# Patient Record
Sex: FEMALE | Employment: OTHER | URBAN - METROPOLITAN AREA
[De-identification: names, ages, dates, MRNs, and addresses within clinical notes are randomized per-mention and may not be internally consistent; named-entity substitution may affect disease eponyms.]

---

## 2017-02-02 ENCOUNTER — APPOINTMENT (OUTPATIENT)
Dept: GENERAL RADIOLOGY | Age: 72
End: 2017-02-02
Attending: PHYSICIAN ASSISTANT
Payer: COMMERCIAL

## 2017-02-02 ENCOUNTER — HOSPITAL ENCOUNTER (EMERGENCY)
Age: 72
Discharge: HOME | End: 2017-02-02
Attending: EMERGENCY MEDICINE
Payer: COMMERCIAL

## 2017-02-02 VITALS
BODY MASS INDEX: 35.49 KG/M2 | DIASTOLIC BLOOD PRESSURE: 76 MMHG | HEIGHT: 65 IN | RESPIRATION RATE: 20 BRPM | WEIGHT: 213 LBS | HEART RATE: 75 BPM | SYSTOLIC BLOOD PRESSURE: 142 MMHG | TEMPERATURE: 98.3 F | OXYGEN SATURATION: 99 %

## 2017-02-02 DIAGNOSIS — K21.9 GASTROESOPHAGEAL REFLUX DISEASE WITHOUT ESOPHAGITIS: ICD-10-CM

## 2017-02-02 DIAGNOSIS — R07.9 CHEST PAIN, UNSPECIFIED TYPE: Primary | ICD-10-CM

## 2017-02-02 LAB
ALBUMIN SERPL BCP-MCNC: 2.7 G/DL (ref 3.5–5)
ALBUMIN/GLOB SERPL: 0.6 {RATIO} (ref 1.1–2.2)
ALP SERPL-CCNC: 98 U/L (ref 45–117)
ALT SERPL-CCNC: 27 U/L (ref 12–78)
ANION GAP BLD CALC-SCNC: 9 MMOL/L (ref 5–15)
APPEARANCE UR: CLEAR
AST SERPL W P-5'-P-CCNC: 25 U/L (ref 15–37)
ATRIAL RATE: 80 BPM
BACTERIA URNS QL MICRO: NEGATIVE /HPF
BASOPHILS # BLD AUTO: 0 K/UL (ref 0–0.1)
BASOPHILS # BLD: 0 % (ref 0–1)
BILIRUB SERPL-MCNC: 0.9 MG/DL (ref 0.2–1)
BILIRUB UR QL: NEGATIVE
BUN SERPL-MCNC: 11 MG/DL (ref 6–20)
BUN/CREAT SERPL: 13 (ref 12–20)
CALCIUM SERPL-MCNC: 8.7 MG/DL (ref 8.5–10.1)
CALCULATED P AXIS, ECG09: 3 DEGREES
CALCULATED R AXIS, ECG10: 1 DEGREES
CALCULATED T AXIS, ECG11: 31 DEGREES
CHLORIDE SERPL-SCNC: 101 MMOL/L (ref 97–108)
CO2 SERPL-SCNC: 29 MMOL/L (ref 21–32)
COLOR UR: ABNORMAL
CREAT SERPL-MCNC: 0.82 MG/DL (ref 0.55–1.02)
DIAGNOSIS, 93000: NORMAL
DIFFERENTIAL METHOD BLD: ABNORMAL
EOSINOPHIL # BLD: 0 K/UL (ref 0–0.4)
EOSINOPHIL NFR BLD: 0 % (ref 0–7)
EPITH CASTS URNS QL MICRO: ABNORMAL /LPF
ERYTHROCYTE [DISTWIDTH] IN BLOOD BY AUTOMATED COUNT: 14.6 % (ref 11.5–14.5)
GLOBULIN SER CALC-MCNC: 4.8 G/DL (ref 2–4)
GLUCOSE SERPL-MCNC: 156 MG/DL (ref 65–100)
GLUCOSE UR STRIP.AUTO-MCNC: NEGATIVE MG/DL
HCT VFR BLD AUTO: 34.9 % (ref 35–47)
HGB BLD-MCNC: 11.7 G/DL (ref 11.5–16)
HGB UR QL STRIP: ABNORMAL
HYALINE CASTS URNS QL MICRO: ABNORMAL /LPF (ref 0–5)
KETONES UR QL STRIP.AUTO: NEGATIVE MG/DL
LACTATE SERPL-SCNC: 1.5 MMOL/L (ref 0.4–2)
LEUKOCYTE ESTERASE UR QL STRIP.AUTO: ABNORMAL
LIPASE SERPL-CCNC: 142 U/L (ref 73–393)
LYMPHOCYTES # BLD AUTO: 9 % (ref 12–49)
LYMPHOCYTES # BLD: 1.6 K/UL (ref 0.8–3.5)
MCH RBC QN AUTO: 29.3 PG (ref 26–34)
MCHC RBC AUTO-ENTMCNC: 33.5 G/DL (ref 30–36.5)
MCV RBC AUTO: 87.3 FL (ref 80–99)
MONOCYTES # BLD: 0.9 K/UL (ref 0–1)
MONOCYTES NFR BLD AUTO: 5 % (ref 5–13)
NEUTS BAND NFR BLD MANUAL: 3 % (ref 0–6)
NEUTS SEG # BLD: 15.4 K/UL (ref 1.8–8)
NEUTS SEG NFR BLD AUTO: 83 % (ref 32–75)
NITRITE UR QL STRIP.AUTO: NEGATIVE
P-R INTERVAL, ECG05: 162 MS
PH UR STRIP: 7.5 [PH] (ref 5–8)
PLATELET # BLD AUTO: 274 K/UL (ref 150–400)
PLATELET COMMENTS,PCOM: ABNORMAL
POTASSIUM SERPL-SCNC: 3.7 MMOL/L (ref 3.5–5.1)
PROT SERPL-MCNC: 7.5 G/DL (ref 6.4–8.2)
PROT UR STRIP-MCNC: NEGATIVE MG/DL
Q-T INTERVAL, ECG07: 386 MS
QRS DURATION, ECG06: 88 MS
QTC CALCULATION (BEZET), ECG08: 445 MS
RBC # BLD AUTO: 4 M/UL (ref 3.8–5.2)
RBC #/AREA URNS HPF: ABNORMAL /HPF (ref 0–5)
RBC MORPH BLD: ABNORMAL
SODIUM SERPL-SCNC: 139 MMOL/L (ref 136–145)
SP GR UR REFRACTOMETRY: 1.01 (ref 1–1.03)
TROPONIN I SERPL-MCNC: <0.04 NG/ML
TROPONIN I SERPL-MCNC: <0.04 NG/ML
UA: UC IF INDICATED,UAUC: ABNORMAL
UROBILINOGEN UR QL STRIP.AUTO: 1 EU/DL (ref 0.2–1)
VENTRICULAR RATE, ECG03: 80 BPM
WBC # BLD AUTO: 17.9 K/UL (ref 3.6–11)
WBC URNS QL MICRO: ABNORMAL /HPF (ref 0–4)

## 2017-02-02 PROCEDURE — 81001 URINALYSIS AUTO W/SCOPE: CPT | Performed by: PHYSICIAN ASSISTANT

## 2017-02-02 PROCEDURE — 83690 ASSAY OF LIPASE: CPT | Performed by: EMERGENCY MEDICINE

## 2017-02-02 PROCEDURE — 84484 ASSAY OF TROPONIN QUANT: CPT | Performed by: EMERGENCY MEDICINE

## 2017-02-02 PROCEDURE — 93005 ELECTROCARDIOGRAM TRACING: CPT

## 2017-02-02 PROCEDURE — 74011250636 HC RX REV CODE- 250/636: Performed by: PHYSICIAN ASSISTANT

## 2017-02-02 PROCEDURE — 36415 COLL VENOUS BLD VENIPUNCTURE: CPT | Performed by: EMERGENCY MEDICINE

## 2017-02-02 PROCEDURE — 85025 COMPLETE CBC W/AUTO DIFF WBC: CPT | Performed by: EMERGENCY MEDICINE

## 2017-02-02 PROCEDURE — 99285 EMERGENCY DEPT VISIT HI MDM: CPT

## 2017-02-02 PROCEDURE — 83605 ASSAY OF LACTIC ACID: CPT | Performed by: EMERGENCY MEDICINE

## 2017-02-02 PROCEDURE — 96374 THER/PROPH/DIAG INJ IV PUSH: CPT

## 2017-02-02 PROCEDURE — 80053 COMPREHEN METABOLIC PANEL: CPT | Performed by: EMERGENCY MEDICINE

## 2017-02-02 PROCEDURE — 71020 XR CHEST PA LAT: CPT

## 2017-02-02 PROCEDURE — 74011000250 HC RX REV CODE- 250: Performed by: PHYSICIAN ASSISTANT

## 2017-02-02 PROCEDURE — 96361 HYDRATE IV INFUSION ADD-ON: CPT

## 2017-02-02 PROCEDURE — 74011250637 HC RX REV CODE- 250/637: Performed by: PHYSICIAN ASSISTANT

## 2017-02-02 RX ORDER — FAMOTIDINE 10 MG/ML
20 INJECTION INTRAVENOUS
Status: COMPLETED | OUTPATIENT
Start: 2017-02-02 | End: 2017-02-02

## 2017-02-02 RX ADMIN — FAMOTIDINE 20 MG: 10 INJECTION, SOLUTION INTRAVENOUS at 02:43

## 2017-02-02 RX ADMIN — LIDOCAINE HYDROCHLORIDE 40 ML: 20 SOLUTION ORAL; TOPICAL at 02:40

## 2017-02-02 RX ADMIN — SODIUM CHLORIDE 1000 ML: 900 INJECTION, SOLUTION INTRAVENOUS at 01:38

## 2017-02-02 NOTE — ED PROVIDER NOTES
HPI Comments: 69 y.o. female with past medical history significant for hypertension, GERD, chronic pain, and endocrine disease who presents from home with chief complaint of chest pain. States she began with left sided chest pain that began around 2000. Pain to left side and left arm. No exertional symptoms. States mild SOB with pain but none otherwise. Pt states she also vomited this am after drinking creamer which \"happens\" to her. Denies abd pain. Denies fever, cough, abd pain, flank pain, urinary symptoms. Non smoker. Review of old chart shows Negative stress by Dr Taina Santillan Dec 2015. Patient is a 66 y.o. female presenting with chest pain. The history is provided by the patient. Chest Pain (Angina)    This is a new problem. The current episode started 3 to 5 hours ago. The problem has been gradually improving. The pain is at a severity of 7/10. The pain radiates to the left arm. Pertinent negatives include no abdominal pain, no back pain, no cough, no diaphoresis, no dizziness, no exertional chest pressure, no headaches, no numbness, no palpitations and no weakness.         Past Medical History:   Diagnosis Date    Abdominal pain 6/18/14     note from [facility] directive discussed with patient 07/22/2015    Arthritis      dr Leandro buck          7/8/15 note/lab    Bacteremia due to Klebsiella pneumoniae 2/2/2016     [facility] note from Dr Nick Boyer, Infect Disease    Chronic pain      low back pain/arthritis      [facility] note 11/2/16    Contact dermatitis and other eczema, due to unspecified cause      hyperpigmented macules/seb k    Elevated LFTs      per info [facility] Dr Eda Steen [facility] on report    Endocrine disease      hypothyroid    GERD (gastroesophageal reflux disease)      chelsy burns    HSV infection     Hypertension     Hypothyroid 10/2012    Insomnia     Melasma 3/3/15     notes from [facility]    Pain management counseling, encounter for 05/05/2016     sees Dr Mable Trinh 10/7/16    Rhinitis, allergic nonseasonal     Well woman exam (no gynecological exam) 07/29/2016     zedler's note rec'd       Past Surgical History:   Procedure Laterality Date    Hx cholecystectomy      Hx tubal ligation      Colonoscopy  8/1/11     dr Arti Tamayo 10 year repeat    [facility]         History reviewed. No pertinent family history. Social History     Social History    Marital status:      Spouse name: N/A    Number of children: N/A    Years of education: N/A     Occupational History    Not on file. Social History Main Topics    Smoking status: Never Smoker    Smokeless tobacco: Never Used    Alcohol use No    Drug use: No    Sexual activity: Not Currently     Other Topics Concern    Not on file     Social History Narrative         ALLERGIES: Pcn [penicillins]; Tramadol; Proventil [albuterol sulfate]; Ace inhibitors; Albuterol; Ambien [zolpidem]; Ciprofloxacin; Lisinopril; Oxaprozin; Sulfadiazine; and Trazodone    Review of Systems   Constitutional: Negative. Negative for diaphoresis. HENT: Negative for ear discharge. Eyes: Negative for photophobia, pain, discharge and visual disturbance. Respiratory: Negative for apnea, cough and chest tightness. Cardiovascular: Positive for chest pain. Negative for palpitations and leg swelling. Gastrointestinal: Negative for abdominal distention, abdominal pain and blood in stool. Genitourinary: Negative for difficulty urinating, dysuria, flank pain, frequency and hematuria. Musculoskeletal: Negative for back pain, gait problem, joint swelling, myalgias and neck pain. Skin: Negative for color change and pallor. Neurological: Negative for dizziness, syncope, weakness, numbness and headaches. Psychiatric/Behavioral: Negative for behavioral problems and confusion. The patient is not nervous/anxious.         Vitals: 02/02/17 0027   BP: 132/80   Pulse: 79   Resp: 19   Temp: 98.3 °F (36.8 °C)   SpO2: 99%   Weight: 96.6 kg (213 lb)   Height: 5' 5\" (1.651 m)            Physical Exam   Constitutional: She is oriented to person, place, and time. She appears well-developed and well-nourished. HENT:   Head: Normocephalic and atraumatic. Right Ear: External ear normal.   Left Ear: External ear normal.   Nose: Nose normal.   Mouth/Throat: Oropharynx is clear and moist.   Eyes: Conjunctivae and EOM are normal. Pupils are equal, round, and reactive to light. Right eye exhibits no discharge. Left eye exhibits no discharge. Neck: Normal range of motion. Neck supple. Cardiovascular: Normal rate, regular rhythm, normal heart sounds and intact distal pulses. Pulmonary/Chest: Effort normal and breath sounds normal.   Abdominal: Soft. Bowel sounds are normal. She exhibits no distension. There is no tenderness. There is no rebound and no guarding. Musculoskeletal: Normal range of motion. She exhibits no edema or tenderness. Neurological: She is alert and oriented to person, place, and time. No cranial nerve deficit. Coordination normal.   Skin: Skin is warm and dry. No rash noted. Psychiatric: She has a normal mood and affect. Her behavior is normal. Judgment and thought content normal.   Nursing note and vitals reviewed. MDM  Number of Diagnoses or Management Options  Chest pain, unspecified type:   Gastroesophageal reflux disease without esophagitis:   Diagnosis management comments: 67 yo female with chest pain earlier this evening; no exertional CP, stress neg Dec 2015; EKG and 2 trops negative; will d/c with PCP and cards followup; also with GI at request of pt; return if change or worsening of symptoms.  FOSTER Santos          Amount and/or Complexity of Data Reviewed  Clinical lab tests: ordered and reviewed  Tests in the radiology section of CPT®: ordered and reviewed  Discuss the patient with other providers: yes  Independent visualization of images, tracings, or specimens: yes      ED Course       Procedures     ED EKG interpretation:  Rhythm: normal sinus rhythm; and regular . Rate (approx.): 80; Axis: normal; P wave: normal; QRS interval: normal ; ST/T wave: normal. This EKG was interpreted by Dr. Eulalio Pacheco and documented by Chani Patient. Garrett Jaimes PA-C,ED Provider. Patient has been reassessed. States now with some pain; repeat EKG with no changes;   ED EKG interpretation:  Rhythm: normal sinus rhythm; and regular . Rate (approx.): 74; Axis: normal; P wave: normal; QRS interval: normal ; ST/T wave: normal. This EKG was interpreted by Dr. Eulalio Pacheco and documented by Chani Patient. Garrett Jaimes PA-C,ED Provider. Discussed case with attending Physician Eulalio Pacheco in to see pt. Agrees with care and plan. FOSTER Napoles    Patient has been reassessed. Feeling better. Reviewed labs, medications and radiographics with patient. Ready to discharge home. Patient's results have been reviewed with them. Patient and/or family have verbally conveyed their understanding and agreement of the patient's signs, symptoms, diagnosis, treatment and prognosis and additionally agree to follow up as recommended or return to the Emergency Room should their condition change prior to follow-up. Discharge instructions have also been provided to the patient with some educational information regarding their diagnosis as well a list of reasons why they would want to return to the ER prior to their follow-up appointment should their condition change.   FOSTER Napoles

## 2017-02-02 NOTE — ED TRIAGE NOTES
Patient presents to the emergency department reporting chest pain, shortness of breath, and left arm pain. Patient reports onset of pain \"sometime this evening. \"  Patient reports pain is a burning sensation, and patient reports significant GI issues. Patient reports loose stool and vomiting yesterday.

## 2017-02-02 NOTE — DISCHARGE INSTRUCTIONS
Chest Pain: Care Instructions  Your Care Instructions  There are many things that can cause chest pain. Some are not serious and will get better on their own in a few days. But some kinds of chest pain need more testing and treatment. Your doctor may have recommended a follow-up visit in the next 8 to 12 hours. If you are not getting better, you may need more tests or treatment. Even though your doctor has released you, you still need to watch for any problems. The doctor carefully checked you, but sometimes problems can develop later. If you have new symptoms or if your symptoms do not get better, get medical care right away. If you have worse or different chest pain or pressure that lasts more than 5 minutes or you passed out (lost consciousness), call 911 or seek other emergency help right away. A medical visit is only one step in your treatment. Even if you feel better, you still need to do what your doctor recommends, such as going to all suggested follow-up appointments and taking medicines exactly as directed. This will help you recover and help prevent future problems. How can you care for yourself at home? · Rest until you feel better. · Take your medicine exactly as prescribed. Call your doctor if you think you are having a problem with your medicine. · Do not drive after taking a prescription pain medicine. When should you call for help? Call 911 if:  · You passed out (lost consciousness). · You have severe difficulty breathing. · You have symptoms of a heart attack. These may include:  ¨ Chest pain or pressure, or a strange feeling in your chest.  ¨ Sweating. ¨ Shortness of breath. ¨ Nausea or vomiting. ¨ Pain, pressure, or a strange feeling in your back, neck, jaw, or upper belly or in one or both shoulders or arms. ¨ Lightheadedness or sudden weakness. ¨ A fast or irregular heartbeat.   After you call 911, the  may tell you to chew 1 adult-strength or 2 to 4 low-dose aspirin. Wait for an ambulance. Do not try to drive yourself. Call your doctor today if:  · You have any trouble breathing. · Your chest pain gets worse. · You are dizzy or lightheaded, or you feel like you may faint. · You are not getting better as expected. · You are having new or different chest pain. Where can you learn more? Go to http://petey-rona.info/. Enter A120 in the search box to learn more about \"Chest Pain: Care Instructions. \"  Current as of: May 27, 2016  Content Version: 11.1  © 8461-3169 [employer]. Care instructions adapted under license by [employer] (which disclaims liability or warranty for this information). If you have questions about a medical condition or this instruction, always ask your healthcare professional. [address] any warranty or liability for your use of this information. We hope that we have addressed all of your medical concerns. The examination and treatment you received in the Emergency Department were for an emergent problem and were not intended as complete care. It is important that you follow up with your healthcare provider(s) for ongoing care. If your symptoms worsen or do not improve as expected, and you are unable to reach your usual health care provider(s), you should return to the Emergency Department. Today's healthcare is undergoing tremendous change, and patient satisfaction surveys are one of the many tools to assess the quality of medical care. You may receive a survey from the [employer] organization regarding your experience in the Emergency Department. I hope that your experience has been completely positive, particularly the medical care that I provided. As such, please participate in the survey; anything less than excellent does not meet my expectations or intentions.         [facility] and [facility] participate in nationally recognized quality of care measures. If your blood pressure is greater than 120/80, as reported below, we urge that you seek medical care to address the potential of high blood pressure, commonly known as hypertension. Hypertension can be hereditary or can be caused by certain medical conditions, pain, stress, or \"white coat syndrome. \"       Please make an appointment with your health care provider(s) for follow up of your Emergency Department visit. VITALS:   Patient Vitals for the past 8 hrs:   Temp Pulse Resp BP SpO2   02/02/17 0415 - 78 19 140/76 97 %   02/02/17 0330 - 79 19 147/75 99 %   02/02/17 0315 - 75 19 152/82 98 %   02/02/17 0247 - 74 20 146/76 100 %   02/02/17 0130 - 75 17 131/81 100 %   02/02/17 0027 98.3 °F (36.8 °C) 79 19 132/80 99 %          Thank you for allowing us to provide you with medical care today. We realize that you have many choices for your emergency care needs. Please choose us in the future for any continued health care needs. Beronica Hoffman  [address].   Office: [phone]            Recent Results (from the past 24 hour(s))   EKG, 12 LEAD, INITIAL    Collection Time: 02/02/17 12:30 AM   Result Value Ref Range    Ventricular Rate 80 BPM    Atrial Rate 80 BPM    P-R Interval 162 ms    QRS Duration 88 ms    Q-T Interval 386 ms    QTC Calculation (Bezet) 445 ms    Calculated P Axis 3 degrees    Calculated R Axis 1 degrees    Calculated T Axis 31 degrees    Diagnosis       Normal sinus rhythm  When compared with ECG of 23-DEC-2015 03:42,  premature ventricular complexes are no longer present     CBC WITH AUTOMATED DIFF    Collection Time: 02/02/17 12:41 AM   Result Value Ref Range    WBC 17.9 (H) 3.6 - 11.0 K/uL    RBC 4.00 3.80 - 5.20 M/uL    HGB 11.7 11.5 - 16.0 g/dL    HCT 34.9 (L) 35.0 - 47.0 %    MCV 87.3 80.0 - 99.0 FL    MCH 29.3 26.0 - 34.0 PG    MCHC 33.5 30.0 - 36.5 g/dL    RDW 14.6 (H) 11.5 - 14.5 % PLATELET 070 518 - 260 K/uL    NEUTROPHILS 83 (H) 32 - 75 %    BAND NEUTROPHILS 3 0 - 6 %    LYMPHOCYTES 9 (L) 12 - 49 %    MONOCYTES 5 5 - 13 %    EOSINOPHILS 0 0 - 7 %    BASOPHILS 0 0 - 1 %    ABS. NEUTROPHILS 15.4 (H) 1.8 - 8.0 K/UL    ABS. LYMPHOCYTES 1.6 0.8 - 3.5 K/UL    ABS. MONOCYTES 0.9 0.0 - 1.0 K/UL    ABS. EOSINOPHILS 0.0 0.0 - 0.4 K/UL    ABS. BASOPHILS 0.0 0.0 - 0.1 K/UL    DF MANUAL      PLATELET COMMENTS LARGE PLATELETS      RBC COMMENTS ANISOCYTOSIS  1+       METABOLIC PANEL, COMPREHENSIVE    Collection Time: 02/02/17 12:41 AM   Result Value Ref Range    Sodium 139 136 - 145 mmol/L    Potassium 3.7 3.5 - 5.1 mmol/L    Chloride 101 97 - 108 mmol/L    CO2 29 21 - 32 mmol/L    Anion gap 9 5 - 15 mmol/L    Glucose 156 (H) 65 - 100 mg/dL    BUN 11 6 - 20 MG/DL    Creatinine 0.82 0.55 - 1.02 MG/DL    BUN/Creatinine ratio 13 12 - 20      GFR est AA >60 >60 ml/min/1.73m2    GFR est non-AA >60 >60 ml/min/1.73m2    Calcium 8.7 8.5 - 10.1 MG/DL    Bilirubin, total 0.9 0.2 - 1.0 MG/DL    ALT (SGPT) 27 12 - 78 U/L    AST (SGOT) 25 15 - 37 U/L    Alk.  phosphatase 98 45 - 117 U/L    Protein, total 7.5 6.4 - 8.2 g/dL    Albumin 2.7 (L) 3.5 - 5.0 g/dL    Globulin 4.8 (H) 2.0 - 4.0 g/dL    A-G Ratio 0.6 (L) 1.1 - 2.2     TROPONIN I    Collection Time: 02/02/17 12:41 AM   Result Value Ref Range    Troponin-I, Qt. <0.04 <0.05 ng/mL   LIPASE    Collection Time: 02/02/17 12:41 AM   Result Value Ref Range    Lipase 142 73 - 393 U/L   URINALYSIS W/ REFLEX CULTURE    Collection Time: 02/02/17  1:27 AM   Result Value Ref Range    Color YELLOW/STRAW      Appearance CLEAR CLEAR      Specific gravity 1.008 1.003 - 1.030      pH (UA) 7.5 5.0 - 8.0      Protein NEGATIVE  NEG mg/dL    Glucose NEGATIVE  NEG mg/dL    Ketone NEGATIVE  NEG mg/dL    Bilirubin NEGATIVE  NEG      Blood SMALL (A) NEG      Urobilinogen 1.0 0.2 - 1.0 EU/dL    Nitrites NEGATIVE  NEG      Leukocyte Esterase TRACE (A) NEG      WBC 0-4 0 - 4 /hpf    RBC 0-5 0 - 5 /hpf    Epithelial cells FEW FEW /lpf    Bacteria NEGATIVE  NEG /hpf    UA:UC IF INDICATED CULTURE NOT INDICATED BY UA RESULT CNI      Hyaline cast 0-2 0 - 5 /lpf   LACTIC ACID, PLASMA    Collection Time: 02/02/17  1:35 AM   Result Value Ref Range    Lactic acid 1.5 0.4 - 2.0 MMOL/L   TROPONIN I    Collection Time: 02/02/17  3:33 AM   Result Value Ref Range    Troponin-I, Qt. <0.04 <0.05 ng/mL       Xr Chest Pa Lat    Result Date: 2/2/2017  EXAM:  CR chest PA lateral INDICATION:  Chest pain, shortness of breath, and left arm pain starting tonight. COMPARISON: 12/30/2015. TECHNIQUE: Frontal and lateral chest views. FINDINGS: Cardiac monitoring wires overlie the thorax. The cardiomediastinal contours are stable. There is stable biapical pleural parenchymal scarring. The lungs and pleural spaces are otherwise clear. The bones and upper abdomen are stable. IMPRESSION: There is no acute process.

## 2017-02-02 NOTE — ED NOTES
Pt is A&OX3 airway patent resting on str without signs of distress. Pt given discharge instructions and advised to follow up with her PCP. Pt expressed understanding of such information. IV removed and pressure dressing applied. Pt taken to waiting room by wheelchair.

## 2017-02-13 DIAGNOSIS — I10 ESSENTIAL HYPERTENSION WITH GOAL BLOOD PRESSURE LESS THAN 130/85: ICD-10-CM

## 2017-02-13 DIAGNOSIS — E03.9 ACQUIRED HYPOTHYROIDISM: ICD-10-CM

## 2017-02-13 RX ORDER — HYDROCHLOROTHIAZIDE 25 MG/1
25 TABLET ORAL DAILY
Qty: 90 TAB | Refills: 0 | Status: SHIPPED | OUTPATIENT
Start: 2017-02-13 | End: 2017-07-12 | Stop reason: SDUPTHER

## 2017-02-13 RX ORDER — VERAPAMIL HYDROCHLORIDE 120 MG/1
TABLET, FILM COATED, EXTENDED RELEASE ORAL
Qty: 90 TAB | Refills: 0 | Status: SHIPPED | OUTPATIENT
Start: 2017-02-13 | End: 2017-05-01 | Stop reason: SDUPTHER

## 2017-02-13 RX ORDER — LEVOTHYROXINE SODIUM 50 UG/1
TABLET ORAL
Qty: 90 TAB | Refills: 2 | Status: SHIPPED | OUTPATIENT
Start: 2017-02-13 | End: 2017-12-11 | Stop reason: SDUPTHER

## 2017-02-13 NOTE — TELEPHONE ENCOUNTER
She is due to have her lipids checked but she has had some labs done in September. She was seen for [facility] end of October and it is ok to wait until April for her to come in. Some labs also done elsewhere but are in the system.  TSH was checked end of Sept.

## 2017-03-14 RX ORDER — POTASSIUM CHLORIDE 1500 MG/1
TABLET, EXTENDED RELEASE ORAL
Qty: 90 TAB | Refills: 2 | Status: SHIPPED | OUTPATIENT
Start: 2017-03-14 | End: 2017-10-18 | Stop reason: ALTCHOICE

## 2017-05-01 RX ORDER — VERAPAMIL HYDROCHLORIDE 120 MG/1
TABLET, FILM COATED, EXTENDED RELEASE ORAL
Qty: 90 TAB | Refills: 0 | Status: SHIPPED | OUTPATIENT
Start: 2017-05-01 | End: 2017-05-24

## 2017-05-01 NOTE — TELEPHONE ENCOUNTER
She is due in for recheck and needs to come fasting-no recent lipids done-most of other labs are current as of September

## 2017-05-14 ENCOUNTER — APPOINTMENT (OUTPATIENT)
Dept: CT IMAGING | Age: 72
DRG: 871 | End: 2017-05-14
Attending: EMERGENCY MEDICINE
Payer: MEDICARE

## 2017-05-14 ENCOUNTER — HOSPITAL ENCOUNTER (INPATIENT)
Age: 72
LOS: 9 days | Discharge: NURSING, REHAB OR LONG-TERM CARE | DRG: 871 | End: 2017-05-24
Attending: EMERGENCY MEDICINE | Admitting: INTERNAL MEDICINE
Payer: MEDICARE

## 2017-05-14 DIAGNOSIS — R11.2 NAUSEA AND VOMITING, INTRACTABILITY OF VOMITING NOT SPECIFIED, UNSPECIFIED VOMITING TYPE: ICD-10-CM

## 2017-05-14 DIAGNOSIS — G93.40 ACUTE ENCEPHALOPATHY: ICD-10-CM

## 2017-05-14 DIAGNOSIS — I95.9 HYPOTENSION, UNSPECIFIED HYPOTENSION TYPE: ICD-10-CM

## 2017-05-14 DIAGNOSIS — N17.9 ACUTE RENAL FAILURE, UNSPECIFIED ACUTE RENAL FAILURE TYPE (HCC): Primary | ICD-10-CM

## 2017-05-14 DIAGNOSIS — R19.7 DIARRHEA, UNSPECIFIED TYPE: ICD-10-CM

## 2017-05-14 LAB
BASOPHILS # BLD AUTO: 0 K/UL (ref 0–0.1)
BASOPHILS # BLD: 0 % (ref 0–1)
DIFFERENTIAL METHOD BLD: ABNORMAL
EOSINOPHIL # BLD: 0 K/UL (ref 0–0.4)
EOSINOPHIL NFR BLD: 0 % (ref 0–7)
ERYTHROCYTE [DISTWIDTH] IN BLOOD BY AUTOMATED COUNT: 16.9 % (ref 11.5–14.5)
HCT VFR BLD AUTO: 32.8 % (ref 35–47)
HGB BLD-MCNC: 11.1 G/DL (ref 11.5–16)
LACTATE SERPL-SCNC: 10.3 MMOL/L (ref 0.4–2)
LIPASE SERPL-CCNC: 75 U/L (ref 73–393)
LYMPHOCYTES # BLD AUTO: 7 % (ref 12–49)
LYMPHOCYTES # BLD: 1.2 K/UL (ref 0.8–3.5)
MCH RBC QN AUTO: 30.5 PG (ref 26–34)
MCHC RBC AUTO-ENTMCNC: 33.8 G/DL (ref 30–36.5)
MCV RBC AUTO: 90.1 FL (ref 80–99)
METAMYELOCYTES NFR BLD MANUAL: 5 %
MONOCYTES # BLD: 0.2 K/UL (ref 0–1)
MONOCYTES NFR BLD AUTO: 1 % (ref 5–13)
NEUTS BAND NFR BLD MANUAL: 21 % (ref 0–6)
NEUTS SEG # BLD: 14.6 K/UL (ref 1.8–8)
NEUTS SEG NFR BLD AUTO: 66 % (ref 32–75)
PLATELET # BLD AUTO: 240 K/UL (ref 150–400)
RBC # BLD AUTO: 3.64 M/UL (ref 3.8–5.2)
RBC MORPH BLD: ABNORMAL
RBC MORPH BLD: ABNORMAL
WBC # BLD AUTO: 16.8 K/UL (ref 3.6–11)

## 2017-05-14 PROCEDURE — 83605 ASSAY OF LACTIC ACID: CPT | Performed by: EMERGENCY MEDICINE

## 2017-05-14 PROCEDURE — 83690 ASSAY OF LIPASE: CPT | Performed by: EMERGENCY MEDICINE

## 2017-05-14 PROCEDURE — 93005 ELECTROCARDIOGRAM TRACING: CPT

## 2017-05-14 PROCEDURE — 87186 SC STD MICRODIL/AGAR DIL: CPT | Performed by: EMERGENCY MEDICINE

## 2017-05-14 PROCEDURE — 51702 INSERT TEMP BLADDER CATH: CPT

## 2017-05-14 PROCEDURE — 74011250636 HC RX REV CODE- 250/636: Performed by: EMERGENCY MEDICINE

## 2017-05-14 PROCEDURE — 77030005514 HC CATH URETH FOL14 BARD -A

## 2017-05-14 PROCEDURE — 99285 EMERGENCY DEPT VISIT HI MDM: CPT

## 2017-05-14 PROCEDURE — 80053 COMPREHEN METABOLIC PANEL: CPT | Performed by: EMERGENCY MEDICINE

## 2017-05-14 PROCEDURE — 36415 COLL VENOUS BLD VENIPUNCTURE: CPT | Performed by: EMERGENCY MEDICINE

## 2017-05-14 PROCEDURE — C1751 CATH, INF, PER/CENT/MIDLINE: HCPCS

## 2017-05-14 PROCEDURE — 77030008768 HC TU NG VYGC -A

## 2017-05-14 PROCEDURE — 96361 HYDRATE IV INFUSION ADD-ON: CPT

## 2017-05-14 PROCEDURE — 87040 BLOOD CULTURE FOR BACTERIA: CPT | Performed by: EMERGENCY MEDICINE

## 2017-05-14 PROCEDURE — 96365 THER/PROPH/DIAG IV INF INIT: CPT

## 2017-05-14 PROCEDURE — 75810000137 HC PLCMT CENT VENOUS CATH

## 2017-05-14 PROCEDURE — 87077 CULTURE AEROBIC IDENTIFY: CPT | Performed by: EMERGENCY MEDICINE

## 2017-05-14 PROCEDURE — 85025 COMPLETE CBC W/AUTO DIFF WBC: CPT | Performed by: EMERGENCY MEDICINE

## 2017-05-14 RX ORDER — SODIUM CHLORIDE 0.9 % (FLUSH) 0.9 %
10 SYRINGE (ML) INJECTION
Status: COMPLETED | OUTPATIENT
Start: 2017-05-14 | End: 2017-05-15

## 2017-05-14 RX ADMIN — SODIUM CHLORIDE 1000 ML: 900 INJECTION, SOLUTION INTRAVENOUS at 23:12

## 2017-05-14 RX ADMIN — SODIUM CHLORIDE 1000 ML: 900 INJECTION, SOLUTION INTRAVENOUS at 22:56

## 2017-05-14 RX ADMIN — SODIUM CHLORIDE 1000 ML: 900 INJECTION, SOLUTION INTRAVENOUS at 23:56

## 2017-05-14 NOTE — IP AVS SNAPSHOT
[address] 
[phone] Patient: Joao Gilliam MRN: [id number] HXK:[id number] You are allergic to the following Allergen Reactions Pcn (Penicillins) Swelling Has tolerated Cefepime Tramadol Nausea and Vomiting SEVERE If taken w/o food Proventil (Albuterol Sulfate) Hives Ace Inhibitors Angioedema Albuterol Swelling  
 swelling Ambien (Zolpidem) Other (comments) Nightmares and memory disturbance Ciprofloxacin Other (comments) Sore throat and trouble swallowing Lisinopril Swelling Oxaprozin Nausea and Vomiting  
 severe stomach upset Sulfadiazine Swelling  
 swelling Trazodone Other (comments) Vivid dreams and felt disoriented Recent Documentation Height Weight Breastfeeding? BMI OB Status Smoking Status 1.626 m 104.6 kg No 39.58 kg/m2 Postmenopausal Never Smoker Emergency Contacts Name Discharge Info Relation Home Work Mobile Sandeep Mtz DISCHARGE CAREGIVER [3] Child [2] [phone]    
 Sandeep LEWIS  Child [2] [phone] About your hospitalization You were admitted on:  May 15, 2017 You last received care in the:  [place] [id number] You were discharged on:  May 24, 2017 Unit phone number:  [phone] Why you were hospitalized Your primary diagnosis was:  Septic Shock (Hcc) Your diagnoses also included:  E Coli Bacteremia, Septic Encephalopathy, Demand Ischemia (Hcc), Partial Small Bowel Obstruction (Hcc), Hypokalemia, High Anion Gap Metabolic Acidosis, Rohan (Acute Kidney Injury) ([facility]), Abnormal Lfts, Acute Gastroenteritis, Hypothyroidism, Ra (Rheumatoid Arthritis) ([facility]), Oral Candidiasis Providers Seen During Your Hospitalizations Provider Role Specialty Primary office phone Linda Wilkes MD Attending Provider Emergency Medicine [phone] Sherie Barahona MD Attending Provider Internal Medicine [phone] Rosa Brito MD Attending Provider Internal Medicine [phone] Clifford Denver, DO Attending Provider Internal Medicine [phone] Filbert Denver, MD Attending Provider Internal Medicine [phone] Your Primary Care Physician (PCP) Primary Care Physician Office Phone Office Fax Mary Felipe [phone] Follow-up Information Follow up With Details Comments Contact Info [facility] [address] [facility] [address] 
[phone] Hansel Sharma MD In 1 week hospital follow up [address] [facility] [employer] [place] [address] 
[phone] Kisha Calderón MD  Infectious Disease; as instructed [address] [facility] [address] [facility] [address] 
[phone] Aashish Patel MD  Renal; call as needed [address] [facility] [address] [facility]  [address] 
[phone] Your Appointments Wednesday June 21, 2017  1:20 PM EDT HOSPITAL DISCHARGE with Eleonora Marroquin MD  
[facility] ([facility]) [address]  [address] [place] [address]  
[phone] Current Discharge Medication List  
  
START taking these medications Dose & Instructions Dispensing Information Comments Morning Noon Evening Bedtime  
 aluminum-magnesium hydroxide 200-200 mg/5 mL susp 30 mL, diphenhydrAMINE 12.5 mg/5 mL elix 75 mg, lidocaine 2 % soln 30 mL oral suspension (compounded) Your last dose was: Your next dose is:    
   
   
 Dose:  5 mL Take 5 mL by mouth Before breakfast, lunch, and dinner. Quantity:  50 mL Refills:  0  
     
   
   
   
  
 atorvastatin 20 mg tablet Commonly known as:  LIPITOR Your last dose was: Your next dose is:    
   
   
 Dose:  20 mg Take 1 Tab by mouth nightly. Quantity:  30 Tab Refills:  0  
     
   
   
   
  
 carvedilol 3.125 mg tablet Commonly known as:  [place] Your last dose was: Your next dose is:    
   
   
 Dose:  3.125 mg Take 1 Tab by mouth two (2) times a day. Quantity:  60 Tab Refills:  0  
     
   
   
   
  
 cefTRIAXone 2 gram 2 g, ADDaptor 1 Device IVPB Your last dose was: Your next dose is:    
   
   
 Dose:  2 g  
2 g by IntraVENous route every twenty-four (24) hours. Quantity:  1 Dose Refills:  0  
     
   
   
   
  
 furosemide 40 mg tablet Commonly known as:  LASIX Your last dose was: Your next dose is:    
   
   
 Dose:  40 mg Take 1 Tab by mouth daily. Quantity:  30 Tab Refills:  0  
     
   
   
   
  
 ipratropium 0.02 % nebulizer solution Commonly known as:  ATROVENT Your last dose was: Your next dose is:    
   
   
 Dose:  0.5 mg  
2.5 mL by Nebulization route every six (6) hours as needed. Quantity:  100 mL Refills:  0  
     
   
   
   
  
 pantoprazole 40 mg tablet Commonly known as:  PROTONIX Your last dose was: Your next dose is:    
   
   
 Dose:  40 mg Take 1 Tab by mouth daily. Indications: gastroenteritis Quantity:  30 Tab Refills:  0 CONTINUE these medications which have CHANGED Dose & Instructions Dispensing Information Comments Morning Noon Evening Bedtime  
 baclofen 10 mg tablet Commonly known as:  LIORESAL What changed:   
- how much to take - when to take this 
- additional instructions Your last dose was: Your next dose is:    
   
   
 Dose:  5 mg Take 0.5 Tabs by mouth three (3) times daily. Take one half tablet every 8 hr.prn for muscle spasm Quantity:  30 Tab Refills:  0  
     
   
   
   
  
 * nystatin topical cream  
 Commonly known as:  MYCOSTATIN What changed:  Another medication with the same name was added. Make sure you understand how and when to take each. Your last dose was: Your next dose is:    
   
   
 Apply  to affected area two (2) times a day. Quantity:  30 g Refills:  0  
     
   
   
   
  
 * nystatin 100,000 unit/mL suspension Commonly known as:  MYCOSTATIN What changed: You were already taking a medication with the same name, and this prescription was added. Make sure you understand how and when to take each. Your last dose was: Your next dose is:    
   
   
 Dose:  254255 Units Take 5 mL by mouth four (4) times daily for 13 days. swish and spit  Indications: ORAL CANDIDIASIS Quantity:  250 mL Refills:  0  
     
   
   
   
  
 * Notice: This list has 2 medication(s) that are the same as other medications prescribed for you. Read the directions carefully, and ask your doctor or other care provider to review them with you. CONTINUE these medications which have NOT CHANGED Dose & Instructions Dispensing Information Comments Morning Noon Evening Bedtime  
 aspirin 81 mg tablet Your last dose was: Your next dose is:    
   
   
 Dose:  81 mg Take 81 mg by mouth daily. Refills:  0  
     
   
   
   
  
 azaTHIOprine 50 mg tablet Commonly known as:  The Pepsi Your last dose was: Your next dose is:    
   
   
 Dose:  150 mg Take 150 mg by mouth daily. Refills:  0  
     
   
   
   
  
 CALCIUM 600 + D 600-125 mg-unit Tab Generic drug:  calcium-cholecalciferol (d3) Your last dose was: Your next dose is: Take  by mouth. Refills:  0  
     
   
   
   
  
 gabapentin 100 mg capsule Commonly known as:  NEURONTIN Your last dose was: Your next dose is:    
   
   
 Dose:  100 mg Take 1 Cap by mouth two (2) times a day. Quantity:  60 Cap Refills:  0 guaiFENesin 100 mg/5 mL liquid Commonly known as:  ROBITUSSIN Your last dose was: Your next dose is:    
   
   
 Dose:  100 mg Take 100 mg by mouth three (3) times daily as needed for Cough. Refills:  0  
     
   
   
   
  
 hydroCHLOROthiazide 25 mg tablet Commonly known as:  HYDRODIURIL Your last dose was: Your next dose is:    
   
   
 Dose:  25 mg Take 1 Tab by mouth daily. 1 tablet oe time daily for edema ,HTN Quantity:  90 Tab Refills:  0 KLOR-CON M20 20 mEq tablet Generic drug:  potassium chloride Your last dose was: Your next dose is: TAKE 1 TABLET DAILY Quantity:  90 Tab Refills:  2  
     
   
   
   
  
 levothyroxine 50 mcg tablet Commonly known as:  SYNTHROID Your last dose was: Your next dose is: TAKE 1 TABLET DAILY Quantity:  90 Tab Refills:  2  
     
   
   
   
  
 montelukast 10 mg tablet Commonly known as:  SINGULAIR Your last dose was: Your next dose is:    
   
   
 Dose:  10 mg Take 1 Tab by mouth daily. Quantity:  90 Tab Refills:  3  
     
   
   
   
  
 multivitamin tablet Commonly known as:  ONE A DAY Your last dose was: Your next dose is:    
   
   
 Dose:  1 Tab Take 1 Tab by mouth daily. Refills:  0  
     
   
   
   
  
 VITAMIN B COMPLEX PO Your last dose was: Your next dose is:    
   
   
 Dose:  1 Tab Take 1 Tab by mouth daily. 1 tab, PO, daily, 0 Refills Refills:  0  
     
   
   
   
  
 VITAMIN D3 1,000 unit tablet Generic drug:  cholecalciferol Your last dose was: Your next dose is:    
   
   
 Dose:  1000 Units Take 1,000 Units by mouth two (2) times a day. Refills:  0  
     
   
   
   
  
 vitamin E 400 unit capsule Commonly known as:  Mary Silva 83 Your last dose was: Your next dose is:    
   
   
 Dose:  400 Units Take 400 Units by mouth two (2) times a day. Refills:  0 STOP taking these medications   
 acyclovir 400 mg tablet Commonly known as:  ZOVIRAX  
   
  
 oxyCODONE IR 5 mg immediate release tablet Commonly known as:  ROXICODONE  
   
  
 predniSONE 5 mg tablet Commonly known as:  DELTASONE  
   
  
 verapamil  mg tablet Commonly known as:  CALAN-SR Where to Get Your Medications Information on where to get these meds will be given to you by the nurse or doctor. ! Ask your nurse or doctor about these medications  
  aluminum-magnesium hydroxide 200-200 mg/5 mL susp 30 mL, diphenhydrAMINE 12.5 mg/5 mL elix 75 mg, lidocaine 2 % soln 30 mL oral suspension (compounded)  
 atorvastatin 20 mg tablet  
 carvedilol 3.125 mg tablet  
 cefTRIAXone 2 gram 2 g, ADDaptor 1 Device IVPB  
 furosemide 40 mg tablet  
 ipratropium 0.02 % nebulizer solution  
 nystatin 100,000 unit/mL suspension  
 pantoprazole 40 mg tablet Discharge Instructions DISCHARGE SUMMARY from Nurse The following personal items are in your possession at time of discharge: 
 
Dental Appliances: None Visual Aid: None Home Medications: None Jewelry: None Clothing: At bedside Other Valuables: None Personal Items Sent to Safe: none PATIENT INSTRUCTIONS: 
 
 
F-face looks uneven A-arms unable to move or move unevenly S-speech slurred or non-existent T-time-call 911 as soon as signs and symptoms begin-DO NOT go Back to bed or wait to see if you get better-TIME IS BRAIN. Warning Signs of HEART ATTACK Call 911 if you have these symptoms: 
? Chest discomfort. Most heart attacks involve discomfort in the center of the chest that lasts more than a few minutes, or that goes away and comes back. It can feel like uncomfortable pressure, squeezing, fullness, or pain. ? Discomfort in other areas of the upper body. Symptoms can include pain or discomfort in one or both arms, the back, neck, jaw, or stomach. ? Shortness of breath with or without chest discomfort. ? Other signs may include breaking out in a cold sweat, nausea, or lightheadedness. Don't wait more than five minutes to call [address]! Fast action can save your life. Calling 911 is almost always the fastest way to get lifesaving treatment. Emergency Medical Services staff can begin treatment when they arrive  up to an hour sooner than if someone gets to the hospital by car. The discharge information has been reviewed with the patient. The patient verbalized understanding. Discharge medications reviewed with the patient and appropriate educational materials and side effects teaching were provided. Undo Software Activation Thank you for requesting access to [employer] Software. Please follow the instructions below to securely access and download your online medical record. Undo Software allows you to send messages to your doctor, view your test results, renew your prescriptions, schedule appointments, and more. How Do I Sign Up? 1. In your internet browser, go to [email or web address] 
2. Click on the First Time User? Click Here link in the Sign In box. You will be redirect to the New Member Sign Up page. 3. Enter your Undo Software Access Code exactly as it appears below. You will not need to use this code after youve completed the sign-up process. If you do not sign up before the expiration date, you must request a new code. Undo Software Access Code: [email or web address] Expires: 2017  3:51 PM (This is the date your Undo Software access code will ) 4. Enter the last four digits of your Social Security Number (xxxx) and Date of Birth (mm/dd/yyyy) as indicated and click Submit. You will be taken to the next sign-up page. 5. Create a Undo Software ID.  This will be your Undo Software login ID and cannot be changed, so think of one that is secure and easy to remember. 6. Create a Locu password. You can change your password at any time. 7. Enter your Password Reset Question and Answer. This can be used at a later time if you forget your password. 8. Enter your e-mail address. You will receive e-mail notification when new information is available in [address] 9. Click Sign Up. You can now view and download portions of your medical record. 10. Click the Download Summary menu link to download a portable copy of your medical information. Additional Information If you have questions, please visit the Frequently Asked Questions section of the Locu website at [email or web address] Remember, Locu is NOT to be used for urgent needs. For medical emergencies, dial 911. ADDITIONAL CARE RECOMMENDATIONS:  
1. Take medications as prescribed. 2. Keep appointment(s) as recommended/scheduled. 3. Encourage free water intake to improve hypernatremia. DIET: Cardiac Diet Oral Nutritional Supplements: Ensure Enlive with breakfast and dinner ACTIVITY: PT/OT Eval and Treat WOUND CARE: routine left IJ central line care EQUIPMENT needed: as per PT/OT Gastroenteritis: Care Instructions Your Care Instructions Gastroenteritis is an illness that may cause nausea, vomiting, and diarrhea. It is sometimes called \"stomach flu. \" It can be caused by bacteria or a virus. You will probably begin to feel better in 1 to 2 days. In the meantime, get plenty of rest and make sure you do not become dehydrated. Dehydration occurs when your body loses too much fluid. Follow-up care is a key part of your treatment and safety. Be sure to make and go to all appointments, and call your doctor if you are having problems. Its also a good idea to know your test results and keep a list of the medicines you take. How can you care for yourself at home? · If your doctor prescribed antibiotics, take them as directed. Do not stop taking them just because you feel better. You need to take the full course of antibiotics. · Drink plenty of fluids to prevent dehydration, enough so that your urine is light yellow or clear like water. Choose water and other caffeine-free clear liquids until you feel better. If you have kidney, heart, or liver disease and have to limit fluids, talk with your doctor before you increase your fluid intake. · Drink fluids slowly, in frequent, small amounts, because drinking too much too fast can cause vomiting. · Begin eating mild foods, such as dry toast, yogurt, applesauce, bananas, and rice. Avoid spicy, hot, or high-fat foods, and do not drink alcohol or caffeine for a day or two. Do not drink milk or eat ice cream until you are feeling better. How to prevent gastroenteritis · Keep hot foods hot and cold foods cold. · Do not eat meats, dressings, salads, or other foods that have been kept at room temperature for more than 2 hours. · Use a thermometer to check your refrigerator. It should be between 34°F and 40°F. 
· Defrost meats in the refrigerator or microwave, not on the kitchen counter. · Keep your hands and your kitchen clean. Wash your hands, cutting boards, and countertops with hot soapy water frequently. · Cook meat until it is well done. · Do not eat raw eggs or uncooked sauces made with raw eggs. · Do not take chances. If food looks or tastes spoiled, throw it out. When should you call for help? Call 911 anytime you think you may need emergency care. For example, call if: 
· You vomit blood or what looks like coffee grounds. · You passed out (lost consciousness). · You pass maroon or very bloody stools. Call your doctor now or seek immediate medical care if: 
· You have severe belly pain. · You have signs of needing more fluids. You have sunken eyes, a dry mouth, and pass only a little dark urine. · You feel like you are going to faint. · You have increased belly pain that does not go away in 1 to 2 days. · You have new or increased nausea, or you are vomiting. · You have a new or higher fever. · Your stools are black and tarlike or have streaks of blood. Watch closely for changes in your health, and be sure to contact your doctor if: 
· You are dizzy or lightheaded. · You urinate less than usual, or your urine is dark yellow or brown. · You do not feel better with each day that goes by. Where can you learn more? Go to http://petey-rona.info/. Enter N142 in the search box to learn more about \"Gastroenteritis: Care Instructions. \" Current as of: May 24, 2016 Content Version: 11.2 © 3649-9826 [employer]ud[employer]. Care instructions adapted under license by [employer] (which disclaims liability or warranty for this information). If you have questions about a medical condition or this instruction, always ask your healthcare professional. [place] [address] any warranty or liability for your use of this information. Sepsis: Care Instructions Your Care Instructions Sepsis is an infection that has spread throughout your body. It is a life-threatening condition and often causes extremely low blood pressure. This can lead to problems with many different organs. The cause of sepsis is not always clear, but it can happen as part of a long-term or sudden illness. Sometimes even a mild illness can lead to sepsis. Follow-up care is a key part of your treatment and safety. Be sure to make and go to all appointments, and call your doctor if you are having problems. Its also a good idea to know your test results and keep a list of the medicines you take. How can you care for yourself at home? · If your doctor prescribed antibiotics, take them as directed. Do not stop taking them just because you feel better.  You need to take the full course of antibiotics. · Drink plenty of fluids, enough so that your urine is light yellow or clear like water. Choose water or caffeine-free clear liquids until you feel better. If you have kidney, heart, or liver disease and have to limit fluids, talk with your doctor before you increase your fluid intake. You can try rehydration drinks, such as Gatorade or Powerade. · Do not drink alcohol. · Eat a healthy diet. Include fruits, vegetables, and whole grains in your diet every day. · Walking is an easy way to get exercise. Gradually increase the amount you walk every day. Make sure your doctor knows that you are starting an exercise program. 
· Do not smoke or use other tobacco products. If you need help quitting, talk to your doctor about stop-smoking programs and medicines. These can increase your chances of quitting for good. When should you call for help? Call 911 anytime you think you may need emergency care. For example, call if: 
· You passed out (lost consciousness). Call your doctor now or seek immediate medical care if: 
· You have a fever or chills. · You have cool, pale, or clammy skin. · You are dizzy or lightheaded, or you feel like you may faint. · You have any new symptoms, such as a cough, pain in one part of your body, or urinary problems. Watch closely for changes in your health, and be sure to contact your doctor if: 
· You do not get better as expected. Where can you learn more? Go to http://petey-rona.info/. Enter X505 in the search box to learn more about \"Sepsis: Care Instructions. \" Current as of: May 27, 2016 Content Version: 11.2 © 5063-8629 Healthwise, Incorporated. Care instructions adapted under license by [employer] (which disclaims liability or warranty for this information).  If you have questions about a medical condition or this instruction, always ask your healthcare professional. [employer] disclaims any warranty or liability for your use of this information. Discharge Instructions Attachments/References PANTOPRAZOLE (BY MOUTH) (ENGLISH) CEFTRIAXONE (BY INJECTION) (ENGLISH) Discharge Orders None [facility] Announcement We are excited to announce that we are making your provider's discharge notes available to you in Gift Card Combo. You will see these notes when they are completed and signed by the physician that discharged you from your recent hospital stay. If you have any questions or concerns about any information you see in Gift Card Combo, please call the Health Information Department where you were seen or reach out to your Primary Care Provider for more information about your plan of care. Introducing [facility]! Tiffany Hinson introduces Gift Card Combo patient portal. Now you can access parts of your medical record, email your doctor's office, and request medication refills online. 1. In your internet browser, go to [email or web address] 2. Click on the First Time User? Click Here link in the Sign In box. You will see the New Member Sign Up page. 3. Enter your Gift Card Combo Access Code exactly as it appears below. You will not need to use this code after youve completed the sign-up process. If you do not sign up before the expiration date, you must request a new code. · Gift Card Combo Access Code: [email or web address] Expires: 8/22/2017  3:51 PM 
 
4. Enter the last four digits of your Social Security Number (xxxx) and Date of Birth (mm/dd/yyyy) as indicated and click Submit. You will be taken to the next sign-up page. 5. Create a Applitoolst ID. This will be your Gift Card Combo login ID and cannot be changed, so think of one that is secure and easy to remember. 6. Create a Gift Card Combo password. You can change your password at any time. 7. Enter your Password Reset Question and Answer. This can be used at a later time if you forget your password. 8. Enter your e-mail address. You will receive e-mail notification when new information is available in [address] 9. Click Sign Up. You can now view and download portions of your medical record. 10. Click the Download Summary menu link to download a portable copy of your medical information. If you have questions, please visit the Frequently Asked Questions section of the RouterShare website. Remember, RouterShare is NOT to be used for urgent needs. For medical emergencies, dial 911. Now available from your iPhone and Android! General Information Please provide this summary of care documentation to your next provider. Patient Signature:  ____________________________________________________________ Date:  ____________________________________________________________  
  
[facility] Provider Signature:  ____________________________________________________________ Date:  ____________________________________________________________ More Information Pantoprazole (By mouth) Pantoprazole (pan-TOE-pra-zole) Treats gastroesophageal reflux disease (GERD), a damaged esophagus, and high levels of stomach acid. This medicine is a proton pump inhibitor (PPI). Brand Name(s): Protonix There may be other brand names for this medicine. When This Medicine Should Not Be Used: This medicine is not right for everyone. Do not use it if you had an allergic reaction to pantoprazole or similar medicines. How to Use This Medicine:  
Packet, Tablet, Delayed Release Tablet, Long Acting Tablet · Your doctor will tell you how much medicine to use. Do not use more than directed. Take the medicine at least 30 minutes before a meal. 
· Delayed-release tablet: Swallow the tablet whole. Do not crush, break, or chew it. · Delayed-release packet: ¨ To prepare with applesauce: § Mix the packet contents with 1 teaspoon of applesauce.  Do not mix with water, or other liquids or food. Do not divide the packet contents to make smaller doses. § Swallow the mixture within 10 minutes after you mix it. Do not chew or crush the granules. § Sip some water after you take the mixture to make sure you swallow all of the medicine. ¨ To prepare with apple juice: § Mix the packet contents with 1 teaspoon of apple juice in a small cup. Do not divide the packet contents to make smaller doses. § Stir for 5 seconds and drink the mixture immediately. Do not chew or crush the granules. § To make sure you get all of the medicine, add more apple juice to the cup. Drink it immediately. ¨ To prepare for a feeding tube: § Pour the packet contents in a 2-ounce (60 milliliter [mL]) catheter-tip syringe. § Add 10 mL of apple juice to the syringe. Add the mixture to the tube. Gently tap or shake the barrel of the syringe to help empty it. § Add 10 mL of apple juice to the syringe and put it in the tube. Do this at least 2 times. There should be no granules left in the syringe. · This medicine should come with a Medication Guide. Ask your pharmacist for a copy if you do not have one. · Missed dose: Take a dose as soon as you remember. If it is almost time for your next dose, wait until then and take a regular dose. Do not take extra medicine to make up for a missed dose. · Store the medicine in a closed container at room temperature, away from heat, moisture, and direct light. Drugs and Foods to Avoid: Ask your doctor or pharmacist before using any other medicine, including over-the-counter medicines, vitamins, and herbal products. · Some foods and medicines can affect how pantoprazole works. Tell your doctor if you are using any of the following: ¨ Ampicillin, atazanavir, digoxin, erlotinib, ketoconazole, methotrexate, mycophenolate mofetil, nelfinavir ¨ Blood thinner (including warfarin) ¨ Diuretic (water pill) ¨ Iron supplements Warnings While Using This Medicine: · Tell your doctor if you are pregnant or breastfeeding, or if you have liver disease, lupus, or osteoporosis. · This medicine may cause the following problems: ¨ Kidney problems ¨ Low vitamin B12 or magnesium levels ¨ Increased risk of broken bones in the hip, wrist, or spine · This medicine can cause diarrhea. Call your doctor if the diarrhea becomes severe, does not stop, or is bloody. Do not take any medicine to stop diarrhea until you have talked to your doctor. Diarrhea can occur 2 months or more after you stop taking this medicine. · Tell any doctor or dentist who treats you that you are using this medicine. This medicine may affect certain medical test results. · Your doctor will check your progress and the effects of this medicine at regular visits. Keep all appointments. · Keep all medicine out of the reach of children. Never share your medicine with anyone. Possible Side Effects While Using This Medicine:  
Call your doctor right away if you notice any of these side effects: · Allergic reaction: Itching or hives, swelling in your face or hands, swelling or tingling in your mouth or throat, chest tightness, trouble breathing · Blistering, peeling, red skin rash · Fever, joint pain, swelling in your body, unusual weight gain, change in how much or how often you urinate · Joint pain, rash on your cheeks or arms that gets worse in the sun · Seizures, dizziness, uneven heartbeat, muscle cramps or twitching · Severe diarrhea, stomach cramps, fever · Stomach pain, nausea, vomiting, weight loss If you notice other side effects that you think are caused by this medicine, tell your doctor. Call your doctor for medical advice about side effects. You may report side effects to FDA at 3-406-FDA-2452 © 2017 [facility] Information is for End User's use only and may not be sold, redistributed or otherwise used for commercial purposes. The above information is an  only. It is not intended as medical advice for individual conditions or treatments. Talk to your doctor, nurse or pharmacist before following any medical regimen to see if it is safe and effective for you. Ceftriaxone (By injection) Ceftriaxone (yem-cdts-DK-one) Treats infections. This medicine is a cephalosporin antibiotic. Brand Name(s): [employer] cefTRIAXone, [employer] [facility] cefTRIAXone, cefTRIAXone Novaplus There may be other brand names for this medicine. When This Medicine Should Not Be Used: This medicine is not right for everyone. Do not use it if you had an allergic reaction to any other cephalosporin antibiotic. How to Use This Medicine:  
Injectable · Your doctor will prescribe your exact dose and tell you how often it should be given. This medicine is given as a shot into a muscle or through a needle placed into a vein. · A nurse or other health provider will give you this medicine. · Missed dose: You must use this medicine on a fixed schedule. Call your doctor or pharmacist if you miss a dose. Drugs and Foods to Avoid: Ask your doctor or pharmacist before using any other medicine, including over-the-counter medicines, vitamins, and herbal products. Warnings While Using This Medicine: · Tell your doctor if you are pregnant or breastfeeding, or if you have kidney disease, liver disease, anemia, gallbladder disease, pancreas problems, or a history of stomach or bowel disease, such as colitis. Tell your doctor if you are allergic to penicillin. · This medicine can cause diarrhea. Call your doctor if the diarrhea becomes severe, does not stop, or is bloody. Do not take any medicine to stop diarrhea until you have talked to your doctor. Diarrhea can occur 2 months or more after you stop taking this medicine. · Your doctor will do lab tests at regular visits to check on the effects of this medicine. Keep all appointments. · Take all of the medicine in your prescription to clear up your infection, even if you feel better after the first few doses. · Call your doctor if your symptoms do not improve or if they get worse. Possible Side Effects While Using This Medicine:  
Call your doctor right away if you notice any of these side effects: · Allergic reaction: Itching or hives, swelling in your face or hands, swelling or tingling in your mouth or throat, chest tightness, trouble breathing · Dark urine or pale stools, nausea, vomiting, loss of appetite, stomach pain, yellow skin or eyes · Severe diarrhea, diarrhea that contains blood, or vomiting · Shortness of breath, tiredness, uneven heartbeat · Unusual bleeding, bruising, or weakness If you notice these less serious side effects, talk with your doctor: · Change or loss of taste · Dizziness or headache · Mild rash or itching skin · Pain, redness, or swelling where the shot is given · Vaginal itching or discharge If you notice other side effects that you think are caused by this medicine, tell your doctor. Call your doctor for medical advice about side effects. You may report side effects to FDA at 2-406-FDA-0289 © 2017 [facility] Information is for End User's use only and may not be sold, redistributed or otherwise used for commercial purposes. The above information is an  only. It is not intended as medical advice for individual conditions or treatments. Talk to your doctor, nurse or pharmacist before following any medical regimen to see if it is safe and effective for you.

## 2017-05-14 NOTE — IP AVS SNAPSHOT
Current Discharge Medication List  
  
START taking these medications Dose & Instructions Dispensing Information Comments Morning Noon Evening Bedtime  
 aluminum-magnesium hydroxide 200-200 mg/5 mL susp 30 mL, diphenhydrAMINE 12.5 mg/5 mL elix 75 mg, lidocaine 2 % soln 30 mL oral suspension (compounded) Your last dose was: Your next dose is:    
   
   
 Dose:  5 mL Take 5 mL by mouth Before breakfast, lunch, and dinner. Quantity:  50 mL Refills:  0  
     
   
   
   
  
 atorvastatin 20 mg tablet Commonly known as:  LIPITOR Your last dose was: Your next dose is:    
   
   
 Dose:  20 mg Take 1 Tab by mouth nightly. Quantity:  30 Tab Refills:  0  
     
   
   
   
  
 carvedilol 3.125 mg tablet Commonly known as:  Carteret Combe Your last dose was: Your next dose is:    
   
   
 Dose:  3.125 mg Take 1 Tab by mouth two (2) times a day. Quantity:  60 Tab Refills:  0  
     
   
   
   
  
 cefTRIAXone 2 gram 2 g, ADDaptor 1 Device IVPB Your last dose was: Your next dose is:    
   
   
 Dose:  2 g  
2 g by IntraVENous route every twenty-four (24) hours. Quantity:  1 Dose Refills:  0  
     
   
   
   
  
 furosemide 40 mg tablet Commonly known as:  LASIX Your last dose was: Your next dose is:    
   
   
 Dose:  40 mg Take 1 Tab by mouth daily. Quantity:  30 Tab Refills:  0  
     
   
   
   
  
 ipratropium 0.02 % nebulizer solution Commonly known as:  ATROVENT Your last dose was: Your next dose is:    
   
   
 Dose:  0.5 mg  
2.5 mL by Nebulization route every six (6) hours as needed. Quantity:  100 mL Refills:  0  
     
   
   
   
  
 pantoprazole 40 mg tablet Commonly known as:  PROTONIX Your last dose was: Your next dose is:    
   
   
 Dose:  40 mg Take 1 Tab by mouth daily. Indications: gastroenteritis Quantity:  30 Tab Refills:  0 CONTINUE these medications which have CHANGED Dose & Instructions Dispensing Information Comments Morning Noon Evening Bedtime  
 baclofen 10 mg tablet Commonly known as:  LIORESAL What changed:   
- how much to take - when to take this 
- additional instructions Your last dose was: Your next dose is:    
   
   
 Dose:  5 mg Take 0.5 Tabs by mouth three (3) times daily. Take one half tablet every 8 hr.prn for muscle spasm Quantity:  30 Tab Refills:  0  
     
   
   
   
  
 * nystatin topical cream  
Commonly known as:  MYCOSTATIN What changed:  Another medication with the same name was added. Make sure you understand how and when to take each. Your last dose was: Your next dose is:    
   
   
 Apply  to affected area two (2) times a day. Quantity:  30 g Refills:  0  
     
   
   
   
  
 * nystatin 100,000 unit/mL suspension Commonly known as:  MYCOSTATIN What changed: You were already taking a medication with the same name, and this prescription was added. Make sure you understand how and when to take each. Your last dose was: Your next dose is:    
   
   
 Dose:  061464 Units Take 5 mL by mouth four (4) times daily for 13 days. swish and spit  Indications: ORAL CANDIDIASIS Quantity:  250 mL Refills:  0  
     
   
   
   
  
 * Notice: This list has 2 medication(s) that are the same as other medications prescribed for you. Read the directions carefully, and ask your doctor or other care provider to review them with you. CONTINUE these medications which have NOT CHANGED Dose & Instructions Dispensing Information Comments Morning Noon Evening Bedtime  
 aspirin 81 mg tablet Your last dose was: Your next dose is:    
   
   
 Dose:  81 mg Take 81 mg by mouth daily. Refills:  0  
     
   
   
   
  
 azaTHIOprine 50 mg tablet Commonly known as:  [employer]  
   
 Your last dose was: Your next dose is:    
   
   
 Dose:  150 mg Take 150 mg by mouth daily. Refills:  0  
     
   
   
   
  
 CALCIUM 600 + D 600-125 mg-unit Tab Generic drug:  calcium-cholecalciferol (d3) Your last dose was: Your next dose is: Take  by mouth. Refills:  0  
     
   
   
   
  
 gabapentin 100 mg capsule Commonly known as:  NEURONTIN Your last dose was: Your next dose is:    
   
   
 Dose:  100 mg Take 1 Cap by mouth two (2) times a day. Quantity:  60 Cap Refills:  0  
     
   
   
   
  
 guaiFENesin 100 mg/5 mL liquid Commonly known as:  ROBITUSSIN Your last dose was: Your next dose is:    
   
   
 Dose:  100 mg Take 100 mg by mouth three (3) times daily as needed for Cough. Refills:  0  
     
   
   
   
  
 hydroCHLOROthiazide 25 mg tablet Commonly known as:  HYDRODIURIL Your last dose was: Your next dose is:    
   
   
 Dose:  25 mg Take 1 Tab by mouth daily. 1 tablet oe time daily for edema ,HTN Quantity:  90 Tab Refills:  0 KLOR-CON M20 20 mEq tablet Generic drug:  potassium chloride Your last dose was: Your next dose is: TAKE 1 TABLET DAILY Quantity:  90 Tab Refills:  2  
     
   
   
   
  
 levothyroxine 50 mcg tablet Commonly known as:  SYNTHROID Your last dose was: Your next dose is: TAKE 1 TABLET DAILY Quantity:  90 Tab Refills:  2  
     
   
   
   
  
 montelukast 10 mg tablet Commonly known as:  SINGULAIR Your last dose was: Your next dose is:    
   
   
 Dose:  10 mg Take 1 Tab by mouth daily. Quantity:  90 Tab Refills:  3  
     
   
   
   
  
 multivitamin tablet Commonly known as:  ONE A DAY Your last dose was: Your next dose is:    
   
   
 Dose:  1 Tab Take 1 Tab by mouth daily. Refills:  0 VITAMIN B COMPLEX PO Your last dose was: Your next dose is:    
   
   
 Dose:  1 Tab Take 1 Tab by mouth daily. 1 tab, PO, daily, 0 Refills Refills:  0  
     
   
   
   
  
 VITAMIN D3 1,000 unit tablet Generic drug:  cholecalciferol Your last dose was: Your next dose is:    
   
   
 Dose:  1000 Units Take 1,000 Units by mouth two (2) times a day. Refills:  0  
     
   
   
   
  
 vitamin E 400 unit capsule Commonly known as:  [address] Your last dose was: Your next dose is:    
   
   
 Dose:  400 Units Take 400 Units by mouth two (2) times a day. Refills:  0 STOP taking these medications   
 acyclovir 400 mg tablet Commonly known as:  ZOVIRAX  
   
  
 oxyCODONE IR 5 mg immediate release tablet Commonly known as:  ROXICODONE  
   
  
 predniSONE 5 mg tablet Commonly known as:  DELTASONE  
   
  
 verapamil  mg tablet Commonly known as:  CALAN-SR Where to Get Your Medications Information on where to get these meds will be given to you by the nurse or doctor. ! Ask your nurse or doctor about these medications  
  aluminum-magnesium hydroxide 200-200 mg/5 mL susp 30 mL, diphenhydrAMINE 12.5 mg/5 mL elix 75 mg, lidocaine 2 % soln 30 mL oral suspension (compounded)  
 atorvastatin 20 mg tablet  
 carvedilol 3.125 mg tablet  
 cefTRIAXone 2 gram 2 g, ADDaptor 1 Device IVPB  
 furosemide 40 mg tablet  
 ipratropium 0.02 % nebulizer solution  
 nystatin 100,000 unit/mL suspension  
 pantoprazole 40 mg tablet

## 2017-05-15 ENCOUNTER — APPOINTMENT (OUTPATIENT)
Dept: GENERAL RADIOLOGY | Age: 72
DRG: 871 | End: 2017-05-15
Attending: NURSE PRACTITIONER
Payer: MEDICARE

## 2017-05-15 ENCOUNTER — APPOINTMENT (OUTPATIENT)
Dept: GENERAL RADIOLOGY | Age: 72
DRG: 871 | End: 2017-05-15
Attending: SURGERY
Payer: MEDICARE

## 2017-05-15 ENCOUNTER — APPOINTMENT (OUTPATIENT)
Dept: GENERAL RADIOLOGY | Age: 72
DRG: 871 | End: 2017-05-15
Attending: EMERGENCY MEDICINE
Payer: MEDICARE

## 2017-05-15 ENCOUNTER — APPOINTMENT (OUTPATIENT)
Dept: GENERAL RADIOLOGY | Age: 72
DRG: 871 | End: 2017-05-15
Attending: INTERNAL MEDICINE
Payer: MEDICARE

## 2017-05-15 ENCOUNTER — APPOINTMENT (OUTPATIENT)
Dept: CT IMAGING | Age: 72
DRG: 871 | End: 2017-05-15
Attending: EMERGENCY MEDICINE
Payer: MEDICARE

## 2017-05-15 ENCOUNTER — APPOINTMENT (OUTPATIENT)
Dept: CT IMAGING | Age: 72
DRG: 871 | End: 2017-05-15
Attending: INTERNAL MEDICINE
Payer: MEDICARE

## 2017-05-15 PROBLEM — A41.9 SEPTIC SHOCK (HCC): Status: ACTIVE | Noted: 2017-05-15

## 2017-05-15 PROBLEM — R65.21 SEPTIC SHOCK (HCC): Status: ACTIVE | Noted: 2017-05-15

## 2017-05-15 LAB
ALBUMIN SERPL BCP-MCNC: 2.1 G/DL (ref 3.5–5)
ALBUMIN SERPL BCP-MCNC: 2.4 G/DL (ref 3.5–5)
ALBUMIN/GLOB SERPL: 0.6 {RATIO} (ref 1.1–2.2)
ALBUMIN/GLOB SERPL: 0.7 {RATIO} (ref 1.1–2.2)
ALP SERPL-CCNC: 202 U/L (ref 45–117)
ALP SERPL-CCNC: 217 U/L (ref 45–117)
ALT SERPL-CCNC: 50 U/L (ref 12–78)
ALT SERPL-CCNC: 53 U/L (ref 12–78)
ANION GAP BLD CALC-SCNC: 15 MMOL/L (ref 5–15)
ANION GAP BLD CALC-SCNC: 20 MMOL/L (ref 5–15)
APPEARANCE UR: ABNORMAL
ARTERIAL PATENCY WRIST A: ABNORMAL
AST SERPL W P-5'-P-CCNC: 104 U/L (ref 15–37)
AST SERPL W P-5'-P-CCNC: 105 U/L (ref 15–37)
ATRIAL RATE: 116 BPM
BACTERIA URNS QL MICRO: ABNORMAL /HPF
BASE DEFICIT BLDV-SCNC: 12 MMOL/L
BASOPHILS # BLD AUTO: 0 K/UL (ref 0–0.1)
BASOPHILS # BLD: 0 % (ref 0–1)
BDY SITE: ABNORMAL
BILIRUB SERPL-MCNC: 4 MG/DL (ref 0.2–1)
BILIRUB SERPL-MCNC: 4.6 MG/DL (ref 0.2–1)
BILIRUB UR QL CFM: POSITIVE
BUN SERPL-MCNC: 17 MG/DL (ref 6–20)
BUN SERPL-MCNC: 21 MG/DL (ref 6–20)
BUN/CREAT SERPL: 7 (ref 12–20)
BUN/CREAT SERPL: 8 (ref 12–20)
CALCIUM SERPL-MCNC: 8.1 MG/DL (ref 8.5–10.1)
CALCIUM SERPL-MCNC: 9.1 MG/DL (ref 8.5–10.1)
CALCULATED P AXIS, ECG09: 52 DEGREES
CALCULATED R AXIS, ECG10: 36 DEGREES
CALCULATED T AXIS, ECG11: 55 DEGREES
CHLORIDE SERPL-SCNC: 101 MMOL/L (ref 97–108)
CHLORIDE SERPL-SCNC: 107 MMOL/L (ref 97–108)
CK MB CFR SERPL CALC: 1.3 % (ref 0–2.5)
CK MB SERPL-MCNC: 3.4 NG/ML (ref 5–25)
CK SERPL-CCNC: 267 U/L (ref 26–192)
CO2 SERPL-SCNC: 17 MMOL/L (ref 21–32)
CO2 SERPL-SCNC: 18 MMOL/L (ref 21–32)
COLOR UR: ABNORMAL
CREAT SERPL-MCNC: 2.43 MG/DL (ref 0.55–1.02)
CREAT SERPL-MCNC: 2.63 MG/DL (ref 0.55–1.02)
DIAGNOSIS, 93000: NORMAL
DIFFERENTIAL METHOD BLD: ABNORMAL
EOSINOPHIL # BLD: 0.4 K/UL (ref 0–0.4)
EOSINOPHIL NFR BLD: 2 % (ref 0–7)
EPITH CASTS URNS QL MICRO: ABNORMAL /LPF
ERYTHROCYTE [DISTWIDTH] IN BLOOD BY AUTOMATED COUNT: 17.4 % (ref 11.5–14.5)
GAS FLOW.O2 O2 DELIVERY SYS: ABNORMAL L/MIN
GLOBULIN SER CALC-MCNC: 3.4 G/DL (ref 2–4)
GLOBULIN SER CALC-MCNC: 3.6 G/DL (ref 2–4)
GLUCOSE SERPL-MCNC: 77 MG/DL (ref 65–100)
GLUCOSE SERPL-MCNC: 92 MG/DL (ref 65–100)
GLUCOSE UR STRIP.AUTO-MCNC: NEGATIVE MG/DL
GRAN CASTS URNS QL MICRO: ABNORMAL /LPF
HCO3 BLDV-SCNC: 14.3 MMOL/L (ref 23–28)
HCT VFR BLD AUTO: 30.6 % (ref 35–47)
HGB BLD-MCNC: 10.4 G/DL (ref 11.5–16)
HGB UR QL STRIP: ABNORMAL
KETONES UR QL STRIP.AUTO: NEGATIVE MG/DL
LACTATE SERPL-SCNC: 6.8 MMOL/L (ref 0.4–2)
LACTATE SERPL-SCNC: 7.4 MMOL/L (ref 0.4–2)
LACTATE SERPL-SCNC: 8.1 MMOL/L (ref 0.4–2)
LACTATE SERPL-SCNC: 9.5 MMOL/L (ref 0.4–2)
LEUKOCYTE ESTERASE UR QL STRIP.AUTO: ABNORMAL
LYMPHOCYTES # BLD AUTO: 3 % (ref 12–49)
LYMPHOCYTES # BLD: 0.6 K/UL (ref 0.8–3.5)
MAGNESIUM SERPL-MCNC: 1.1 MG/DL (ref 1.6–2.4)
MCH RBC QN AUTO: 30.5 PG (ref 26–34)
MCHC RBC AUTO-ENTMCNC: 34 G/DL (ref 30–36.5)
MCV RBC AUTO: 89.7 FL (ref 80–99)
METAMYELOCYTES NFR BLD MANUAL: 1 %
MONOCYTES # BLD: 0.2 K/UL (ref 0–1)
MONOCYTES NFR BLD AUTO: 1 % (ref 5–13)
MYELOCYTES NFR BLD MANUAL: 6 %
NEUTS BAND NFR BLD MANUAL: 18 % (ref 0–6)
NEUTS SEG # BLD: 16.4 K/UL (ref 1.8–8)
NEUTS SEG NFR BLD AUTO: 69 % (ref 32–75)
NITRITE UR QL STRIP.AUTO: NEGATIVE
P-R INTERVAL, ECG05: 142 MS
PCO2 BLDV: 28.7 MMHG (ref 41–51)
PH BLDV: 7.3 [PH] (ref 7.32–7.42)
PH UR STRIP: 6 [PH] (ref 5–8)
PHOSPHATE SERPL-MCNC: 3.4 MG/DL (ref 2.6–4.7)
PLATELET # BLD AUTO: 182 K/UL (ref 150–400)
PO2 BLDV: 30 MMHG (ref 25–40)
POTASSIUM SERPL-SCNC: 3.1 MMOL/L (ref 3.5–5.1)
POTASSIUM SERPL-SCNC: 3.7 MMOL/L (ref 3.5–5.1)
PROT SERPL-MCNC: 5.5 G/DL (ref 6.4–8.2)
PROT SERPL-MCNC: 6 G/DL (ref 6.4–8.2)
PROT UR STRIP-MCNC: 100 MG/DL
Q-T INTERVAL, ECG07: 336 MS
QRS DURATION, ECG06: 82 MS
QTC CALCULATION (BEZET), ECG08: 467 MS
RBC # BLD AUTO: 3.41 M/UL (ref 3.8–5.2)
RBC #/AREA URNS HPF: ABNORMAL /HPF (ref 0–5)
RBC MORPH BLD: ABNORMAL
RBC MORPH BLD: ABNORMAL
SAO2 % BLDV: 52 % (ref 65–88)
SODIUM SERPL-SCNC: 139 MMOL/L (ref 136–145)
SODIUM SERPL-SCNC: 139 MMOL/L (ref 136–145)
SP GR UR REFRACTOMETRY: 1.01 (ref 1–1.03)
SPECIMEN TYPE: ABNORMAL
TOTAL RESP. RATE, ITRR: 21
TROPONIN I SERPL-MCNC: 0.19 NG/ML
TSH SERPL DL<=0.05 MIU/L-ACNC: 0.78 UIU/ML (ref 0.36–3.74)
UA: UC IF INDICATED,UAUC: ABNORMAL
UROBILINOGEN UR QL STRIP.AUTO: 0.2 EU/DL (ref 0.2–1)
VENTRICULAR RATE, ECG03: 116 BPM
WAXY CASTS URNS QL MICRO: ABNORMAL /LPF
WBC # BLD AUTO: 18.8 K/UL (ref 3.6–11)
WBC MORPH BLD: ABNORMAL
WBC URNS QL MICRO: ABNORMAL /HPF (ref 0–4)

## 2017-05-15 PROCEDURE — 77010033678 HC OXYGEN DAILY

## 2017-05-15 PROCEDURE — 74011250636 HC RX REV CODE- 250/636: Performed by: INTERNAL MEDICINE

## 2017-05-15 PROCEDURE — C9113 INJ PANTOPRAZOLE SODIUM, VIA: HCPCS | Performed by: INTERNAL MEDICINE

## 2017-05-15 PROCEDURE — 71010 XR CHEST PORT: CPT

## 2017-05-15 PROCEDURE — 74011000258 HC RX REV CODE- 258: Performed by: INTERNAL MEDICINE

## 2017-05-15 PROCEDURE — P9045 ALBUMIN (HUMAN), 5%, 250 ML: HCPCS | Performed by: INTERNAL MEDICINE

## 2017-05-15 PROCEDURE — 85025 COMPLETE CBC W/AUTO DIFF WBC: CPT | Performed by: INTERNAL MEDICINE

## 2017-05-15 PROCEDURE — 83605 ASSAY OF LACTIC ACID: CPT | Performed by: INTERNAL MEDICINE

## 2017-05-15 PROCEDURE — 65610000006 HC RM INTENSIVE CARE

## 2017-05-15 PROCEDURE — 74000 XR ABD (KUB): CPT

## 2017-05-15 PROCEDURE — 81001 URINALYSIS AUTO W/SCOPE: CPT | Performed by: INTERNAL MEDICINE

## 2017-05-15 PROCEDURE — 36415 COLL VENOUS BLD VENIPUNCTURE: CPT | Performed by: INTERNAL MEDICINE

## 2017-05-15 PROCEDURE — 83735 ASSAY OF MAGNESIUM: CPT | Performed by: INTERNAL MEDICINE

## 2017-05-15 PROCEDURE — 74011250637 HC RX REV CODE- 250/637

## 2017-05-15 PROCEDURE — 71250 CT THORAX DX C-: CPT

## 2017-05-15 PROCEDURE — 74011250636 HC RX REV CODE- 250/636: Performed by: SURGERY

## 2017-05-15 PROCEDURE — 87077 CULTURE AEROBIC IDENTIFY: CPT | Performed by: SURGERY

## 2017-05-15 PROCEDURE — 82803 BLOOD GASES ANY COMBINATION: CPT

## 2017-05-15 PROCEDURE — 74011000250 HC RX REV CODE- 250: Performed by: INTERNAL MEDICINE

## 2017-05-15 PROCEDURE — 87045 FECES CULTURE AEROBIC BACT: CPT | Performed by: SURGERY

## 2017-05-15 PROCEDURE — 74011000250 HC RX REV CODE- 250: Performed by: EMERGENCY MEDICINE

## 2017-05-15 PROCEDURE — 84100 ASSAY OF PHOSPHORUS: CPT | Performed by: INTERNAL MEDICINE

## 2017-05-15 PROCEDURE — 74176 CT ABD & PELVIS W/O CONTRAST: CPT

## 2017-05-15 PROCEDURE — 84484 ASSAY OF TROPONIN QUANT: CPT | Performed by: INTERNAL MEDICINE

## 2017-05-15 PROCEDURE — 77030019938 HC TBNG IV PCA ICUM -A

## 2017-05-15 PROCEDURE — 74011000258 HC RX REV CODE- 258: Performed by: EMERGENCY MEDICINE

## 2017-05-15 PROCEDURE — 80053 COMPREHEN METABOLIC PANEL: CPT | Performed by: INTERNAL MEDICINE

## 2017-05-15 PROCEDURE — 84443 ASSAY THYROID STIM HORMONE: CPT | Performed by: INTERNAL MEDICINE

## 2017-05-15 PROCEDURE — 82550 ASSAY OF CK (CPK): CPT | Performed by: INTERNAL MEDICINE

## 2017-05-15 PROCEDURE — 87086 URINE CULTURE/COLONY COUNT: CPT | Performed by: INTERNAL MEDICINE

## 2017-05-15 PROCEDURE — 02HV33Z INSERTION OF INFUSION DEVICE INTO SUPERIOR VENA CAVA, PERCUTANEOUS APPROACH: ICD-10-PCS | Performed by: EMERGENCY MEDICINE

## 2017-05-15 PROCEDURE — 93306 TTE W/DOPPLER COMPLETE: CPT

## 2017-05-15 RX ORDER — HEPARIN SODIUM 5000 [USP'U]/ML
5000 INJECTION, SOLUTION INTRAVENOUS; SUBCUTANEOUS EVERY 8 HOURS
Status: DISCONTINUED | OUTPATIENT
Start: 2017-05-15 | End: 2017-05-17

## 2017-05-15 RX ORDER — DOBUTAMINE HYDROCHLORIDE 200 MG/100ML
2.5 INJECTION INTRAVENOUS
Status: DISCONTINUED | OUTPATIENT
Start: 2017-05-15 | End: 2017-05-15

## 2017-05-15 RX ORDER — MAGNESIUM SULFATE HEPTAHYDRATE 40 MG/ML
4 INJECTION, SOLUTION INTRAVENOUS ONCE
Status: COMPLETED | OUTPATIENT
Start: 2017-05-15 | End: 2017-05-15

## 2017-05-15 RX ORDER — ONDANSETRON 2 MG/ML
4 INJECTION INTRAMUSCULAR; INTRAVENOUS
Status: DISCONTINUED | OUTPATIENT
Start: 2017-05-15 | End: 2017-05-24 | Stop reason: HOSPADM

## 2017-05-15 RX ORDER — METRONIDAZOLE 500 MG/100ML
500 INJECTION, SOLUTION INTRAVENOUS EVERY 8 HOURS
Status: DISCONTINUED | OUTPATIENT
Start: 2017-05-15 | End: 2017-05-16

## 2017-05-15 RX ORDER — VANCOMYCIN HYDROCHLORIDE
1250
Status: DISCONTINUED | OUTPATIENT
Start: 2017-05-16 | End: 2017-05-16

## 2017-05-15 RX ORDER — SODIUM BICARBONATE 1 MEQ/ML
100 SYRINGE (ML) INTRAVENOUS ONCE
Status: COMPLETED | OUTPATIENT
Start: 2017-05-15 | End: 2017-05-15

## 2017-05-15 RX ORDER — SODIUM CHLORIDE 9 MG/ML
125 INJECTION, SOLUTION INTRAVENOUS CONTINUOUS
Status: DISCONTINUED | OUTPATIENT
Start: 2017-05-15 | End: 2017-05-15

## 2017-05-15 RX ORDER — ACETAMINOPHEN 650 MG/1
650 SUPPOSITORY RECTAL
Status: DISCONTINUED | OUTPATIENT
Start: 2017-05-15 | End: 2017-05-15

## 2017-05-15 RX ORDER — DOBUTAMINE HYDROCHLORIDE 200 MG/100ML
2.5 INJECTION INTRAVENOUS CONTINUOUS
Status: DISCONTINUED | OUTPATIENT
Start: 2017-05-15 | End: 2017-05-16

## 2017-05-15 RX ORDER — PANTOPRAZOLE SODIUM 40 MG/10ML
40 INJECTION, POWDER, LYOPHILIZED, FOR SOLUTION INTRAVENOUS DAILY
Status: DISCONTINUED | OUTPATIENT
Start: 2017-05-15 | End: 2017-05-15 | Stop reason: SDUPTHER

## 2017-05-15 RX ORDER — ALBUMIN HUMAN 50 G/1000ML
25 SOLUTION INTRAVENOUS EVERY 6 HOURS
Status: COMPLETED | OUTPATIENT
Start: 2017-05-15 | End: 2017-05-16

## 2017-05-15 RX ORDER — ACETAMINOPHEN 650 MG/1
SUPPOSITORY RECTAL
Status: COMPLETED
Start: 2017-05-15 | End: 2017-05-15

## 2017-05-15 RX ORDER — SODIUM CHLORIDE AND POTASSIUM CHLORIDE .9; .15 G/100ML; G/100ML
SOLUTION INTRAVENOUS CONTINUOUS
Status: DISCONTINUED | OUTPATIENT
Start: 2017-05-15 | End: 2017-05-15

## 2017-05-15 RX ORDER — FAMOTIDINE 10 MG/ML
INJECTION INTRAVENOUS
Status: DISPENSED
Start: 2017-05-15 | End: 2017-05-15

## 2017-05-15 RX ORDER — HYDROCORTISONE SODIUM SUCCINATE 100 MG/2ML
50 INJECTION, POWDER, FOR SOLUTION INTRAMUSCULAR; INTRAVENOUS EVERY 8 HOURS
Status: DISCONTINUED | OUTPATIENT
Start: 2017-05-15 | End: 2017-05-17

## 2017-05-15 RX ORDER — POTASSIUM CHLORIDE 29.8 MG/ML
20 INJECTION INTRAVENOUS ONCE
Status: DISCONTINUED | OUTPATIENT
Start: 2017-05-15 | End: 2017-05-15

## 2017-05-15 RX ORDER — HYDROMORPHONE HYDROCHLORIDE 1 MG/ML
1 INJECTION, SOLUTION INTRAMUSCULAR; INTRAVENOUS; SUBCUTANEOUS
Status: DISCONTINUED | OUTPATIENT
Start: 2017-05-15 | End: 2017-05-24 | Stop reason: HOSPADM

## 2017-05-15 RX ORDER — MAGNESIUM SULFATE HEPTAHYDRATE 40 MG/ML
2 INJECTION, SOLUTION INTRAVENOUS ONCE
Status: COMPLETED | OUTPATIENT
Start: 2017-05-15 | End: 2017-05-15

## 2017-05-15 RX ORDER — VANCOMYCIN 2 GRAM/500 ML IN 0.9 % SODIUM CHLORIDE INTRAVENOUS
2000 ONCE
Status: COMPLETED | OUTPATIENT
Start: 2017-05-15 | End: 2017-05-15

## 2017-05-15 RX ADMIN — Medication 10 ML: at 07:12

## 2017-05-15 RX ADMIN — SODIUM CHLORIDE 1000 ML: 900 INJECTION, SOLUTION INTRAVENOUS at 11:14

## 2017-05-15 RX ADMIN — NOREPINEPHRINE BITARTRATE 20 MCG/MIN: 1 INJECTION, SOLUTION, CONCENTRATE INTRAVENOUS at 17:39

## 2017-05-15 RX ADMIN — SODIUM CHLORIDE 40 MG: 9 INJECTION INTRAMUSCULAR; INTRAVENOUS; SUBCUTANEOUS at 08:48

## 2017-05-15 RX ADMIN — ALBUMIN (HUMAN) 25 G: 12.5 INJECTION, SOLUTION INTRAVENOUS at 18:37

## 2017-05-15 RX ADMIN — HEPARIN SODIUM 5000 UNITS: 5000 INJECTION, SOLUTION INTRAVENOUS; SUBCUTANEOUS at 07:11

## 2017-05-15 RX ADMIN — HEPARIN SODIUM 5000 UNITS: 5000 INJECTION, SOLUTION INTRAVENOUS; SUBCUTANEOUS at 16:11

## 2017-05-15 RX ADMIN — ONDANSETRON 4 MG: 2 INJECTION INTRAMUSCULAR; INTRAVENOUS at 20:08

## 2017-05-15 RX ADMIN — VANCOMYCIN HYDROCHLORIDE 2000 MG: 10 INJECTION, POWDER, LYOPHILIZED, FOR SOLUTION INTRAVENOUS at 05:31

## 2017-05-15 RX ADMIN — NOREPINEPHRINE BITARTRATE 5 MCG/MIN: 1 INJECTION, SOLUTION, CONCENTRATE INTRAVENOUS at 01:45

## 2017-05-15 RX ADMIN — SODIUM BICARBONATE 100 MEQ: 84 INJECTION, SOLUTION INTRAVENOUS at 11:33

## 2017-05-15 RX ADMIN — DEXTROSE MONOHYDRATE: 5 INJECTION, SOLUTION INTRAVENOUS at 19:04

## 2017-05-15 RX ADMIN — CEFEPIME 2 G: 2 INJECTION, POWDER, FOR SOLUTION INTRAMUSCULAR; INTRAVENOUS at 07:10

## 2017-05-15 RX ADMIN — HYDROMORPHONE HYDROCHLORIDE 1 MG: 1 INJECTION, SOLUTION INTRAMUSCULAR; INTRAVENOUS; SUBCUTANEOUS at 05:27

## 2017-05-15 RX ADMIN — HEPARIN SODIUM 5000 UNITS: 5000 INJECTION, SOLUTION INTRAVENOUS; SUBCUTANEOUS at 22:42

## 2017-05-15 RX ADMIN — SODIUM CHLORIDE 125 ML/HR: 900 INJECTION, SOLUTION INTRAVENOUS at 03:26

## 2017-05-15 RX ADMIN — HYDROCORTISONE SODIUM SUCCINATE 50 MG: 100 INJECTION, POWDER, FOR SOLUTION INTRAMUSCULAR; INTRAVENOUS at 22:42

## 2017-05-15 RX ADMIN — MAGNESIUM SULFATE HEPTAHYDRATE 2 G: 40 INJECTION, SOLUTION INTRAVENOUS at 09:33

## 2017-05-15 RX ADMIN — HYDROCORTISONE SODIUM SUCCINATE 50 MG: 100 INJECTION, POWDER, FOR SOLUTION INTRAMUSCULAR; INTRAVENOUS at 14:01

## 2017-05-15 RX ADMIN — SODIUM CHLORIDE 3000 ML: 900 INJECTION, SOLUTION INTRAVENOUS at 13:04

## 2017-05-15 RX ADMIN — MAGNESIUM SULFATE HEPTAHYDRATE 4 G: 40 INJECTION, SOLUTION INTRAVENOUS at 18:37

## 2017-05-15 RX ADMIN — ACETAMINOPHEN 650 MG: 650 SUPPOSITORY RECTAL at 05:51

## 2017-05-15 RX ADMIN — HYDROMORPHONE HYDROCHLORIDE 1 MG: 1 INJECTION, SOLUTION INTRAMUSCULAR; INTRAVENOUS; SUBCUTANEOUS at 22:38

## 2017-05-15 RX ADMIN — POTASSIUM CHLORIDE AND SODIUM CHLORIDE: 900; 150 INJECTION, SOLUTION INTRAVENOUS at 07:08

## 2017-05-15 RX ADMIN — HYDROCORTISONE SODIUM SUCCINATE 50 MG: 100 INJECTION, POWDER, FOR SOLUTION INTRAMUSCULAR; INTRAVENOUS at 08:48

## 2017-05-15 RX ADMIN — DEXTROSE MONOHYDRATE: 5 INJECTION, SOLUTION INTRAVENOUS at 12:15

## 2017-05-15 RX ADMIN — ALBUMIN (HUMAN) 25 G: 12.5 INJECTION, SOLUTION INTRAVENOUS at 12:19

## 2017-05-15 RX ADMIN — METRONIDAZOLE 500 MG: 500 INJECTION, SOLUTION INTRAVENOUS at 20:04

## 2017-05-15 RX ADMIN — SODIUM CHLORIDE 1000 ML: 900 INJECTION, SOLUTION INTRAVENOUS at 08:42

## 2017-05-15 RX ADMIN — METRONIDAZOLE 500 MG: 500 INJECTION, SOLUTION INTRAVENOUS at 12:13

## 2017-05-15 RX ADMIN — DOBUTAMINE IN DEXTROSE 2.5 MCG/KG/MIN: 200 INJECTION, SOLUTION INTRAVENOUS at 14:02

## 2017-05-15 NOTE — ED TRIAGE NOTES
Patient arrives via EMS from home with n/v/d starting yesterday with 10/10 abd pain. Son found patient on couch at her home, covered in stool and vomit, had been there all day. Sinus tach and hypertensive en route, BG90 en route per EMS.

## 2017-05-15 NOTE — PROGRESS NOTES
Day #1 of cefepime  Indication:  sepsis  Abx regimen:  cefepime, vancomycin  ID Following ?: NO  Frequency of BMP?: once  Recent Labs      17   2258   WBC  16.8*   CREA  2.43*   BUN  17     Est CrCl: ~23 ml/min  Temp (24hrs), Av.7 °F (37.1 °C), Min:97.7 °F (36.5 °C), Max:101.1 °F (38.4 °C)    Cultures:    blood - pending   stool - pending    Plan: Change to cefepime 2g IV Q24h per renal dose adjustment policy.

## 2017-05-15 NOTE — CONSULTS
[address]    [address] [place] [address]        GASTROENTEROLOGY CONSULTATION NOTE  [facility]  [phone]      NAME:  Korin Cartagena   :   1945   MRN:   [id number]       Referring Physician: Dr Daphne Canales Date: 5/15/2017 10:32 AM    Chief Complaint: abdominal pain     History of Present Illness:  Patient is a 67 y.o. female who is seen in consultation at the request of Dr. Donna Alcantar for gastroenteritis. Ms. Mana Velazquez is in the ICU on pressor support. She was found by her son with vomit and stool on her. She was hypotensive in the ER. She has abdominal pain on palpation. NGT in place with light green output. No signs of acute GI bleeding. Surgery is following.        PMH:  Past Medical History:   Diagnosis Date    Abdominal pain 14    note from [facility] jim discussed with patient 2015    Arthritis     dr Kameron buck        17 f/u note    Bacteremia due to Klebsiella pneumoniae 2016    OV note from Dr Daphne Canales, Infect Disease    Chronic pain     low back pain/arthritis      [facility]    Contact dermatitis and other eczema, due to unspecified cause     hyperpigmented macules/seb k    Elevated LFTs     per [facility] Mar[facility]cos [facility] on report    Endocrine disease     hypothyroid    GERD (gastroesophageal reflux disease)     chelsy burns    HSV infection     Hypertension     Hypothyroid 10/2012    Insomnia     Melasma 3/3/15    [facility]    Nausea & vomiting 2017    report [facility]    Pain management counseling, encounter for 2016    sees Dr Gia Zuleta 17 f/u    Rhinitis, allergic nonseasonal     Urge incontinence of urine 2017 [facility]    [facility] initial eval note Lencho Garcia 17 urodymanics study//17 f/u note    Well woman exam (no gynecological exam) 2016 jonathan's note rec'd       PSH:  Past Surgical History:   Procedure Laterality Date    COLONOSCOPY  8/1/11    dr Brenden Acevedo 10 year repeat    HX CHOLECYSTECTOMY      HX ENDOSCOPY  2014    [address]    HX OTHER SURGICAL  04/17/2017    complex [facility]rodynamics [facility]    HX TUBAL LIGATION         Allergies: Allergies   Allergen Reactions    Pcn [Penicillins] Swelling    Tramadol Nausea and Vomiting     SEVERE If taken w/o food    Proventil [Albuterol Sulfate] Hives    Ace Inhibitors Angioedema    Albuterol Swelling     swelling    Ambien [Zolpidem] Other (comments)     Nightmares and memory disturbance    Ciprofloxacin Other (comments)     Sore throat and trouble swallowing    Lisinopril Swelling    Oxaprozin Nausea and Vomiting     severe stomach upset    Sulfadiazine Swelling     swelling    Trazodone Other (comments)     Vivid dreams and felt disoriented       Home Medications:  Prior to Admission Medications   Prescriptions Last Dose Informant Patient Reported? Taking? KLOR-CON M20 20 mEq tablet   No No   Sig: TAKE 1 TABLET DAILY   VITAMIN B COMPLEX PO   Yes No   Sig: Take 1 Tab by mouth daily. 1 tab, PO, daily, 0 Refills   acyclovir (ZOVIRAX) 400 mg tablet   Yes No   Sig: Take 400 mg by mouth two (2) times a day. aspirin 81 mg tablet   Yes No   Sig: Take 81 mg by mouth daily. azaTHIOprine (IMURAN) 50 mg tablet   Yes No   Sig: Take 150 mg by mouth daily. baclofen (LIORESAL) 10 mg tablet   No No   Sig: Take 0.5 Tabs by mouth three (3) times daily. Take one half tablet every 8 hr.prn for muscle spasm   Patient taking differently: Take 10 mg by mouth two (2) times a day. As needed-muscle spasms. calcium-cholecalciferol, d3, (CALCIUM 600 + D) 600-125 mg-unit tab   Yes No   Sig: Take  by mouth.    cholecalciferol (VITAMIN D3) 1,000 unit tablet   Yes No   Sig: Take 1,000 Units by mouth two (2) times a day.   gabapentin (NEURONTIN) 100 mg capsule   No No   Sig: Take 1 Cap by mouth two (2) times a day. guaiFENesin (ROBITUSSIN) 100 mg/5 mL liquid   Yes No   Sig: Take 100 mg by mouth three (3) times daily as needed for Cough. hydroCHLOROthiazide (HYDRODIURIL) 25 mg tablet   No No   Sig: Take 1 Tab by mouth daily. 1 tablet oe time daily for edema ,HTN   levothyroxine (SYNTHROID) 50 mcg tablet   No No   Sig: TAKE 1 TABLET DAILY   montelukast (SINGULAIR) 10 mg tablet   No No   Sig: Take 1 Tab by mouth daily. multivitamin (ONE A DAY) tablet   Yes No   Sig: Take 1 Tab by mouth daily. nystatin (MYCOSTATIN) topical cream   No No   Sig: Apply  to affected area two (2) times a day. oxyCODONE IR (ROXICODONE) 5 mg immediate release tablet   Yes No   Sig: Before breakfast, lunch, and dinner. predniSONE (DELTASONE) 5 mg tablet   Yes No   Sig: Take 10 mg by mouth daily. 10 mg = 2 tab each dose, PO, daily, this is a dosage change for next refill, # 180 tab, 3 Refills, Pharmacy: [facility]   verapamil ER (CALAN-SR) 120 mg tablet   No No   Sig: TAKE 1 TABLET NIGHTLY   vitamin E (AQUA GEMS) 400 unit capsule   Yes No   Sig: Take 400 Units by mouth two (2) times a day.       Facility-Administered Medications: None       Hospital Medications:  Current Facility-Administered Medications   Medication Dose Route Frequency    NOREPINephrine (LEVOPHED) 8,000 mcg in dextrose 5% 250 mL infusion  2-16 mcg/min IntraVENous TITRATE    0.9% sodium chloride infusion  125 mL/hr IntraVENous CONTINUOUS    0.9% sodium chloride with KCl 20 mEq/L infusion   IntraVENous CONTINUOUS    HYDROmorphone (PF) (DILAUDID) injection 1 mg  1 mg IntraVENous Q4H PRN    ondansetron (ZOFRAN) injection 4 mg  4 mg IntraVENous Q4H PRN    heparin (porcine) injection 5,000 Units  5,000 Units SubCUTAneous Q8H    cefepime (MAXIPIME) 2 g in 0.9% sodium chloride (MBP/ADV) 100 mL  2 g IntraVENous Q24H    pantoprazole (PROTONIX) 40 mg in sodium chloride 0.9 % 10 mL injection  40 mg IntraVENous DAILY    Vancomycin - Pharmacy to Dose Other Rx Dosing/Monitoring    [START ON 5/16/2017] vancomycin (VANCOCIN) 1250 mg in  ml infusion  1,250 mg IntraVENous Q36H    sodium chloride 0.9 % bolus infusion 3,000 mL  3,000 mL IntraVENous ONCE    hydrocortisone Sod Succ (PF) (SOLU-CORTEF) injection 50 mg  50 mg IntraVENous Q8H    magnesium sulfate 2 g/50 ml IVPB (premix or compounded)  2 g IntraVENous ONCE    albumin human 5% (BUMINATE) solution 25 g  25 g IntraVENous Q6H    sodium bicarbonate 8.4 % (1 mEq/mL) injection 100 mEq  100 mEq IntraVENous ONCE    sodium chloride 0.9 % bolus infusion 100 mL  100 mL IntraVENous RAD ONCE    iopamidol (ISOVUE-370) 76 % injection 100 mL  100 mL IntraVENous RAD ONCE       Social History:  Social History   Substance Use Topics    Smoking status: Never Smoker    Smokeless tobacco: Never Used    Alcohol use No       Family History:  History reviewed. No pertinent family history.     Review of Systems:  Constitutional:+ chills  Eyes:   negative visual changes  ENT:   negative sore throat, tongue or lip swelling  Respiratory:  dyspnea  Cards:  negative for chest pain, palpitations, lower extremity edema  GI:   See HPI  :  negative for frequency, dysuria  Integument:  negative for rash and pruritus  Heme:  negative for easy bruising and gum/nose bleeding  Musculoskel: negative for myalgias,  back pain and muscle weakness  Neuro: negative for headaches, dizziness, vertigo  Psych:  negative for feelings of anxiety, depression     Objective:   Patient Vitals for the past 8 hrs:   BP Temp Pulse Resp SpO2 Weight   05/15/17 0933 (!) 76/52 - (!) 114 - - -   05/15/17 0730 (!) 77/52 - (!) 115 28 90 % -   05/15/17 0715 (!) 80/55 - (!) 118 (!) 31 91 % -   05/15/17 0700 (!) 88/50 - (!) 118 (!) 31 90 % -   05/15/17 0645 92/54 - (!) 121 22 91 % -   05/15/17 0637 - - - - - 92.2 kg (203 lb 4.2 oz)   05/15/17 0631 92/54 100.2 °F (37.9 °C) (!) 121 22 91 % -   05/15/17 0550 - (!) 101.1 °F (38.4 °C) - - - -   05/15/17 0530 92/51 98.5 °F (36.9 °C) (!) 125 (!) 33 94 % -   05/15/17 0500 96/48 - (!) 116 (!) 36 96 % -   05/15/17 0430 100/51 - (!) 113 (!) 40 96 % -   05/15/17 0400 103/52 - (!) 108 (!) 36 94 % -   05/15/17 0345 103/59 97.7 °F (36.5 °C) (!) 109 (!) 35 98 % -   05/15/17 0330 94/48 - (!) 114 (!) 37 96 % -   05/15/17 0300 91/44 - (!) 116 (!) 36 94 % -     05/15 0701 - 05/15 1900  In: 100 [I.V.:100]  Out: -   05/13 1901 - 05/15 0700  In: 93.4 [I.V.:93.4]  Out: 101 [Urine:1]    EXAM: CONST- ill appearing 67year old lying in bed    NEURO-alert    HEENT-mild scleral icterus   NECK - supple, thyroid smooth non-tender, no lymphadenopathy   LUNGS-increased resp effort   CARD-S1 S2, tachy   ABD-soft, tenderness, no rebound, bowel sounds (+)  To upper abd, mildly distended   EXT-no edema, cool        Data Review     Recent Labs      05/15/17   0717  05/14/17 2258   WBC  18.8*  16.8*   HGB  10.4*  11.1*   HCT  30.6*  32.8*   PLT  182  240     Recent Labs      05/15/17   0717  05/14/17   2258   NA  139  139   K  3.7  3.1*   CL  107  101   CO2  17*  18*   BUN  21*  17   CREA  2.63*  2.43*   GLU  92  77   PHOS  3.4   --    CA  8.1*  9.1     Recent Labs      05/15/17   0717  05/14/17   2258   SGOT  105*  104*   AP  217*  202*   TP  5.5*  6.0*   ALB  2.1*  2.4*   GLOB  3.4  3.6   LPSE   --   75     No results for input(s): INR, PTP, APTT in the last 72 hours. No lab exists for component: INREXT       Assessment:   · Septic Shock - ICU support on going. · Partial small bowel obstruction - seen on CT scan, concern for ischemia.  Surgery is following     Patient Active Problem List   Diagnosis Code    RA (rheumatoid arthritis) ([facility]) M06.9    Essential hypertension I10    Postmenopausal Z78.0    Hypothyroidism E03.9    Allergic rhinitis J30.9    Tinea manus B35.2    Chronic constipation K59.09    Vitamin D deficiency E55.9    Abnormal gait R26.9    Prediabetes R73.03    Insomnia G47.00    Gastroesophageal reflux disease without esophagitis K21.9    Chronic midline low back pain without sciatica M54.5, G89.29    ACP (advance care planning) Z71.89    Septic shock (HCC) A41.9, R65.21     Plan:   · Support care  · Discussed with Dr Sarahi Wilkinson and Dr Margarette Gloria    Thank you for the consult     Signed By: Marlena Jackson NP     5/15/2017  10:32 AM     I have examined the patient. I have reviewed the chart and agree with the documentation recorded by the NP, including the assessment, treatment plan, and disposition.         Ele Gallegos MD

## 2017-05-15 NOTE — PROGRESS NOTES
Pt awake and answering questions appropriately. Seems a little tachypneic. BP low- systolic 71- and levophed at 25mcg; getting fluid resuscitated as well  Abd w/ RUQ tenderness, o/w soft and NT, few hypactive BS. Lactic acid level trending downward  Cont to monitor.

## 2017-05-15 NOTE — CONSULTS
Patient name: Desiree Barraza MRN: [id number]      [facility]  Initial Consult Note  DOA:5/14/2017  DOS: 5/15/2017  Requested by: Dr. Tom Moser  Reason: Evaluation and management of JEANINE  Source: pt/chart review    Assessment:  JEANINE- likely septic ATN in the setting of SBO/hypotension  Sepsis  SBO  Acidosis-due to JEANINE/Lactic acidosis  HTN>>now hypotensive  Low Mg    Plan/Recommendations:  IVF bolus has been ordered- getting 3 L saline  ct isotonic HCO3 drip at 150 ml/hr as maintenance after the bolus  Pressors to keep MAP >65 mm Hg  Avoid nephrotoxins; hold home dose HCTZ  Replace Mg  IV ABx  Follow cultures  Check UA with micro  Urine Na, Cr- when sample can be obtained    Prog -guarded. High risk for progression    HPI: Desiree Barraza is a 67 y.o. female with PMH significant for HTn, DJD, RA, Hypothyroidism>>presents with N/V and abd pain. Found to be in septic shock and CT A/P shows SBP. Started on IVF/Pressors and IV ABx. Nephrology consulted for Mx of JEANINE. Baseline Cr is normal. No melena or BRBPR. Followed by surgery and PCCM team. No recent NSAIDS use. Not on ACE-I or ARB. No IV contrast exposure. She is slight restless, anxious and unable to provide much history.     PMH:    Past Medical History:   Diagnosis Date    Abdominal pain 6/18/14    note from Rick Oneal directive discussed with patient 07/22/2015    Arthritis     dr Khadar buck        2/6/17 f/u note    Bacteremia due to Klebsiella pneumoniae 2/2/2016    OV note from Dr Agus Caicedo [facility]    Chronic pain     low back pain/arthritis      [facility] note 11/2/16    Contact dermatitis and other eczema, due to unspecified cause     hyperpigmented macules/seb k    Elevated LFTs     per [facility] on report    Endocrine disease     hypothyroid    GERD (gastroesophageal reflux disease)     chelsy burns    HSV infection     Hypertension     Hypothyroid 10/2012    Insomnia     Melasma 3/3/15    [facility]    Nausea & vomiting 05/04/2017    report [facility]    Pain management counseling, encounter for 05/05/2016    sees Dr Mariana Verma 5/2/17 f/u    Rhinitis, allergic nonseasonal     Urge incontinence of urine 03/21/2017 initial [facility] consult    [facility] initial eval note Gabriel Merino 4/17/17 urodymanics study//5/9/17 f/u note    Well woman exam (no gynecological exam) 07/29/2016    zedler's note rec'd       PSH:  Past Surgical History:   Procedure Laterality Date    COLONOSCOPY  8/1/11    dr Shy Cole 10 year repeat    HX CHOLECYSTECTOMY      HX ENDOSCOPY  2014    [address]    HX OTHER SURGICAL  04/17/2017    complex [facility]rodynamics [facility] report [facility]    HX TUBAL LIGATION         Allergies   Allergen Reactions    Pcn [Penicillins] Swelling    Tramadol Nausea and Vomiting     SEVERE If taken w/o food    Proventil [Albuterol Sulfate] Hives    Ace Inhibitors Angioedema    Albuterol Swelling     swelling    Ambien [Zolpidem] Other (comments)     Nightmares and memory disturbance    Ciprofloxacin Other (comments)     Sore throat and trouble swallowing    Lisinopril Swelling    Oxaprozin Nausea and Vomiting     severe stomach upset    Sulfadiazine Swelling     swelling    Trazodone Other (comments)     Vivid dreams and felt disoriented       Prescriptions Prior to Admission   Medication Sig    verapamil ER (CALAN-SR) 120 mg tablet TAKE 1 TABLET NIGHTLY    KLOR-CON M20 20 mEq tablet TAKE 1 TABLET DAILY    hydroCHLOROthiazide (HYDRODIURIL) 25 mg tablet Take 1 Tab by mouth daily. 1 tablet oe time daily for edema ,HTN    levothyroxine (SYNTHROID) 50 mcg tablet TAKE 1 TABLET DAILY    nystatin (MYCOSTATIN) topical cream Apply  to affected area two (2) times a day.  oxyCODONE IR (ROXICODONE) 5 mg immediate release tablet Before breakfast, lunch, and dinner.  montelukast (SINGULAIR) 10 mg tablet Take 1 Tab by mouth daily.  cholecalciferol (VITAMIN D3) 1,000 unit tablet Take 1,000 Units by mouth two (2) times a day.  calcium-cholecalciferol, d3, (CALCIUM 600 + D) 600-125 mg-unit tab Take  by mouth.  guaiFENesin (ROBITUSSIN) 100 mg/5 mL liquid Take 100 mg by mouth three (3) times daily as needed for Cough.  multivitamin (ONE A DAY) tablet Take 1 Tab by mouth daily.  baclofen (LIORESAL) 10 mg tablet Take 0.5 Tabs by mouth three (3) times daily. Take one half tablet every 8 hr.prn for muscle spasm (Patient taking differently: Take 10 mg by mouth two (2) times a day. As needed-muscle spasms.)    gabapentin (NEURONTIN) 100 mg capsule Take 1 Cap by mouth two (2) times a day.  predniSONE (DELTASONE) 5 mg tablet Take 10 mg by mouth daily. 10 mg = 2 tab each dose, PO, daily, this is a dosage change for next refill, # 180 tab, 3 Refills, Pharm[facility]: [facility]    azaTHIOprine (IMURAN) 50 mg tablet Take 150 mg by mouth daily.  VITAMIN B COMPLEX PO Take 1 Tab by mouth daily. 1 tab, PO, daily, 0 Refills    aspirin 81 mg tablet Take 81 mg by mouth daily.  vitamin E (AQUA GEMS) 400 unit capsule Take 400 Units by mouth two (2) times a day.  acyclovir (ZOVIRAX) 400 mg tablet Take 400 mg by mouth two (2) times a day. Social history:  Social History     Social History    Marital status:      Spouse name: N/A    Number of children: N/A    Years of education: N/A     Occupational History    Not on file.      Social History Main Topics    Smoking status: Never Smoker    Smokeless tobacco: Never Used    Alcohol use No    Drug use: No    Sexual activity: Not Currently     Other Topics Concern    Not on file     Social History Narrative       Family history:  No family history of CKD or ESRD    Review of Systems: as noted in HPI;  Rest deferred due to pts clinical status    Physical Exam:  Visit Vitals    BP (!) 76/52    Pulse (!) 114    Temp 100.2 °F (37.9 °C)    Resp 28    Ht 5' 4\" (1.626 m)    Wt 92.2 kg (203 lb 4.2 oz)    SpO2 90%    BMI 34.89 kg/m2       Elderly, WB/WN in NAD  No icterus, mm-dry  Clear ant/lat  RRR, tachy  Soft, slight distended, hypoactive BS+  No edema  Cool peripheries  Peripheral pulses intact  No rash  Alert awake  Wilkerson+    Labs/Data:   Lab Results   Component Value Date/Time    WBC 18.8 05/15/2017 07:17 AM    Hemoglobin (POC) 11.9 12/03/2010 01:40 AM    HGB 10.4 05/15/2017 07:17 AM    Hematocrit (POC) 35 12/03/2010 01:40 AM    HCT 30.6 05/15/2017 07:17 AM    PLATELET 144 20/16/6526 07:17 AM    MCV 89.7 05/15/2017 07:17 AM       Lab Results   Component Value Date/Time    Sodium 139 05/15/2017 07:17 AM    Potassium 3.7 05/15/2017 07:17 AM    Chloride 107 05/15/2017 07:17 AM    CO2 17 05/15/2017 07:17 AM    Anion gap 15 05/15/2017 07:17 AM    Glucose 92 05/15/2017 07:17 AM    BUN 21 05/15/2017 07:17 AM    Creatinine 2.63 05/15/2017 07:17 AM    BUN/Creatinine ratio 8 05/15/2017 07:17 AM    GFR est AA 22 05/15/2017 07:17 AM    GFR est non-AA 18 05/15/2017 07:17 AM    Calcium 8.1 05/15/2017 07:17 AM         Intake/Output Summary (Last 24 hours) at 05/15/17 1113  Last data filed at 05/15/17 0710   Gross per 24 hour   Intake           193.37 ml   Output              101 ml   Net            92.37 ml       Wt Readings from Last 3 Encounters:   05/15/17 92.2 kg (203 lb 4.2 oz)   02/02/17 96.6 kg (213 lb)   11/30/16 96.8 kg (213 lb 8 oz)       Renal US: NA  UA: NA    Patient seen and examined. Chart reviewed.  Labs, data and other pertinent notes reviewed from admit.     Discussed with pt/RN    Signed by:  Dean Ponce MD  Nephrology and HTN

## 2017-05-15 NOTE — CDMP QUERY
Please clarify if this patient is being treated/managed for:    =>Hypomagnesemia in the setting of level 1.1 requiring magnesium replacement and lab monitoring  =>Other Explanation of clinical findings  =>Unable to Determine (no explanation of clinical findings)    The medical record reflects the following:     Clinical Indicators/Risk Factors:  Mag level 1.1 on admission. Treatment: IV Mag replacement, monitor VS and labs    Please clarify and document your clinical opinion in the progress notes and discharge summary including the definitive and/or presumptive diagnosis, (suspected or probable), related to the above clinical findings. Please include clinical findings supporting your diagnosis.     Thank you,    Gely Sommer RN, BSN, [facility], [address] [place]  [phone]

## 2017-05-15 NOTE — PROCEDURES
Name: Hollie Kehr: Toya De Luna 54   : 1945 Admit Date: 2017   Phone: [phone]  Room: [address] [place] [address]   PCP: Han Granger MD  MRN: [id number]   Date: 5/15/2017  Code: Full Code        Procedure:  Central Line Using ultrasound guidance, Left internal jugular    Airway: III (soft palate, base of uvula visible)    Indication:  Inadequate venous access    Anesthesia:  0.1 % Lidocaine    Time Out: Emergency procedure    Summary:        Full sterile barrier precautions were used. A 7 Step Sterility Protocol followed. (cap, mask sterile gown, sterile gloves, large sterile sheet, hand hygiene, 2% chlorhexidine for cutaneous antisepsis). Vessel cannulated cannulated  After x 1 attempts and line placed with no difficulty. Blood return noted. Catheter secured & Biopatch applied. Sterile Tegaderm placed.   CXR pending    Signed: Hyun Downing MD

## 2017-05-15 NOTE — ED NOTES
Repositioned patient in bed incontinent of stool x2 not loose enough to send for C-Diff sample, repeat Lactic drawn, rectal tylenol given for rectal temp of 101. 1.

## 2017-05-15 NOTE — ED NOTES
IV fluids infusing with pressure bags. Pt on monitor x 3. Dr. Codie Mays made aware of BP 73/48 after 2 liters NS. Order received for central line; supplies placed at bedside.

## 2017-05-15 NOTE — H&P
[address]   [address] [place] [address]   HISTORY AND PHYSICAL       Name:  Uli Frye   MR#:  [id number]   :  1945   Account #:  [de-identified]        Date of Adm:  2017       PRIMARY CARE PHYSICIAN: Nancy Manning. MD Betty    SOURCE OF INFORMATION: The patient. CHIEF COMPLAINT: Abdominal pain. HISTORY OF PRESENT ILLNESS: This is a 39-year-old woman with   a past medical history significant for hypothyroidism, rheumatoid   arthritis, chronic pain syndrome, gastroesophageal reflux disease,   hypertension, herpes simplex virus infection, who was in her usual   state of health until about 2 days ago when the patient developed   abdominal pain. The abdominal pain is located at the epigastric region,   constant with radiation to the back. The pain is sharp, 8/10 in severity,   worse with eating, no known relieving factors. The pain is associated   with nausea, vomiting, and diarrhea. The patient was brought to the   emergency room because of worsening symptoms. When the patient   arrived at the emergency room, she was found to be hypotensive with   leukocytosis and elevated lactic acid level. CT scan of the abdomen   and pelvis was obtained. The CT scan shows partial small-bowel   obstruction. The patient was started on IV fluid. She was also started   on a vasopressor because of the hypotension. The patient was   subsequently referred to the hospitalist service for evaluation for   admission. She was last admitted here in 2017 for chest   pain. The patient also complained of rigor, fever and chills. PAST MEDICAL HISTORY: Rheumatoid arthritis, chronic pain   syndrome, hypothyroidism, herpes simplex virus infection,   hypertension. ALLERGIES: THE PATIENT IS ALLERGIC TO.   1. PENICILLIN. 2. TRAMADOL. 3. ACE INHIBITOR. 4. CIPRO. 5. LISINOPRIL. MEDICATIONS:   1. Acyclovir 400 mg twice daily. 2. Aspirin 81 mg daily. 3. Imuran 150 mg daily. 4. Baclofen 5 mg 3 times daily. 5. Neurontin 100 mg twice daily. 6. Hydrochlorothiazide 25 mg daily. 7. Potassium chloride 20 mEq daily. 8. Synthroid 50 mcg daily. 9. Singular 10 mg daily. 10. Oxycodone IR 5 mg 4 times daily. 11. Prednisone dosage as directed. 12. Calan  mg daily. FAMILY HISTORY: This was reviewed no pertinent findings. No family   history of rheumatoid arthritis. PAST SURGICAL HISTORY: This is significant for cholecystectomy,   tubal ligation. SOCIAL HISTORY: No history of alcohol or tobacco abuse. REVIEW OF SYSTEMS   HEAD, EYES, EARS, NOSE AND THROAT: This is positive for   dizziness and lightheadedness. No headache. No photophobia. No   blurring of vision. RESPIRATORY: No cough, no shortness of breath, no hemoptysis. CARDIOVASCULAR: No chest pain, no orthopnea, no palpitation. GASTROINTESTINAL: This is positive for abdominal pain, nausea,   vomiting and diarrhea, no constipation. GENITOURINARY: No dysuria, no urgency, and no frequency. All other systems are reviewed and they are negative. PHYSICAL EXAMINATION   GENERAL APPEARANCE: The patient appeared ill, in   critical condition. VITAL SIGNS: On arrival at the emergency room, temperature 97.9,   pulse 112, respiratory rate 26, blood pressure 122/98. This went down   to 71/47, oxygen saturation 93% on room air. HEAD: Normocephalic, atraumatic. EYES: Normal eye movement. No redness, no drainage, no discharge. EARS: Normal external ears with no obvious drainage. NOSE: No deformities. No drainage. MOUTH AND THROAT: No visible oral lesion. Dry oral mucosa. NECK: Supple. No JVD. No thyromegaly. CHEST: Clear breath sounds. No wheezing, no crackles. HEART: Normal S1 and S2, regular. No clinically appreciable murmur. ABDOMEN: Mild epigastric tenderness, no rebound tenderness, no   guarding. Normal bowel sounds. CENTRAL NERVOUS SYSTEM: Alert, oriented x3.  No gross focal   neurological deficits. EXTREMITIES: No edema. Pulses 2+ bilaterally. MUSCULOSKELETAL: No obvious joint deformity or swelling. SKIN: No active skin lesions seen in the exposed parts of the body. PSYCHIATRIC: Normal mood with flat affect. LYMPHATIC: No cervical lymphadenopathy. DIAGNOSTIC DATA: CHEST X-RAY: No acute pathology. CT scan of   the abdomen and pelvis shows a partial small-bowel obstruction. EKG   shows sinus rhythm and nonspecific ST and T-wave abnormalities. LABORATORY DATA: Sodium 139, potassium 3.1, chloride 101, CO2   of 18, glucose 77, BUN 17, creatinine 2.43. Calcium 9.1, bilirubin total   4.6, ALT 53, , alkaline phosphatase 202, total protein 6.0,   albumin level 2.4, globulin at 3.6, lipase level 175. Lactic acid level of   10.3. Hematology: WBC 16.8, hemoglobin 11.1, hematocrit 32.8,   platelets 965. ASSESSMENT:   1. Septic shock. 2. Partial small bowel obstruction. 3. Hypokalemia. 4. Metabolic acidosis. 5. Acute renal failure. 6. Hyperbilirubinemia. 7. Hypothyroidism. 8. Acute gastroenteritis. 9. Rheumatoid arthritis. PLAN:   1. Septic shock. We will admit the patient into the intensive care unit. The diagnosis of septic shock is supported by hypotension, tachycardia,   and elevated lactic acid level. Will start the patient on vancomycin and   Levaquin. No clear source of infection. We will obtain a CT scan of the   chest. Will await urine and blood culture. We will continue with the   vasopressor started in the emergency room. Will repeat lactic acid   level per protocol. 2. Partial small bowel obstruction. Will carry out conservative therapy. General surgery consult will be requested to assist in evaluation and   treatment. 3. Hypokalemia. We will replace potassium. Will repeat potassium   level. Will also check magnesium level. 4. Metabolic acidosis. Will carry out supportive therapy, which will   include fluid therapy.  Nephrology consult will be requested to assist in   evaluation and treatment. 5. Acute renal failure. The etiology of acute renal failure is not clear. The patient has normal renal function in February this year. Will carry   out hydration with normal saline. Nephrology consult will be requested. 6. Hyperbilirubinemia, will monitor. Gastroenterology consult will be   requested to assist in evaluation and treatment. 7. Hypothyroidism. Will continue with Synthroid. Will check TSH level. 8. Acute gastroenteritis. Will start the patient on Protonix. We will await   further recommendation from the gastroenterologist.   9. Rheumatoid arthritis. Will hold Imuran and prednisone until the   patient is medically stable. OTHER ISSUES: CODE STATUS: THE PATIENT IS A FULL CODE. We will place the patient on Heparin for DVT prophylaxis.         Rodolfo Bustamante MD      RE / CD   D:  05/15/2017   04:34   T:  05/15/2017   05:35   Job #:  [id number]

## 2017-05-15 NOTE — CONSULTS
Note dictated  SBO vs ileus- abd overall feels pretty benign. CT does not show signs of ischemia   Place   NGT  Aggressive fluid resuscitation   Stool for culture and c diff.    Will follow

## 2017-05-15 NOTE — PROGRESS NOTES
Hospitalist Progress Note  Office: [phone]      Date of Service:  5/15/2017  NAME:  Randy Real  :  1945  MRN:  [id number]      Admission Summary:   68 yo woman with obesity, rheumatoid arthritis, chronic pain syndrome, gastroesophageal reflux disease,   hypertension, h/o herpes simplex virus infection was BIBEMS from home on 17 with nausea, vomiting, diarrhea and abdominal pain.      Interval history / Subjective:   C/o pain in the upper and right upper abdomen; had pulled out femoral CVC and had IJ CVC placed at bedside     Assessment & Plan:     Septic shock (POA)  - leucocytosis, fever, hypotension, lactic acidosis, tachypnea, tachycardia on admission  - lactic acidosis; trending down with IVF  - leucocytosis trending up  - concern for intra-abdominal pathology  - aggressive IVF resuscitation  - was on norepinephrine gtt when femoral CVC was in; pulled out and now PCCM placing IJ CVC; resume pressor when CVC available  - empiric cefepime, vancomycin  - BCx  pending  - stool Cx 5/15 pending  - stool caliber not amenable to C diff testing  - minimal UOP since admission to check UA   - place ponce temp probe  - in ICU with PCCM following  - check cortisol as on chronic prednisone due to RA and start IV hydrocortisone    Partial small bowel obstruction (POA)  - RUQ and epigastic TTP  - per General Surgery consult yesterday, not an acute abdomen and no surgical intervention indicated at this time  - pain control    Hypokalemia (POA) - replete prn    AG metabolic acidosis (POA)  - likely due to lactic acidosis and exacerbated by GI losses and JEANINE  - start bicarb gtt    Acute kidney injury (POA)  - likely due to prerenal azotemia vs ATN from hypotension  - Renal following  - Cr worsening  - start bicarb gtt  - CT abd/pelvis wo contrast 5/15 unremarkable kidneys    Abnormal LFTs, hyperbilirubinemia (POA)  - due to dehydration vs other  - GI following  - avoid hepatotoxins    Acute gastroenteritis (POA)   - antiemetics prn  - pain control  - stool Cx 5/15 pending  - C diff 5/15 pending  - NPO for now on IVF  - PPI    Hypothyroidism - TSH WNL; levothyroxine    Rheumatoid arthritis  - check cortisol as patient on prednisone  - hold prednisone and start IV hydrocortisone  - hold Imuran    Hypomagnesemia - replete prn    Code status: Full  DVT prophylaxis: heparin SC    Care Plan discussed with: Patient/Family, Nurse and Consultant PCCM, GI. Spoke to son at bedside. Disposition: TBD. Came from home. Patient is critically ill. Hospital Problems  Date Reviewed: 5/15/2017          Codes Class Noted POA    * (Principal)Septic shock ([facility] [address] ICD-10-CM: A41.9, R65.21  ICD-9-CM: 038.9, 785.52, 995.92  5/15/2017 Yes            Review of Systems:   Pertinent items are noted in HPI. Vital Signs:    Last 24hrs VS reviewed since prior progress note.  Most recent are:  Visit Vitals    BP (!) 77/52    Pulse (!) 115    Temp 100.2 °F (37.9 °C)    Resp 28    Ht 5' 4\" (1.626 m)    Wt 92.2 kg (203 lb 4.2 oz)    SpO2 90%    BMI 34.89 kg/m2         Intake/Output Summary (Last 24 hours) at 05/15/17 0745  Last data filed at 05/15/17 0710   Gross per 24 hour   Intake           193.37 ml   Output              101 ml   Net            92.37 ml      Physical Examination:     Constitutional:  awake, restless, mild acute respiratory distress, moderate pain discomfort, obese, diaphoretic    ENT:  oral mucosa slightly dry, oropharynx benign  Neck thick, IJ CVC   Resp:  tachypnic, using accessory muscles, shallow breaths   CV:  regular rhythm, tachycardic, distant heart sounds, nonpalpable pulses    GI:  hypoactive BS, soft, non distended, epigastric and RUQ TTP, obese     Musculoskeletal:  moves all extremities    Neurologic:  AAOx3, responds appropriately to questions and commands     Skin:  cool, diaphoretic  Eyes:  PERRL    Data Review:    Review and/or order of clinical lab test  Review and/or order of tests in the radiology section of CPT  Review and/or order of tests in the medicine section of CPT    Labs:     Recent Labs      05/15/17   0717  05/14/17 2258   WBC  18.8*  16.8*   HGB  10.4*  11.1*   HCT  30.6*  32.8*   PLT  182  240     Recent Labs      05/14/17 2258   NA  139   K  3.1*   CL  101   CO2  18*   BUN  17   CREA  2.43*   GLU  77   CA  9.1     Recent Labs      05/14/17 2258   SGOT  104*   ALT  53   AP  202*   TBILI  4.6*   TP  6.0*   ALB  2.4*   GLOB  3.6   LPSE  75     No results for input(s): INR, PTP, APTT in the last 72 hours. No lab exists for component: INREXT   No results for input(s): FE, TIBC, PSAT, FERR in the last 72 hours. No results found for: FOL, RBCF   No results for input(s): PH, PCO2, PO2 in the last 72 hours. No results for input(s): CPK, CKNDX, TROIQ in the last 72 hours.     No lab exists for component: CPKMB  Lab Results   Component Value Date/Time    Cholesterol, total 99 12/23/2015 01:45 AM    HDL Cholesterol 22 12/23/2015 01:45 AM    LDL, calculated 54.4 12/23/2015 01:45 AM    Triglyceride 113 12/23/2015 01:45 AM    CHOL/HDL Ratio 4.5 12/23/2015 01:45 AM     Lab Results   Component Value Date/Time    Glucose (POC) 125 12/30/2015 11:28 AM    Glucose (POC) 101 12/23/2015 12:08 AM    Glucose (POC) 205 09/02/2015 11:44 AM    Glucose (POC) 112 09/02/2015 07:04 AM    Glucose (POC) 120 09/01/2015 09:41 PM     Lab Results   Component Value Date/Time    Color YELLOW/STRAW 02/02/2017 01:27 AM    Appearance CLEAR 02/02/2017 01:27 AM    Specific gravity 1.008 02/02/2017 01:27 AM    Specific gravity 1.005 12/23/2015 01:45 AM    pH (UA) 7.5 02/02/2017 01:27 AM    Protein NEGATIVE  02/02/2017 01:27 AM    Glucose NEGATIVE  02/02/2017 01:27 AM    Ketone NEGATIVE  02/02/2017 01:27 AM    Bilirubin NEGATIVE  02/02/2017 01:27 AM    Urobilinogen 1.0 02/02/2017 01:27 AM    Nitrites NEGATIVE  02/02/2017 01:27 AM    Leukocyte Esterase TRACE 02/02/2017 01:27 AM    Epithelial cells FEW 02/02/2017 01:27 AM    Bacteria NEGATIVE  02/02/2017 01:27 AM    WBC 0-4 02/02/2017 01:27 AM    RBC 0-5 02/02/2017 01:27 AM     Medications Reviewed:     Current Facility-Administered Medications   Medication Dose Route Frequency    NOREPINephrine (LEVOPHED) 8,000 mcg in dextrose 5% 250 mL infusion  2-16 mcg/min IntraVENous TITRATE    0.9% sodium chloride infusion  125 mL/hr IntraVENous CONTINUOUS    0.9% sodium chloride with KCl 20 mEq/L infusion   IntraVENous CONTINUOUS    HYDROmorphone (PF) (DILAUDID) injection 1 mg  1 mg IntraVENous Q4H PRN    ondansetron (ZOFRAN) injection 4 mg  4 mg IntraVENous Q4H PRN    heparin (porcine) injection 5,000 Units  5,000 Units SubCUTAneous Q8H    cefepime (MAXIPIME) 2 g in 0.9% sodium chloride (MBP/ADV) 100 mL  2 g IntraVENous Q24H    pantoprazole (PROTONIX) 40 mg in sodium chloride 0.9 % 10 mL injection  40 mg IntraVENous DAILY    Vancomycin - Pharmacy to Dose   Other Rx Dosing/Monitoring    [START ON 5/16/2017] vancomycin (VANCOCIN) 1250 mg in  ml infusion  1,250 mg IntraVENous Q36H    sodium chloride 0.9 % bolus infusion 100 mL  100 mL IntraVENous RAD ONCE    iopamidol (ISOVUE-370) 76 % injection 100 mL  100 mL IntraVENous RAD ONCE     ______________________________________________________________________  EXPECTED LENGTH OF STAY: - - -  ACTUAL LENGTH OF STAY:          0                 Tj Ledezma MD

## 2017-05-15 NOTE — PROGRESS NOTES
Pharmacist Note - Vancomycin Dosing    Consult provided for this 67 y.o. female for indication of sepsis. Antibiotic regimen(s): vancomycin, cefepime    Recent Labs      17   2258   WBC  16.8*   CREA  2.43*   BUN  17     Frequency of BMP: once  Height: 162.2 cm  Weight: 92.2 kg  Est CrCl: ~23 ml/min  Temp (24hrs), Av.7 °F (37.1 °C), Min:97.7 °F (36.5 °C), Max:101.1 °F (38.4 °C)    Cultures:   blood - pending  5/15 stool - pending    Goal trough = 15 - 20 mcg/mL    Therapy will be initiated with a loading dose of 2000 mg IV x 1 to be followed by a maintenance dose of 1250 mg IV every 36 hours. Pharmacy to follow patient daily and order levels / make dose adjustments as appropriate.

## 2017-05-15 NOTE — ROUTINE PROCESS
1930: Bedside and Verbal shift change report given to Cathryn Pineda RN (oncoming nurse) by Darcy Ponce RN (offgoing nurse). Report included the following information SBAR, Kardex, Procedure Summary, Intake/Output, MAR, Accordion, Recent Results, Med Rec Status, Cardiac Rhythm ST and Alarm Parameters . 0730: Bedside and Verbal shift change report given to Ileana Wakefield RN (oncoming nurse) by Cathryn Pineda RN (offgoing nurse). Report included the following information SBAR, Kardex, ED Summary, Procedure Summary, Intake/Output, MAR, Accordion, Recent Results, Med Rec Status, Cardiac Rhythm NSR 80s-ST low 100s and Alarm Parameters .

## 2017-05-15 NOTE — CONSULTS
[address]   [address] [place] [address]       Name:  Nely Mariscal   MR#:  [id number]   :  1945   Account #:  [de-identified]    Date of Consultation:  05/15/2017   Date of Adm:  2017       REFERRING PHYSICIAN: Dr. Audrey Galarza in the Emergency Department. HISTORY OF PRESENT ILLNESS: The patient is a 76-year-old   female with a significant past medical history, who presented to the   hospital with a 3-day history of nausea, vomiting and diarrhea. The   patient states she was in her usual state of health until about 3 days   ago when this began. She states that she could not get off the couch   and could not move and continually had vomiting and diarrhea. Eventually, her son came to check on her and she was found to be   covered in stool, and he called EMS. She has never had anything   similar to this in the past. She does not recall anything that may have   started it. She denies any recent antibiotics. She has not noted any   blood in her stool. PAST MEDICAL HISTORY: Significant for abdominal pain, arthritis,   bacteremia due to Klebsiella, chronic back pain, dermatitis, elevated   LFTs, endocrine disease, GERD, HSV, hypertension, hypothyroidism,   insomnia, asthma, nausea and vomiting, allergic rhinitis. PAST SURGICAL HISTORY: Colonoscopy, cholecystectomy,   endoscopy, and tubal ligation. FAMILY HISTORY: Unremarkable. SOCIAL HISTORY: She is . She does not smoke. ALLERGIES: SHE IS ALLERGIC TO   1. PENICILLIN. 2. TRAMADOL. 3. PROVENTIL. 4. ACE INHIBITORS. 5. ALBUTEROL. 6. AMBIEN. 7. CIPRO. 8. LISINOPRIL. 9. OXAPROZIN. 10. SULFADIAZINE. 11. TRAZODONE. REVIEW OF SYSTEMS   CONSTITUTIONAL: Negative for chills and fever. HEENT: Negative. RESPIRATORY: Negative. CARDIOVASCULAR: Negative. GASTROINTESTINAL: Positive for upper abdominal pain, diarrhea,   nausea and vomiting. Negative for blood in her stool. GENITOURINARY: Negative. MUSCULOSKELETAL: Negative. SKIN: Negative. NEUROLOGIC: Positive for dizziness and lightheadedness. PHYSICAL EXAMINATION   VITAL SIGNS: Temperature is 97.8. Her pulse has been between 104   and 113. Blood pressure as low as 71/47. Respirations 33. GENERAL: She is alert, in moderate distress. HEENT: Normocephalic, atraumatic. Her eyes are anicteric. NECK: Supple. LUNGS: clear bilaterally. HEART: Tachycardic. ABDOMEN: Soft. She is tender in the epigastric region and right and   left quadrants; however, the rest of her abdomen is quite benign and   nontender. EXTREMITIES: No clubbing, cyanosis or edema. LABORATORY: Studies reveal a white count of 16.8, H and H of 11.1   and 32.8, with a platelet count of 038. She does have bandemia. Lactic   acid is 10. Sodium is 139, potassium 3.1, BUN is 17, creatinine is 2.43. Previously 3 months ago, her creatinine was completely normal at 0.8. ALT 53, , alkaline phosphatase 202. CT scan was reviewed   directly with the radiologist and it showed a proximal small-bowel   obstruction, probably adhesive in nature. I asked him extensively if   there is any evidence of ischemia or inflammation, and there was   none. ASSESSMENT: This is a 74-year-old female with nausea, vomiting   and diarrhea. While she does have some dilated loops of bowel on her   CT scan, this may just be a partial obstruction or ileus, given the   diarrhea that she has been having for the past 3 days. Most of her   abdomen is benign and she is not peritonitic. At this point, I believe the   patient will require aggressive hydration and serial abdominal   examinations. An NG tube will be placed to low intermittent suction. Stool cultures will be sent. We will continue to follow along. Should she   worsen or if this becomes more obviously an abdominal process, then   she may require operative intervention.  However, at this point, she   should be resuscitated, as she is quite dehydrated.         Huy Lema MD      SS / FS   D:  05/15/2017   03:23   T:  05/15/2017   04:17   Job #:  [id number]

## 2017-05-15 NOTE — ED PROVIDER NOTES
HPI Comments: 67 y.o. female with extensive past medical history, please see list, significant for HTN, GERD, Cholecystectomy who presents from home via EMS for evaluation of multiple symptoms. Pt c/o epigastric pain, nausea, vomiting and diarrhea x 2 days. Pt states she has vomited 3x today and had diarrhea 3x today. Pt states she had not answered the phone today when her son called numerous times due to not feeling well therefore son came to check on her. She states her son found her on the couch covered in stool therefore called EMS. While in ED, pt also c/o lightheadedness and dizziness. Pt denies recent anitbiotics. She further denies fever, chills, chest pain, SOB, syncope, blood in stool or melena. There are no other acute medical concerns at this time. Old chart review: Pt evaluated in ED 2/2/17 for chest pain and GERD. EKG normal. Serial Troponins negative. Chest XR unremarkable. Pt discharged home to f/u with [facility], Cardiology and GI. Social hx: Lives by herself. PCP: Alec Correa MD    Note written by Alice Bolanos, as dictated by Ruiz Faria MD 11:25 PM    The history is provided by the patient. No  was used.         Past Medical History:   Diagnosis Date    Abdominal pain 6/18/14    note from [facility] directive discussed with patient 07/22/2015    Arthritis     dr Adelina buck        2/6/17 f/u note    Bacteremia due to Klebsiella pneumoniae 2/2/2016    [facility] note from Dr Reinaldo Golden, Infect Disease    Chronic pain     low back pain/arthritis      [facility] Cori Cassis note 11/2/16    Contact dermatitis and other eczema, due to unspecified cause     hyperpigmented macules/seb k    Elevated LFTs     per info from Dr Elroy Peralta [facility] on report    Endocrine disease     hypothyroid    GERD (gastroesophageal reflux disease)     chelsy burns    HSV infection     Hypertension     Hypothyroid 10/2012    Insomnia     Melasma 3/3/15    notes from [facility]    Nausea & vomiting 05/04/2017    report [facility]    Pain management counseling, encounter for 05/05/2016    sees Dr Shalom Willson 5/2/17 f/u    Rhinitis, allergic nonseasonal     Urge incontinence of urine 03/21/2017 initial [facility]    [facility] initial eval note Dee Mary 4/17/17 urodymanics study//5/9/17 f/u note    Well woman exam (no gynecological exam) 07/29/2016    zedler's note rec'd       Past Surgical History:   Procedure Laterality Date    COLONOSCOPY  8/1/11    dr Tiffany Wood 10 year repeat    HX CHOLECYSTECTOMY      HX ENDOSCOPY  2014    [address]    HX OTHER SURGICAL  04/17/2017    complex urodynamics study report from [facility]    HX TUBAL LIGATION           History reviewed. No pertinent family history. Social History     Social History    Marital status:      Spouse name: N/A    Number of children: N/A    Years of education: N/A     Occupational History    Not on file. Social History Main Topics    Smoking status: Never Smoker    Smokeless tobacco: Never Used    Alcohol use No    Drug use: No    Sexual activity: Not Currently     Other Topics Concern    Not on file     Social History Narrative         ALLERGIES: Pcn [penicillins]; Tramadol; Proventil [albuterol sulfate]; Ace inhibitors; Albuterol; Ambien [zolpidem]; Ciprofloxacin; Lisinopril; Oxaprozin; Sulfadiazine; and Trazodone    Review of Systems   Constitutional: Negative for chills and fever. HENT: Negative for rhinorrhea and sore throat. Respiratory: Negative for cough and shortness of breath. Cardiovascular: Negative for chest pain. Gastrointestinal: Positive for abdominal pain (epigastric), diarrhea, nausea and vomiting. Negative for blood in stool. Genitourinary: Negative for dysuria and urgency. Musculoskeletal: Negative for arthralgias and back pain. Skin: Negative for rash.    Neurological: Positive for dizziness and light-headedness. Negative for weakness. Vitals:    05/14/17 2255   BP: (!) 122/98   Pulse: (!) 112   Resp: 26   Temp: 97.9 °F (36.6 °C)   SpO2: 93%   Weight: 87.4 kg (192 lb 11.2 oz)   Height: 5' 4\" (1.626 m)            Physical Exam   Const:  No acute distress, well developed, well nourished  Head:  Atraumatic, normocephalic  Eyes:  PERRL, conjunctiva normal, no scleral icterus  Neck:  Supple, trachea midline  Cardiovascular: Tachycardic, normal rhythm, no murmurs, no gallops, no rubs  Resp:  No resp distress, no increased work of breathing, no wheezes, no rhonchi, no rales,  Abd:  Soft, mild diffuse abdominal tenderness most significant to epigastric region, non-distended, no rebound, no guarding, no CVA tenderness  :  Deferred  MSK:  No pedal edema, normal ROM  Neuro:  Alert and oriented x3, no cranial nerve defect  Skin:  Warm, dry, intact  Psych: Agitated, behavior is normal, judgement and thought content is normal  Note written by Alice Coronel, as dictated by Elroy Banks MD 11:27 PM    MDM  Number of Diagnoses or Management Options  Acute encephalopathy:   Acute renal failure, unspecified acute renal failure type ([address]):   Diarrhea, unspecified type:   Hypotension, unspecified hypotension type:   Nausea and vomiting, intractability of vomiting not specified, unspecified vomiting type:      Amount and/or Complexity of Data Reviewed  Clinical lab tests: ordered and reviewed  Tests in the radiology section of CPT®: ordered and reviewed  Review and summarize past medical records: yes    Critical Care  Total time providing critical care: 30-74 minutes (45 min.)    Patient Progress  Patient progress: stable    ED Course     Pt. Presents to the ER with AMS, diarrhea, and vomiting. Pt. Found to have an extremely elevated lactic acid. Pt. Has been hypotensive, despite IVF. I have placed a central line and started pressors. CT shows partial SBO. Pt.  To be seen by general surgery and to be evaluated for admission by the hospitalist.    CONSULT NOTE:  1:54 AM Eddie Meyers MD spoke with Dr. Logan Noriega, Consult for Surgery. Discussed available diagnostic tests and clinical findings for partial small bowel obstruction. Dr. Logan Noriega recommends admitting to medicine and he will see and evaluate. CONSULT NOTE:  2:09 AM Eddie Meyers MD spoke with Dr. Nikki Gracia, Consult for Hospitalist.  Discussed available diagnostic tests and clinical findings. Dr. Nikki Gracia will see and admit. Central Line  Date/Time: 5/15/2017 6:05 AM  Performed by: Yanira Nielsen  Authorized by: Yanira Nielsen     Consent:     Consent obtained:  Verbal    Consent given by:  Patient    Risks discussed:  Arterial puncture, incorrect placement, nerve damage, bleeding, infection and pneumothorax    Alternatives discussed:  No treatment  Pre-procedure details:     Hand hygiene: Hand hygiene performed prior to insertion      Sterile barrier technique: All elements of maximal sterile technique followed      Skin preparation:  2% chlorhexidine    Skin preparation agent: Skin preparation agent completely dried prior to procedure    Anesthesia (see MAR for exact dosages): Anesthesia method:  Local infiltration    Local anesthetic:  Lidocaine 1% w/o epi  Procedure details:     Location:  R femoral and R internal jugular    Patient position:  Flat    Procedural supplies:  Triple lumen    Landmarks identified: yes      Ultrasound guidance: yes      Successful placement: yes    Post-procedure details:     Post-procedure:  Dressing applied and line sutured    Assessment:  Blood return through all ports and free fluid flow    Patient tolerance of procedure: Tolerated well, no immediate complications  Comments:      I attempted to place the central line in the right IJ 3 times. Although I was able to get blood return on the syringe, I was not able to successfully able to pass the guide wire.   Therefore, I abandoned attempting an IJ, and I placed a right femoral line in one attempt.

## 2017-05-15 NOTE — PROGRESS NOTES
Shift summary: Arrived in ICU from ED at 0630. Drowsy, generalized weakness, oriented x4. Levophed increased to 15 mcg/min for goal MAP >65.  1 ml U/O total since ponce insertion. RR 25-30. ST 110s-120s.

## 2017-05-15 NOTE — ED NOTES
Assumed care, verbal and beside report received from [facility], [facility] . Pt resting comfortably in bed, monitor x 3,  alert and oriented x 4 with no complaints at this time.

## 2017-05-15 NOTE — CDMP QUERY
Patient is noted to have a BMI of 34.89 kg/m. Please clarify if this patient is:     =>Morbidly obese  =>Obese   =>Overweight  =>Other explanation of clinical findings  =>Unable to determine (no explanation for clinical findings)    Presentation: Ht: 5' 4\" (1.626 m) &   Wt: 92.2 kg (203 lb 4.2 oz) = BMI: 34.89 kg/m     Please clarify and document your clinical opinion in the progress notes and discharge summary, including the definitive and or presumptive diagnosis, (suspected or probable), related to the above clinical findings. Please include clinical findings supporting your diagnosis.      Thank you,    Alfred Lawrence, RN, BSN, [facility], [address] [place]  [phone]

## 2017-05-15 NOTE — CDMP QUERY
Please clarify if this patient is being treated/managed for:    =>Severe sepsis (POA) in the setting of lactic acidosis, refractory hypotension and acute kidney injury with noted partial bowel obstruction requiring Vasopressor, abx., renal consult   =>Other Explanation of clinical findings  =>Unable to Determine (no explanation of clinical findings)    The medical record reflects the following:     Clinical Indicators/Risk Factors: Pt presented in septic shock with doc JEANINE likely septic ATN (per renal), noted small bowel obstruction on CT, Lactic acid level 10.3. Treatment: Renal consult, abx-Vanc, 3L NS fl bolus w/Levophed gtt for refractory hypotension. Monitor VS and labs    Please clarify and document your clinical opinion in the progress notes and discharge summary including the definitive and/or presumptive diagnosis, (suspected or probable), related to the above clinical findings. Please include clinical findings supporting your diagnosis.     Thank you,    Radha Medina, RN, BSN, [facility], [address] [place]  [phone]

## 2017-05-15 NOTE — CONSULTS
Hospital: [facility]   Name: Sylvester Marin   : 1945   MRN: [id number]   Code: Full Code           CHIEF COMPLAINT:     History and ROS:  Unable to obtain due to patient factors : Disease State   musculoskeletal issues. Elzbieta Gutierrez 1213 Day: 2       Intensive Care Unit 13    Patient is a 69-year-old -American female admitted with delirium with an NG tube in to low wall suction. There is a query of sepsis patient has shock on norepinephrine. She is cool and getting fluids. We will check a venous blood gas to see if she needs an inotrope and add dobutamine. Solu-Cortef has been added as well as broad-spectrum antibiotics. Surgery is seeing the patient and incarcerated hernia does not appear to be playing a role and her belly is benign. GI and renal to see the patient. Electrolytes to be replenished. She needs to remain in the Intensive Care Unit until her physiology has been clarified. IMPRESSION      · Delirium - Monitoring   · Shock requiring Vasopressors  NorEpi - septic ( 1/3 GNR  On blood culture )  vs Cardiogenic ( hands cold Low SvO2 ) - Cefepime / Vamco - Flagyl  · Abdominal Complaints - ? pSBO Umbilical Hernia- surgery Following   · ARF - Oliguric - Renal Consulted  - Volume expanding  · Lactic acidosis  · Hypomagnesemia required parenteral replenishment   · Hyperbilirubinemia   · Hypothyroidism - On LT4 50   · Acute gastroenteritis. · Rheumatoid arthritis - On Imuran PTA      Medical Issues:  has a past medical history of Abdominal pain (14); Advance directive discussed with patient (2015); Arthritis; Bacteremia due to Klebsiella pneumoniae (2016); Chronic pain; Contact dermatitis and other eczema, due to unspecified cause; Elevated LFTs; Endocrine disease; GERD (gastroesophageal reflux disease); HSV infection; Hypertension; Hypothyroid (10/2012); Insomnia; Melasma (3/3/15); Nausea & vomiting (2017);  Pain management counseling, encounter for (2016); Rhinitis, allergic nonseasonal; Urge incontinence of urine (2017 initial [facility] consult); and Well woman exam (no gynecological exam) (2016).      Patient Active Problem List    Diagnosis Date Noted    Septic shock ([facility] 05/15/2017    ACP (advance care planning) 2016    Gastroesophageal reflux disease without esophagitis 10/24/2016    Chronic midline low back pain without sciatica 10/24/2016    Prediabetes 2015    Insomnia 2015    Abnormal gait 2015    Vitamin D deficiency 2015    Allergic rhinitis 10/07/2014    Tinea manus 10/07/2014    Chronic constipation 10/07/2014    Hypothyroidism 2012    Postmenopausal 10/17/2012    RA (rheumatoid arthritis) ([facility] 2010    Essential hypertension 2010        VITAL SIGNS:        Visit Vitals    BP (!) 76/52    Pulse (!) 114    Temp 100.2 °F (37.9 °C)    Resp 28    Ht 5' 4\" (1.626 m)    Wt 92.2 kg (203 lb 4.2 oz)    SpO2 90%    BMI 34.89 kg/m2        Temp (24hrs), Av.7 °F (37.1 °C), Min:97.7 °F (36.5 °C), Max:101.1 °F (38.4 °C)           INTAKE / OUPUT       Intake/Output Summary (Last 24 hours) at 05/15/17 0935  Last data filed at 05/15/17 0710   Gross per 24 hour   Intake           193.37 ml   Output              101 ml   Net            92.37 ml              EXAM:     OTHER:       GEN: toxic Nondistressed     EYE: Anicteric Noninjected    ENT: Moist No Thrush    CARD: Regular No murmurs    RESP: Clear Equal BS    GI: NABS Nontender    : Clear Urine Normal Genitalia    SKEL: WD WN No Clubbing    SKIN: Perfused No drug rash    NEURO: delirious Nonfocal    PSYCH: Nonagitated Poor insight    LYMPH: No TOVA No edema       DATA:      Recent Labs      05/15/17   0717  17   2258   WBC  18.8*  16.8*   HGB  10.4*  11.1*   PLT  182  240     Recent Labs      05/15/17   0717  05/15/17   0538  17   2258   NA  139   --   139   K  3.7   --   3.1*   CL  107   --   101 CO2  17*   --   18*   GLU  92   --   77   BUN  21*   --   17   CREA  2.63*   --   2.43*   CA  8.1*   --   9.1   MG  1.1*   --    --    PHOS  3.4   --    --    LAC   --   9.5*  10.3*   ALB  2.1*   --   2.4*   SGOT  105*   --   104*   ALT  50   --   53   LPSE   --    --   75          Ventilator / BiPAP / O2     O2 Device: Room air (05/15/17 0631)    Mode    Rate    TV     Pressure    FiO2    PEEP    PIP    MV      No results for input(s): PHI, PCO2I, PO2I in the last 72 hours. IMAGING:     [] Personally Visualized [] Discussed with Radiologist  [] Report reviewed       Results from Hospital Encounter encounter on 05/14/17   XR ABD (KUB)   Narrative EXAM:  XR ABD (KUB). INDICATION: NG tube placement. COMPARISON: 12/1/2010. FINDINGS:   A portable supine radiograph of the lower chest and upper abdomen was obtained  at 0342 hours and shows a nasogastric tube with tip and sidehole over the  stomach. The patient is on a cardiac monitor. No soft tissue masses or  pathologic calcifications are identified. The bones and soft tissues are within  normal limits. Impression IMPRESSION: Nasogastric tube tip and sidehole over the stomach. Results from [place] encounter on 05/14/17   CT CHEST WO CONT   Narrative INDICATION: sepsis     Noncontrast CT of the chest is performed with 5 mm collimation. Coronal and  sagittal reformatted images were also performed. CT dose reduction was achieved through use of a standardized protocol tailored  for this examination and automatic exposure control for dose modulation. Adaptive statistical iterative reconstruction (ASIR) was utilized. Direct comparison is made to CT of the abdomen and pelvis, including lower chest  dated May 15, 2017 and prior CT chest dated December 2015. Chest:     Lungs: There is mild bibasilar dependent compressive atelectasis. Lymph nodes: There is no axillary, mediastinal or hilar lymphadenopathy. Heart:  The heart is of normal size and there is no pericardial effusion. Atherosclerotic calcifications are noted within the coronary arteries. Pleura: There are very small bilateral pleural effusions. Bones: Visualized osseous structures are intact. Upper abdomen: The gallbladder is surgically absent. Nasogastric tube extends to  the stomach. Visualized liver is hypodense consistent with hepatic steatosis. The visualized abdominal structures are otherwise normal.         Impression IMPRESSION: Very small bilateral pleural effusions. Mild bibasilar dependent  compressive atelectasis. Results for orders placed or performed during the hospital encounter of 05/14/17   EKG, 12 LEAD, INITIAL   Result Value Ref Range    Ventricular Rate 116 BPM    Atrial Rate 116 BPM    P-R Interval 142 ms    QRS Duration 82 ms    Q-T Interval 336 ms    QTC Calculation (Bezet) 467 ms    Calculated P Axis 52 degrees    Calculated R Axis 36 degrees    Calculated T Axis 55 degrees    Diagnosis       ** Poor data quality, interpretation may be adversely affected  Sinus tachycardia  When compared with ECG of 14-MAY-2017 22:49,  MANUAL COMPARISON REQUIRED, DATA IS UNCONFIRMED  Confirmed by Lopez Marcial M.D., Radha Tess ([id number]) on 5/15/2017 9:10:23 AM           Tomy Reddy MD [facility]     History:     PMH:  has a past medical history of Abdominal pain (6/18/14); Advance directive discussed with patient (07/22/2015); Arthritis; Bacteremia due to Klebsiella pneumoniae (2/2/2016); Chronic pain; Contact dermatitis and other eczema, due to unspecified cause; Elevated LFTs; Endocrine disease; GERD (gastroesophageal reflux disease); HSV infection; Hypertension; Hypothyroid (10/2012); Insomnia; Melasma (3/3/15); Nausea & vomiting (05/04/2017); Pain management counseling, encounter for (05/05/2016);  Rhinitis, allergic nonseasonal; Urge incontinence of urine (03/21/2017 initial [facility] consult); and Well woman exam (no gynecological exam) (07/29/2016). PSH:   has a past surgical history that includes cholecystectomy; tubal ligation; colonoscopy (8/1/11); endoscopy (2014); and other surgical (04/17/2017). FHX: family history is not on file. SHX:  reports that she has never smoked. She has never used smokeless tobacco. She reports that she does not drink alcohol or use illicit drugs.     ALL:   Allergies   Allergen Reactions    Pcn [Penicillins] Swelling    Tramadol Nausea and Vomiting     SEVERE If taken w/o food    Proventil [Albuterol Sulfate] Hives    Ace Inhibitors Angioedema    Albuterol Swelling     swelling    Ambien [Zolpidem] Other (comments)     Nightmares and memory disturbance    Ciprofloxacin Other (comments)     Sore throat and trouble swallowing    Lisinopril Swelling    Oxaprozin Nausea and Vomiting     severe stomach upset    Sulfadiazine Swelling     swelling    Trazodone Other (comments)     Vivid dreams and felt disoriented        MEDS:   [x] Reviewed - As Below   [] Not reviewed    Current Facility-Administered Medications   Medication    NOREPINephrine (LEVOPHED) 8,000 mcg in dextrose 5% 250 mL infusion    0.9% sodium chloride infusion    0.9% sodium chloride with KCl 20 mEq/L infusion    HYDROmorphone (PF) (DILAUDID) injection 1 mg    ondansetron (ZOFRAN) injection 4 mg    heparin (porcine) injection 5,000 Units    cefepime (MAXIPIME) 2 g in 0.9% sodium chloride (MBP/ADV) 100 mL    pantoprazole (PROTONIX) 40 mg in sodium chloride 0.9 % 10 mL injection    Vancomycin - Pharmacy to Dose    [START ON 5/16/2017] vancomycin (VANCOCIN) 1250 mg in  ml infusion    sodium chloride 0.9 % bolus infusion 3,000 mL    hydrocortisone Sod Succ (PF) (SOLU-CORTEF) injection 50 mg    magnesium sulfate 2 g/50 ml IVPB (premix or compounded)    albumin human 5% (BUMINATE) solution 25 g    sodium chloride 0.9 % bolus infusion 100 mL    iopamidol (ISOVUE-370) 76 % injection 100 mL         Akbar Hernandez MD [facility]

## 2017-05-15 NOTE — ROUTINE PROCESS
TRANSFER - OUT REPORT:    Verbal report given to Edna BRANDT(name) on [facility]  being transferred to ICU(unit) for routine progression of care       Report consisted of patients Situation, Background, Assessment and   Recommendations(SBAR). Information from the following report(s) ED Summary was reviewed with the receiving nurse. Lines:   Triple Lumen 05/15/17 Right Femoral (Active)   Site Assessment Clean, dry, & intact 5/15/2017  2:51 AM   Infiltration Assessment 0 5/15/2017  2:51 AM       Peripheral IV 05/14/17 Left Antecubital (Active)   Site Assessment Clean, dry, & intact 5/14/2017 10:43 PM   Phlebitis Assessment 0 5/14/2017 10:43 PM   Infiltration Assessment 0 5/14/2017 10:43 PM   Dressing Status Clean, dry, & intact 5/14/2017 10:43 PM   Dressing Type Transparent 5/14/2017 10:43 PM       Peripheral IV 05/14/17 Right Antecubital (Active)   Site Assessment Clean, dry, & intact 5/14/2017 11:12 PM   Phlebitis Assessment 0 5/14/2017 11:12 PM   Infiltration Assessment 0 5/14/2017 11:12 PM   Dressing Status Clean, dry, & intact 5/14/2017 11:12 PM   Dressing Type Transparent 5/14/2017 11:12 PM        Opportunity for questions and clarification was provided.       Patient transported with:   Registered Nurse

## 2017-05-15 NOTE — PROGRESS NOTES
Ms. Vangei Reynolds tells me that she feels a little better today. Tm 101.1 HR: 115 BP: 77/52 on Levophed Resp Rate: 28 90% sat on room air. Intake/Output Summary (Last 24 hours) at 05/15/17 0853  Last data filed at 05/15/17 0710   Gross per 24 hour   Intake           193.37 ml   Output              101 ml   Net            92.37 ml   Exam: Cor: RRR. Lungs: Bilat BS, CTA. Abd: Soft, Non tender. No rebound or guarding. Non distended. Incarcerated ventral hernia. Labs:   Recent Results (from the past 12 hour(s))   EKG, 12 LEAD, INITIAL    Collection Time: 05/14/17 10:47 PM   Result Value Ref Range    Ventricular Rate 0 BPM    Atrial Rate 0 BPM    QRS Duration 0 ms    Q-T Interval 0 ms    QTC Calculation (Bezet) 0 ms    Calculated R Axis 0 degrees    Calculated T Axis 0 degrees    Diagnosis       ** No QRS complexes found, no ECG analysis possible **  When compared with ECG of 02-FEB-2017 00:30,  Current undetermined rhythm precludes rhythm comparison, needs review     CBC WITH AUTOMATED DIFF    Collection Time: 05/14/17 10:58 PM   Result Value Ref Range    WBC 16.8 (H) 3.6 - 11.0 K/uL    RBC 3.64 (L) 3.80 - 5.20 M/uL    HGB 11.1 (L) 11.5 - 16.0 g/dL    HCT 32.8 (L) 35.0 - 47.0 %    MCV 90.1 80.0 - 99.0 FL    MCH 30.5 26.0 - 34.0 PG    MCHC 33.8 30.0 - 36.5 g/dL    RDW 16.9 (H) 11.5 - 14.5 %    PLATELET 286 220 - 856 K/uL    NEUTROPHILS 66 32 - 75 %    BAND NEUTROPHILS 21 (H) 0 - 6 %    LYMPHOCYTES 7 (L) 12 - 49 %    MONOCYTES 1 (L) 5 - 13 %    EOSINOPHILS 0 0 - 7 %    BASOPHILS 0 0 - 1 %    METAMYELOCYTES 5 (H) 0 %    ABS. NEUTROPHILS 14.6 (H) 1.8 - 8.0 K/UL    ABS. LYMPHOCYTES 1.2 0.8 - 3.5 K/UL    ABS. MONOCYTES 0.2 0.0 - 1.0 K/UL    ABS. EOSINOPHILS 0.0 0.0 - 0.4 K/UL    ABS.  BASOPHILS 0.0 0.0 - 0.1 K/UL    DF MANUAL      RBC COMMENTS ANISOCYTOSIS  1+        RBC COMMENTS POLYCHROMASIA  1+       METABOLIC PANEL, COMPREHENSIVE    Collection Time: 05/14/17 10:58 PM   Result Value Ref Range    Sodium 139 136 - 145 mmol/L    Potassium 3.1 (L) 3.5 - 5.1 mmol/L    Chloride 101 97 - 108 mmol/L    CO2 18 (L) 21 - 32 mmol/L    Anion gap 20 (H) 5 - 15 mmol/L    Glucose 77 65 - 100 mg/dL    BUN 17 6 - 20 MG/DL    Creatinine 2.43 (H) 0.55 - 1.02 MG/DL    BUN/Creatinine ratio 7 (L) 12 - 20      GFR est AA 24 (L) >60 ml/min/1.73m2    GFR est non-AA 20 (L) >60 ml/min/1.73m2    Calcium 9.1 8.5 - 10.1 MG/DL    Bilirubin, total 4.6 (H) 0.2 - 1.0 MG/DL    ALT (SGPT) 53 12 - 78 U/L    AST (SGOT) 104 (H) 15 - 37 U/L    Alk. phosphatase 202 (H) 45 - 117 U/L    Protein, total 6.0 (L) 6.4 - 8.2 g/dL    Albumin 2.4 (L) 3.5 - 5.0 g/dL    Globulin 3.6 2.0 - 4.0 g/dL    A-G Ratio 0.7 (L) 1.1 - 2.2     LACTIC ACID, PLASMA    Collection Time: 05/14/17 10:58 PM   Result Value Ref Range    Lactic acid 10.3 (HH) 0.4 - 2.0 MMOL/L   LIPASE    Collection Time: 05/14/17 10:58 PM   Result Value Ref Range    Lipase 75 73 - 393 U/L   LACTIC ACID, PLASMA    Collection Time: 05/15/17  5:38 AM   Result Value Ref Range    Lactic acid 9.5 (HH) 0.4 - 2.0 MMOL/L   METABOLIC PANEL, COMPREHENSIVE    Collection Time: 05/15/17  7:17 AM   Result Value Ref Range    Sodium 139 136 - 145 mmol/L    Potassium 3.7 3.5 - 5.1 mmol/L    Chloride 107 97 - 108 mmol/L    CO2 17 (L) 21 - 32 mmol/L    Anion gap 15 5 - 15 mmol/L    Glucose 92 65 - 100 mg/dL    BUN 21 (H) 6 - 20 MG/DL    Creatinine 2.63 (H) 0.55 - 1.02 MG/DL    BUN/Creatinine ratio 8 (L) 12 - 20      GFR est AA 22 (L) >60 ml/min/1.73m2    GFR est non-AA 18 (L) >60 ml/min/1.73m2    Calcium 8.1 (L) 8.5 - 10.1 MG/DL    Bilirubin, total 4.0 (H) 0.2 - 1.0 MG/DL    ALT (SGPT) 50 12 - 78 U/L    AST (SGOT) 105 (H) 15 - 37 U/L    Alk.  phosphatase 217 (H) 45 - 117 U/L    Protein, total 5.5 (L) 6.4 - 8.2 g/dL    Albumin 2.1 (L) 3.5 - 5.0 g/dL    Globulin 3.4 2.0 - 4.0 g/dL    A-G Ratio 0.6 (L) 1.1 - 2.2     CBC WITH AUTOMATED DIFF    Collection Time: 05/15/17  7:17 AM   Result Value Ref Range    WBC 18.8 (H) 3.6 - 11.0 K/uL    RBC 3.41 (L) 3.80 - 5.20 M/uL    HGB 10.4 (L) 11.5 - 16.0 g/dL    HCT 30.6 (L) 35.0 - 47.0 %    MCV 89.7 80.0 - 99.0 FL    MCH 30.5 26.0 - 34.0 PG    MCHC 34.0 30.0 - 36.5 g/dL    RDW 17.4 (H) 11.5 - 14.5 %    PLATELET 156 849 - 638 K/uL    NEUTROPHILS 69 32 - 75 %    BAND NEUTROPHILS 18 (H) 0 - 6 %    LYMPHOCYTES 3 (L) 12 - 49 %    MONOCYTES 1 (L) 5 - 13 %    EOSINOPHILS 2 0 - 7 %    BASOPHILS 0 0 - 1 %    METAMYELOCYTES 1 (H) 0 %    MYELOCYTES 6 (H) 0 %    ABS. NEUTROPHILS 16.4 (H) 1.8 - 8.0 K/UL    ABS. LYMPHOCYTES 0.6 (L) 0.8 - 3.5 K/UL    ABS. MONOCYTES 0.2 0.0 - 1.0 K/UL    ABS. EOSINOPHILS 0.4 0.0 - 0.4 K/UL    ABS. BASOPHILS 0.0 0.0 - 0.1 K/UL    DF MANUAL      RBC COMMENTS ANISOCYTOSIS  1+        RBC COMMENTS MAURA CELLS  PRESENT        WBC COMMENTS VACUOLATED POLYS     TROPONIN I    Collection Time: 05/15/17  7:17 AM   Result Value Ref Range    Troponin-I, Qt. 0.19 (H) <0.05 ng/mL   PHOSPHORUS    Collection Time: 05/15/17  7:17 AM   Result Value Ref Range    Phosphorus 3.4 2.6 - 4.7 MG/DL   TSH 3RD GENERATION    Collection Time: 05/15/17  7:17 AM   Result Value Ref Range    TSH 0.78 0.36 - 3.74 uIU/mL   MAGNESIUM    Collection Time: 05/15/17  7:17 AM   Result Value Ref Range    Magnesium 1.1 (L) 1.6 - 2.4 mg/dL   CK W/ CKMB & INDEX    Collection Time: 05/15/17  7:17 AM   Result Value Ref Range     (H) 26 - 192 U/L    CK - MB 3.4 <3.6 NG/ML    CK-MB Index 1.3 0 - 2.5     Reviewed CT Scan. Continue NG decompression. NPO for now. Wean pressors as tolerated. Nephrology to see. Dr. Gurdeep Dixon following. Plans per Dr. Kiah Cummings. Following.

## 2017-05-15 NOTE — PROGRESS NOTES
Ms. Jimena Ritter feels better today. Tm 101.1 Tc 97.6 HR: 103 on Levophed and Dobutamine BP: 125/80 Resp Rate: 23 per minute 99% sat on 2 liters/minute. Intake/Output Summary (Last 24 hours) at 05/15/17 1800  Last data filed at 05/15/17 1600   Gross per 24 hour   Intake          3334.28 ml   Output              346 ml   Net          2988.28 ml   Abd: Soft, Tender. No associated rebound or guarding. Non Distended. Recent Results (from the past 24 hour(s))   EKG, 12 LEAD, INITIAL    Collection Time: 05/14/17 10:47 PM   Result Value Ref Range    Ventricular Rate 116 BPM    Atrial Rate 116 BPM    P-R Interval 142 ms    QRS Duration 82 ms    Q-T Interval 336 ms    QTC Calculation (Bezet) 467 ms    Calculated P Axis 52 degrees    Calculated R Axis 36 degrees    Calculated T Axis 55 degrees    Diagnosis       ** Poor data quality, interpretation may be adversely affected  Sinus tachycardia  When compared with ECG of 14-MAY-2017 22:49,  MANUAL COMPARISON REQUIRED, DATA IS UNCONFIRMED  Confirmed by Oliver Montoya M.D., [facility] ([id number]) on 5/15/2017 9:10:23 AM     CBC WITH AUTOMATED DIFF    Collection Time: 05/14/17 10:58 PM   Result Value Ref Range    WBC 16.8 (H) 3.6 - 11.0 K/uL    RBC 3.64 (L) 3.80 - 5.20 M/uL    HGB 11.1 (L) 11.5 - 16.0 g/dL    HCT 32.8 (L) 35.0 - 47.0 %    MCV 90.1 80.0 - 99.0 FL    MCH 30.5 26.0 - 34.0 PG    MCHC 33.8 30.0 - 36.5 g/dL    RDW 16.9 (H) 11.5 - 14.5 %    PLATELET 193 323 - 077 K/uL    NEUTROPHILS 66 32 - 75 %    BAND NEUTROPHILS 21 (H) 0 - 6 %    LYMPHOCYTES 7 (L) 12 - 49 %    MONOCYTES 1 (L) 5 - 13 %    EOSINOPHILS 0 0 - 7 %    BASOPHILS 0 0 - 1 %    METAMYELOCYTES 5 (H) 0 %    ABS. NEUTROPHILS 14.6 (H) 1.8 - 8.0 K/UL    ABS. LYMPHOCYTES 1.2 0.8 - 3.5 K/UL    ABS. MONOCYTES 0.2 0.0 - 1.0 K/UL    ABS. EOSINOPHILS 0.0 0.0 - 0.4 K/UL    ABS.  BASOPHILS 0.0 0.0 - 0.1 K/UL    DF MANUAL      RBC COMMENTS ANISOCYTOSIS  1+        RBC COMMENTS POLYCHROMASIA  1+ METABOLIC PANEL, COMPREHENSIVE    Collection Time: 05/14/17 10:58 PM   Result Value Ref Range    Sodium 139 136 - 145 mmol/L    Potassium 3.1 (L) 3.5 - 5.1 mmol/L    Chloride 101 97 - 108 mmol/L    CO2 18 (L) 21 - 32 mmol/L    Anion gap 20 (H) 5 - 15 mmol/L    Glucose 77 65 - 100 mg/dL    BUN 17 6 - 20 MG/DL    Creatinine 2.43 (H) 0.55 - 1.02 MG/DL    BUN/Creatinine ratio 7 (L) 12 - 20      GFR est AA 24 (L) >60 ml/min/1.73m2    GFR est non-AA 20 (L) >60 ml/min/1.73m2    Calcium 9.1 8.5 - 10.1 MG/DL    Bilirubin, total 4.6 (H) 0.2 - 1.0 MG/DL    ALT (SGPT) 53 12 - 78 U/L    AST (SGOT) 104 (H) 15 - 37 U/L    Alk.  phosphatase 202 (H) 45 - 117 U/L    Protein, total 6.0 (L) 6.4 - 8.2 g/dL    Albumin 2.4 (L) 3.5 - 5.0 g/dL    Globulin 3.6 2.0 - 4.0 g/dL    A-G Ratio 0.7 (L) 1.1 - 2.2     LACTIC ACID, PLASMA    Collection Time: 05/14/17 10:58 PM   Result Value Ref Range    Lactic acid 10.3 (HH) 0.4 - 2.0 MMOL/L   LIPASE    Collection Time: 05/14/17 10:58 PM   Result Value Ref Range    Lipase 75 73 - 393 U/L   CULTURE, BLOOD, PAIRED    Collection Time: 05/14/17 11:35 PM   Result Value Ref Range    Special Requests: NO SPECIAL REQUESTS      Culture result: (A)       GRAM NEGATIVE RODS  GROWING IN 3 OF 3 BOTTLES DRAWN  (SITES=L AC)     CULTURE, STOOL    Collection Time: 05/15/17  4:23 AM   Result Value Ref Range    Special Requests: NO SPECIAL REQUESTS      Campylobacter antigen NEGATIVE      Culture result: PENDING    LACTIC ACID, PLASMA    Collection Time: 05/15/17  5:38 AM   Result Value Ref Range    Lactic acid 9.5 (HH) 0.4 - 2.0 MMOL/L   METABOLIC PANEL, COMPREHENSIVE    Collection Time: 05/15/17  7:17 AM   Result Value Ref Range    Sodium 139 136 - 145 mmol/L    Potassium 3.7 3.5 - 5.1 mmol/L    Chloride 107 97 - 108 mmol/L    CO2 17 (L) 21 - 32 mmol/L    Anion gap 15 5 - 15 mmol/L    Glucose 92 65 - 100 mg/dL    BUN 21 (H) 6 - 20 MG/DL    Creatinine 2.63 (H) 0.55 - 1.02 MG/DL    BUN/Creatinine ratio 8 (L) 12 - 20 GFR est AA 22 (L) >60 ml/min/1.73m2    GFR est non-AA 18 (L) >60 ml/min/1.73m2    Calcium 8.1 (L) 8.5 - 10.1 MG/DL    Bilirubin, total 4.0 (H) 0.2 - 1.0 MG/DL    ALT (SGPT) 50 12 - 78 U/L    AST (SGOT) 105 (H) 15 - 37 U/L    Alk. phosphatase 217 (H) 45 - 117 U/L    Protein, total 5.5 (L) 6.4 - 8.2 g/dL    Albumin 2.1 (L) 3.5 - 5.0 g/dL    Globulin 3.4 2.0 - 4.0 g/dL    A-G Ratio 0.6 (L) 1.1 - 2.2     CBC WITH AUTOMATED DIFF    Collection Time: 05/15/17  7:17 AM   Result Value Ref Range    WBC 18.8 (H) 3.6 - 11.0 K/uL    RBC 3.41 (L) 3.80 - 5.20 M/uL    HGB 10.4 (L) 11.5 - 16.0 g/dL    HCT 30.6 (L) 35.0 - 47.0 %    MCV 89.7 80.0 - 99.0 FL    MCH 30.5 26.0 - 34.0 PG    MCHC 34.0 30.0 - 36.5 g/dL    RDW 17.4 (H) 11.5 - 14.5 %    PLATELET 005 315 - 156 K/uL    NEUTROPHILS 69 32 - 75 %    BAND NEUTROPHILS 18 (H) 0 - 6 %    LYMPHOCYTES 3 (L) 12 - 49 %    MONOCYTES 1 (L) 5 - 13 %    EOSINOPHILS 2 0 - 7 %    BASOPHILS 0 0 - 1 %    METAMYELOCYTES 1 (H) 0 %    MYELOCYTES 6 (H) 0 %    ABS. NEUTROPHILS 16.4 (H) 1.8 - 8.0 K/UL    ABS. LYMPHOCYTES 0.6 (L) 0.8 - 3.5 K/UL    ABS. MONOCYTES 0.2 0.0 - 1.0 K/UL    ABS. EOSINOPHILS 0.4 0.0 - 0.4 K/UL    ABS.  BASOPHILS 0.0 0.0 - 0.1 K/UL    DF MANUAL      RBC COMMENTS ANISOCYTOSIS  1+        RBC COMMENTS MAURA CELLS  PRESENT        WBC COMMENTS VACUOLATED POLYS     TROPONIN I    Collection Time: 05/15/17  7:17 AM   Result Value Ref Range    Troponin-I, Qt. 0.19 (H) <0.05 ng/mL   PHOSPHORUS    Collection Time: 05/15/17  7:17 AM   Result Value Ref Range    Phosphorus 3.4 2.6 - 4.7 MG/DL   TSH 3RD GENERATION    Collection Time: 05/15/17  7:17 AM   Result Value Ref Range    TSH 0.78 0.36 - 3.74 uIU/mL   MAGNESIUM    Collection Time: 05/15/17  7:17 AM   Result Value Ref Range    Magnesium 1.1 (L) 1.6 - 2.4 mg/dL   CK W/ CKMB & INDEX    Collection Time: 05/15/17  7:17 AM   Result Value Ref Range     (H) 26 - 192 U/L    CK - MB 3.4 <3.6 NG/ML    CK-MB Index 1.3 0 - 2.5     LACTIC ACID, PLASMA    Collection Time: 05/15/17  9:28 AM   Result Value Ref Range    Lactic acid 7.4 (HH) 0.4 - 2.0 MMOL/L   POC VENOUS BLOOD GAS    Collection Time: 05/15/17  9:30 AM   Result Value Ref Range    Device: ROOM AIR      pH, venous (POC) 7.305 (L) 7.32 - 7.42      pCO2, venous (POC) 28.7 (L) 41 - 51 MMHG    pO2, venous (POC) 30 25 - 40 mmHg    HCO3, venous (POC) 14.3 (L) 23.0 - 28.0 MMOL/L    sO2, venous (POC) 52 (L) 65 - 88 %    Base deficit, venous (POC) 12 mmol/L    Allens test (POC) N/A      Total resp. rate 21      Site CENTRAL LINE      Specimen type (POC) VENOUS BLOOD     URINALYSIS W/ REFLEX CULTURE    Collection Time: 05/15/17  1:44 PM   Result Value Ref Range    Color DARK YELLOW      Appearance TURBID (A) CLEAR      Specific gravity 1.012 1.003 - 1.030      pH (UA) 6.0 5.0 - 8.0      Protein 100 (A) NEG mg/dL    Glucose NEGATIVE  NEG mg/dL    Ketone NEGATIVE  NEG mg/dL    Blood MODERATE (A) NEG      Urobilinogen 0.2 0.2 - 1.0 EU/dL    Nitrites NEGATIVE  NEG      Leukocyte Esterase MODERATE (A) NEG      WBC 0-4 0 - 4 /hpf    RBC 0-5 0 - 5 /hpf    Epithelial cells FEW FEW /lpf    Bacteria 2+ (A) NEG /hpf    UA:UC IF INDICATED URINE CULTURE ORDERED (A) CNI      Granular cast 0-2 (A) NEG /lpf    Waxy Cast 2-5 (A) NEG /lpf   LACTIC ACID, PLASMA    Collection Time: 05/15/17  1:44 PM   Result Value Ref Range    Lactic acid 6.8 (HH) 0.4 - 2.0 MMOL/L   BILIRUBIN, CONFIRM    Collection Time: 05/15/17  1:44 PM   Result Value Ref Range    Bilirubin UA, confirm POSITIVE (A) NEG     Continue to wean pressors as tolerated. GI input - noted. Nephrology following. Dr. Jayna Guidry input - noted. Plans per Dr. Ayse Camarillo. Following.

## 2017-05-15 NOTE — PROGRESS NOTES
0800 assumed care of pt - systolic BP in mid 98'Z with map less then 65 - levophed increased  0830 Justen Smalls in and updated-   0930 pt resting eyes closed-receiving her 3 liter bolus of NS  1005 pt pulled out her central line(femoral line)- Dr Shearon Closs notified  21 223  new line placed per Dr Shearon Closs  [phone]. Aly NP in for GI- stat chest xray done  1100 Dr Patel Hassan in and new orders placed  Fer NP for surgeon - echo done  1400 dobutamine is infusing now at 2.5 mcg  1600 urine output has improved and pt talking more clearly  1800 weaning the levophed - BP better and urine output improved - Dr Vladimir Posadas in to see pt  1900 shift summary: levophed now at 10 mcg- dobutamine at 2.5 mcg- bicarb drip continues- Sm.  Amount of NGT drainage- urine output now over 50 cc an hr-

## 2017-05-16 ENCOUNTER — APPOINTMENT (OUTPATIENT)
Dept: GENERAL RADIOLOGY | Age: 72
DRG: 871 | End: 2017-05-16
Attending: INTERNAL MEDICINE
Payer: MEDICARE

## 2017-05-16 LAB
ALBUMIN SERPL BCP-MCNC: 2.8 G/DL (ref 3.5–5)
ALBUMIN/GLOB SERPL: 1 {RATIO} (ref 1.1–2.2)
ALP SERPL-CCNC: 99 U/L (ref 45–117)
ALT SERPL-CCNC: 43 U/L (ref 12–78)
ANION GAP BLD CALC-SCNC: 12 MMOL/L (ref 5–15)
AST SERPL W P-5'-P-CCNC: 79 U/L (ref 15–37)
BASOPHILS # BLD AUTO: 0 K/UL (ref 0–0.1)
BASOPHILS # BLD: 0 % (ref 0–1)
BILIRUB SERPL-MCNC: 2.4 MG/DL (ref 0.2–1)
BUN SERPL-MCNC: 31 MG/DL (ref 6–20)
BUN/CREAT SERPL: 13 (ref 12–20)
CALCIUM SERPL-MCNC: 7.2 MG/DL (ref 8.5–10.1)
CHLORIDE SERPL-SCNC: 104 MMOL/L (ref 97–108)
CK MB CFR SERPL CALC: 6.8 % (ref 0–2.5)
CK MB SERPL-MCNC: 20.4 NG/ML (ref 5–25)
CK SERPL-CCNC: 299 U/L (ref 26–192)
CO2 SERPL-SCNC: 26 MMOL/L (ref 21–32)
CREAT SERPL-MCNC: 2.43 MG/DL (ref 0.55–1.02)
DIFFERENTIAL METHOD BLD: ABNORMAL
EOSINOPHIL # BLD: 0 K/UL (ref 0–0.4)
EOSINOPHIL NFR BLD: 0 % (ref 0–7)
ERYTHROCYTE [DISTWIDTH] IN BLOOD BY AUTOMATED COUNT: 17.7 % (ref 11.5–14.5)
GLOBULIN SER CALC-MCNC: 2.9 G/DL (ref 2–4)
GLUCOSE SERPL-MCNC: 139 MG/DL (ref 65–100)
HCT VFR BLD AUTO: 24.6 % (ref 35–47)
HGB BLD-MCNC: 8.6 G/DL (ref 11.5–16)
LACTATE SERPL-SCNC: 4.2 MMOL/L (ref 0.4–2)
LYMPHOCYTES # BLD AUTO: 3 % (ref 12–49)
LYMPHOCYTES # BLD: 0.7 K/UL (ref 0.8–3.5)
MAGNESIUM SERPL-MCNC: 2.6 MG/DL (ref 1.6–2.4)
MCH RBC QN AUTO: 30.4 PG (ref 26–34)
MCHC RBC AUTO-ENTMCNC: 35 G/DL (ref 30–36.5)
MCV RBC AUTO: 86.9 FL (ref 80–99)
METAMYELOCYTES NFR BLD MANUAL: 3 %
MONOCYTES # BLD: 0.9 K/UL (ref 0–1)
MONOCYTES NFR BLD AUTO: 4 % (ref 5–13)
NEUTS BAND NFR BLD MANUAL: 5 % (ref 0–6)
NEUTS SEG # BLD: 20.3 K/UL (ref 1.8–8)
NEUTS SEG NFR BLD AUTO: 85 % (ref 32–75)
PHOSPHATE SERPL-MCNC: 1.8 MG/DL (ref 2.6–4.7)
PLATELET # BLD AUTO: 112 K/UL (ref 150–400)
POTASSIUM SERPL-SCNC: 3.1 MMOL/L (ref 3.5–5.1)
PROT SERPL-MCNC: 5.7 G/DL (ref 6.4–8.2)
RBC # BLD AUTO: 2.83 M/UL (ref 3.8–5.2)
RBC MORPH BLD: ABNORMAL
SODIUM SERPL-SCNC: 142 MMOL/L (ref 136–145)
TROPONIN I SERPL-MCNC: 6.88 NG/ML
WBC # BLD AUTO: 22.6 K/UL (ref 3.6–11)
WBC MORPH BLD: ABNORMAL

## 2017-05-16 PROCEDURE — 36415 COLL VENOUS BLD VENIPUNCTURE: CPT | Performed by: INTERNAL MEDICINE

## 2017-05-16 PROCEDURE — 82550 ASSAY OF CK (CPK): CPT | Performed by: INTERNAL MEDICINE

## 2017-05-16 PROCEDURE — 77010033678 HC OXYGEN DAILY

## 2017-05-16 PROCEDURE — 65610000006 HC RM INTENSIVE CARE

## 2017-05-16 PROCEDURE — 93005 ELECTROCARDIOGRAM TRACING: CPT

## 2017-05-16 PROCEDURE — 83605 ASSAY OF LACTIC ACID: CPT | Performed by: INTERNAL MEDICINE

## 2017-05-16 PROCEDURE — 83735 ASSAY OF MAGNESIUM: CPT | Performed by: INTERNAL MEDICINE

## 2017-05-16 PROCEDURE — 74011000250 HC RX REV CODE- 250: Performed by: INTERNAL MEDICINE

## 2017-05-16 PROCEDURE — 84484 ASSAY OF TROPONIN QUANT: CPT | Performed by: INTERNAL MEDICINE

## 2017-05-16 PROCEDURE — 84100 ASSAY OF PHOSPHORUS: CPT | Performed by: INTERNAL MEDICINE

## 2017-05-16 PROCEDURE — 85025 COMPLETE CBC W/AUTO DIFF WBC: CPT | Performed by: INTERNAL MEDICINE

## 2017-05-16 PROCEDURE — 74011000258 HC RX REV CODE- 258: Performed by: INTERNAL MEDICINE

## 2017-05-16 PROCEDURE — 74011000250 HC RX REV CODE- 250: Performed by: STUDENT IN AN ORGANIZED HEALTH CARE EDUCATION/TRAINING PROGRAM

## 2017-05-16 PROCEDURE — 74011250636 HC RX REV CODE- 250/636: Performed by: INTERNAL MEDICINE

## 2017-05-16 PROCEDURE — 71010 XR CHEST PORT: CPT

## 2017-05-16 PROCEDURE — C9113 INJ PANTOPRAZOLE SODIUM, VIA: HCPCS | Performed by: INTERNAL MEDICINE

## 2017-05-16 PROCEDURE — 80053 COMPREHEN METABOLIC PANEL: CPT | Performed by: INTERNAL MEDICINE

## 2017-05-16 PROCEDURE — 87040 BLOOD CULTURE FOR BACTERIA: CPT | Performed by: STUDENT IN AN ORGANIZED HEALTH CARE EDUCATION/TRAINING PROGRAM

## 2017-05-16 PROCEDURE — 36591 DRAW BLOOD OFF VENOUS DEVICE: CPT

## 2017-05-16 PROCEDURE — P9045 ALBUMIN (HUMAN), 5%, 250 ML: HCPCS | Performed by: INTERNAL MEDICINE

## 2017-05-16 RX ORDER — SODIUM CHLORIDE 0.9 % (FLUSH) 0.9 %
SYRINGE (ML) INJECTION
Status: COMPLETED
Start: 2017-05-16 | End: 2017-05-16

## 2017-05-16 RX ORDER — GUAIFENESIN 100 MG/5ML
81 LIQUID (ML) ORAL DAILY
Status: DISCONTINUED | OUTPATIENT
Start: 2017-05-17 | End: 2017-05-24 | Stop reason: HOSPADM

## 2017-05-16 RX ORDER — IPRATROPIUM BROMIDE 0.5 MG/2.5ML
0.5 SOLUTION RESPIRATORY (INHALATION)
Status: DISCONTINUED | OUTPATIENT
Start: 2017-05-16 | End: 2017-05-24 | Stop reason: HOSPADM

## 2017-05-16 RX ORDER — POTASSIUM CHLORIDE 29.8 MG/ML
20 INJECTION INTRAVENOUS ONCE
Status: COMPLETED | OUTPATIENT
Start: 2017-05-16 | End: 2017-05-16

## 2017-05-16 RX ADMIN — DEXTROSE MONOHYDRATE: 5 INJECTION, SOLUTION INTRAVENOUS at 03:00

## 2017-05-16 RX ADMIN — HEPARIN SODIUM 5000 UNITS: 5000 INJECTION, SOLUTION INTRAVENOUS; SUBCUTANEOUS at 06:45

## 2017-05-16 RX ADMIN — HYDROMORPHONE HYDROCHLORIDE 1 MG: 1 INJECTION, SOLUTION INTRAMUSCULAR; INTRAVENOUS; SUBCUTANEOUS at 21:32

## 2017-05-16 RX ADMIN — DEXTROSE MONOHYDRATE: 5 INJECTION, SOLUTION INTRAVENOUS at 19:01

## 2017-05-16 RX ADMIN — HYDROCORTISONE SODIUM SUCCINATE 50 MG: 100 INJECTION, POWDER, FOR SOLUTION INTRAMUSCULAR; INTRAVENOUS at 14:13

## 2017-05-16 RX ADMIN — Medication 10 ML: at 14:16

## 2017-05-16 RX ADMIN — SODIUM CHLORIDE 40 MG: 9 INJECTION INTRAMUSCULAR; INTRAVENOUS; SUBCUTANEOUS at 08:27

## 2017-05-16 RX ADMIN — METRONIDAZOLE 500 MG: 500 INJECTION, SOLUTION INTRAVENOUS at 04:26

## 2017-05-16 RX ADMIN — ALBUMIN (HUMAN) 25 G: 12.5 INJECTION, SOLUTION INTRAVENOUS at 06:45

## 2017-05-16 RX ADMIN — HEPARIN SODIUM 5000 UNITS: 5000 INJECTION, SOLUTION INTRAVENOUS; SUBCUTANEOUS at 21:32

## 2017-05-16 RX ADMIN — HYDROMORPHONE HYDROCHLORIDE 1 MG: 1 INJECTION, SOLUTION INTRAMUSCULAR; INTRAVENOUS; SUBCUTANEOUS at 04:51

## 2017-05-16 RX ADMIN — HYDROCORTISONE SODIUM SUCCINATE 50 MG: 100 INJECTION, POWDER, FOR SOLUTION INTRAMUSCULAR; INTRAVENOUS at 21:31

## 2017-05-16 RX ADMIN — HYDROCORTISONE SODIUM SUCCINATE 50 MG: 100 INJECTION, POWDER, FOR SOLUTION INTRAMUSCULAR; INTRAVENOUS at 06:43

## 2017-05-16 RX ADMIN — Medication: at 09:00

## 2017-05-16 RX ADMIN — POTASSIUM CHLORIDE 20 MEQ: 400 INJECTION, SOLUTION INTRAVENOUS at 11:03

## 2017-05-16 RX ADMIN — ONDANSETRON 4 MG: 2 INJECTION INTRAMUSCULAR; INTRAVENOUS at 04:51

## 2017-05-16 RX ADMIN — HYDROMORPHONE HYDROCHLORIDE 1 MG: 1 INJECTION, SOLUTION INTRAMUSCULAR; INTRAVENOUS; SUBCUTANEOUS at 14:50

## 2017-05-16 RX ADMIN — HEPARIN SODIUM 5000 UNITS: 5000 INJECTION, SOLUTION INTRAVENOUS; SUBCUTANEOUS at 14:13

## 2017-05-16 RX ADMIN — CEFEPIME 2 G: 2 INJECTION, POWDER, FOR SOLUTION INTRAMUSCULAR; INTRAVENOUS at 06:43

## 2017-05-16 RX ADMIN — ALBUMIN (HUMAN) 25 G: 12.5 INJECTION, SOLUTION INTRAVENOUS at 00:06

## 2017-05-16 RX ADMIN — IPRATROPIUM BROMIDE 0.5 MG: 0.5 SOLUTION RESPIRATORY (INHALATION) at 14:59

## 2017-05-16 NOTE — PROGRESS NOTES
Patient with a pmh for gastroesophageal reflux disease, hypertension, herpes simplex virus infection, who was brought to the ED with N/V/D and abdominal pain rated 8/10. Patient admitted to ICU with sepsis and a partial small bowel obstruction. Care manager spoke by phone to patient's son Richard Corona [facility]) [phone] to explain role and discuss transitions of care. 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 Son confirmed her PCP to be Dr Emerald Forbes and she sees him about every 2 months. And uses the mail order prescription benefit through her insurance as well as [facility] as her pharmacies. Care management will follow for potential discharge planning needs. Joao James RN,CRM  Care Management Interventions  PCP Verified by CM:  Yes (Dr Emerald Forbes)  [address] [place]: No  Discharge Durable Medical Equipment: No  Physical Therapy Consult: No  Occupational Therapy Consult: No  Speech Therapy Consult: No  Current Support Network: Own Home, Lives Alone Chandrakant Corona [phone] ([facility]

## 2017-05-16 NOTE — PROGRESS NOTES
Hospital: [facility]   Name: Esteban Watson   : 1945   MRN: [id number]   Code: Full Code             Elzbieta Gutierrez 1213 Day: 3     2017-patient still delirious. Will need restraints. She does follow commands and wants to eat. 50 cc out NG tube, may be able to remove NG tube and advance diet. She complains of only mild abdominal pain and no other complaints. Her creatinine is stable. Echocardiogram is reassuring. Urine output has picked up and creatinine has stabilized. We'll discontinue dobutamine today. Bump in troponin. We'll ask cardiology to comment but likely a supply demand issue. Continue current care in the ICU. Gram-negative rods in 3 of 3 blood cultures. Awaiting identification. Narrowing antibiotics    5/15/17      Intensive Care Unit 13    Patient is a 25-year-old -American female admitted with delirium with an NG tube in to low wall suction. There is a query of sepsis patient has shock on norepinephrine. She is cool and getting fluids. We will check a venous blood gas to see if she needs an inotrope and add dobutamine. Solu-Cortef has been added as well as broad-spectrum antibiotics. Surgery is seeing the patient and incarcerated hernia does not appear to be playing a role and her belly is benign. GI and renal to see the patient. Electrolytes to be replenished. She needs to remain in the Intensive Care Unit until her physiology has been clarified. IMPRESSION      · Delirium - Monitoring   · Shock requiring Vasopressors  NorEpi - septic ( 1/3 GNR  On blood culture )  vs Cardiogenic ( hands cold Low SvO2 ) - Cefepime / Vamco - Flagyl  · Abdominal Complaints - ? pSBO Umbilical Hernia- surgery Following   · ARF - Oliguric - Renal Consulted  - Volume expanding  · Lactic acidosis  · Hypomagnesemia required parenteral replenishment   · Hyperbilirubinemia   · Hypothyroidism - On LT4 50   · Acute gastroenteritis.    · Rheumatoid arthritis - On Imuran PTA      Medical Issues:  has a past medical history of Abdominal pain (14); Advance directive discussed with patient (2015); Arthritis; Bacteremia due to Klebsiella pneumoniae (2016); Chronic pain; Contact dermatitis and other eczema, due to unspecified cause; Elevated LFTs; Endocrine disease; GERD (gastroesophageal reflux disease); HSV infection; Hypertension; Hypothyroid (10/2012); Insomnia; Melasma (3/3/15); Nausea & vomiting (2017); Pain management counseling, encounter for (2016); Rhinitis, allergic nonseasonal; Urge incontinence of urine (2017 initial [facility] consult); and Well woman exam (no gynecological exam) (2016).      Patient Active Problem List    Diagnosis Date Noted    Septic shock ([facility] 05/15/2017    ACP (advance care planning) 2016    Gastroesophageal reflux disease without esophagitis 10/24/2016    Chronic midline low back pain without sciatica 10/24/2016    Prediabetes 2015    Insomnia 2015    Abnormal gait 2015    Vitamin D deficiency 2015    Allergic rhinitis 10/07/2014    Tinea manus 10/07/2014    Chronic constipation 10/07/2014    Hypothyroidism 2012    Postmenopausal 10/17/2012    RA (rheumatoid arthritis) ([facility] 2010    Essential hypertension 2010        VITAL SIGNS:        Visit Vitals    BP 98/70    Pulse 88    Temp 96.5 °F (35.8 °C)    Resp 14    Ht 5' 4\" (1.626 m)    Wt 96.2 kg (212 lb)    SpO2 100%    BMI 36.39 kg/m2        Temp (24hrs), Av.7 °F (36.5 °C), Min:96.5 °F (35.8 °C), Max:98.5 °F (36.9 °C)           INTAKE / OUPUT       Intake/Output Summary (Last 24 hours) at 17 3286  Last data filed at 17 0900   Gross per 24 hour   Intake          7051.45 ml   Output             1160 ml   Net          5891.45 ml              EXAM:     OTHER:       GEN: toxic Nondistressed     EYE: Anicteric Noninjected    ENT: Moist No Thrush    CARD: Regular No murmurs    RESP: Clear Equal BS    GI: NABS Nontender    : Clear Urine Normal Genitalia    SKEL: WD WN No Clubbing    SKIN: Perfused No drug rash    NEURO: delirious Nonfocal    PSYCH: Nonagitated Poor insight    LYMPH: No TOVA No edema       DATA:      Recent Labs      05/16/17   0428  05/15/17   0717  05/14/17   2258   WBC  22.6*  18.8*  16.8*   HGB  8.6*  10.4*  11.1*   PLT  112*  182  240     Recent Labs      05/16/17   0433  05/16/17   0428  05/15/17   1736  05/15/17   1344   05/15/17   0717 05/14/17   2258   NA   --   142   --    --    --   139   --   139   K   --   3.1*   --    --    --   3.7   --   3.1*   CL   --   104   --    --    --   107   --   101   CO2   --   26   --    --    --   17*   --   18*   GLU   --   139*   --    --    --   92   --   77   BUN   --   31*   --    --    --   21*   --   17   CREA   --   2.43*   --    --    --   2.63*   --   2.43*   CA   --   7.2*   --    --    --   8.1*   --   9.1   MG   --   2.6*   --    --    --   1.1*   --    --    PHOS   --   1.8*   --    --    --   3.4   --    --    LAC  4.2*   --   8.1*  6.8*   < >   --    < >  10.3*   ALB   --   2.8*   --    --    --   2.1*   --   2.4*   SGOT   --   79*   --    --    --   105*   --   104*   ALT   --   43   --    --    --   50   --   53   LPSE   --    --    --    --    --    --    --   75    < > = values in this interval not displayed. Ventilator / BiPAP / O2     O2 Device: Nasal cannula (05/16/17 0800)    Mode    Rate    TV     Pressure    FiO2    PEEP    PIP    MV      No results for input(s): PHI, PCO2I, PO2I in the last 72 hours. IMAGING:     [] Personally Visualized [] Discussed with Radiologist  [] Report reviewed       Results from Hospital Encounter encounter on 05/14/17   XR ABD (KUB)   Narrative EXAM:  XR ABD (KUB)    INDICATION:  Abdominal pain    COMPARISON: 0340 hours. FINDINGS: A supine radiograph of the abdomen portably at 1045 hours shows a  normal bowel gas pattern.  A few clips are present in the right upper quadrant. The NG tube extends into the stomach. No soft tissue masses or pathologic  calcifications are identified. Degenerative disc disease is present at the L4-5  level. . A temperature probe is noted in the rectum. Impression IMPRESSION: Paucity of bowel gas. Nonspecific appearance. .                    Results from [place] encounter on 05/14/17   CT CHEST WO CONT   Narrative INDICATION: sepsis     Noncontrast CT of the chest is performed with 5 mm collimation. Coronal and  sagittal reformatted images were also performed. CT dose reduction was achieved through use of a standardized protocol tailored  for this examination and automatic exposure control for dose modulation. Adaptive statistical iterative reconstruction (ASIR) was utilized. Direct comparison is made to CT of the abdomen and pelvis, including lower chest  dated May 15, 2017 and prior CT chest dated December 2015. Chest:     Lungs: There is mild bibasilar dependent compressive atelectasis. Lymph nodes: There is no axillary, mediastinal or hilar lymphadenopathy. Heart: The heart is of normal size and there is no pericardial effusion. Atherosclerotic calcifications are noted within the coronary arteries. Pleura: There are very small bilateral pleural effusions. Bones: Visualized osseous structures are intact. Upper abdomen: The gallbladder is surgically absent. Nasogastric tube extends to  the stomach. Visualized liver is hypodense consistent with hepatic steatosis. The visualized abdominal structures are otherwise normal.         Impression IMPRESSION: Very small bilateral pleural effusions. Mild bibasilar dependent  compressive atelectasis.           Results for orders placed or performed during the hospital encounter of 05/14/17   EKG, 12 LEAD, INITIAL   Result Value Ref Range    Ventricular Rate 116 BPM    Atrial Rate 116 BPM    P-R Interval 142 ms    QRS Duration 82 ms    Q-T Interval 336 ms QTC Calculation (Bezet) 467 ms    Calculated P Axis 52 degrees    Calculated R Axis 36 degrees    Calculated T Axis 55 degrees    Diagnosis       ** Poor data quality, interpretation may be adversely affected  Sinus tachycardia  When compared with ECG of 14-MAY-2017 22:49,  MANUAL COMPARISON REQUIRED, DATA IS UNCONFIRMED  Confirmed by Winnie Dumont M.D., Yulissa Dan ([id number]) on 5/15/2017 9:10:23 AM           Marcia Zuñiga MD [place] [facility]     History:     PMH:  has a past medical history of Abdominal pain (6/18/14); Advance directive discussed with patient (07/22/2015); Arthritis; Bacteremia due to Klebsiella pneumoniae (2/2/2016); Chronic pain; Contact dermatitis and other eczema, due to unspecified cause; Elevated LFTs; Endocrine disease; GERD (gastroesophageal reflux disease); HSV infection; Hypertension; Hypothyroid (10/2012); Insomnia; Melasma (3/3/15); Nausea & vomiting (05/04/2017); Pain management counseling, encounter for (05/05/2016); Rhinitis, allergic nonseasonal; Urge incontinence of urine (03/21/2017 initial [facility] consult); and Well woman exam (no gynecological exam) (07/29/2016). PSH:   has a past surgical history that includes cholecystectomy; tubal ligation; colonoscopy (8/1/11); endoscopy (2014); and other surgical (04/17/2017). FHX: family history is not on file. SHX:  reports that she has never smoked. She has never used smokeless tobacco. She reports that she does not drink alcohol or use illicit drugs.     ALL:   Allergies   Allergen Reactions    Pcn [Penicillins] Swelling    Tramadol Nausea and Vomiting     SEVERE If taken w/o food    Proventil [Albuterol Sulfate] Hives    Ace Inhibitors Angioedema    Albuterol Swelling     swelling    Ambien [Zolpidem] Other (comments)     Nightmares and memory disturbance    Ciprofloxacin Other (comments)     Sore throat and trouble swallowing    Lisinopril Swelling    Oxaprozin Nausea and Vomiting     severe stomach upset    Sulfadiazine Swelling     swelling    Trazodone Other (comments)     Vivid dreams and felt disoriented        MEDS:   [x] Reviewed - As Below   [] Not reviewed    Current Facility-Administered Medications   Medication    sodium chloride (NS) 0.9 % flush    NOREPINephrine (LEVOPHED) 8,000 mcg in dextrose 5% 250 mL infusion    HYDROmorphone (PF) (DILAUDID) injection 1 mg    ondansetron (ZOFRAN) injection 4 mg    heparin (porcine) injection 5,000 Units    cefepime (MAXIPIME) 2 g in 0.9% sodium chloride (MBP/ADV) 100 mL    pantoprazole (PROTONIX) 40 mg in sodium chloride 0.9 % 10 mL injection    Vancomycin - Pharmacy to Dose    vancomycin (VANCOCIN) 1250 mg in  ml infusion    hydrocortisone Sod Succ (PF) (SOLU-CORTEF) injection 50 mg    metroNIDAZOLE (FLAGYL) IVPB premix 500 mg    sodium bicarbonate (8.4%) 150 mEq in dextrose 5% 1,000 mL infusion    DOBUTamine (DOBUTREX) 500 mg/250 mL (2,000 mcg/mL) infusion         Sandra Dawson MD [facility]

## 2017-05-16 NOTE — PROGRESS NOTES
Hospitalist Progress Note  Office: [phone]      Date of Service:  2017  NAME:  Phyllis Frances  :  1945  MRN:  [id number]      Admission Summary:   68 yo woman with obesity, rheumatoid arthritis, chronic pain syndrome, gastroesophageal reflux disease,   hypertension, h/o herpes simplex virus infection was BIBEMS from home on 17 with nausea, vomiting, diarrhea and abdominal pain. Interval history / Subjective:    She is a fairly vague historian, but denies any pain or other acute concerns. Assessment & Plan:         1. Septic shock (POA) with gram negative bacteremia   - leukocytosis, fever, hypotension, lactic acidosis, tachypnea, tachycardia on admission, leukocytosis is worsening   - aggressive IVF resuscitation   - was on norepinephrine gtt, currently weaned off   - on empiric cefepime, received vancomycin   - BCx  pending   - stool Cx 5/15 pending   - stool caliber not amenable to C diff testing currently    2. Troponin elevation / NSTEMI   - suspect demand ischemia in setting of septic shock   - she will require stress test prior to discharge   - start aspirin, need to evaluate for bleeding, but would benefit from heparin drip x 48 hours   - check occult blood stool   - seen by cardiology     3. Partial small bowel obstruction (POA)   - RUQ and epigastic TTP   - per General Surgery consult yesterday, not an acute abdomen and no surgical intervention indicated at this time   - pain control, conservative management with bowel rest    4. Hypokalemia (POA)   - replete prn    5. Anion gap metabolic acidosis (POA)   - likely due to lactic acidosis and exacerbated by GI losses and JEANINE   - on sodium bicarbonate    6.   Acute kidney injury (POA)   - likely due to prerenal azotemia vs septic / hypotensive ATN   - on sodium bicarbonate   - CT abd/pelvis without contrast 5/15 - unremarkable kidneys   - nephrology following    7. Abnormal LFTs, hyperbilirubinemia (POA)   - suspect either dehydration or intra-abdominal sepsis   - GI following   - avoid hepatotoxins    8. Acute gastroenteritis (POA)    - antiemetics prn   - pain control   - stool Cx 5/15 pending   - C diff 5/15 pending, has not had liquid stool yet   - PPI    9. Hypothyroidism   - TSH WNL; continue levothyroxine    10. Rheumatoid arthritis   - start stress dose steroids due to chronic steroid use   - hold home Imuran    11. Hypomagnesemia   - replete prn    12. Anemia   - suspect some element of hemodilution, but cannot rule out occult bleed at this juncture   - check occult blood    Code status: FULL  DVT prophylaxis: heparin    Care Plan discussed with: Patient/Family and Nurse  Disposition: WEI Came from home. She remains critically ill. Hospital Problems  Date Reviewed: 5/15/2017          Codes Class Noted POA    * (Principal)Septic shock ([facility] ICD-10-CM: A41.9, R65.21  ICD-9-CM: 038.9, 785.52, 995.92  5/15/2017 Yes            Review of Systems:   Pertinent items are noted in HPI. Vital Signs:    Last 24hrs VS reviewed since prior progress note.  Most recent are:  Visit Vitals    /82    Pulse 73    Temp 97.6 °F (36.4 °C)    Resp 13    Ht 5' 4\" (1.626 m)    Wt 96.2 kg (212 lb)    SpO2 99%    BMI 36.39 kg/m2         Intake/Output Summary (Last 24 hours) at 05/16/17 1810  Last data filed at 05/16/17 1600   Gross per 24 hour   Intake          4679.44 ml   Output             1060 ml   Net          3619.44 ml      Physical Examination:     Constitutional:  awake, calm, obese   ENT:  oral mucosa slightly dry, oropharynx benign  Neck thick, IJ CVC   Resp:  tachypnic, using accessory muscles, shallow breaths   CV:  regular rhythm, tachycardic, distant heart sounds, nonpalpable pulses    GI:  hypoactive BS, soft, non distended, epigastric and RUQ TTP, obese     Musculoskeletal:  moves all extremities    Neurologic:  AAOx3, responds appropriately to questions and commands     Skin:  cool, diaphoretic  Eyes:  PERRL    Data Review:    Review and/or order of clinical lab test  Review and/or order of tests in the radiology section of CPT  Review and/or order of tests in the medicine section of CPT    Labs:     Recent Labs      05/16/17   0428  05/15/17   0717   WBC  22.6*  18.8*   HGB  8.6*  10.4*   HCT  24.6*  30.6*   PLT  112*  182     Recent Labs      05/16/17   0428  05/15/17   0717  05/14/17   2258   NA  142  139  139   K  3.1*  3.7  3.1*   CL  104  107  101   CO2  26  17*  18*   BUN  31*  21*  17   CREA  2.43*  2.63*  2.43*   GLU  139*  92  77   CA  7.2*  8.1*  9.1   MG  2.6*  1.1*   --    PHOS  1.8*  3.4   --      Recent Labs      05/16/17   0428  05/15/17   0717  05/14/17   2258   SGOT  79*  105*  104*   ALT  43  50  53   AP  99  217*  202*   TBILI  2.4*  4.0*  4.6*   TP  5.7*  5.5*  6.0*   ALB  2.8*  2.1*  2.4*   GLOB  2.9  3.4  3.6   LPSE   --    --   75     No results for input(s): INR, PTP, APTT in the last 72 hours. No lab exists for component: INREXT, INREXT   No results for input(s): FE, TIBC, PSAT, FERR in the last 72 hours. No results found for: FOL, RBCF   No results for input(s): PH, PCO2, PO2 in the last 72 hours.   Recent Labs      05/16/17   0428  05/15/17   0717   CPK  299*  267*   CKNDX  6.8*  1.3   TROIQ  6.88*  0.19*     Lab Results   Component Value Date/Time    Cholesterol, total 99 12/23/2015 01:45 AM    HDL Cholesterol 22 12/23/2015 01:45 AM    LDL, calculated 54.4 12/23/2015 01:45 AM    Triglyceride 113 12/23/2015 01:45 AM    CHOL/HDL Ratio 4.5 12/23/2015 01:45 AM     Lab Results   Component Value Date/Time    Glucose (POC) 125 12/30/2015 11:28 AM    Glucose (POC) 101 12/23/2015 12:08 AM    Glucose (POC) 205 09/02/2015 11:44 AM    Glucose (POC) 112 09/02/2015 07:04 AM    Glucose (POC) 120 09/01/2015 09:41 PM     Lab Results   Component Value Date/Time    Color DARK YELLOW 05/15/2017 01:44 PM    Appearance TURBID 05/15/2017 01:44 PM    Specific gravity 1.012 05/15/2017 01:44 PM    Specific gravity 1.005 12/23/2015 01:45 AM    pH (UA) 6.0 05/15/2017 01:44 PM    Protein 100 05/15/2017 01:44 PM    Glucose NEGATIVE  05/15/2017 01:44 PM    Ketone NEGATIVE  05/15/2017 01:44 PM    Bilirubin NEGATIVE  02/02/2017 01:27 AM    Urobilinogen 0.2 05/15/2017 01:44 PM    Nitrites NEGATIVE  05/15/2017 01:44 PM    Leukocyte Esterase MODERATE 05/15/2017 01:44 PM    Epithelial cells FEW 05/15/2017 01:44 PM    Bacteria 2+ 05/15/2017 01:44 PM    WBC 0-4 05/15/2017 01:44 PM    RBC 0-5 05/15/2017 01:44 PM     Medications Reviewed:     Current Facility-Administered Medications   Medication Dose Route Frequency    ipratropium (ATROVENT) 0.02 % nebulizer solution 0.5 mg  0.5 mg Nebulization Q6H PRN    [START ON 5/17/2017] aspirin chewable tablet 81 mg  81 mg Oral DAILY    HYDROmorphone (PF) (DILAUDID) injection 1 mg  1 mg IntraVENous Q4H PRN    ondansetron (ZOFRAN) injection 4 mg  4 mg IntraVENous Q4H PRN    heparin (porcine) injection 5,000 Units  5,000 Units SubCUTAneous Q8H    cefepime (MAXIPIME) 2 g in 0.9% sodium chloride (MBP/ADV) 100 mL  2 g IntraVENous Q24H    pantoprazole (PROTONIX) 40 mg in sodium chloride 0.9 % 10 mL injection  40 mg IntraVENous DAILY    hydrocortisone Sod Succ (PF) (SOLU-CORTEF) injection 50 mg  50 mg IntraVENous Q8H    sodium bicarbonate (8.4%) 150 mEq in dextrose 5% 1,000 mL infusion   IntraVENous CONTINUOUS     ______________________________________________________________________  EXPECTED LENGTH OF STAY: 5d 2h  ACTUAL LENGTH OF STAY:          [facility] MD

## 2017-05-16 NOTE — PROGRESS NOTES
0730: Bedside and Verbal shift change report given to Bennie Langley RN (oncoming nurse) by Brian Rodriguez RN (offgoing nurse). Report included the following information SBAR, Kardex, Intake/Output, MAR, Accordion, Recent Results and Cardiac Rhythm NSR.     0935: Dobutamine stopped per Dr. Marisa Clark. Cards consult called - Dr. Elizabeth Mederos on call. 1010: Per Dr. Mayda Sow with general surgery, NGT to stay today. Cardiology NP also at bedside. 1115: Infectious Disease consult called. 1140: Repeat blood culture drawn and sent per Dr. Denny Jeffers. 1330: Spoke with patient's son over the phone to update on status. 1500: PRN atrovent neb given for expiratory wheezing. 1530: Dr. Kassandra Knott at bedside to evaluate patient. 1600: No changes to previous assessment. 1930: Bedside and Verbal shift change report given to Ivory De Leon RN (oncoming nurse) by Rosalinda Coello RN (offgoing nurse). Report included the following information SBAR, Intake/Output, MAR, Accordion and Recent Results.

## 2017-05-16 NOTE — PROGRESS NOTES
1940 Bedside and Verbal shift change report given to Allegra Berumen RN  (oncoming nurse) by  Olivia Harmon RN (offgoing nurse). Report included the following information SBAR, Kardex, ED Summary, Intake/Output, MAR, Accordion, Recent Results, Med Rec Status, Cardiac Rhythm normal sinus and Alarm Parameters . 2000 Assessment done at this time, patient is alert to self only, she knows that she is in the hospital, she is disoriented to time and situation. She repeats same answers to different questions, and follows very simple commands. No distress noted at this time  2130 Patient medicated for pain, patient unable to give a number but states her belly hurts, patient medicated at this itme  2200 Patient resting, denies any pain no distress noted  0000 Reassessment done at this time, no distress noted and no changes note  0110 Ultrasound here at this time  0400 reassessment done at this time no change noted, patient resting  0600 Am care done and linen changed  0730 Bedside and Verbal shift change report given to Tiara Suero  (oncoming nurse) by Allegra Berumen RN (offgoing nurse). Report included the following information SBAR, Kardex, Med Rec Status and Cardiac Rhythm normal sinus.

## 2017-05-16 NOTE — PROGRESS NOTES
Problem: Non-Violent Restraints  Goal: *Patient Specific Goal (EDIT GOAL, INSERT TEXT)  Outcome: Not Progressing Towards Goal  Variance: Patient Condition  Comments: Pt specific goal: Pt will not pull at lines and will maintain a safe environment.

## 2017-05-16 NOTE — PROGRESS NOTES
Subjective  No abdominal pain. No flatus   ngt bilious    Temp:  [96.5 °F (35.8 °C)-101.1 °F (38.4 °C)]   Pulse (Heart Rate):  []   BP: ()/(41-98)   Resp Rate:  [10-40]   O2 Sat (%):  [89 %-100 %]   Weight:  [192 lb 11.2 oz (87.4 kg)-212 lb (96.2 kg)]   05/16 0701 - 05/16 1900  In: 159.5 [I.V.:159.5]  Out: 75 [Urine:75]  05/14 1901 - 05/16 0700  In: 7110.3 [I.V.:7110.3]  Out: 1191 [Urine:991]      Objective  Gen- More alert in NAD  Lungs- CTA  H- RRR  Abd- soft nontender nondistended  No  Real BS yet.      Recent Results (from the past 24 hour(s))   URINALYSIS W/ REFLEX CULTURE    Collection Time: 05/15/17  1:44 PM   Result Value Ref Range    Color DARK YELLOW      Appearance TURBID (A) CLEAR      Specific gravity 1.012 1.003 - 1.030      pH (UA) 6.0 5.0 - 8.0      Protein 100 (A) NEG mg/dL    Glucose NEGATIVE  NEG mg/dL    Ketone NEGATIVE  NEG mg/dL    Blood MODERATE (A) NEG      Urobilinogen 0.2 0.2 - 1.0 EU/dL    Nitrites NEGATIVE  NEG      Leukocyte Esterase MODERATE (A) NEG      WBC 0-4 0 - 4 /hpf    RBC 0-5 0 - 5 /hpf    Epithelial cells FEW FEW /lpf    Bacteria 2+ (A) NEG /hpf    UA:UC IF INDICATED URINE CULTURE ORDERED (A) CNI      Granular cast 0-2 (A) NEG /lpf    Waxy Cast 2-5 (A) NEG /lpf   LACTIC ACID, PLASMA    Collection Time: 05/15/17  1:44 PM   Result Value Ref Range    Lactic acid 6.8 (HH) 0.4 - 2.0 MMOL/L   BILIRUBIN, CONFIRM    Collection Time: 05/15/17  1:44 PM   Result Value Ref Range    Bilirubin UA, confirm POSITIVE (A) NEG     LACTIC ACID, PLASMA    Collection Time: 05/15/17  5:36 PM   Result Value Ref Range    Lactic acid 8.1 (HH) 0.4 - 2.0 MMOL/L   TROPONIN I    Collection Time: 05/16/17  4:28 AM   Result Value Ref Range    Troponin-I, Qt. 6.88 (H) <0.05 ng/mL   CBC WITH AUTOMATED DIFF    Collection Time: 05/16/17  4:28 AM   Result Value Ref Range    WBC 22.6 (H) 3.6 - 11.0 K/uL    RBC 2.83 (L) 3.80 - 5.20 M/uL    HGB 8.6 (L) 11.5 - 16.0 g/dL    HCT 24.6 (L) 35.0 - 47.0 %    MCV 86.9 80.0 - 99.0 FL    MCH 30.4 26.0 - 34.0 PG    MCHC 35.0 30.0 - 36.5 g/dL    RDW 17.7 (H) 11.5 - 14.5 %    PLATELET 065 (L) 878 - 400 K/uL    NEUTROPHILS 85 (H) 32 - 75 %    BAND NEUTROPHILS 5 0 - 6 %    LYMPHOCYTES 3 (L) 12 - 49 %    MONOCYTES 4 (L) 5 - 13 %    EOSINOPHILS 0 0 - 7 %    BASOPHILS 0 0 - 1 %    METAMYELOCYTES 3 (H) 0 %    ABS. NEUTROPHILS 20.3 (H) 1.8 - 8.0 K/UL    ABS. LYMPHOCYTES 0.7 (L) 0.8 - 3.5 K/UL    ABS. MONOCYTES 0.9 0.0 - 1.0 K/UL    ABS. EOSINOPHILS 0.0 0.0 - 0.4 K/UL    ABS. BASOPHILS 0.0 0.0 - 0.1 K/UL    DF MANUAL      RBC COMMENTS ANISOCYTOSIS  1+        RBC COMMENTS POLYCHROMASIA  1+        RBC COMMENTS OVALOCYTES  PRESENT        WBC COMMENTS VACUOLATED POLYS     METABOLIC PANEL, COMPREHENSIVE    Collection Time: 05/16/17  4:28 AM   Result Value Ref Range    Sodium 142 136 - 145 mmol/L    Potassium 3.1 (L) 3.5 - 5.1 mmol/L    Chloride 104 97 - 108 mmol/L    CO2 26 21 - 32 mmol/L    Anion gap 12 5 - 15 mmol/L    Glucose 139 (H) 65 - 100 mg/dL    BUN 31 (H) 6 - 20 MG/DL    Creatinine 2.43 (H) 0.55 - 1.02 MG/DL    BUN/Creatinine ratio 13 12 - 20      GFR est AA 24 (L) >60 ml/min/1.73m2    GFR est non-AA 20 (L) >60 ml/min/1.73m2    Calcium 7.2 (L) 8.5 - 10.1 MG/DL    Bilirubin, total 2.4 (H) 0.2 - 1.0 MG/DL    ALT (SGPT) 43 12 - 78 U/L    AST (SGOT) 79 (H) 15 - 37 U/L    Alk.  phosphatase 99 45 - 117 U/L    Protein, total 5.7 (L) 6.4 - 8.2 g/dL    Albumin 2.8 (L) 3.5 - 5.0 g/dL    Globulin 2.9 2.0 - 4.0 g/dL    A-G Ratio 1.0 (L) 1.1 - 2.2     MAGNESIUM    Collection Time: 05/16/17  4:28 AM   Result Value Ref Range    Magnesium 2.6 (H) 1.6 - 2.4 mg/dL   PHOSPHORUS    Collection Time: 05/16/17  4:28 AM   Result Value Ref Range    Phosphorus 1.8 (L) 2.6 - 4.7 MG/DL   LACTIC ACID, PLASMA    Collection Time: 05/16/17  4:33 AM   Result Value Ref Range    Lactic acid 4.2 (HH) 0.4 - 2.0 MMOL/L         Principal Problem:    Septic shock (HCC) (5/15/2017)          Assessment & Plan  Sepsis- Abdomen seems benign. Lactic acid seems to be improving. Would Continue NGt for now until bowel function returns  Cont abx and supportive care  Currently being evaluated for troponin increase.

## 2017-05-16 NOTE — PROGRESS NOTES
Spiritual Care Assessment/Progress Notes    Rachana Shetty [id number]  [id number]    1945  67 y.o.  female    Patient Telephone Number: There is no home phone number on file. Sikhism Affiliation: Buddhism   Language: English   Extended Emergency Contact Information  Primary Emergency Contact: [place] Phone: [phone]  Relation: Child  Secondary Emergency Contact: [address] [place] Phone: [phone]  Relation: Child   Patient Active Problem List    Diagnosis Date Noted    Septic shock ([facility].) 05/15/2017    ACP (advance care planning) 11/30/2016    Gastroesophageal reflux disease without esophagitis 10/24/2016    Chronic midline low back pain without sciatica 10/24/2016    Prediabetes 07/22/2015    Insomnia 07/22/2015    Abnormal gait 05/14/2015    Vitamin D deficiency 03/16/2015    Allergic rhinitis 10/07/2014    Tinea manus 10/07/2014    Chronic constipation 10/07/2014    Hypothyroidism 11/30/2012    Postmenopausal 10/17/2012    RA (rheumatoid arthritis) ([facility] 02/09/2010    Essential hypertension 02/09/2010        Date: 5/16/2017       Level of Sikhism/Spiritual Activity:  []         Involved in brenton tradition/spiritual practice    []         Not involved in brenton tradition/spiritual practice  []         Spiritually oriented    []         Claims no spiritual orientation    []         seeking spiritual identity  []         Feels alienated from Rastafari practice/tradition  []         Feels angry about Rastafari practice/tradition  [x]         Spirituality/Rastafari tradition may be a resource for coping at this time.   [x]         Not able to assess due to medical condition    Services Provided Today:  []         crisis intervention    []         reading Scriptures  []         spiritual assessment    []         prayer  []         empathic listening/emotional support  []         rites and rituals (cite in comments)  []         life review     [] Gnosticist support  []         theological development   []         advocacy  []         ethical dialog     []         blessing  []         bereavement support    []         support to family  []         anticipatory grief support   []         help with AMD  []         spiritual guidance    []         meditation      Spiritual Care Needs  []         Emotional Support  []         Spiritual/Jainism Care  []         Loss/Adjustment  []         Advocacy/Referral                /Ethics  []         No needs expressed at               this time  []         Other: (note in               comments)  [address]  []         Follow up visits with               pt/family  []         Provide materials  []         Schedule sacraments  []         Contact Community               Clergy  []         Follow up as needed  []         Other: (note in               comments)     Attempted Initial Spiritual Assessment in ICU 7123. Unable to assess patients needs--appeared to be sleeping. No family present at this time. Chaplains will follow as able and/or as needed. [facility]'s Staff  (Deangelo Barillas Patient Care Specialist)   Paging Service [phone])

## 2017-05-16 NOTE — ROUTINE PROCESS
9997: Primary Nurse Francisco Javier Martin, RN and Ananth Gamez, RN performed a dual skin assessment on this patient No impairment noted  Jamin score is 14

## 2017-05-16 NOTE — PROGRESS NOTES
[address] [facility] 12, 120 [facility]    GI PROGRESS NOTE    NAME: Dale Bertrand   :  1945   MRN:  [id number]       Subjective:   More awake and alert  Review of Systems    Constitutional: negative fever, negative chills, negative weight loss  Eyes:   negative visual changes  ENT:   negative sore throat, tongue or lip swelling  Respiratory:  negative cough, negative dyspnea  Cards:  negative for chest pain, palpitations, lower extremity edema  GI:   See HPI  :  negative for frequency, dysuria  Integument:  negative for rash and pruritus  Heme:  negative for easy bruising and gum/nose bleeding  Musculoskel: negative for myalgias,  back pain and muscle weakness  Neuro: negative for headaches, dizziness, vertigo  Psych:  negative for feelings of anxiety, depression         Objective:     VITALS:   Last 24hrs VS reviewed since prior progress note. Most recent are:  Visit Vitals    BP 98/70    Pulse 88    Temp 96.5 °F (35.8 °C)    Resp 14    Ht 5' 4\" (1.626 m)    Wt 96.2 kg (212 lb)    SpO2 100%    BMI 36.39 kg/m2       Intake/Output Summary (Last 24 hours) at 17 0914  Last data filed at 17 0900   Gross per 24 hour   Intake          7051.45 ml   Output             1160 ml   Net          5891.45 ml     PHYSICAL EXAM:  General: WD, WN. Alert, cooperative, no acute distress    HEENT: NC, Atraumatic. PERRLA, EOMI. Anicteric sclerae. Lungs:  CTA Bilaterally. No Wheezing/Rhonchi/Rales. Heart:  Regular  rhythm,  No murmur (), No Rubs, No Gallops  Abdomen: Soft, Non distended, Non tender.  +Bowel sounds, no HSM, abdominal hernia with mild pain  Extremities: No c/c/e  Neurologic:  CN 2-12 gi, Alert and oriented X 3. No acute neurological distress   Psych:   Good insight. Not anxious nor agitated.     Lab Data Reviewed:   Recent Labs      17   0428  05/15/17   0717   WBC  22.6*  18.8*   HGB  8.6*  10.4*   HCT  24.6*  30.6*   PLT  112*  182     Recent Labs      05/16/17   0428  05/15/17   0717   NA  142  139   K  3.1*  3.7   CL  104  107   CO2  26  17*   BUN  31*  21*   CREA  2.43*  2.63*   GLU  139*  92   PHOS  1.8*  3.4   CA  7.2*  8.1*     Recent Labs      05/16/17   0428  05/15/17   0717  05/14/17   2258   SGOT  79*  105*  104*   AP  99  217*  202*   TP  5.7*  5.5*  6.0*   ALB  2.8*  2.1*  2.4*   GLOB  2.9  3.4  3.6   LPSE   --    --   75       ________________________________________________________________________       Assessment:   · Sepsis of unclear etiology, she has some mild abdominal pain and abdominal hernia     Patient Active Problem List   Diagnosis Code    RA (rheumatoid arthritis) ([facility]) M06.9    Essential hypertension I10    Postmenopausal Z78.0    Hypothyroidism E03.9    Allergic rhinitis J30.9    Tinea manus B35.2    Chronic constipation K59.09    Vitamin D deficiency E55.9    Abnormal gait R26.9    Prediabetes R73.03    Insomnia G47.00    Gastroesophageal reflux disease without esophagitis K21.9    Chronic midline low back pain without sciatica M54.5, G89.29    ACP (advance care planning) Z71.89    Septic shock ([facility]) A41.9, R65.21     Plan:   · Continue on supportive care  · Follow surgical recommendations     Signed By: Micheal Ramirez MD     5/16/2017  9:14 AM

## 2017-05-16 NOTE — CONSULTS
Cardiology Consult Note      Patient Name: Saurav Owens  : 1945 MRN: [id number]  Date: 2017  Time: 10:18 AM    Admit Diagnosis: Septic shock [facility])    Primary Cardiologist:    Consulting Cardiologist: Anthony Sotelo MD    Reason for Consult: elevated troponin    Requesting MD: Dr. Olivia Cleary    HPI:  Saurav Owens is a 67 y.o. female admitted on 2017  for Septic shock ([facility] She has PMH significant for HTN, negative lexiscan stress in 2015, TIA/thalmic hemorrhage, hypothyroid, GERD, HSV, rheumatoid arthritis on Imuran. She presented 17 with N&V, abdominal pain and found to have gram negative bacteremia, shock, ? SBO vs ileus, ARF. She has been on levophed and dobutamine- both now off. Cardiology was consulted for elevated troponin of 6.88. Subjective: Interview somewhat limited- pt somewhat confused but Pt denies chest pain and SOB. States she has no pain. Does state she feels a little \"dizzy. \"  Denies any chest pain or SOB prior to admission. No N&V currently. Assessment and Plan     1. NSTEMI:  By chemical criteria. Troponin 6.88 from 0.19. No chest pain or SOB. No ST/T wave changes on 12 lead EKG .  6/15 TTE:  EF 55-60%, NWMA   -Medical therapy as able and supportive care. -Recommend ASA 81 mg daily and heparin drip if no bleeding confirmed/suspected. -No beta blocker, ARB due to hypotension/low normal BP and JEANINE. No ACE-I also due to hx of angioedema with ACE-I per chart.   -No nitroglycerin due to hypotension, no chest pain.   -No statin due to elevated LFT   -Will trend troponins   -Repeat 12 lead EKG pending.   -Will need ischemic workup in future, possible nuclear stress test prior to discharge vs outpatient stress test.  Pt not a candidate for cardiac cath at this time due to JEANINE, anemia, ?bleed.     2. JEANINE: creatinine 2.43 (from 2.63 yesterday) GFR 24   -Nephrology following- likely ATN 3. Hypotension:  Hx of HTN. BP currently 91/69 currently   - receiving  ml/hr   -Currently off levophed.    -Holding home verapamil/HCTZ    4. Gm negative bactermia/Sepsis: 3/3 BC 5/14 gram negative rods- ID of bacteria pending. Lactic Acid trending down (4.2 today). WBC trending up:  22.6 today from    -Management per primary team/intensivist.    5. Anemia:  Hgb 8.6 from 10.4   -Workup/management per primary team.    6. Hypokalemia: K=3.3- repleted by Nephrology    7. Elevated LFT (AST 79 trending down from 105 yesterday)   -Hold statins. Pt with sepsis, JEANINE, anemia now with NSTEMI by chemical criteria. No chest pain. Pt not a candidate for invasive cardiac testing at this time - recommend supportive care and medical management to include ASA and heparin if no active bleeding confirmed or suspected. Saw and evaluated pt and agree with above assessment and plan. Pt with elevated cardiac markers, NSTEMI by definition. Consistent with pt having some degree of underlying CAD. Current elevations are more from demand as pt is without chest pain, no ischemic ECG changes and preserved LV function with no wall motion abnormality by echo. Not a candidate for invasive work up at this time with sepsis/renal dysfunction/anemia, etc.  Optimize supportive care as noted above. Hopefully ASA, heparin gtt if no bleeding with anemia. Consider ischemic work up later on with stress test when more stable. Cardiac testing  1/19/85 TTE: LV  Systolic function NL. EF 55 % to 60 %. No RWMA.  Mild TR    Review of Symptoms:  Comprehensive review of symptoms limited due to pt factors- confusion, encephalopathy    Previous treatment/evaluation includes echocardiogram and nuclear stress test .  Cardiac risk factors: obesity, sedentary life style, hypertension, post-menopausal.    Past Medical History:   Diagnosis Date    Abdominal pain 6/18/14    note from [facility] Vera directive discussed with patient 07/22/2015    Arthritis     dr Susana buck        2/6/17 f/u note    Bacteremia due to Klebsiella pneumoniae 2/2/2016    OV note from Dr Alicia Blancas, Infect Disease    Chronic pain     low back pain/arthritis      [facility] [place] note 11/2/16    Contact dermatitis and other eczema, due to unspecified cause     hyperpigmented macules/seb k    Elevated LFTs     per info from Dr Freddy Whitley at [facility] on report    Endocrine disease     hypothyroid    GERD (gastroesophageal reflux disease)     chesly stokesrell    HSV infection     Hypertension     Hypothyroid 10/2012    Insomnia     Melasma 3/3/15    [facility]    Nausea & vomiting 05/04/2017    report [facility]    Pain management counseling, encounter for 05/05/2016    sees Dr Jam Jenkins 5/2/17 f/u    Rhinitis, allergic nonseasonal     Urge incontinence of urine 03/21/2017 initial [facility] consult    [facility] initial eval note Grisel Grover 4/17/17 urodymanics study//5/9/17 f/u note    Well woman exam (no gynecological exam) 07/29/2016    zedler's note rec'd     Past Surgical History:   Procedure Laterality Date    COLONOSCOPY  8/1/11    dr Jhony Jalloh 10 year repeat    HX CHOLECYSTECTOMY      HX ENDOSCOPY  2014    [address]    HX OTHER SURGICAL  04/17/2017    complex urodynamics study report from [facility]    HX TUBAL LIGATION       Current Facility-Administered Medications   Medication Dose Route Frequency    potassium chloride 20 mEq in 50 ml IVPB  20 mEq IntraVENous ONCE    HYDROmorphone (PF) (DILAUDID) injection 1 mg  1 mg IntraVENous Q4H PRN    ondansetron (ZOFRAN) injection 4 mg  4 mg IntraVENous Q4H PRN    heparin (porcine) injection 5,000 Units  5,000 Units SubCUTAneous Q8H    cefepime (MAXIPIME) 2 g in 0.9% sodium chloride (MBP/ADV) 100 mL  2 g IntraVENous Q24H    pantoprazole (PROTONIX) 40 mg in sodium chloride 0.9 % 10 mL injection  40 mg IntraVENous DAILY    hydrocortisone Sod Succ (PF) (SOLU-CORTEF) injection 50 mg  50 mg IntraVENous Q8H    sodium bicarbonate (8.4%) 150 mEq in dextrose 5% 1,000 mL infusion   IntraVENous CONTINUOUS       Allergies   Allergen Reactions    Pcn [Penicillins] Swelling    Tramadol Nausea and Vomiting     SEVERE If taken w/o food    Proventil [Albuterol Sulfate] Hives    Ace Inhibitors Angioedema    Albuterol Swelling     swelling    Ambien [Zolpidem] Other (comments)     Nightmares and memory disturbance    Ciprofloxacin Other (comments)     Sore throat and trouble swallowing    Lisinopril Swelling    Oxaprozin Nausea and Vomiting     severe stomach upset    Sulfadiazine Swelling     swelling    Trazodone Other (comments)     Vivid dreams and felt disoriented      History reviewed. No pertinent family history. Social History     Social History    Marital status:      Spouse name: N/A    Number of children: N/A    Years of education: N/A     Social History Main Topics    Smoking status: Never Smoker    Smokeless tobacco: Never Used    Alcohol use No    Drug use: No    Sexual activity: Not Currently     Other Topics Concern    None     Social History Narrative       Objective:    Physical Exam    Vitals:   Vitals:    05/16/17 0700 05/16/17 0800 05/16/17 0900 05/16/17 1000   BP: 115/84 115/83 98/70 91/69   Pulse: 82 82 88 72   Resp: 20 10 14 11   Temp:  96.5 °F (35.8 °C)     SpO2: 99% 99% 100% 100%   Weight:       Height:           General:     cooperative, no distress, appears stated age. Head:  NG tube to suction- greenish drainage   Neck:   Supple, no carotid bruit and no JVD. Back:     Not examined. Lungs:     Few faint crackles left base, diminished bases bilaterally. No use of accessory muscles noted, no tachypnea. Heart[de-identified]    Regular rate and rhythm, S1, S2 normal, no murmur, click, rub or gallop. Abdomen:     Soft, non-tender   Extremities:   Extremities normal, atraumatic, no cyanosis or edema.    Vascular:   Pulses - 2+   Skin:   Skin color normal.    Neurologic:   Oriented to person, disoriented to place. Telemetry: normal sinus rhythm    ECG: NSR, no ST/T wave changes. Data Review:     Radiology:  5/16 CXR: Mildly decreased diffuse interstitial opacities. Stable small left pleural  effusion.        Recent Labs      05/16/17   0428  05/15/17   0717   CPK  299*  267*   TROIQ  6.88*  0.19*     Recent Labs      05/16/17   0428  05/15/17   0717   NA  142  139   K  3.1*  3.7   CL  104  107   CO2  26  17*   BUN  31*  21*   CREA  2.43*  2.63*   GLU  139*  92   PHOS  1.8*  3.4   CA  7.2*  8.1*     Recent Labs      05/16/17   0428  05/15/17   0717   WBC  22.6*  18.8*   HGB  8.6*  10.4*   HCT  24.6*  30.6*   PLT  112*  182     Recent Labs      05/16/17   0428  05/15/17   0717   SGOT  79*  105*   AP  99  217*     No results for input(s): TGL, CHOL, LDLC in the last 72 hours. No lab exists for component: HDLC,  HBA1C  Recent Labs      05/15/17   0717   TSH  0.78       Thank you very much for this referral. I appreciate the opportunity to participate in this patient's care. I will follow along with above stated plan. Love Sánchez NP         [facility] [address] [place] [address] Rd[address] [facility] 13, [address] [facility] 83[address]     Austyn Harris     [phone]    Vivian Bryson MD

## 2017-05-16 NOTE — CONSULTS
[address] [facility] [address]   [address] [facility]       Name:  Pili Felix   MR#:  [id number]   :  1945   Account #:  [de-identified]    Date of Consultation:  2017   Date of Adm:  2017       REQUESTING PHYSICIAN: Dr. Alberts Petties: Gram-negative lisa bacteremia. HISTORY OF PRESENT ILLNESS: The patient is a 69-year-old   woman whom I have seen in the past for 2 episodes of Klebsiella   bacteremia as well as a history of L4-L5 diskitis. She had received   more than 12 weeks of intravenous therapy with normalization of her   inflammation markers. An aspirate of the disk did not grow out any   bacteria. I have not seen her in more than a year. She currently is   confused and cannot give me a history. She cannot even answer my   questions appropriately. She does say that she does have some back   pain. According to the admitting note, she came in because she had   abdominal pain, vomiting and diarrhea. The pain was located in the   epigastric area and it radiated to the back. She went to the emergency   room where she was found to be hypotensive. She was started on   vancomycin and Levaquin. Levaquin was changed to cefepime. On   May 15, 2017, a central venous line was placed. Her blood cultures   have grown out 3/3 gram-negative rods and we are now being asked   to see her in consult. It looks like she has just sustained a non-ST   segment elevation myocardial infarction. ALLERGIES   1. PENICILLIN, BUT SHE CAN TOLERATE CEPHALOSPORINS. 2. TRAMADOL. 3. PROVENTIL. 4. ACE INHIBITORS. 5. ALBUTEROL. 6. AMBIEN. 7. CIPROFLOXACIN. 8. LISINOPRIL. 9. OXAPROZIN. 10. SULFADIAZINE. 11. TRAZODONE. REVIEW OF SYSTEMS: Really unobtainable, but she did mention that   she had some back pain. CURRENT MEDICATIONS   1. Cefepime. 2. Heparin. 3. Protonix. PAST MEDICAL HISTORY   1. Hypertension. 2. GERD. 3. Rhinitis.    4. Diskitis at L4-L5.   5. Eczema. 6. Insomnia. 7. Rheumatoid arthritis. 8. Hypothyroidism. 9. Asthma. 10. History of elevation of liver function tests. 11. History of Klebsiella bacteremia. PAST SURGICAL HISTORY   1. Cholecystectomy. 2. Tubal ligation. 3. Colonoscopy. FAMILY HISTORY: Remarkable for heart disease in her father,   mother, brother and sister. SOCIAL HISTORY: Unobtainable at this time, but from the time that I   saw her, she did not smoke, drink alcohol or engage in recreational   drug use. PHYSICAL EXAMINATION   GENERAL: She is not in respiratory distress. VITAL SIGNS: Temperature 97.1, pulse 74, blood pressure 104/86,   saturating at 100%, respirations 12. HEENT: She has pink conjunctivae. Anicteric sclerae. NECK: No JVD. No cervical lymphadenopathy. External ears are   normal.   LUNGS: Clear to auscultation. No rales, wheezes or rhonchi. HEART: No murmurs, rubs or clicks. ABDOMEN: Slightly tender on the right upper quadrant and epigastric   areas. GENITOURINARY: No flank tenderness. MUSCULOSKELETAL: Knees are not warm and are not tender. PSYCHIATRIC: She does not answer questions appropriately. INTEGUMENT: I did not notice any rash. NEUROLOGIC: Her muscle strength is equal.    LABORATORY DATA: White blood cell count is 22.6, hemoglobin 8.6,   platelets 160. Creatinine 2.43, AST 29, ALT 43, alkaline phosphatase   is 99, total bilirubin 2.4. Urinalysis shows 0-4 white blood cells. Troponin 6.88. Cultures growing gram-negative rods in 3/3 bottles. December 24, 2016, disk aspirate was negative. December 22, 2016,   blood cultures growing 4/4 bottles of Klebsiella. August 22, 2015, blood   cultures growing 4/4 bottles of Klebsiella. CT of the abdomen shows   proximal bowel obstruction and lumbar spondylosis. Stool culture is   negative. A chest x-ray shows increased interstitial edema. IMPRESSION   1. Gram-negative lisa bacteremia.    2. History of recurrent Klebsiella bacteremia. 3. History of L4-L5 diskitis for which she has received greater than 12   weeks of IV therapy with normalization of her inflammation markers. 4. Renal failure. 5. Rheumatoid arthritis. 6. History of dilated bile ducts. 7. non-ST-segment elevation myocardial infarction. PLAN: It would be concerning if the bacteria in her blood stream will   again be Klebsiella. She had a history of dilated bile ducts 2 years ago. I will get an ultrasound of the liver. I agree with cefepime. We should   follow up her blood cultures. Thank you for the consult.         MD Rocky Strong / [facility] [facility]   D:  05/16/2017   16:29   T:  05/16/2017   17:11   Job #:  [id number]

## 2017-05-16 NOTE — PROGRESS NOTES
Attended interdisciplinary rounds in ICU. : Rev. Rosanna Matamoros.  Panfilo [place]aya; [facility]; to contact [phone] [address]roso call: [phone]

## 2017-05-16 NOTE — PROGRESS NOTES
Name: Rayray Cleary   MRN: [id number]  : 1945      Assessment:  JEANINE- likely septic ATN in the setting of SBO/hypotension and ? NSTEMI  Sepsis  SBO  Acidosis-due to JEANINE/Lactic acidosis  HTN>>now hypotensive  Low Mg  Low K    Pts hemodynamics have improved. She is off pressors this am. UOP has picked up and Cr trending down. Lactic acidosis also improving. Bump in Trop I either NSTEMI vs supply demand mismatch. Echo with nl LVSF and no RWMA.    Plan/Recommendations:  Ct HCO3 drip- reduce rate to 125 ml/hr (she is up by 20 lbs in 2 days)  PRN Pressors to keep MAP >65 mm Hg  Avoid nephrotoxins; hold home dose HCTZ  Replace lytes PRN- IV KCL 20 meq x 1  IV ABx  Follow cultures    Subjective:  Restless. Needing restraints. Bilious NG drainage  WBC rising  UOP picking up    ROS:   UTO    Exam:  Visit Vitals    BP 91/69    Pulse 72    Temp 96.5 °F (35.8 °C)    Resp 11    Ht 5' 4\" (1.626 m)    Wt 96.2 kg (212 lb)    SpO2 100%    BMI 36.39 kg/m2       NAD  Mild icterus+  NGT+  Dec BS, no distress  RRR, no murmur  Soft, no BS, ND, NT  Tr edema  Wilkerson+  Alert awake.  Slight confused/restless    Current Facility-Administered Medications   Medication Dose Route Frequency Last Dose    sodium chloride (NS) 0.9 % flush          potassium chloride 20 mEq in 50 ml IVPB  20 mEq IntraVENous ONCE      HYDROmorphone (PF) (DILAUDID) injection 1 mg  1 mg IntraVENous Q4H PRN 1 mg at 17 0451    ondansetron (ZOFRAN) injection 4 mg  4 mg IntraVENous Q4H PRN 4 mg at 17 0451    heparin (porcine) injection 5,000 Units  5,000 Units SubCUTAneous Q8H 5,000 Units at 17 0645    cefepime (MAXIPIME) 2 g in 0.9% sodium chloride (MBP/ADV) 100 mL  2 g IntraVENous Q24H 2 g at 17 0643    pantoprazole (PROTONIX) 40 mg in sodium chloride 0.9 % 10 mL injection  40 mg IntraVENous DAILY 40 mg at 05/16/17 0827    hydrocortisone Sod Succ (PF) (SOLU-CORTEF) injection 50 mg  50 mg IntraVENous Q8H 50 mg at 05/16/17 0643    sodium bicarbonate (8.4%) 150 mEq in dextrose 5% 1,000 mL infusion   IntraVENous CONTINUOUS         Labs/Data:    Lab Results   Component Value Date/Time    WBC 22.6 05/16/2017 04:28 AM    Hemoglobin (POC) 11.9 12/03/2010 01:40 AM    HGB 8.6 05/16/2017 04:28 AM    Hematocrit (POC) 35 12/03/2010 01:40 AM    HCT 24.6 05/16/2017 04:28 AM    PLATELET 715 38/57/5710 04:28 AM    MCV 86.9 05/16/2017 04:28 AM       Lab Results   Component Value Date/Time    Sodium 142 05/16/2017 04:28 AM    Potassium 3.1 05/16/2017 04:28 AM    Chloride 104 05/16/2017 04:28 AM    CO2 26 05/16/2017 04:28 AM    Anion gap 12 05/16/2017 04:28 AM    Glucose 139 05/16/2017 04:28 AM    BUN 31 05/16/2017 04:28 AM    Creatinine 2.43 05/16/2017 04:28 AM    BUN/Creatinine ratio 13 05/16/2017 04:28 AM    GFR est AA 24 05/16/2017 04:28 AM    GFR est non-AA 20 05/16/2017 04:28 AM    Calcium 7.2 05/16/2017 04:28 AM       Wt Readings from Last 3 Encounters:   05/16/17 96.2 kg (212 lb)   02/02/17 96.6 kg (213 lb)   11/30/16 96.8 kg (213 lb 8 oz)         Intake/Output Summary (Last 24 hours) at 05/16/17 1044  Last data filed at 05/16/17 0900   Gross per 24 hour   Intake          7051.45 ml   Output             1155 ml   Net          5896.45 ml       Patient seen and examined. Chart reviewed. Labs, data and other pertinent notes reviewed in last 24 hrs.     PMH/SH/FH reviewed and unchanged compared to H&P    Discussed with pt, RN and Dr. Caitlyn Piña MD

## 2017-05-16 NOTE — PROGRESS NOTES
Shift summary: Uneventful shift. Levophed weaned to off. 25 ml out from NGT. PRN Zofran and Dilaudid administered per order. U/O adequate. Dr. Raj Crisostomo aware of critical troponin results. No new orders at this time.

## 2017-05-16 NOTE — PROGRESS NOTES
Patient seen and examined     IMPRESSION    1. GNR bacteremia    2. History of recurrent klebsiella bacteremia     3. History of l4-l5 discitis. Received with > 12 weeks of IV therapy    4. Renal failure     5. Rheumatoid arthritis     6. History of dilated bile ducts     7. NSTEMI     PLAN      It will be concerning if the bacteria in her bloodstream will again be klebsiella. She had a history of dilated bile ducts two years ago. I will get a US of the liver. I agree with cefepime. We should ff up her blood cultures.

## 2017-05-17 ENCOUNTER — APPOINTMENT (OUTPATIENT)
Dept: GENERAL RADIOLOGY | Age: 72
DRG: 871 | End: 2017-05-17
Payer: MEDICARE

## 2017-05-17 ENCOUNTER — APPOINTMENT (OUTPATIENT)
Dept: ULTRASOUND IMAGING | Age: 72
DRG: 871 | End: 2017-05-17
Attending: INTERNAL MEDICINE
Payer: MEDICARE

## 2017-05-17 ENCOUNTER — APPOINTMENT (OUTPATIENT)
Dept: GENERAL RADIOLOGY | Age: 72
DRG: 871 | End: 2017-05-17
Attending: INTERNAL MEDICINE
Payer: MEDICARE

## 2017-05-17 LAB
ALBUMIN SERPL BCP-MCNC: 2.6 G/DL (ref 3.5–5)
ALBUMIN/GLOB SERPL: 0.9 {RATIO} (ref 1.1–2.2)
ALP SERPL-CCNC: 98 U/L (ref 45–117)
ALT SERPL-CCNC: 45 U/L (ref 12–78)
ANION GAP BLD CALC-SCNC: 8 MMOL/L (ref 5–15)
APTT PPP: 31.3 SEC (ref 22.1–32.5)
APTT PPP: 42.5 SEC (ref 22.1–32.5)
AST SERPL W P-5'-P-CCNC: 68 U/L (ref 15–37)
BACTERIA SPEC CULT: ABNORMAL
BACTERIA SPEC CULT: NORMAL
BACTERIA SPEC CULT: NORMAL
BASOPHILS # BLD AUTO: 0 K/UL (ref 0–0.1)
BASOPHILS # BLD AUTO: 0 K/UL (ref 0–0.1)
BASOPHILS # BLD: 0 % (ref 0–1)
BASOPHILS # BLD: 0 % (ref 0–1)
BILIRUB SERPL-MCNC: 1.6 MG/DL (ref 0.2–1)
BUN SERPL-MCNC: 31 MG/DL (ref 6–20)
BUN/CREAT SERPL: 16 (ref 12–20)
C JEJUNI+C COLI AG STL QL: NEGATIVE
CALCIUM SERPL-MCNC: 7.4 MG/DL (ref 8.5–10.1)
CC UR VC: NORMAL
CHLORIDE SERPL-SCNC: 100 MMOL/L (ref 97–108)
CO2 SERPL-SCNC: 35 MMOL/L (ref 21–32)
CREAT SERPL-MCNC: 1.93 MG/DL (ref 0.55–1.02)
DIFFERENTIAL METHOD BLD: ABNORMAL
DIFFERENTIAL METHOD BLD: ABNORMAL
E COLI SXT1+2 STL IA: NEGATIVE
EOSINOPHIL # BLD: 0 K/UL (ref 0–0.4)
EOSINOPHIL # BLD: 0 K/UL (ref 0–0.4)
EOSINOPHIL NFR BLD: 0 % (ref 0–7)
EOSINOPHIL NFR BLD: 0 % (ref 0–7)
ERYTHROCYTE [DISTWIDTH] IN BLOOD BY AUTOMATED COUNT: 17.5 % (ref 11.5–14.5)
ERYTHROCYTE [DISTWIDTH] IN BLOOD BY AUTOMATED COUNT: 17.5 % (ref 11.5–14.5)
GLOBULIN SER CALC-MCNC: 2.8 G/DL (ref 2–4)
GLUCOSE SERPL-MCNC: 128 MG/DL (ref 65–100)
HCT VFR BLD AUTO: 26.3 % (ref 35–47)
HCT VFR BLD AUTO: 27.8 % (ref 35–47)
HGB BLD-MCNC: 9.1 G/DL (ref 11.5–16)
HGB BLD-MCNC: 9.5 G/DL (ref 11.5–16)
LYMPHOCYTES # BLD AUTO: 2 % (ref 12–49)
LYMPHOCYTES # BLD AUTO: 4 % (ref 12–49)
LYMPHOCYTES # BLD: 0.5 K/UL (ref 0.8–3.5)
LYMPHOCYTES # BLD: 1 K/UL (ref 0.8–3.5)
MAGNESIUM SERPL-MCNC: 2.5 MG/DL (ref 1.6–2.4)
MCH RBC QN AUTO: 30.4 PG (ref 26–34)
MCH RBC QN AUTO: 30.6 PG (ref 26–34)
MCHC RBC AUTO-ENTMCNC: 34.2 G/DL (ref 30–36.5)
MCHC RBC AUTO-ENTMCNC: 34.6 G/DL (ref 30–36.5)
MCV RBC AUTO: 88.6 FL (ref 80–99)
MCV RBC AUTO: 89.1 FL (ref 80–99)
METAMYELOCYTES NFR BLD MANUAL: 3 %
MONOCYTES # BLD: 0.3 K/UL (ref 0–1)
MONOCYTES # BLD: 1.3 K/UL (ref 0–1)
MONOCYTES NFR BLD AUTO: 1 % (ref 5–13)
MONOCYTES NFR BLD AUTO: 5 % (ref 5–13)
NEUTS BAND NFR BLD MANUAL: 5 % (ref 0–6)
NEUTS BAND NFR BLD MANUAL: 6 % (ref 0–6)
NEUTS SEG # BLD: 23 K/UL (ref 1.8–8)
NEUTS SEG # BLD: 23.2 K/UL (ref 1.8–8)
NEUTS SEG NFR BLD AUTO: 86 % (ref 32–75)
NEUTS SEG NFR BLD AUTO: 88 % (ref 32–75)
PHOSPHATE SERPL-MCNC: 1.7 MG/DL (ref 2.6–4.7)
PLATELET # BLD AUTO: 91 K/UL (ref 150–400)
PLATELET # BLD AUTO: 93 K/UL (ref 150–400)
POTASSIUM SERPL-SCNC: 3 MMOL/L (ref 3.5–5.1)
PROT SERPL-MCNC: 5.4 G/DL (ref 6.4–8.2)
RBC # BLD AUTO: 2.97 M/UL (ref 3.8–5.2)
RBC # BLD AUTO: 3.12 M/UL (ref 3.8–5.2)
RBC MORPH BLD: ABNORMAL
SERVICE CMNT-IMP: ABNORMAL
SERVICE CMNT-IMP: NORMAL
SERVICE CMNT-IMP: NORMAL
SODIUM SERPL-SCNC: 143 MMOL/L (ref 136–145)
THERAPEUTIC RANGE,PTTT: ABNORMAL SECS (ref 58–77)
THERAPEUTIC RANGE,PTTT: NORMAL SECS (ref 58–77)
TROPONIN I SERPL-MCNC: 3.29 NG/ML
WBC # BLD AUTO: 25 K/UL (ref 3.6–11)
WBC # BLD AUTO: 25 K/UL (ref 3.6–11)
WBC MORPH BLD: ABNORMAL
WBC MORPH BLD: ABNORMAL

## 2017-05-17 PROCEDURE — 74011000250 HC RX REV CODE- 250: Performed by: INTERNAL MEDICINE

## 2017-05-17 PROCEDURE — 65660000000 HC RM CCU STEPDOWN

## 2017-05-17 PROCEDURE — 36591 DRAW BLOOD OFF VENOUS DEVICE: CPT

## 2017-05-17 PROCEDURE — 83735 ASSAY OF MAGNESIUM: CPT | Performed by: INTERNAL MEDICINE

## 2017-05-17 PROCEDURE — 74011250636 HC RX REV CODE- 250/636: Performed by: INTERNAL MEDICINE

## 2017-05-17 PROCEDURE — C9113 INJ PANTOPRAZOLE SODIUM, VIA: HCPCS | Performed by: INTERNAL MEDICINE

## 2017-05-17 PROCEDURE — 84100 ASSAY OF PHOSPHORUS: CPT | Performed by: INTERNAL MEDICINE

## 2017-05-17 PROCEDURE — 71010 XR CHEST PORT: CPT

## 2017-05-17 PROCEDURE — 76705 ECHO EXAM OF ABDOMEN: CPT

## 2017-05-17 PROCEDURE — 74011250637 HC RX REV CODE- 250/637: Performed by: STUDENT IN AN ORGANIZED HEALTH CARE EDUCATION/TRAINING PROGRAM

## 2017-05-17 PROCEDURE — 77010033678 HC OXYGEN DAILY

## 2017-05-17 PROCEDURE — 36415 COLL VENOUS BLD VENIPUNCTURE: CPT | Performed by: INTERNAL MEDICINE

## 2017-05-17 PROCEDURE — 74011250636 HC RX REV CODE- 250/636: Performed by: STUDENT IN AN ORGANIZED HEALTH CARE EDUCATION/TRAINING PROGRAM

## 2017-05-17 PROCEDURE — 74011000258 HC RX REV CODE- 258: Performed by: INTERNAL MEDICINE

## 2017-05-17 PROCEDURE — 85730 THROMBOPLASTIN TIME PARTIAL: CPT | Performed by: STUDENT IN AN ORGANIZED HEALTH CARE EDUCATION/TRAINING PROGRAM

## 2017-05-17 PROCEDURE — 84484 ASSAY OF TROPONIN QUANT: CPT | Performed by: INTERNAL MEDICINE

## 2017-05-17 PROCEDURE — 74000 XR ABD (KUB): CPT

## 2017-05-17 PROCEDURE — 85025 COMPLETE CBC W/AUTO DIFF WBC: CPT | Performed by: INTERNAL MEDICINE

## 2017-05-17 PROCEDURE — 80053 COMPREHEN METABOLIC PANEL: CPT | Performed by: INTERNAL MEDICINE

## 2017-05-17 RX ORDER — POTASSIUM CHLORIDE 29.8 MG/ML
20 INJECTION INTRAVENOUS
Status: COMPLETED | OUTPATIENT
Start: 2017-05-17 | End: 2017-05-17

## 2017-05-17 RX ORDER — HEPARIN SODIUM 5000 [USP'U]/ML
60 INJECTION, SOLUTION INTRAVENOUS; SUBCUTANEOUS ONCE
Status: DISCONTINUED | OUTPATIENT
Start: 2017-05-17 | End: 2017-05-17

## 2017-05-17 RX ORDER — SODIUM CHLORIDE 9 MG/ML
100 INJECTION, SOLUTION INTRAVENOUS CONTINUOUS
Status: DISCONTINUED | OUTPATIENT
Start: 2017-05-17 | End: 2017-05-18

## 2017-05-17 RX ORDER — HEPARIN SODIUM 5000 [USP'U]/ML
2000 INJECTION, SOLUTION INTRAVENOUS; SUBCUTANEOUS ONCE
Status: COMPLETED | OUTPATIENT
Start: 2017-05-17 | End: 2017-05-17

## 2017-05-17 RX ORDER — HEPARIN SODIUM 10000 [USP'U]/100ML
9-25 INJECTION, SOLUTION INTRAVENOUS
Status: DISCONTINUED | OUTPATIENT
Start: 2017-05-17 | End: 2017-05-19

## 2017-05-17 RX ORDER — HYDROCORTISONE SODIUM SUCCINATE 100 MG/2ML
25 INJECTION, POWDER, FOR SOLUTION INTRAMUSCULAR; INTRAVENOUS EVERY 8 HOURS
Status: DISCONTINUED | OUTPATIENT
Start: 2017-05-17 | End: 2017-05-18

## 2017-05-17 RX ADMIN — HEPARIN SODIUM 2000 UNITS: 5000 INJECTION, SOLUTION INTRAVENOUS; SUBCUTANEOUS at 22:12

## 2017-05-17 RX ADMIN — CEFEPIME 2 G: 2 INJECTION, POWDER, FOR SOLUTION INTRAMUSCULAR; INTRAVENOUS at 07:23

## 2017-05-17 RX ADMIN — DEXTROSE MONOHYDRATE: 5 INJECTION, SOLUTION INTRAVENOUS at 01:00

## 2017-05-17 RX ADMIN — POTASSIUM CHLORIDE 20 MEQ: 400 INJECTION, SOLUTION INTRAVENOUS at 09:57

## 2017-05-17 RX ADMIN — ASPIRIN 81 MG 81 MG: 81 TABLET ORAL at 08:02

## 2017-05-17 RX ADMIN — SODIUM CHLORIDE: 900 INJECTION, SOLUTION INTRAVENOUS at 11:14

## 2017-05-17 RX ADMIN — HEPARIN SODIUM 5000 UNITS: 5000 INJECTION, SOLUTION INTRAVENOUS; SUBCUTANEOUS at 07:02

## 2017-05-17 RX ADMIN — SODIUM CHLORIDE 40 MG: 9 INJECTION INTRAMUSCULAR; INTRAVENOUS; SUBCUTANEOUS at 08:02

## 2017-05-17 RX ADMIN — HYDROCORTISONE SODIUM SUCCINATE 25 MG: 100 INJECTION, POWDER, FOR SOLUTION INTRAMUSCULAR; INTRAVENOUS at 14:03

## 2017-05-17 RX ADMIN — HYDROCORTISONE SODIUM SUCCINATE 50 MG: 100 INJECTION, POWDER, FOR SOLUTION INTRAMUSCULAR; INTRAVENOUS at 07:02

## 2017-05-17 RX ADMIN — HYDROCORTISONE SODIUM SUCCINATE 25 MG: 100 INJECTION, POWDER, FOR SOLUTION INTRAMUSCULAR; INTRAVENOUS at 22:03

## 2017-05-17 RX ADMIN — ONDANSETRON 4 MG: 2 INJECTION INTRAMUSCULAR; INTRAVENOUS at 22:07

## 2017-05-17 RX ADMIN — HEPARIN SODIUM AND DEXTROSE 9 UNITS/KG/HR: 10000; 5 INJECTION INTRAVENOUS at 11:38

## 2017-05-17 RX ADMIN — SODIUM CHLORIDE 100 ML/HR: 900 INJECTION, SOLUTION INTRAVENOUS at 08:54

## 2017-05-17 NOTE — PROGRESS NOTES
Name: Rey Linder   MRN: [id number]  : 1945      Assessment:  JEANINE- likely septic ATN in the setting of SBO/hypotension and ? NSTEMI  Sepsis- growing Gram negatives  SBO-minimal NG drainage  Acidosis-due to JEANINE/Lactic acidosis  Low Mg  Low K  Alkalosis- from HCO3 drip    Pts hemodynamics have improved. She is off pressors. UOP has picked up and Cr trending down. Bump in Trop I either NSTEMI vs supply demand mismatch. Echo with nl LVSF and no RWMA.    Plan/Recommendations:  D/C HCO3 drip  Start IV NS at 100 ml/hr  Monitor IOs  PRN Pressors to keep MAP >65 mm Hg  Avoid nephrotoxins; hold home dose HCTZ  Replace lytes PRN- KCL/KPhos being replaced  IV ABx    Subjective:  Still restless, but less. No acute c/o    ROS:   Denies n/v/cp or sob    Exam:  Visit Vitals    /75 (BP 1 Location: Left arm, BP Patient Position: At rest)    Pulse 66    Temp 98 °F (36.7 °C)    Resp 16    Ht 5' 4\" (1.626 m)    Wt 96.2 kg (212 lb)    SpO2 100%    BMI 36.39 kg/m2       NAD  Mild icterus+  NGT+  Dec BS, no distress  RRR, no murmur  Soft, faint BS+, ND, NT  Tr edema  Wilkerson+  Alert awake.  Slight confused/restless    Current Facility-Administered Medications   Medication Dose Route Frequency Last Dose    0.9% sodium chloride infusion  100 mL/hr IntraVENous CONTINUOUS 100 mL/hr at 17 0854    potassium phosphate 15 mmol in 0.9% sodium chloride 250 mL infusion   IntraVENous ONCE      potassium chloride 20 mEq in 50 ml IVPB  20 mEq IntraVENous Q1H      hydrocortisone Sod Succ (PF) (SOLU-CORTEF) injection 25 mg  25 mg IntraVENous Q8H      ipratropium (ATROVENT) 0.02 % nebulizer solution 0.5 mg  0.5 mg Nebulization Q6H PRN 0.5 mg at 17 1459    aspirin chewable tablet 81 mg  81 mg Oral DAILY 81 mg at 17 0802    HYDROmorphone (PF) (DILAUDID) injection 1 mg  1 mg IntraVENous Q4H PRN 1 mg at 05/16/17 2132    ondansetron (ZOFRAN) injection 4 mg  4 mg IntraVENous Q4H PRN 4 mg at 05/16/17 0451    heparin (porcine) injection 5,000 Units  5,000 Units SubCUTAneous Q8H 5,000 Units at 05/17/17 0702    cefepime (MAXIPIME) 2 g in 0.9% sodium chloride (MBP/ADV) 100 mL  2 g IntraVENous Q24H 2 g at 05/17/17 0723    pantoprazole (PROTONIX) 40 mg in sodium chloride 0.9 % 10 mL injection  40 mg IntraVENous DAILY 40 mg at 05/17/17 0802       Labs/Data:    Lab Results   Component Value Date/Time    WBC 25.0 05/17/2017 04:45 AM    Hemoglobin (POC) 11.9 12/03/2010 01:40 AM    HGB 9.1 05/17/2017 04:45 AM    Hematocrit (POC) 35 12/03/2010 01:40 AM    HCT 26.3 05/17/2017 04:45 AM    PLATELET 93 57/28/5893 04:45 AM    MCV 88.6 05/17/2017 04:45 AM       Lab Results   Component Value Date/Time    Sodium 143 05/17/2017 04:45 AM    Potassium 3.0 05/17/2017 04:45 AM    Chloride 100 05/17/2017 04:45 AM    CO2 35 05/17/2017 04:45 AM    Anion gap 8 05/17/2017 04:45 AM    Glucose 128 05/17/2017 04:45 AM    BUN 31 05/17/2017 04:45 AM    Creatinine 1.93 05/17/2017 04:45 AM    BUN/Creatinine ratio 16 05/17/2017 04:45 AM    GFR est AA 31 05/17/2017 04:45 AM    GFR est non-AA 25 05/17/2017 04:45 AM    Calcium 7.4 05/17/2017 04:45 AM       Wt Readings from Last 3 Encounters:   05/16/17 96.2 kg (212 lb)   02/02/17 96.6 kg (213 lb)   11/30/16 96.8 kg (213 lb 8 oz)         Intake/Output Summary (Last 24 hours) at 05/17/17 0927  Last data filed at 05/17/17 0900   Gross per 24 hour   Intake          3045.83 ml   Output             1150 ml   Net          1895.83 ml       Patient seen and examined. Chart reviewed. Labs, data and other pertinent notes reviewed in last 24 hrs.     PMH/SH/FH reviewed and unchanged compared to H&P    Discussed with pt and Dr. Jen Palumbo MD

## 2017-05-17 NOTE — PROGRESS NOTES
Problem: Falls - Risk of  Goal: *Absence of falls  Outcome: Progressing Towards Goal  Fall precautions are in place at this time        Problem: Pressure Injury - Risk of  Goal: *Prevention of pressure ulcer  Outcome: Progressing Towards Goal  Patient turned and repositioned as needed and every 2 hours    Problem: Sepsis: Day 3  Goal: *Oxygen saturation within defined limits  Outcome: Progressing Towards Goal  Patient needing supplemental oxygen therapy at this time  Goal: *Vital sign stability  Outcome: Progressing Towards Goal  Patient off of pressors at this time  Goal: *Tolerating diet  Outcome: Not Progressing Towards Goal  Patient NPO at this time and has NGT due to bowel obstruction   Goal: *Demonstrates progressive activity  Outcome: Not Progressing Towards Goal  Patient needing PT eval and treat menty    Problem: Infection - Risk of, Urinary Catheter-Associated Urinary Tract Infection  Goal: *Absence of infection signs and symptoms  Outcome: Progressing Towards Goal  Patient shows no s/s of infection due urinary catheter     Problem: Non-Violent Restraints  Goal: *No harm/injury to patient while restraints in use  Outcome: Progressing Towards Goal  Patient continues to need restraints due to pulling and attempting to remove NGT

## 2017-05-17 NOTE — PROGRESS NOTES
Hospitalist Progress Note  Office: [phone]      Date of Service:  2017  NAME:  Sylvester Marin  :  1945  MRN:  [id number]      Admission Summary:   68 yo woman with obesity, rheumatoid arthritis, chronic pain syndrome, gastroesophageal reflux disease,   hypertension, h/o herpes simplex virus infection was BIBEMS from home on 17 with nausea, vomiting, diarrhea and abdominal pain. Interval history / Subjective:    Awake, reports being hungry, otherwise denies any pain or other acute concerns. Assessment & Plan:         1. Septic shock (POA) with E. coli bacteremia, toxic encephalopathy   - leukocytosis, fever, hypotension, lactic acidosis, tachypnea, tachycardia on admission   - leukocytosis not significantly changed, though had been given stress dose steroids, given E. Coli bacteremia, concern raised for intra-abdominal source for infection   - abd US  - heterogenous liver, increase small right pleural effusion, normal biliary tree s/p cholecystectomy, no hydronephrosis   - aggressive IVF resuscitation   - was on norepinephrine gtt, now weaned off   - on empiric cefepime, received vancomycin   - blood cultures  - E. Coli 3/3 bottles   - repeat blood cultures  - no growth thus far   - stool Cx 5/15 - negative   - stool caliber not amenable to C diff testing currently    2. Troponin elevation / NSTEMI   - suspect demand ischemia in setting of septic shock   - she will require stress test prior to discharge   - start aspirin   - start heparin drip x 48 hours   - seen by cardiology     3. Partial small bowel obstruction (POA)   - RUQ and epigastic TTP   - KUB  -  Nasogastric tube in place with tip projected in the expected location of the stomach. Continued relative paucity of abdominal gas. - pain control, conservative management with bowel rest   - GI / general surgery following    4.   Hypokalemia (POA), hypophosphatemia   - replete prn    5. Anion gap metabolic acidosis (POA)   - likely due to lactic acidosis and exacerbated by GI losses and JEANINE, generally downtrending   - on sodium bicarbonate, now discontinued, started on normal saline    6. Acute kidney injury (POA)   - likely due to prerenal azotemia vs septic / hypotensive ATN   - on sodium bicarbonate, now discontinued   - CT abd/pelvis without contrast 5/15 - unremarkable kidneys   - nephrology following    7. Abnormal LFTs, hyperbilirubinemia (POA)   - suspect either dehydration or intra-abdominal sepsis   - GI following   - avoid hepatotoxins    8. Acute gastroenteritis (POA)    - antiemetics prn   - pain control   - stool Cx 5/15 - negative   - C diff 5/15 - not able to be tested due to stool caliber   - PPI    9. Hypothyroidism   - TSH WNL; continue levothyroxine    10. Rheumatoid arthritis   - started stress dose steroids due to chronic steroid use, taper   - hold home Imuran    11. Hypomagnesemia   - replete prn    12. Anemia   - suspect some element of hemodilution, seems improved   - check occult blood stool    Code status: FULL  DVT prophylaxis: heparin    Care Plan discussed with: Patient/Family and Nurse  Disposition: TBD. Came from home. Unclear disposition currently. Hospital Problems  Date Reviewed: 5/15/2017          Codes Class Noted POA    * (Principal)Septic shock ([facility] [address] ICD-10-CM: A41.9, R65.21  ICD-9-CM: 038.9, 785.52, 995.92  5/15/2017 Yes            Review of Systems:   Pertinent items are noted in HPI. Vital Signs:    Last 24hrs VS reviewed since prior progress note.  Most recent are:  Visit Vitals    /78 (BP 1 Location: Left arm, BP Patient Position: At rest)    Pulse 80    Temp 97.3 °F (36.3 °C)    Resp 16    Ht 5' 4\" (1.626 m)    Wt 104.6 kg (230 lb 9.6 oz)    SpO2 93%    BMI 39.58 kg/m2         Intake/Output Summary (Last 24 hours) at 05/17/17 1901  Last data filed at 05/17/17 1200   Gross per 24 hour   Intake             2282 ml   Output             1040 ml   Net             1242 ml      Physical Examination:     Constitutional:  awake, calm, obese   ENT:  oral mucosa slightly dry, oropharynx benign, +NG tube  Neck thick, IJ CVC   Resp:  clear to auscultation anteriorly   CV:  regular rhythm, slightly tachycardic, distant heart sounds    GI:  hypoactive BS, soft, non distended, mildly tender to palpation in epigastrum, obese     Musculoskeletal:  moves all extremities    Neurologic:  AAOx3, responds appropriately to questions and commands     Skin:  Good turgor, no rashes or ulcers  Eyes:  PERRL    Data Review:    Review and/or order of clinical lab test  Review and/or order of tests in the radiology section of CPT  Review and/or order of tests in the medicine section of CPT    Labs:     Recent Labs      05/17/17   1050  05/17/17   0445   WBC  25.0*  25.0*   HGB  9.5*  9.1*   HCT  27.8*  26.3*   PLT  91*  93*     Recent Labs      05/17/17 0445 05/16/17   0428  05/15/17   0717   NA  143  142  139   K  3.0*  3.1*  3.7   CL  100  104  107   CO2  35*  26  17*   BUN  31*  31*  21*   CREA  1.93*  2.43*  2.63*   GLU  128*  139*  92   CA  7.4*  7.2*  8.1*   MG  2.5*  2.6*  1.1*   PHOS  1.7*  1.8*  3.4     Recent Labs      05/17/17   0445  05/16/17   0428  05/15/17   0717  05/14/17   2258   SGOT  68*  79*  105*  104*   ALT  45  43  50  53   AP  98  99  217*  202*   TBILI  1.6*  2.4*  4.0*  4.6*   TP  5.4*  5.7*  5.5*  6.0*   ALB  2.6*  2.8*  2.1*  2.4*   GLOB  2.8  2.9  3.4  3.6   LPSE   --    --    --   75     Recent Labs      05/17/17   1050   APTT  31.3      No results for input(s): FE, TIBC, PSAT, FERR in the last 72 hours. No results found for: FOL, RBCF   No results for input(s): PH, PCO2, PO2 in the last 72 hours.   Recent Labs      05/17/17   0445  05/16/17   0428  05/15/17   0717   CPK   --   299*  267*   CKNDX   --   6.8*  1.3   TROIQ  3.29*  6.88*  0.19*     Lab Results   Component Value Date/Time Cholesterol, total 99 12/23/2015 01:45 AM    HDL Cholesterol 22 12/23/2015 01:45 AM    LDL, calculated 54.4 12/23/2015 01:45 AM    Triglyceride 113 12/23/2015 01:45 AM    CHOL/HDL Ratio 4.5 12/23/2015 01:45 AM     Lab Results   Component Value Date/Time    Glucose (POC) 125 12/30/2015 11:28 AM    Glucose (POC) 101 12/23/2015 12:08 AM    Glucose (POC) 205 09/02/2015 11:44 AM    Glucose (POC) 112 09/02/2015 07:04 AM    Glucose (POC) 120 09/01/2015 09:41 PM     Lab Results   Component Value Date/Time    Color DARK YELLOW 05/15/2017 01:44 PM    Appearance TURBID 05/15/2017 01:44 PM    Specific gravity 1.012 05/15/2017 01:44 PM    Specific gravity 1.005 12/23/2015 01:45 AM    pH (UA) 6.0 05/15/2017 01:44 PM    Protein 100 05/15/2017 01:44 PM    Glucose NEGATIVE  05/15/2017 01:44 PM    Ketone NEGATIVE  05/15/2017 01:44 PM    Bilirubin NEGATIVE  02/02/2017 01:27 AM    Urobilinogen 0.2 05/15/2017 01:44 PM    Nitrites NEGATIVE  05/15/2017 01:44 PM    Leukocyte Esterase MODERATE 05/15/2017 01:44 PM    Epithelial cells FEW 05/15/2017 01:44 PM    Bacteria 2+ 05/15/2017 01:44 PM    WBC 0-4 05/15/2017 01:44 PM    RBC 0-5 05/15/2017 01:44 PM     Medications Reviewed:     Current Facility-Administered Medications   Medication Dose Route Frequency    0.9% sodium chloride infusion  100 mL/hr IntraVENous CONTINUOUS    hydrocortisone Sod Succ (PF) (SOLU-CORTEF) injection 25 mg  25 mg IntraVENous Q8H    heparin 25,000 units in D5W 250 ml infusion  9-25 Units/kg/hr IntraVENous TITRATE    ipratropium (ATROVENT) 0.02 % nebulizer solution 0.5 mg  0.5 mg Nebulization Q6H PRN    aspirin chewable tablet 81 mg  81 mg Oral DAILY    HYDROmorphone (PF) (DILAUDID) injection 1 mg  1 mg IntraVENous Q4H PRN    ondansetron (ZOFRAN) injection 4 mg  4 mg IntraVENous Q4H PRN    cefepime (MAXIPIME) 2 g in 0.9% sodium chloride (MBP/ADV) 100 mL  2 g IntraVENous Q24H    pantoprazole (PROTONIX) 40 mg in sodium chloride 0.9 % 10 mL injection  40 mg IntraVENous DAILY     ______________________________________________________________________  EXPECTED LENGTH OF STAY: 5d 2h  ACTUAL LENGTH OF STAY:          [id number]

## 2017-05-17 NOTE — PROGRESS NOTES
[address]  [address], [address]       GI PROGRESS NOTE  [facility]  [phone]    NAME: Saurav Owens   :  1945   MRN:  [id number]       Subjective:     Abdominal pain only with palpation. Feels hungry    Objective:     VITALS:   Last 24hrs VS reviewed since prior progress note. Most recent are:  Visit Vitals    /76    Pulse 66    Temp 98 °F (36.7 °C)    Resp 11    Ht 5' 4\" (1.626 m)    Wt 104.6 kg (230 lb 9.6 oz)    SpO2 98%    BMI 39.58 kg/m2       PHYSICAL EXAM:  General: Obese 67year old lying in bed    Neurologic:  Alert and oriented X 2  HEENT: PERRLA, EOMI, NGT remains in place but minimal output  Lungs:  No distress  Heart:  s1 s2  Abdomen: Soft, Non distended, tender without rebound  Extremities: edema  Psych:   Not anxious nor agitated. Lab Data Reviewed:     Recent Results (from the past 24 hour(s))   CULTURE, BLOOD    Collection Time: 17 11:38 AM   Result Value Ref Range    Special Requests: NO SPECIAL REQUESTS      Culture result: NO GROWTH AFTER 16 HOURS     CBC WITH AUTOMATED DIFF    Collection Time: 17  4:45 AM   Result Value Ref Range    WBC 25.0 (H) 3.6 - 11.0 K/uL    RBC 2.97 (L) 3.80 - 5.20 M/uL    HGB 9.1 (L) 11.5 - 16.0 g/dL    HCT 26.3 (L) 35.0 - 47.0 %    MCV 88.6 80.0 - 99.0 FL    MCH 30.6 26.0 - 34.0 PG    MCHC 34.6 30.0 - 36.5 g/dL    RDW 17.5 (H) 11.5 - 14.5 %    PLATELET 93 (L) 110 - 400 K/uL    NEUTROPHILS 86 (H) 32 - 75 %    BAND NEUTROPHILS 6 0 - 6 %    LYMPHOCYTES 4 (L) 12 - 49 %    MONOCYTES 1 (L) 5 - 13 %    EOSINOPHILS 0 0 - 7 %    BASOPHILS 0 0 - 1 %    METAMYELOCYTES 3 (H) 0 %    ABS. NEUTROPHILS 23.0 (H) 1.8 - 8.0 K/UL    ABS. LYMPHOCYTES 1.0 0.8 - 3.5 K/UL    ABS. MONOCYTES 0.3 0.0 - 1.0 K/UL    ABS. EOSINOPHILS 0.0 0.0 - 0.4 K/UL    ABS.  BASOPHILS 0.0 0.0 - 0.1 K/UL    DF MANUAL      RBC COMMENTS ANISOCYTOSIS  1+        RBC COMMENTS HYPOCHROMIA  PRESENT        WBC COMMENTS DOHLE BODIES METABOLIC PANEL, COMPREHENSIVE    Collection Time: 05/17/17  4:45 AM   Result Value Ref Range    Sodium 143 136 - 145 mmol/L    Potassium 3.0 (L) 3.5 - 5.1 mmol/L    Chloride 100 97 - 108 mmol/L    CO2 35 (H) 21 - 32 mmol/L    Anion gap 8 5 - 15 mmol/L    Glucose 128 (H) 65 - 100 mg/dL    BUN 31 (H) 6 - 20 MG/DL    Creatinine 1.93 (H) 0.55 - 1.02 MG/DL    BUN/Creatinine ratio 16 12 - 20      GFR est AA 31 (L) >60 ml/min/1.73m2    GFR est non-AA 25 (L) >60 ml/min/1.73m2    Calcium 7.4 (L) 8.5 - 10.1 MG/DL    Bilirubin, total 1.6 (H) 0.2 - 1.0 MG/DL    ALT (SGPT) 45 12 - 78 U/L    AST (SGOT) 68 (H) 15 - 37 U/L    Alk. phosphatase 98 45 - 117 U/L    Protein, total 5.4 (L) 6.4 - 8.2 g/dL    Albumin 2.6 (L) 3.5 - 5.0 g/dL    Globulin 2.8 2.0 - 4.0 g/dL    A-G Ratio 0.9 (L) 1.1 - 2.2     MAGNESIUM    Collection Time: 05/17/17  4:45 AM   Result Value Ref Range    Magnesium 2.5 (H) 1.6 - 2.4 mg/dL   PHOSPHORUS    Collection Time: 05/17/17  4:45 AM   Result Value Ref Range    Phosphorus 1.7 (L) 2.6 - 4.7 MG/DL   TROPONIN I    Collection Time: 05/17/17  4:45 AM   Result Value Ref Range    Troponin-I, Qt. 3.29 (H) <0.05 ng/mL         ________________________________________________________________________       Assessment:   · Septic Shock - unclear etiology, surgery is following.     · Elevated LFTs - improving, US pending     Patient Active Problem List   Diagnosis Code    [facility] M06.9    Essential hypertension I10    Postmenopausal Z78.0    Hypothyroidism E03.9    Allergic rhinitis J30.9    Tinea manus B35.2    Chronic constipation K59.09    Vitamin D deficiency E55.9    Abnormal gait R26.9    Prediabetes R73.03    Insomnia G47.00    Gastroesophageal reflux disease without esophagitis K21.9    Chronic midline low back pain without sciatica M54.5, G89.29    ACP (advance care planning) Z71.89    Septic shock (HCC) A41.9, R65.21     Plan:   · KUB  · Clamp NGT  · Diet per surgery  · Await US results  · Continue to trend lfts     Signed By: Cr Marquez NP     5/17/2017  10:48 AM                 I have examined the patient. I have reviewed the chart and agree with the documentation recorded by the NP, including the assessment, treatment plan, and disposition.         Micheal Ramirez MD    Signed By: Micheal Ramirez MD     May 17, 2017 4:39 PM

## 2017-05-17 NOTE — PROGRESS NOTES
0730-Bedside and Verbal shift change report given to [address] (oncoming nurse) by Phu Joya RN (offgoing nurse). Report included the following information SBAR, Kardex, ED Summary, Intake/Output, MAR and Recent Results.

## 2017-05-17 NOTE — CDMP QUERY
Please clarify if this patient is being treated/managed for:    =>Hypophosphatemia in the setting of low phosphorus level requiring KPhos IV replacement and lab monitoring  =>Other Explanation of clinical findings  =>Unable to Determine (no explanation of clinical findings)    The medical record reflects the following:      Clinical Indicators/Risk Factors: Phos level 1.8 and dropped to 1.7 (5/17). Treatment: KPHOS IV replacement, lab monitoring    Please clarify and document your clinical opinion in the progress notes and discharge summary including the definitive and/or presumptive diagnosis, (suspected or probable), related to the above clinical findings. Please include clinical findings supporting your diagnosis.     Thank you,    Sherin Holder, RN, BSN, [facility], [address]  [phone]

## 2017-05-17 NOTE — PROGRESS NOTES
Bedside and Verbal shift change report given to Robbin Hurtado RN (oncoming nurse) by Daniel Spear RN (offgoing nurse). Report included the following information SBAR, Kardex, Intake/Output, MAR and Recent Results.

## 2017-05-17 NOTE — PROGRESS NOTES
Hospital: [facility]   Name: Pat Mejia   : 1945   MRN: [id number]   Code: Full Code             Elzbieta Gutierrez 1213 Day: 4       17 - ICU day 13    Patient with gram-negative bacteremia-speciation forthcoming. Stable and off of pressors and ready for the floor. ID investigation is underway. Continue current regimen, de-escalate Solu-Cortef. Previously was on azathioprine for RA. Transferring to remote telemetry to continue care. Still has some delirium requiring restraints. Start physical therapy Out of bed to chair. NG tube management per surgery. Advance diet per surgery and GI when ready. May need speech evaluation. 2017-patient still delirious. Will need restraints. She does follow commands and wants to eat. 50 cc out NG tube, may be able to remove NG tube and advance diet. She complains of only mild abdominal pain and no other complaints. Her creatinine is stable. Echocardiogram is reassuring. Urine output has picked up and creatinine has stabilized. We'll discontinue dobutamine today. Bump in troponin. We'll ask cardiology to comment but likely a supply demand issue. Continue current care in the ICU. Gram-negative rods in 3 of 3 blood cultures. Awaiting identification. Narrowing antibiotics    5/15/17      Intensive Care Unit 13    Patient is a 65-year-old -American female admitted with delirium with an NG tube in to low wall suction. There is a query of sepsis patient has shock on norepinephrine. She is cool and getting fluids. We will check a venous blood gas to see if she needs an inotrope and add dobutamine. Solu-Cortef has been added as well as broad-spectrum antibiotics. Surgery is seeing the patient and incarcerated hernia does not appear to be playing a role and her belly is benign. GI and renal to see the patient. Electrolytes to be replenished. She needs to remain in the Intensive Care Unit until her physiology has been clarified. IMPRESSION      · Delirium - Monitoring    · Shock requiring Vasopressors -Resolved  · Abdominal Complaints - ? pSBO Umbilical Hernia- surgery Following   · ARF - Oliguric - Renal Consulted  - Volume expanding -nephrology  following  · Lactic acidosis -resolved  · hypophosphatemia - replenish  · Hypokalemia-replenish   · Hyperbilirubinemia   · Hypothyroidism - On LT4 50   · Acute gastroenteritis. · Rheumatoid arthritis - On Imuran PTA      Medical Issues:  has a past medical history of Abdominal pain (6/18/14); Advance directive discussed with patient (07/22/2015); Arthritis; Bacteremia due to Klebsiella pneumoniae (2/2/2016); Chronic pain; Contact dermatitis and other eczema, due to unspecified cause; Elevated LFTs; Endocrine disease; GERD (gastroesophageal reflux disease); HSV infection; Hypertension; Hypothyroid (10/2012); Insomnia; Melasma (3/3/15); Nausea & vomiting (05/04/2017); Pain management counseling, encounter for (05/05/2016); Rhinitis, allergic nonseasonal; Urge incontinence of urine (03/21/2017 initial [facility] consult); and Well woman exam (no gynecological exam) (07/29/2016).      Patient Active Problem List    Diagnosis Date Noted    Septic shock ([facility] 05/15/2017    ACP (advance care planning) 11/30/2016    Gastroesophageal reflux disease without esophagitis 10/24/2016    Chronic midline low back pain without sciatica 10/24/2016    Prediabetes 07/22/2015    Insomnia 07/22/2015    Abnormal gait 05/14/2015    Vitamin D deficiency 03/16/2015    Allergic rhinitis 10/07/2014    Tinea manus 10/07/2014    Chronic constipation 10/07/2014    Hypothyroidism 11/30/2012    Postmenopausal 10/17/2012    RA (rheumatoid arthritis) ([facility] 02/09/2010    Essential hypertension 02/09/2010        VITAL SIGNS:        Visit Vitals    /75 (BP 1 Location: Left arm, BP Patient Position: At rest)    Pulse 66    Temp 98 °F (36.7 °C)    Resp 16    Ht 5' 4\" (1.626 m)    Wt 96.2 kg (212 lb)    SpO2 100%    BMI 36.39 kg/m2        Temp (24hrs), Av.9 °F (36.6 °C), Min:97.1 °F (36.2 °C), Max:98.9 °F (37.2 °C)           INTAKE / OUPUT       Intake/Output Summary (Last 24 hours) at 17 0907  Last data filed at 17 0800   Gross per 24 hour   Intake          3045.83 ml   Output             1020 ml   Net          2025.83 ml              EXAM:     OTHER:       GEN: nontoxic Nondistressed     EYE: Anicteric Noninjected    ENT: Moist No Thrush    CARD: Regular No murmurs    RESP: Clear Equal BS    GI: NABS Nontender    : Clear Urine Normal Genitalia    SKEL: WD WN No Clubbing    SKIN: Perfused No drug rash    NEURO: delirious Nonfocal    PSYCH: Nonagitated Poor insight    LYMPH: No TOVA No edema       DATA:      Recent Labs      17   0445  17   0428  05/15/17   0717   WBC  25.0*  22.6*  18.8*   HGB  9.1*  8.6*  10.4*   PLT  93*  112*  182     Recent Labs      17   0445  17   0433  17   0428  05/15/17   1736  05/15/17   1344   05/15/17   0717   17   2258   NA  143   --   142   --    --    --   139   --   139   K  3.0*   --   3.1*   --    --    --   3.7   --   3.1*   CL  100   --   104   --    --    --   107   --   101   CO2  35*   --   26   --    --    --   17*   --   18*   GLU  128*   --   139*   --    --    --   92   --   77   BUN  31*   --   31*   --    --    --   21*   --   17   CREA  1.93*   --   2.43*   --    --    --   2.63*   --   2.43*   CA  7.4*   --   7.2*   --    --    --   8.1*   --   9.1   MG  2.5*   --   2.6*   --    --    --   1.1*   --    --    PHOS  1.7*   --   1.8*   --    --    --   3.4   --    --    LAC   --   4.2*   --   8.1*  6.8*   < >   --    < >  10.3*   ALB  2.6*   --   2.8*   --    --    --   2.1*   --   2.4*   SGOT  68*   --   79*   --    --    --   105*   --   104*   ALT  45   --   43   --    --    --   50   --   53   LPSE   --    --    --    --    --    --    --    --   75    < > = values in this interval not displayed.           Ventilator / BiPAP / O2     O2 Device: Room air (05/17/17 0800)    Mode    Rate    TV     Pressure    FiO2    PEEP    PIP    MV      No results for input(s): PHI, PCO2I, PO2I in the last 72 hours. IMAGING:     [] Personally Visualized [] Discussed with Radiologist  [] Report reviewed       Results from Hospital Encounter encounter on 05/14/17   XR CHEST PORT   Narrative EXAM:  XR CHEST PORT    INDICATION:  Dyspnea. Follow-up abnormal chest radiograph. COMPARISON: 5/16/2017 at 0418 hours    TECHNIQUE: Portable AP semiupright chest view at 0416 hours    FINDINGS: The enteric tube and left IJ catheter are stable. Cardiac monitoring  wires overlie the thorax. The cardiomediastinal contours are stable. There are stable diffuse interstitial opacities and a stable small left pleural  effusion. There is no pneumothorax. The bones and upper abdomen are stable. Impression IMPRESSION:    Stable appearance of the chest.            Results from Hospital Encounter encounter on 05/14/17   CT CHEST WO CONT   Narrative INDICATION: sepsis     Noncontrast CT of the chest is performed with 5 mm collimation. Coronal and  sagittal reformatted images were also performed. CT dose reduction was achieved through use of a standardized protocol tailored  for this examination and automatic exposure control for dose modulation. Adaptive statistical iterative reconstruction (ASIR) was utilized. Direct comparison is made to CT of the abdomen and pelvis, including lower chest  dated May 15, 2017 and prior CT chest dated December 2015. Chest:     Lungs: There is mild bibasilar dependent compressive atelectasis. Lymph nodes: There is no axillary, mediastinal or hilar lymphadenopathy. Heart: The heart is of normal size and there is no pericardial effusion. Atherosclerotic calcifications are noted within the coronary arteries. Pleura: There are very small bilateral pleural effusions.     Bones: Visualized osseous structures are intact. Upper abdomen: The gallbladder is surgically absent. Nasogastric tube extends to  the stomach. Visualized liver is hypodense consistent with hepatic steatosis. The visualized abdominal structures are otherwise normal.         Impression IMPRESSION: Very small bilateral pleural effusions. Mild bibasilar dependent  compressive atelectasis. Results for orders placed or performed during the hospital encounter of 05/14/17   EKG, 12 LEAD, INITIAL   Result Value Ref Range    Ventricular Rate 116 BPM    Atrial Rate 116 BPM    P-R Interval 142 ms    QRS Duration 82 ms    Q-T Interval 336 ms    QTC Calculation (Bezet) 467 ms    Calculated P Axis 52 degrees    Calculated R Axis 36 degrees    Calculated T Axis 55 degrees    Diagnosis       ** Poor data quality, interpretation may be adversely affected  Sinus tachycardia  When compared with ECG of 14-MAY-2017 22:49,  MANUAL COMPARISON REQUIRED, DATA IS UNCONFIRMED  Confirmed by Shay Zuñiga M.D., [facility] ([id number]) on 5/15/2017 9:10:23 AM           Yolanda Collado MD [facility]     History:     PMH:  has a past medical history of Abdominal pain (6/18/14); Advance directive discussed with patient (07/22/2015); Arthritis; Bacteremia due to Klebsiella pneumoniae (2/2/2016); Chronic pain; Contact dermatitis and other eczema, due to unspecified cause; Elevated LFTs; Endocrine disease; GERD (gastroesophageal reflux disease); HSV infection; Hypertension; Hypothyroid (10/2012); Insomnia; Melasma (3/3/15); Nausea & vomiting (05/04/2017); Pain management counseling, encounter for (05/05/2016); Rhinitis, allergic nonseasonal; Urge incontinence of urine (03/21/2017 initial [facility] consult); and Well woman exam (no gynecological exam) (07/29/2016). PSH:   has a past surgical history that includes cholecystectomy; tubal ligation; colonoscopy (8/1/11); endoscopy (2014); and other surgical (04/17/2017). FHX: family history is not on file.      SHX:  reports that she has never smoked. She has never used smokeless tobacco. She reports that she does not drink alcohol or use illicit drugs.     ALL:   Allergies   Allergen Reactions    Pcn [Penicillins] Swelling    Tramadol Nausea and Vomiting     SEVERE If taken w/o food    Proventil [Albuterol Sulfate] Hives    Ace Inhibitors Angioedema    Albuterol Swelling     swelling    Ambien [Zolpidem] Other (comments)     Nightmares and memory disturbance    Ciprofloxacin Other (comments)     Sore throat and trouble swallowing    Lisinopril Swelling    Oxaprozin Nausea and Vomiting     severe stomach upset    Sulfadiazine Swelling     swelling    Trazodone Other (comments)     Vivid dreams and felt disoriented        MEDS:   [x] Reviewed - As Below   [] Not reviewed    Current Facility-Administered Medications   Medication    0.9% sodium chloride infusion    potassium phosphate 15 mmol in 0.9% sodium chloride 250 mL infusion    potassium chloride 20 mEq in 50 ml IVPB    hydrocortisone Sod Succ (PF) (SOLU-CORTEF) injection 25 mg    ipratropium (ATROVENT) 0.02 % nebulizer solution 0.5 mg    aspirin chewable tablet 81 mg    HYDROmorphone (PF) (DILAUDID) injection 1 mg    ondansetron (ZOFRAN) injection 4 mg    heparin (porcine) injection 5,000 Units    cefepime (MAXIPIME) 2 g in 0.9% sodium chloride (MBP/ADV) 100 mL    pantoprazole (PROTONIX) 40 mg in sodium chloride 0.9 % 10 mL injection         Wilhemenia Boxer, MD [facility]

## 2017-05-17 NOTE — PROGRESS NOTES
TRANSFER - OUT REPORT:    Verbal report given to Ganesh Darling RN(name) on Blaine Wolff  being transferred to 510(unit) for routine progression of care       Report consisted of patients Situation, Background, Assessment and   Recommendations(SBAR). Information from the following report(s) SBAR, Kardex, ED Summary, Intake/Output, MAR and Recent Results was reviewed with the receiving nurse. Lines:   Quad Lumen quad lumen 05/15/17 Left Subclavian (Active)   Central Line Being Utilized Yes 5/17/2017  8:00 AM   Criteria for Appropriate Use Limited/no vessel suitable for conventional peripheral access 5/17/2017  8:00 AM   Site Assessment Clean, dry, & intact 5/17/2017  8:00 AM   Infiltration Assessment 0 5/17/2017  8:00 AM   Affected Extremity/Extremities Color distal to insertion site pink (or appropriate for race); Pulses palpable 5/17/2017  8:00 AM   Date of Last Dressing Change 05/17/17 5/17/2017  8:00 AM   Dressing Status Clean, dry, & intact 5/17/2017  8:00 AM   Dressing Type Disk with Chlorhexadine gluconate (CHG); Transparent 5/17/2017  8:00 AM   Action Taken Blood drawn;Open ports on tubing capped;Dressing changed 5/17/2017  4:00 AM   Proximal Hub Color/Line Status White 5/17/2017  8:00 AM   Positive Blood Return (Medial Site) Yes 5/17/2017  8:00 AM   Medial 1 Hub Color/Line Status Gray 5/17/2017  8:00 AM   Positive Blood Return (Lateral Site) Yes 5/17/2017  8:00 AM   Medial 2 Hub Color/Line Status Blue 5/17/2017  8:00 AM   Positive Blood Return (Site #3) Yes 5/17/2017  8:00 AM   Distal Hub Color/Line Status Brown 5/17/2017  8:00 AM   Positive Blood Return (Site #4) Yes 5/17/2017  8:00 AM   Alcohol Cap Used Yes 5/17/2017  8:00 AM       Peripheral IV 05/14/17 Right Antecubital (Active)   Site Assessment Clean, dry, & intact 5/17/2017  8:00 AM   Phlebitis Assessment 0 5/17/2017  8:00 AM   Infiltration Assessment 0 5/17/2017  8:00 AM   Dressing Status Clean, dry, & intact 5/17/2017  8:00 AM   Dressing Type Tape;Transparent 5/17/2017  8:00 AM   Hub Color/Line Status Green;Capped 5/17/2017  8:00 AM   Action Taken Open ports on tubing capped 5/17/2017  4:00 AM   Alcohol Cap Used Yes 5/17/2017  8:00 AM        Opportunity for questions and clarification was provided.       Patient transported with:   Monitor  O2 @ 2 liters  Registered Nurse  [facility]

## 2017-05-17 NOTE — PROGRESS NOTES
ID Progress Note  2017     Cefepime     Subjective:     Afebrile. SHe has abdominal pain. No back pain. No headache or sore throat. No diarrhea. Objective:     Vitals:   Visit Vitals    /75 (BP 1 Location: Left arm, BP Patient Position: At rest)    Pulse 66    Temp 98 °F (36.7 °C)    Resp 16    Ht 5' 4\" (1.626 m)    Wt 96.2 kg (212 lb)    SpO2 100%    BMI 36.39 kg/m2        Tmax:  Temp (24hrs), Av.9 °F (36.6 °C), Min:97.1 °F (36.2 °C), Max:98.9 °F (37.2 °C)      Exam:    Not in distress   Eyes: pink conjunctivae, anicteric sclerae   No cervical lymphadenopathy    Lungs: clear to auscultation, no rales   Heart: s1, s2, no murmurs, rubs or clicks   Abdomen: soft, some tenderness on RUQ   Extremities: knees not warm or tender     Labs:   Lab Results   Component Value Date/Time    WBC 25.0 2017 04:45 AM    Hemoglobin (POC) 11.9 2010 01:40 AM    HGB 9.1 2017 04:45 AM    Hematocrit (POC) 35 2010 01:40 AM    HCT 26.3 2017 04:45 AM    PLATELET 93  04:45 AM    MCV 88.6 2017 04:45 AM     Lab Results   Component Value Date/Time    Sodium 143 2017 04:45 AM    Potassium 3.0 2017 04:45 AM    Chloride 100 2017 04:45 AM    CO2 35 2017 04:45 AM    Anion gap 8 2017 04:45 AM    Glucose 128 2017 04:45 AM    BUN 31 2017 04:45 AM    Creatinine 1.93 2017 04:45 AM    BUN/Creatinine ratio 16 2017 04:45 AM    GFR est AA 31 2017 04:45 AM    GFR est non-AA 25 2017 04:45 AM    Calcium 7.4 2017 04:45 AM    Bilirubin, total 1.6 2017 04:45 AM    AST (SGOT) 68 2017 04:45 AM    Alk. phosphatase 98 2017 04:45 AM    Protein, total 5.4 2017 04:45 AM    Albumin 2.6 2017 04:45 AM    Globulin 2.8 2017 04:45 AM    A-G Ratio 0.9 2017 04:45 AM    ALT (SGPT) 45 2017 04:45 AM             Assessment:       1. Gram-negative lisa bacteremia.    2. History of recurrent Klebsiella bacteremia. 3. History of L4-L5 diskitis for which she has received greater than 12   weeks of IV therapy with normalization of her inflammation markers. 4. Renal failure. 5. Rheumatoid arthritis. 6. History of dilated bile ducts. 7. non-ST-segment elevation myocardial infarction.       Recommendations:     She has a history of GNR bacteremia. It will be concerning if this turns out to be klebsiella again. Continue cefepime and ff up US done earlier. Ff up blood cultures.      Sanjuana Mackey MD

## 2017-05-17 NOTE — PROGRESS NOTES
Subjective  Pt without complaints. No Abdominal pain. No Nausea. NO flatus    Temp:  [96.5 °F (35.8 °C)-101.1 °F (38.4 °C)]   Pulse (Heart Rate):  []   BP: ()/(41-98)   Resp Rate:  [7-40]   O2 Sat (%):  [89 %-100 %]   Weight:  [192 lb 11.2 oz (87.4 kg)-230 lb 9.6 oz (104.6 kg)]   05/17 0701 - 05/17 1900  In: 135 [I.V.:135]  Out: 290 [Urine:290]  05/15 1901 - 05/17 0700  In: 5322.7 [I.V.:5262.7]  Out: 2351 [Urine:1290]    NGT minimal   Objective  Gen- Alert in NAD  Lungs- CTA  H- RRR  Abd- Soft /NT/ ND    Recent Results (from the past 24 hour(s))   CULTURE, BLOOD    Collection Time: 05/16/17 11:38 AM   Result Value Ref Range    Special Requests: NO SPECIAL REQUESTS      Culture result: NO GROWTH AFTER 16 HOURS     CBC WITH AUTOMATED DIFF    Collection Time: 05/17/17  4:45 AM   Result Value Ref Range    WBC 25.0 (H) 3.6 - 11.0 K/uL    RBC 2.97 (L) 3.80 - 5.20 M/uL    HGB 9.1 (L) 11.5 - 16.0 g/dL    HCT 26.3 (L) 35.0 - 47.0 %    MCV 88.6 80.0 - 99.0 FL    MCH 30.6 26.0 - 34.0 PG    MCHC 34.6 30.0 - 36.5 g/dL    RDW 17.5 (H) 11.5 - 14.5 %    PLATELET 93 (L) 027 - 400 K/uL    NEUTROPHILS 86 (H) 32 - 75 %    BAND NEUTROPHILS 6 0 - 6 %    LYMPHOCYTES 4 (L) 12 - 49 %    MONOCYTES 1 (L) 5 - 13 %    EOSINOPHILS 0 0 - 7 %    BASOPHILS 0 0 - 1 %    METAMYELOCYTES 3 (H) 0 %    ABS. NEUTROPHILS 23.0 (H) 1.8 - 8.0 K/UL    ABS. LYMPHOCYTES 1.0 0.8 - 3.5 K/UL    ABS. MONOCYTES 0.3 0.0 - 1.0 K/UL    ABS. EOSINOPHILS 0.0 0.0 - 0.4 K/UL    ABS.  BASOPHILS 0.0 0.0 - 0.1 K/UL    DF MANUAL      RBC COMMENTS ANISOCYTOSIS  1+        RBC COMMENTS HYPOCHROMIA  PRESENT        WBC COMMENTS DOHLE BODIES     METABOLIC PANEL, COMPREHENSIVE    Collection Time: 05/17/17  4:45 AM   Result Value Ref Range    Sodium 143 136 - 145 mmol/L    Potassium 3.0 (L) 3.5 - 5.1 mmol/L    Chloride 100 97 - 108 mmol/L    CO2 35 (H) 21 - 32 mmol/L    Anion gap 8 5 - 15 mmol/L    Glucose 128 (H) 65 - 100 mg/dL    BUN 31 (H) 6 - 20 MG/DL    Creatinine 1.93 (H) 0.55 - 1.02 MG/DL    BUN/Creatinine ratio 16 12 - 20      GFR est AA 31 (L) >60 ml/min/1.73m2    GFR est non-AA 25 (L) >60 ml/min/1.73m2    Calcium 7.4 (L) 8.5 - 10.1 MG/DL    Bilirubin, total 1.6 (H) 0.2 - 1.0 MG/DL    ALT (SGPT) 45 12 - 78 U/L    AST (SGOT) 68 (H) 15 - 37 U/L    Alk. phosphatase 98 45 - 117 U/L    Protein, total 5.4 (L) 6.4 - 8.2 g/dL    Albumin 2.6 (L) 3.5 - 5.0 g/dL    Globulin 2.8 2.0 - 4.0 g/dL    A-G Ratio 0.9 (L) 1.1 - 2.2     MAGNESIUM    Collection Time: 05/17/17  4:45 AM   Result Value Ref Range    Magnesium 2.5 (H) 1.6 - 2.4 mg/dL   PHOSPHORUS    Collection Time: 05/17/17  4:45 AM   Result Value Ref Range    Phosphorus 1.7 (L) 2.6 - 4.7 MG/DL   TROPONIN I    Collection Time: 05/17/17  4:45 AM   Result Value Ref Range    Troponin-I, Qt. 3.29 (H) <0.05 ng/mL   CBC WITH AUTOMATED DIFF    Collection Time: 05/17/17 10:50 AM   Result Value Ref Range    WBC 25.0 (H) 3.6 - 11.0 K/uL    RBC 3.12 (L) 3.80 - 5.20 M/uL    HGB 9.5 (L) 11.5 - 16.0 g/dL    HCT 27.8 (L) 35.0 - 47.0 %    MCV 89.1 80.0 - 99.0 FL    MCH 30.4 26.0 - 34.0 PG    MCHC 34.2 30.0 - 36.5 g/dL    RDW 17.5 (H) 11.5 - 14.5 %    PLATELET 91 (L) 674 - 400 K/uL    NEUTROPHILS PENDING %    LYMPHOCYTES PENDING %    MONOCYTES PENDING %    EOSINOPHILS PENDING %    BASOPHILS PENDING %    ABS. NEUTROPHILS PENDING K/UL    ABS. LYMPHOCYTES PENDING K/UL    ABS. MONOCYTES PENDING K/UL    ABS. EOSINOPHILS PENDING K/UL    ABS. BASOPHILS PENDING K/UL    DF PENDING        Principal Problem:    Septic shock ([facility] [address] (5/15/2017)          Assessment & Plan  Possible SBO- seems to be resolved. Depending on clamp trial and  KUB NGT could be removed and she could be started on a diet.

## 2017-05-17 NOTE — CDMP QUERY
Please clarify if this patient is being treated/managed for:    =>Acute septic encephalopathy in the setting of septic shock (POA) with gram negative bacteremia resulting in confusion/delirium requiring restraints, additional monitoring and abx for treatment of sepsis  =>Acute metabolic encephalopathy in the setting of Septic shock (POA) with gram negative bacteremia resulting in confusion/delirium requiring restraints, additional monitoring and abx for treatment of sepsis  =>Other Explanation of clinical findings  =>Unable to Determine (no explanation of clinical findings)    The medical record reflects the following:     Clinical Indicators/Risk Factors:Admitted with sepsis/septic shock requiring pressors and abx. Growing gram neg bacteremia. Per Pul PN (5/17): \"patient still delirious. Will need restraints. She does follow commands\". Gabriela Barragan Nursing notes doc pt alert and oriented to self. 5 on Kristie fall assessment scale     Treatment: Hydration, abx: Cefepime, monitor for safety, restraints. Frequent reorientation    Please clarify and document your clinical opinion in the progress notes and discharge summary including the definitive and/or presumptive diagnosis, (suspected or probable), related to the above clinical findings. Please include clinical findings supporting your diagnosis.     Thank you,    Eliecer Asher, RN, BSN, [facility], [address] [place]  [phone]

## 2017-05-17 NOTE — PROGRESS NOTES
Cardiology Progress Note      5/17/2017 9:36 AM    Admit Date: 5/14/2017    Admit Diagnosis: Septic shock (HCC)      Subjective: Rachnamanuel Pringle abdominal pain. No chest pain or shortness of breath. Off pressors. Visit Vitals    /75 (BP 1 Location: Left arm, BP Patient Position: At rest)    Pulse 66    Temp 98 °F (36.7 °C)    Resp 16    Ht 5' 4\" (1.626 m)    Wt 212 lb (96.2 kg)    SpO2 100%    BMI 36.39 kg/m2     Current Facility-Administered Medications   Medication Dose Route Frequency    0.9% sodium chloride infusion  100 mL/hr IntraVENous CONTINUOUS    potassium phosphate 15 mmol in 0.9% sodium chloride 250 mL infusion   IntraVENous ONCE    potassium chloride 20 mEq in 50 ml IVPB  20 mEq IntraVENous Q1H    hydrocortisone Sod Succ (PF) (SOLU-CORTEF) injection 25 mg  25 mg IntraVENous Q8H    ipratropium (ATROVENT) 0.02 % nebulizer solution 0.5 mg  0.5 mg Nebulization Q6H PRN    aspirin chewable tablet 81 mg  81 mg Oral DAILY    HYDROmorphone (PF) (DILAUDID) injection 1 mg  1 mg IntraVENous Q4H PRN    ondansetron (ZOFRAN) injection 4 mg  4 mg IntraVENous Q4H PRN    heparin (porcine) injection 5,000 Units  5,000 Units SubCUTAneous Q8H    cefepime (MAXIPIME) 2 g in 0.9% sodium chloride (MBP/ADV) 100 mL  2 g IntraVENous Q24H    pantoprazole (PROTONIX) 40 mg in sodium chloride 0.9 % 10 mL injection  40 mg IntraVENous DAILY         Objective:      Physical Exam:  Visit Vitals    /75 (BP 1 Location: Left arm, BP Patient Position: At rest)    Pulse 66    Temp 98 °F (36.7 °C)    Resp 16    Ht 5' 4\" (1.626 m)    Wt 212 lb (96.2 kg)    SpO2 100%    BMI 36.39 kg/m2     General Appearance:  Alert, no acute distress. Ears/Nose/Mouth/Throat:   Hearing grossly normal.         Neck: Supple. no JVD, NG in place   Chest:   Scattered rhonchi   Cardiovascular:  Regular rate and rhythm, S1, S2 normal, no murmur. Abdomen:   deferred   Extremities: trace edema bilaterally.     Skin: Warm and dry. Data Review:   Labs:    Recent Results (from the past 24 hour(s))   CULTURE, BLOOD    Collection Time: 05/16/17 11:38 AM   Result Value Ref Range    Special Requests: NO SPECIAL REQUESTS      Culture result: NO GROWTH AFTER 16 HOURS     CBC WITH AUTOMATED DIFF    Collection Time: 05/17/17  4:45 AM   Result Value Ref Range    WBC 25.0 (H) 3.6 - 11.0 K/uL    RBC 2.97 (L) 3.80 - 5.20 M/uL    HGB 9.1 (L) 11.5 - 16.0 g/dL    HCT 26.3 (L) 35.0 - 47.0 %    MCV 88.6 80.0 - 99.0 FL    MCH 30.6 26.0 - 34.0 PG    MCHC 34.6 30.0 - 36.5 g/dL    RDW 17.5 (H) 11.5 - 14.5 %    PLATELET 93 (L) 553 - 400 K/uL    NEUTROPHILS 86 (H) 32 - 75 %    BAND NEUTROPHILS 6 0 - 6 %    LYMPHOCYTES 4 (L) 12 - 49 %    MONOCYTES 1 (L) 5 - 13 %    EOSINOPHILS 0 0 - 7 %    BASOPHILS 0 0 - 1 %    METAMYELOCYTES 3 (H) 0 %    ABS. NEUTROPHILS 23.0 (H) 1.8 - 8.0 K/UL    ABS. LYMPHOCYTES 1.0 0.8 - 3.5 K/UL    ABS. MONOCYTES 0.3 0.0 - 1.0 K/UL    ABS. EOSINOPHILS 0.0 0.0 - 0.4 K/UL    ABS. BASOPHILS 0.0 0.0 - 0.1 K/UL    DF MANUAL      RBC COMMENTS ANISOCYTOSIS  1+        RBC COMMENTS HYPOCHROMIA  PRESENT        WBC COMMENTS DOHLE BODIES     METABOLIC PANEL, COMPREHENSIVE    Collection Time: 05/17/17  4:45 AM   Result Value Ref Range    Sodium 143 136 - 145 mmol/L    Potassium 3.0 (L) 3.5 - 5.1 mmol/L    Chloride 100 97 - 108 mmol/L    CO2 35 (H) 21 - 32 mmol/L    Anion gap 8 5 - 15 mmol/L    Glucose 128 (H) 65 - 100 mg/dL    BUN 31 (H) 6 - 20 MG/DL    Creatinine 1.93 (H) 0.55 - 1.02 MG/DL    BUN/Creatinine ratio 16 12 - 20      GFR est AA 31 (L) >60 ml/min/1.73m2    GFR est non-AA 25 (L) >60 ml/min/1.73m2    Calcium 7.4 (L) 8.5 - 10.1 MG/DL    Bilirubin, total 1.6 (H) 0.2 - 1.0 MG/DL    ALT (SGPT) 45 12 - 78 U/L    AST (SGOT) 68 (H) 15 - 37 U/L    Alk.  phosphatase 98 45 - 117 U/L    Protein, total 5.4 (L) 6.4 - 8.2 g/dL    Albumin 2.6 (L) 3.5 - 5.0 g/dL    Globulin 2.8 2.0 - 4.0 g/dL    A-G Ratio 0.9 (L) 1.1 - 2.2     MAGNESIUM Collection Time: 05/17/17  4:45 AM   Result Value Ref Range    Magnesium 2.5 (H) 1.6 - 2.4 mg/dL   PHOSPHORUS    Collection Time: 05/17/17  4:45 AM   Result Value Ref Range    Phosphorus 1.7 (L) 2.6 - 4.7 MG/DL   TROPONIN I    Collection Time: 05/17/17  4:45 AM   Result Value Ref Range    Troponin-I, Qt. 3.29 (H) <0.05 ng/mL       Telemetry: normal sinus rhythm      Assessment:     Principal Problem:    Septic shock ([address] (5/15/2017)        Plan:     1. NSTEMI: Pt with elevated cardiac markers, NSTEMI by definition. Probably has some degree of underlying CAD. Current elevations appear more from demand as pt is without chest pain, no ischemic ECG changes and preserved LV function with no wall motion abnormality by echo. Not a candidate for invasive work up at this time with sepsis/renal dysfunction/anemia, etc. Optimize supportive care as noted above. Continue aspirin. Heparin gtt for 48 hours if no bleeding with anemia. Consider ischemic work up later on with stress test when more stable. No beta blocker, ARB due to hypotension/low normal BP and JEANINE. No statin due to elevated LFTs. 2. Acute renal insufficiency. 3. Sepsis. ID following. 4. Anemia. Work up underway.

## 2017-05-18 LAB
ALBUMIN SERPL BCP-MCNC: 2.3 G/DL (ref 3.5–5)
ALBUMIN/GLOB SERPL: 0.8 {RATIO} (ref 1.1–2.2)
ALP SERPL-CCNC: 97 U/L (ref 45–117)
ALT SERPL-CCNC: 42 U/L (ref 12–78)
ANION GAP BLD CALC-SCNC: 7 MMOL/L (ref 5–15)
APTT PPP: 55.1 SEC (ref 22.1–32.5)
APTT PPP: 60.4 SEC (ref 22.1–32.5)
AST SERPL W P-5'-P-CCNC: 53 U/L (ref 15–37)
BASOPHILS # BLD AUTO: 0 K/UL (ref 0–0.1)
BASOPHILS # BLD: 0 % (ref 0–1)
BILIRUB DIRECT SERPL-MCNC: 0.5 MG/DL (ref 0–0.2)
BILIRUB SERPL-MCNC: 1.3 MG/DL (ref 0.2–1)
BUN SERPL-MCNC: 27 MG/DL (ref 6–20)
BUN/CREAT SERPL: 21 (ref 12–20)
CALCIUM SERPL-MCNC: 7.7 MG/DL (ref 8.5–10.1)
CHLORIDE SERPL-SCNC: 106 MMOL/L (ref 97–108)
CO2 SERPL-SCNC: 35 MMOL/L (ref 21–32)
CREAT SERPL-MCNC: 1.28 MG/DL (ref 0.55–1.02)
DIFFERENTIAL METHOD BLD: ABNORMAL
EOSINOPHIL # BLD: 0 K/UL (ref 0–0.4)
EOSINOPHIL NFR BLD: 0 % (ref 0–7)
ERYTHROCYTE [DISTWIDTH] IN BLOOD BY AUTOMATED COUNT: 17.6 % (ref 11.5–14.5)
GLOBULIN SER CALC-MCNC: 3 G/DL (ref 2–4)
GLUCOSE SERPL-MCNC: 120 MG/DL (ref 65–100)
HCT VFR BLD AUTO: 27.3 % (ref 35–47)
HGB BLD-MCNC: 9.5 G/DL (ref 11.5–16)
LACTATE SERPL-SCNC: 1.9 MMOL/L (ref 0.4–2)
LYMPHOCYTES # BLD AUTO: 5 % (ref 12–49)
LYMPHOCYTES # BLD: 1.2 K/UL (ref 0.8–3.5)
MAGNESIUM SERPL-MCNC: 2.3 MG/DL (ref 1.6–2.4)
MCH RBC QN AUTO: 30.7 PG (ref 26–34)
MCHC RBC AUTO-ENTMCNC: 34.8 G/DL (ref 30–36.5)
MCV RBC AUTO: 88.3 FL (ref 80–99)
MONOCYTES # BLD: 1.2 K/UL (ref 0–1)
MONOCYTES NFR BLD AUTO: 5 % (ref 5–13)
NEUTS BAND NFR BLD MANUAL: 4 % (ref 0–6)
NEUTS SEG # BLD: 21.2 K/UL (ref 1.8–8)
NEUTS SEG NFR BLD AUTO: 86 % (ref 32–75)
PHOSPHATE SERPL-MCNC: 1.8 MG/DL (ref 2.6–4.7)
PLATELET # BLD AUTO: 95 K/UL (ref 150–400)
POTASSIUM SERPL-SCNC: 2.7 MMOL/L (ref 3.5–5.1)
PROT SERPL-MCNC: 5.3 G/DL (ref 6.4–8.2)
RBC # BLD AUTO: 3.09 M/UL (ref 3.8–5.2)
RBC MORPH BLD: ABNORMAL
SODIUM SERPL-SCNC: 148 MMOL/L (ref 136–145)
THERAPEUTIC RANGE,PTTT: ABNORMAL SECS (ref 58–77)
THERAPEUTIC RANGE,PTTT: ABNORMAL SECS (ref 58–77)
WBC # BLD AUTO: 23.6 K/UL (ref 3.6–11)
WBC MORPH BLD: ABNORMAL

## 2017-05-18 PROCEDURE — 74011000258 HC RX REV CODE- 258: Performed by: INTERNAL MEDICINE

## 2017-05-18 PROCEDURE — 74011250636 HC RX REV CODE- 250/636: Performed by: INTERNAL MEDICINE

## 2017-05-18 PROCEDURE — 85730 THROMBOPLASTIN TIME PARTIAL: CPT | Performed by: STUDENT IN AN ORGANIZED HEALTH CARE EDUCATION/TRAINING PROGRAM

## 2017-05-18 PROCEDURE — 74011250636 HC RX REV CODE- 250/636: Performed by: STUDENT IN AN ORGANIZED HEALTH CARE EDUCATION/TRAINING PROGRAM

## 2017-05-18 PROCEDURE — 83735 ASSAY OF MAGNESIUM: CPT | Performed by: INTERNAL MEDICINE

## 2017-05-18 PROCEDURE — 74011250636 HC RX REV CODE- 250/636: Performed by: NURSE PRACTITIONER

## 2017-05-18 PROCEDURE — 77030032490 HC SLV COMPR SCD KNE COVD -B

## 2017-05-18 PROCEDURE — 74011000250 HC RX REV CODE- 250: Performed by: INTERNAL MEDICINE

## 2017-05-18 PROCEDURE — 80053 COMPREHEN METABOLIC PANEL: CPT | Performed by: INTERNAL MEDICINE

## 2017-05-18 PROCEDURE — 36415 COLL VENOUS BLD VENIPUNCTURE: CPT | Performed by: INTERNAL MEDICINE

## 2017-05-18 PROCEDURE — C9113 INJ PANTOPRAZOLE SODIUM, VIA: HCPCS | Performed by: INTERNAL MEDICINE

## 2017-05-18 PROCEDURE — 65660000000 HC RM CCU STEPDOWN

## 2017-05-18 PROCEDURE — 84100 ASSAY OF PHOSPHORUS: CPT | Performed by: INTERNAL MEDICINE

## 2017-05-18 PROCEDURE — 83605 ASSAY OF LACTIC ACID: CPT | Performed by: STUDENT IN AN ORGANIZED HEALTH CARE EDUCATION/TRAINING PROGRAM

## 2017-05-18 PROCEDURE — 85025 COMPLETE CBC W/AUTO DIFF WBC: CPT | Performed by: INTERNAL MEDICINE

## 2017-05-18 PROCEDURE — 82248 BILIRUBIN DIRECT: CPT | Performed by: INTERNAL MEDICINE

## 2017-05-18 PROCEDURE — 74011250637 HC RX REV CODE- 250/637: Performed by: STUDENT IN AN ORGANIZED HEALTH CARE EDUCATION/TRAINING PROGRAM

## 2017-05-18 PROCEDURE — 74011250637 HC RX REV CODE- 250/637: Performed by: NURSE PRACTITIONER

## 2017-05-18 RX ORDER — POTASSIUM CHLORIDE 29.8 MG/ML
20 INJECTION INTRAVENOUS
Status: DISCONTINUED | OUTPATIENT
Start: 2017-05-18 | End: 2017-05-18

## 2017-05-18 RX ORDER — HYDROCORTISONE SODIUM SUCCINATE 100 MG/2ML
25 INJECTION, POWDER, FOR SOLUTION INTRAMUSCULAR; INTRAVENOUS EVERY 12 HOURS
Status: COMPLETED | OUTPATIENT
Start: 2017-05-18 | End: 2017-05-20

## 2017-05-18 RX ORDER — SODIUM CHLORIDE 0.9 % (FLUSH) 0.9 %
SYRINGE (ML) INJECTION
Status: COMPLETED
Start: 2017-05-18 | End: 2017-05-18

## 2017-05-18 RX ORDER — CARVEDILOL 3.12 MG/1
3.12 TABLET ORAL 2 TIMES DAILY
Status: DISCONTINUED | OUTPATIENT
Start: 2017-05-18 | End: 2017-05-24 | Stop reason: HOSPADM

## 2017-05-18 RX ORDER — POTASSIUM CHLORIDE 29.8 MG/ML
20 INJECTION INTRAVENOUS
Status: COMPLETED | OUTPATIENT
Start: 2017-05-18 | End: 2017-05-18

## 2017-05-18 RX ORDER — DEXTROSE AND POTASSIUM CHLORIDE 5; .15 G/100ML; G/100ML
SOLUTION INTRAVENOUS CONTINUOUS
Status: DISCONTINUED | OUTPATIENT
Start: 2017-05-18 | End: 2017-05-20

## 2017-05-18 RX ADMIN — HYDROCORTISONE SODIUM SUCCINATE 25 MG: 100 INJECTION, POWDER, FOR SOLUTION INTRAMUSCULAR; INTRAVENOUS at 06:00

## 2017-05-18 RX ADMIN — SODIUM CHLORIDE 100 ML/HR: 900 INJECTION, SOLUTION INTRAVENOUS at 06:15

## 2017-05-18 RX ADMIN — CEFEPIME 2 G: 2 INJECTION, POWDER, FOR SOLUTION INTRAMUSCULAR; INTRAVENOUS at 07:00

## 2017-05-18 RX ADMIN — DEXTROSE MONOHYDRATE AND POTASSIUM CHLORIDE: 5; .149 INJECTION, SOLUTION INTRAVENOUS at 13:34

## 2017-05-18 RX ADMIN — CARVEDILOL 3.12 MG: 3.12 TABLET, FILM COATED ORAL at 09:23

## 2017-05-18 RX ADMIN — SODIUM CHLORIDE 40 MG: 9 INJECTION INTRAMUSCULAR; INTRAVENOUS; SUBCUTANEOUS at 09:23

## 2017-05-18 RX ADMIN — HYDROCORTISONE SODIUM SUCCINATE 25 MG: 100 INJECTION, POWDER, FOR SOLUTION INTRAMUSCULAR; INTRAVENOUS at 22:18

## 2017-05-18 RX ADMIN — HEPARIN SODIUM AND DEXTROSE 11.95 UNITS/KG/HR: 10000; 5 INJECTION INTRAVENOUS at 11:44

## 2017-05-18 RX ADMIN — POTASSIUM CHLORIDE 20 MEQ: 400 INJECTION, SOLUTION INTRAVENOUS at 10:37

## 2017-05-18 RX ADMIN — HYDROMORPHONE HYDROCHLORIDE 1 MG: 1 INJECTION, SOLUTION INTRAMUSCULAR; INTRAVENOUS; SUBCUTANEOUS at 00:16

## 2017-05-18 RX ADMIN — HYDROCORTISONE SODIUM SUCCINATE 25 MG: 100 INJECTION, POWDER, FOR SOLUTION INTRAMUSCULAR; INTRAVENOUS at 13:34

## 2017-05-18 RX ADMIN — SODIUM CHLORIDE: 900 INJECTION, SOLUTION INTRAVENOUS at 10:38

## 2017-05-18 RX ADMIN — ASPIRIN 81 MG 81 MG: 81 TABLET ORAL at 09:23

## 2017-05-18 RX ADMIN — POTASSIUM CHLORIDE 20 MEQ: 400 INJECTION, SOLUTION INTRAVENOUS at 09:14

## 2017-05-18 RX ADMIN — POTASSIUM CHLORIDE 20 MEQ: 400 INJECTION, SOLUTION INTRAVENOUS at 07:26

## 2017-05-18 RX ADMIN — ONDANSETRON 4 MG: 2 INJECTION INTRAMUSCULAR; INTRAVENOUS at 14:43

## 2017-05-18 RX ADMIN — CARVEDILOL 3.12 MG: 3.12 TABLET, FILM COATED ORAL at 18:39

## 2017-05-18 RX ADMIN — Medication 10 ML: at 18:39

## 2017-05-18 NOTE — PROGRESS NOTES
Name: Halle Howard   MRN: [id number]  : 1945      Assessment:  JEANINE- likely septic ATN in the setting of SBO/hypotension and ? NSTEMI  Sepsis- growing Gram negatives  SBO-minimal NG drainage  Acidosis-due to JEANINE/Lactic acidosis  Low Mg  Low K  High Na  Alkalosis-from HCO3 drip    JEANINE is nearly resolved. Na is higher now. Starting to take clear liquids. Plan/Recommendations:  D/C IV NS  Start D5W + 20 KCL at 60 ml/hr  Encourage oral hydration  IV Kphos 15 mmol x 1  Monitor IOs  Avoid nephrotoxins; hold home dose HCTZ  Replace lytes PRN    Subjective:  Taking PO. More alert awake.      ROS:   Denies n/v/cp or sob    Exam:  Visit Vitals    /76 (BP 1 Location: Right arm, BP Patient Position: At rest)    Pulse 71    Temp 98 °F (36.7 °C)    Resp 16    Ht 5' 4\" (1.626 m)    Wt 104.6 kg (230 lb 9.6 oz)    SpO2 96%    BMI 39.58 kg/m2       NAD  Mild icterus+  Tr edema  Alert awake    Current Facility-Administered Medications   Medication Dose Route Frequency Last Dose    potassium chloride 20 mEq in 50 ml IVPB  20 mEq IntraVENous Q1H 20 mEq at 17 0914    carvedilol (COREG) tablet 3.125 mg  3.125 mg Oral BID 3.125 mg at 17 0923    dextrose 5% with KCl 20 mEq/L infusion   IntraVENous CONTINUOUS      hydrocortisone Sod Succ (PF) (SOLU-CORTEF) injection 25 mg  25 mg IntraVENous Q8H 25 mg at 17 0600    heparin 25,000 units in D5W 250 ml infusion  9-25 Units/kg/hr IntraVENous TITRATE 12 Units/kg/hr at 17 0610    ipratropium (ATROVENT) 0.02 % nebulizer solution 0.5 mg  0.5 mg Nebulization Q6H PRN 0.5 mg at 17 1459    aspirin chewable tablet 81 mg  81 mg Oral DAILY 81 mg at 17 0923    HYDROmorphone (PF) (DILAUDID) injection 1 mg  1 mg IntraVENous Q4H PRN 1 mg at 17 0016    ondansetron (ZOFRAN) injection 4 mg  4 mg IntraVENous Q4H PRN 4 mg at 05/17/17 2207    cefepime (MAXIPIME) 2 g in 0.9% sodium chloride (MBP/ADV) 100 mL  2 g IntraVENous Q24H 2 g at 05/18/17 0700    pantoprazole (PROTONIX) 40 mg in sodium chloride 0.9 % 10 mL injection  40 mg IntraVENous DAILY 40 mg at 05/18/17 2572       Labs/Data:    Lab Results   Component Value Date/Time    WBC 23.6 05/18/2017 04:30 AM    Hemoglobin (POC) 11.9 12/03/2010 01:40 AM    HGB 9.5 05/18/2017 04:30 AM    Hematocrit (POC) 35 12/03/2010 01:40 AM    HCT 27.3 05/18/2017 04:30 AM    PLATELET 95 89/60/3411 04:30 AM    MCV 88.3 05/18/2017 04:30 AM       Lab Results   Component Value Date/Time    Sodium 148 05/18/2017 04:30 AM    Potassium 2.7 05/18/2017 04:30 AM    Chloride 106 05/18/2017 04:30 AM    CO2 35 05/18/2017 04:30 AM    Anion gap 7 05/18/2017 04:30 AM    Glucose 120 05/18/2017 04:30 AM    BUN 27 05/18/2017 04:30 AM    Creatinine 1.28 05/18/2017 04:30 AM    BUN/Creatinine ratio 21 05/18/2017 04:30 AM    GFR est AA 50 05/18/2017 04:30 AM    GFR est non-AA 41 05/18/2017 04:30 AM    Calcium 7.7 05/18/2017 04:30 AM       Wt Readings from Last 3 Encounters:   05/17/17 104.6 kg (230 lb 9.6 oz)   02/02/17 96.6 kg (213 lb)   11/30/16 96.8 kg (213 lb 8 oz)         Intake/Output Summary (Last 24 hours) at 05/18/17 0956  Last data filed at 05/18/17 0900   Gross per 24 hour   Intake              847 ml   Output             1595 ml   Net             -748 ml       Patient seen and examined. Chart reviewed. Labs, data and other pertinent notes reviewed in last 24 hrs.     PMH/SH/FH reviewed and unchanged compared to H&P    Discussed with pt and RN      Raymon Hogue MD

## 2017-05-18 NOTE — PROGRESS NOTES
Orders received and chart reviewed. Attempted to see pt for skilled PT evaluation. Pt reported that she was trying to eat her lunch but was feeling nauseous and also thought that she had a BM. Nurse notified.   Will follow-up with pt later today or tomorrow for eval.  Maksim Reynolds, PT

## 2017-05-18 NOTE — PROGRESS NOTES
Chart reviewed. Per initial CM assessment, patient lives alone in her own home and uses a cane for ambulation. Son Kerri Hernadez ([phone]) is MPOA. Pt discussed in rounds. Plan is for stress test tomorrow. ID is following. Care management will follow for discharge planning.     Elizabeth Stauffer, BSW/CRM

## 2017-05-18 NOTE — PROGRESS NOTES
ID Progress Note  2017     Cefepime     Subjective:     Afebrile. Feels good. No abdominal pain and no back pain. No dyspnea. Objective:     Vitals:   Visit Vitals    /76 (BP 1 Location: Right arm, BP Patient Position: At rest)    Pulse 71    Temp 98 °F (36.7 °C)    Resp 16    Ht 5' 4\" (1.626 m)    Wt 104.6 kg (230 lb 9.6 oz)    SpO2 96%    BMI 39.58 kg/m2        Tmax:  Temp (24hrs), Av.8 °F (36.6 °C), Min:97.3 °F (36.3 °C), Max:98 °F (36.7 °C)      Exam:    Not in distress   Lungs: clear to auscultation, no rales   Heart: s1, s2, no murmurs, rubs or clicks   Abdomen: soft, nontender    Labs:   Lab Results   Component Value Date/Time    WBC 23.6 2017 04:30 AM    Hemoglobin (POC) 11.9 2010 01:40 AM    HGB 9.5 2017 04:30 AM    Hematocrit (POC) 35 2010 01:40 AM    HCT 27.3 2017 04:30 AM    PLATELET 95  04:30 AM    MCV 88.3 2017 04:30 AM     Lab Results   Component Value Date/Time    Sodium 148 2017 04:30 AM    Potassium 2.7 2017 04:30 AM    Chloride 106 2017 04:30 AM    CO2 35 2017 04:30 AM    Anion gap 7 2017 04:30 AM    Glucose 120 2017 04:30 AM    BUN 27 2017 04:30 AM    Creatinine 1.28 2017 04:30 AM    BUN/Creatinine ratio 21 2017 04:30 AM    GFR est AA 50 2017 04:30 AM    GFR est non-AA 41 2017 04:30 AM    Calcium 7.7 2017 04:30 AM    Bilirubin, total 1.3 2017 04:30 AM    AST (SGOT) 53 2017 04:30 AM    Alk. phosphatase 97 2017 04:30 AM    Protein, total 5.3 2017 04:30 AM    Albumin 2.3 2017 04:30 AM    Globulin 3.0 2017 04:30 AM    A-G Ratio 0.8 2017 04:30 AM    ALT (SGPT) 42 2017 04:30 AM       US - no biliary dilatation      Assessment:       1. E coli  bacteremia. 2. History of recurrent Klebsiella bacteremia.    3. History of L4-L5 diskitis for which she has received greater than 12   weeks of IV therapy with normalization of her inflammation markers. 4. Renal failure. 5. Rheumatoid arthritis. 6. History of dilated bile ducts. 7. non-ST-segment elevation myocardial infarction.       Recommendations:        E coli is pansensitive. Change cefepime to ceftriaxone.    Rolf Schmidt MD

## 2017-05-18 NOTE — ROUTINE PROCESS
Bedside and Verbal shift change report given to Ella (oncoming nurse) by Flako Brock (offgoing nurse). Report included the following information SBAR, Kardex, Intake/Output, MAR, Accordion and Recent Results.

## 2017-05-18 NOTE — PROGRESS NOTES
Cardiology Progress Note      5/18/2017 9:36 AM    Admit Date: 5/14/2017    Admit Diagnosis: Septic shock (HCC)      Subjective: Ruthy Flores states she has chest soreness when moving in bed but otherwise no chest pain. Denies SOB. C/o being thirsty. No nausea currently  Visit Vitals    /78 (BP 1 Location: Left arm, BP Patient Position: At rest)    Pulse 70    Temp 98 °F (36.7 °C)    Resp 16    Ht 5' 4\" (1.626 m)    Wt 104.6 kg (230 lb 9.6 oz)    SpO2 95%    BMI 39.58 kg/m2     Current Facility-Administered Medications   Medication Dose Route Frequency    potassium chloride 20 mEq in 50 ml IVPB  20 mEq IntraVENous Q1H    carvedilol (COREG) tablet 3.125 mg  3.125 mg Oral BID    0.9% sodium chloride infusion  100 mL/hr IntraVENous CONTINUOUS    hydrocortisone Sod Succ (PF) (SOLU-CORTEF) injection 25 mg  25 mg IntraVENous Q8H    heparin 25,000 units in D5W 250 ml infusion  9-25 Units/kg/hr IntraVENous TITRATE    ipratropium (ATROVENT) 0.02 % nebulizer solution 0.5 mg  0.5 mg Nebulization Q6H PRN    aspirin chewable tablet 81 mg  81 mg Oral DAILY    HYDROmorphone (PF) (DILAUDID) injection 1 mg  1 mg IntraVENous Q4H PRN    ondansetron (ZOFRAN) injection 4 mg  4 mg IntraVENous Q4H PRN    cefepime (MAXIPIME) 2 g in 0.9% sodium chloride (MBP/ADV) 100 mL  2 g IntraVENous Q24H    pantoprazole (PROTONIX) 40 mg in sodium chloride 0.9 % 10 mL injection  40 mg IntraVENous DAILY         Objective:      Physical Exam:  Visit Vitals    /78 (BP 1 Location: Left arm, BP Patient Position: At rest)    Pulse 70    Temp 98 °F (36.7 °C)    Resp 16    Ht 5' 4\" (1.626 m)    Wt 104.6 kg (230 lb 9.6 oz)    SpO2 95%    BMI 39.58 kg/m2     General Appearance:  Alert, no acute distress. Ears/Nose/Mouth/Throat:   Hearing grossly normal.         Neck: Supple. no JVD   Chest:   Clear to ausclatation bilaterally. No use of accessory muscles noted.    Cardiovascular:  Regular rate and rhythm, S1, S2 normal, no murmur. Abdomen:   Soft, nondistended, generalized mild ttp, no gaurding. Extremities: trace edema bilaterally. Skin: Warm and dry. Data Review:   Labs:    Recent Results (from the past 24 hour(s))   CBC WITH AUTOMATED DIFF    Collection Time: 05/17/17 10:50 AM   Result Value Ref Range    WBC 25.0 (H) 3.6 - 11.0 K/uL    RBC 3.12 (L) 3.80 - 5.20 M/uL    HGB 9.5 (L) 11.5 - 16.0 g/dL    HCT 27.8 (L) 35.0 - 47.0 %    MCV 89.1 80.0 - 99.0 FL    MCH 30.4 26.0 - 34.0 PG    MCHC 34.2 30.0 - 36.5 g/dL    RDW 17.5 (H) 11.5 - 14.5 %    PLATELET 91 (L) 312 - 400 K/uL    NEUTROPHILS 88 (H) 32 - 75 %    BAND NEUTROPHILS 5 0 - 6 %    LYMPHOCYTES 2 (L) 12 - 49 %    MONOCYTES 5 5 - 13 %    EOSINOPHILS 0 0 - 7 %    BASOPHILS 0 0 - 1 %    ABS. NEUTROPHILS 23.2 (H) 1.8 - 8.0 K/UL    ABS. LYMPHOCYTES 0.5 (L) 0.8 - 3.5 K/UL    ABS. MONOCYTES 1.3 (H) 0.0 - 1.0 K/UL    ABS. EOSINOPHILS 0.0 0.0 - 0.4 K/UL    ABS.  BASOPHILS 0.0 0.0 - 0.1 K/UL    DF MANUAL      RBC COMMENTS ANISOCYTOSIS  1+        WBC COMMENTS DOHLE BODIES     PTT    Collection Time: 05/17/17 10:50 AM   Result Value Ref Range    aPTT 31.3 22.1 - 32.5 sec    aPTT, therapeutic range     58.0 - 77.0 SECS   PTT    Collection Time: 05/17/17  8:30 PM   Result Value Ref Range    aPTT 42.5 (H) 22.1 - 32.5 sec    aPTT, therapeutic range     58.0 - 77.0 SECS   PTT    Collection Time: 05/18/17  4:30 AM   Result Value Ref Range    aPTT 55.1 (H) 22.1 - 32.5 sec    aPTT, therapeutic range     58.0 - 77.0 SECS   CBC WITH AUTOMATED DIFF    Collection Time: 05/18/17  4:30 AM   Result Value Ref Range    WBC 23.6 (H) 3.6 - 11.0 K/uL    RBC 3.09 (L) 3.80 - 5.20 M/uL    HGB 9.5 (L) 11.5 - 16.0 g/dL    HCT 27.3 (L) 35.0 - 47.0 %    MCV 88.3 80.0 - 99.0 FL    MCH 30.7 26.0 - 34.0 PG    MCHC 34.8 30.0 - 36.5 g/dL    RDW 17.6 (H) 11.5 - 14.5 %    PLATELET 95 (L) 932 - 400 K/uL    NEUTROPHILS 86 (H) 32 - 75 %    BAND NEUTROPHILS 4 0 - 6 %    LYMPHOCYTES 5 (L) 12 - 49 % MONOCYTES 5 5 - 13 %    EOSINOPHILS 0 0 - 7 %    BASOPHILS 0 0 - 1 %    ABS. NEUTROPHILS 21.2 (H) 1.8 - 8.0 K/UL    ABS. LYMPHOCYTES 1.2 0.8 - 3.5 K/UL    ABS. MONOCYTES 1.2 (H) 0.0 - 1.0 K/UL    ABS. EOSINOPHILS 0.0 0.0 - 0.4 K/UL    ABS. BASOPHILS 0.0 0.0 - 0.1 K/UL    DF MANUAL      RBC COMMENTS ANISOCYTOSIS  1+        WBC COMMENTS DOHLE BODIES     METABOLIC PANEL, COMPREHENSIVE    Collection Time: 05/18/17  4:30 AM   Result Value Ref Range    Sodium 148 (H) 136 - 145 mmol/L    Potassium 2.7 (LL) 3.5 - 5.1 mmol/L    Chloride 106 97 - 108 mmol/L    CO2 35 (H) 21 - 32 mmol/L    Anion gap 7 5 - 15 mmol/L    Glucose 120 (H) 65 - 100 mg/dL    BUN 27 (H) 6 - 20 MG/DL    Creatinine 1.28 (H) 0.55 - 1.02 MG/DL    BUN/Creatinine ratio 21 (H) 12 - 20      GFR est AA 50 (L) >60 ml/min/1.73m2    GFR est non-AA 41 (L) >60 ml/min/1.73m2    Calcium 7.7 (L) 8.5 - 10.1 MG/DL    Bilirubin, total 1.3 (H) 0.2 - 1.0 MG/DL    ALT (SGPT) 42 12 - 78 U/L    AST (SGOT) 53 (H) 15 - 37 U/L    Alk. phosphatase 97 45 - 117 U/L    Protein, total 5.3 (L) 6.4 - 8.2 g/dL    Albumin 2.3 (L) 3.5 - 5.0 g/dL    Globulin 3.0 2.0 - 4.0 g/dL    A-G Ratio 0.8 (L) 1.1 - 2.2     MAGNESIUM    Collection Time: 05/18/17  4:30 AM   Result Value Ref Range    Magnesium 2.3 1.6 - 2.4 mg/dL   PHOSPHORUS    Collection Time: 05/18/17  4:30 AM   Result Value Ref Range    Phosphorus 1.8 (L) 2.6 - 4.7 MG/DL   LACTIC ACID, PLASMA    Collection Time: 05/18/17  4:30 AM   Result Value Ref Range    Lactic acid 1.9 0.4 - 2.0 MMOL/L   BILIRUBIN, DIRECT    Collection Time: 05/18/17  4:30 AM   Result Value Ref Range    Bilirubin, direct 0.5 (H) 0.0 - 0.2 MG/DL       Telemetry: normal sinus rhythm  Had 7 beat run of NSVT last night (asymptomatic)      Assessment:     Principal Problem:    Septic shock ([address] (5/15/2017)        Plan:     1. NSTEMI: Pt with elevated cardiac markers, NSTEMI by definition. Probably has some degree of underlying CAD.   Current elevations appear more from demand as pt is without chest pain, no ischemic ECG changes and preserved LV function with no wall motion abnormality by echo. Not a candidate for invasive work up at this time with sepsis/renal dysfunction/anemia, etc.  Continue aspirin. Heparin gtt for 48 hours if no bleeding with anemia. (Started 5/17). Start low dose Coreg (3.125 mg BID). No statin due to elevated LFTs. No ACE-I due to elevated creatinine. Will consider Nuclear stress to start tomorrow (pt will need 2 day test- resting images tomorrow and stress images on Monday if stress test started tomorrow. - will make NPO after midnight tonight and re-evaluate pt status in a.m.    2. Acute renal insufficiency. - creatinine improving 1.28 today from 1.93 yesterday   3. Sepsis: ID following. 4. E Coli bacteremia:  ID following. Surveillance cxs 5/16 NGTD  5. Anemia. Work up underway. Hgb stable- 9.5 today from 9.5 on  5/17. HCT stable (27.3)  6. Hypokalemia- K=2.7. Replete with KCL 60 meq IV x 1. Repeat labs in a.m. Saw and evaluated pt and agree with above assessment and plan. Consider rest imaging tomorrow and stress portion on Monday of pharmacologic cardiac stress test (2 day study given body habitus). Bhumika Lu MD      Signed By: Robert Barron.  LOIDA Sánchez     May 18, 2017

## 2017-05-18 NOTE — PROGRESS NOTES
[address]  [address], [address]       GI PROGRESS NOTE  [facility]  [phone]    NAME: Saurav Owens   :  1945   MRN:  [id number]       Subjective:     Nausea and diarrhea, no abdominal pain    Objective:     VITALS:   Last 24hrs VS reviewed since prior progress note. Most recent are:  Visit Vitals    /76 (BP 1 Location: Right arm, BP Patient Position: At rest)    Pulse 71    Temp 98 °F (36.7 °C)    Resp 16    Ht 5' 4\" (1.626 m)    Wt 104.6 kg (230 lb 9.6 oz)    SpO2 96%    BMI 39.58 kg/m2       PHYSICAL EXAM:  General: Cooperative  Neurologic:  Alert and oriented X 2  Lungs:  No Wheezing  Heart:  s1 s2   Abdomen: Soft, distention about the same, less tender  Extremities: No edema      Lab Data Reviewed:     Recent Results (from the past 24 hour(s))   PTT    Collection Time: 17  8:30 PM   Result Value Ref Range    aPTT 42.5 (H) 22.1 - 32.5 sec    aPTT, therapeutic range     58.0 - 77.0 SECS   PTT    Collection Time: 17  4:30 AM   Result Value Ref Range    aPTT 55.1 (H) 22.1 - 32.5 sec    aPTT, therapeutic range     58.0 - 77.0 SECS   CBC WITH AUTOMATED DIFF    Collection Time: 17  4:30 AM   Result Value Ref Range    WBC 23.6 (H) 3.6 - 11.0 K/uL    RBC 3.09 (L) 3.80 - 5.20 M/uL    HGB 9.5 (L) 11.5 - 16.0 g/dL    HCT 27.3 (L) 35.0 - 47.0 %    MCV 88.3 80.0 - 99.0 FL    MCH 30.7 26.0 - 34.0 PG    MCHC 34.8 30.0 - 36.5 g/dL    RDW 17.6 (H) 11.5 - 14.5 %    PLATELET 95 (L) 077 - 400 K/uL    NEUTROPHILS 86 (H) 32 - 75 %    BAND NEUTROPHILS 4 0 - 6 %    LYMPHOCYTES 5 (L) 12 - 49 %    MONOCYTES 5 5 - 13 %    EOSINOPHILS 0 0 - 7 %    BASOPHILS 0 0 - 1 %    ABS. NEUTROPHILS 21.2 (H) 1.8 - 8.0 K/UL    ABS. LYMPHOCYTES 1.2 0.8 - 3.5 K/UL    ABS. MONOCYTES 1.2 (H) 0.0 - 1.0 K/UL    ABS. EOSINOPHILS 0.0 0.0 - 0.4 K/UL    ABS.  BASOPHILS 0.0 0.0 - 0.1 K/UL    DF MANUAL      RBC COMMENTS ANISOCYTOSIS  1+        WBC COMMENTS DOHLE BODIES METABOLIC PANEL, COMPREHENSIVE    Collection Time: 05/18/17  4:30 AM   Result Value Ref Range    Sodium 148 (H) 136 - 145 mmol/L    Potassium 2.7 (LL) 3.5 - 5.1 mmol/L    Chloride 106 97 - 108 mmol/L    CO2 35 (H) 21 - 32 mmol/L    Anion gap 7 5 - 15 mmol/L    Glucose 120 (H) 65 - 100 mg/dL    BUN 27 (H) 6 - 20 MG/DL    Creatinine 1.28 (H) 0.55 - 1.02 MG/DL    BUN/Creatinine ratio 21 (H) 12 - 20      GFR est AA 50 (L) >60 ml/min/1.73m2    GFR est non-AA 41 (L) >60 ml/min/1.73m2    Calcium 7.7 (L) 8.5 - 10.1 MG/DL    Bilirubin, total 1.3 (H) 0.2 - 1.0 MG/DL    ALT (SGPT) 42 12 - 78 U/L    AST (SGOT) 53 (H) 15 - 37 U/L    Alk. phosphatase 97 45 - 117 U/L    Protein, total 5.3 (L) 6.4 - 8.2 g/dL    Albumin 2.3 (L) 3.5 - 5.0 g/dL    Globulin 3.0 2.0 - 4.0 g/dL    A-G Ratio 0.8 (L) 1.1 - 2.2     MAGNESIUM    Collection Time: 05/18/17  4:30 AM   Result Value Ref Range    Magnesium 2.3 1.6 - 2.4 mg/dL   PHOSPHORUS    Collection Time: 05/18/17  4:30 AM   Result Value Ref Range    Phosphorus 1.8 (L) 2.6 - 4.7 MG/DL   LACTIC ACID, PLASMA    Collection Time: 05/18/17  4:30 AM   Result Value Ref Range    Lactic acid 1.9 0.4 - 2.0 MMOL/L   BILIRUBIN, DIRECT    Collection Time: 05/18/17  4:30 AM   Result Value Ref Range    Bilirubin, direct 0.5 (H) 0.0 - 0.2 MG/DL   PTT    Collection Time: 05/18/17  2:38 PM   Result Value Ref Range    aPTT 60.4 (H) 22.1 - 32.5 sec    aPTT, therapeutic range     58.0 - 77.0 SECS         ________________________________________________________________________       Assessment:   · Elevated LFTs- US unremarkable and enzymes slowly improving.  Likely related to the sepsis process and hypotension  · Abdominal pain- PSBO, still nauseated but having a BMs, surgery is following     Patient Active Problem List   Diagnosis Code    [facility] M06.9    Essential hypertension I10    Postmenopausal Z78.0    Hypothyroidism E03.9    Allergic rhinitis J30.9    Tinea manus B35.2    Chronic constipation K59.09    Vitamin D deficiency E55.9    Abnormal gait R26.9    Prediabetes R73.03    Insomnia G47.00    Gastroesophageal reflux disease without esophagitis K21.9    Chronic midline low back pain without sciatica M54.5, G89.29    ACP (advance care planning) Z71.89    Septic shock (HCC) A41.9, R65.21     Plan:   · Diet per surgery  · Expect LFTs to return to normal     Signed By: Eleanor Oshea NP     5/18/2017  3:12 PM                 I have examined the patient. I have reviewed the chart and agree with the documentation recorded by the NP, including the assessment, treatment plan, and disposition.         Wilfredo Gutierrez MD

## 2017-05-18 NOTE — PROGRESS NOTES
Bedside and Verbal shift change report given to [address] (oncoming nurse) by Eder Cortés (offgoing nurse). Report included the following information SBAR, Kardex and ED Summary.

## 2017-05-18 NOTE — PROGRESS NOTES
Subjective  Feeling nauseated. But no vomiting-Still having a small amount of pain  She is having BM and flatus   Temp:  [96.5 °F (35.8 °C)-98.9 °F (37.2 °C)]   Pulse (Heart Rate):  []   BP: ()/(48-95)   Resp Rate:  [7-35]   O2 Sat (%):  [93 %-100 %]   Weight:  [212 lb (96.2 kg)-230 lb 9.6 oz (104.6 kg)]   05/18 0701 - 05/18 1900  In: 120 [P.O.:120]  Out: -   05/16 1901 - 05/18 0700  In: 0736 [P.O.:240; I.V.:2222]  Out: 2390 [Urine:2200]      Objective  Gen- Alert in NAD-  Lungs - CTA  H- RRR  Abd- soft with mild epigastric tenderness    Recent Results (from the past 24 hour(s))   PTT    Collection Time: 05/17/17  8:30 PM   Result Value Ref Range    aPTT 42.5 (H) 22.1 - 32.5 sec    aPTT, therapeutic range     58.0 - 77.0 SECS   PTT    Collection Time: 05/18/17  4:30 AM   Result Value Ref Range    aPTT 55.1 (H) 22.1 - 32.5 sec    aPTT, therapeutic range     58.0 - 77.0 SECS   CBC WITH AUTOMATED DIFF    Collection Time: 05/18/17  4:30 AM   Result Value Ref Range    WBC 23.6 (H) 3.6 - 11.0 K/uL    RBC 3.09 (L) 3.80 - 5.20 M/uL    HGB 9.5 (L) 11.5 - 16.0 g/dL    HCT 27.3 (L) 35.0 - 47.0 %    MCV 88.3 80.0 - 99.0 FL    MCH 30.7 26.0 - 34.0 PG    MCHC 34.8 30.0 - 36.5 g/dL    RDW 17.6 (H) 11.5 - 14.5 %    PLATELET 95 (L) 129 - 400 K/uL    NEUTROPHILS 86 (H) 32 - 75 %    BAND NEUTROPHILS 4 0 - 6 %    LYMPHOCYTES 5 (L) 12 - 49 %    MONOCYTES 5 5 - 13 %    EOSINOPHILS 0 0 - 7 %    BASOPHILS 0 0 - 1 %    ABS. NEUTROPHILS 21.2 (H) 1.8 - 8.0 K/UL    ABS. LYMPHOCYTES 1.2 0.8 - 3.5 K/UL    ABS. MONOCYTES 1.2 (H) 0.0 - 1.0 K/UL    ABS. EOSINOPHILS 0.0 0.0 - 0.4 K/UL    ABS.  BASOPHILS 0.0 0.0 - 0.1 K/UL    DF MANUAL      RBC COMMENTS ANISOCYTOSIS  1+        WBC COMMENTS DOHLE BODIES     METABOLIC PANEL, COMPREHENSIVE    Collection Time: 05/18/17  4:30 AM   Result Value Ref Range    Sodium 148 (H) 136 - 145 mmol/L    Potassium 2.7 (LL) 3.5 - 5.1 mmol/L    Chloride 106 97 - 108 mmol/L    CO2 35 (H) 21 - 32 mmol/L    Anion gap 7 5 - 15 mmol/L    Glucose 120 (H) 65 - 100 mg/dL    BUN 27 (H) 6 - 20 MG/DL    Creatinine 1.28 (H) 0.55 - 1.02 MG/DL    BUN/Creatinine ratio 21 (H) 12 - 20      GFR est AA 50 (L) >60 ml/min/1.73m2    GFR est non-AA 41 (L) >60 ml/min/1.73m2    Calcium 7.7 (L) 8.5 - 10.1 MG/DL    Bilirubin, total 1.3 (H) 0.2 - 1.0 MG/DL    ALT (SGPT) 42 12 - 78 U/L    AST (SGOT) 53 (H) 15 - 37 U/L    Alk.  phosphatase 97 45 - 117 U/L    Protein, total 5.3 (L) 6.4 - 8.2 g/dL    Albumin 2.3 (L) 3.5 - 5.0 g/dL    Globulin 3.0 2.0 - 4.0 g/dL    A-G Ratio 0.8 (L) 1.1 - 2.2     MAGNESIUM    Collection Time: 05/18/17  4:30 AM   Result Value Ref Range    Magnesium 2.3 1.6 - 2.4 mg/dL   PHOSPHORUS    Collection Time: 05/18/17  4:30 AM   Result Value Ref Range    Phosphorus 1.8 (L) 2.6 - 4.7 MG/DL   LACTIC ACID, PLASMA    Collection Time: 05/18/17  4:30 AM   Result Value Ref Range    Lactic acid 1.9 0.4 - 2.0 MMOL/L   BILIRUBIN, DIRECT    Collection Time: 05/18/17  4:30 AM   Result Value Ref Range    Bilirubin, direct 0.5 (H) 0.0 - 0.2 MG/DL           Principal Problem:    [facility] ([facility] (5/15/2017)          Assessment & Plan  Psbo-  Has flatus and bm- would hold on advancing diet until nausea resolves

## 2017-05-19 ENCOUNTER — APPOINTMENT (OUTPATIENT)
Dept: CT IMAGING | Age: 72
DRG: 871 | End: 2017-05-19
Attending: INTERNAL MEDICINE
Payer: MEDICARE

## 2017-05-19 ENCOUNTER — APPOINTMENT (OUTPATIENT)
Dept: NUCLEAR MEDICINE | Age: 72
DRG: 871 | End: 2017-05-19
Attending: INTERNAL MEDICINE
Payer: MEDICARE

## 2017-05-19 LAB
ANION GAP BLD CALC-SCNC: 8 MMOL/L (ref 5–15)
APTT PPP: 46.7 SEC (ref 22.1–32.5)
APTT PPP: 62.2 SEC (ref 22.1–32.5)
ATRIAL RATE: 80 BPM
BASOPHILS # BLD AUTO: 0 K/UL (ref 0–0.1)
BASOPHILS # BLD: 0 % (ref 0–1)
BUN SERPL-MCNC: 19 MG/DL (ref 6–20)
BUN/CREAT SERPL: 20 (ref 12–20)
CALCIUM SERPL-MCNC: 7.8 MG/DL (ref 8.5–10.1)
CALCULATED P AXIS, ECG09: -4 DEGREES
CALCULATED R AXIS, ECG10: 5 DEGREES
CALCULATED T AXIS, ECG11: 37 DEGREES
CHLORIDE SERPL-SCNC: 108 MMOL/L (ref 97–108)
CO2 SERPL-SCNC: 29 MMOL/L (ref 21–32)
CREAT SERPL-MCNC: 0.95 MG/DL (ref 0.55–1.02)
DIAGNOSIS, 93000: NORMAL
DIFFERENTIAL METHOD BLD: ABNORMAL
EOSINOPHIL # BLD: 0 K/UL (ref 0–0.4)
EOSINOPHIL NFR BLD: 0 % (ref 0–7)
ERYTHROCYTE [DISTWIDTH] IN BLOOD BY AUTOMATED COUNT: 17.7 % (ref 11.5–14.5)
GLUCOSE BLD STRIP.AUTO-MCNC: 98 MG/DL (ref 65–100)
GLUCOSE SERPL-MCNC: 105 MG/DL (ref 65–100)
HCT VFR BLD AUTO: 28 % (ref 35–47)
HGB BLD-MCNC: 9.7 G/DL (ref 11.5–16)
LYMPHOCYTES # BLD AUTO: 9 % (ref 12–49)
LYMPHOCYTES # BLD: 2.3 K/UL (ref 0.8–3.5)
MAGNESIUM SERPL-MCNC: 1.7 MG/DL (ref 1.6–2.4)
MCH RBC QN AUTO: 30.5 PG (ref 26–34)
MCHC RBC AUTO-ENTMCNC: 34.6 G/DL (ref 30–36.5)
MCV RBC AUTO: 88.1 FL (ref 80–99)
MONOCYTES # BLD: 2.6 K/UL (ref 0–1)
MONOCYTES NFR BLD AUTO: 10 % (ref 5–13)
NEUTS BAND NFR BLD MANUAL: 5 % (ref 0–6)
NEUTS SEG # BLD: 21.2 K/UL (ref 1.8–8)
NEUTS SEG NFR BLD AUTO: 76 % (ref 32–75)
P-R INTERVAL, ECG05: 174 MS
PHOSPHATE SERPL-MCNC: 1.6 MG/DL (ref 2.6–4.7)
PLATELET # BLD AUTO: 95 K/UL (ref 150–400)
POTASSIUM SERPL-SCNC: 3.1 MMOL/L (ref 3.5–5.1)
Q-T INTERVAL, ECG07: 428 MS
QRS DURATION, ECG06: 88 MS
QTC CALCULATION (BEZET), ECG08: 493 MS
RBC # BLD AUTO: 3.18 M/UL (ref 3.8–5.2)
RBC MORPH BLD: ABNORMAL
SERVICE CMNT-IMP: NORMAL
SODIUM SERPL-SCNC: 145 MMOL/L (ref 136–145)
THERAPEUTIC RANGE,PTTT: ABNORMAL SECS (ref 58–77)
THERAPEUTIC RANGE,PTTT: ABNORMAL SECS (ref 58–77)
VENTRICULAR RATE, ECG03: 80 BPM
WBC # BLD AUTO: 26.1 K/UL (ref 3.6–11)
WBC MORPH BLD: ABNORMAL

## 2017-05-19 PROCEDURE — 74011250636 HC RX REV CODE- 250/636: Performed by: NURSE PRACTITIONER

## 2017-05-19 PROCEDURE — 74011250636 HC RX REV CODE- 250/636: Performed by: INTERNAL MEDICINE

## 2017-05-19 PROCEDURE — 74011636320 HC RX REV CODE- 636/320: Performed by: STUDENT IN AN ORGANIZED HEALTH CARE EDUCATION/TRAINING PROGRAM

## 2017-05-19 PROCEDURE — 83735 ASSAY OF MAGNESIUM: CPT | Performed by: STUDENT IN AN ORGANIZED HEALTH CARE EDUCATION/TRAINING PROGRAM

## 2017-05-19 PROCEDURE — 74011250637 HC RX REV CODE- 250/637: Performed by: NURSE PRACTITIONER

## 2017-05-19 PROCEDURE — 80048 BASIC METABOLIC PNL TOTAL CA: CPT | Performed by: STUDENT IN AN ORGANIZED HEALTH CARE EDUCATION/TRAINING PROGRAM

## 2017-05-19 PROCEDURE — 74011000250 HC RX REV CODE- 250: Performed by: STUDENT IN AN ORGANIZED HEALTH CARE EDUCATION/TRAINING PROGRAM

## 2017-05-19 PROCEDURE — 36415 COLL VENOUS BLD VENIPUNCTURE: CPT | Performed by: STUDENT IN AN ORGANIZED HEALTH CARE EDUCATION/TRAINING PROGRAM

## 2017-05-19 PROCEDURE — 82962 GLUCOSE BLOOD TEST: CPT

## 2017-05-19 PROCEDURE — 94640 AIRWAY INHALATION TREATMENT: CPT

## 2017-05-19 PROCEDURE — 84100 ASSAY OF PHOSPHORUS: CPT | Performed by: STUDENT IN AN ORGANIZED HEALTH CARE EDUCATION/TRAINING PROGRAM

## 2017-05-19 PROCEDURE — 74177 CT ABD & PELVIS W/CONTRAST: CPT

## 2017-05-19 PROCEDURE — 97530 THERAPEUTIC ACTIVITIES: CPT

## 2017-05-19 PROCEDURE — C9113 INJ PANTOPRAZOLE SODIUM, VIA: HCPCS | Performed by: INTERNAL MEDICINE

## 2017-05-19 PROCEDURE — 74011000250 HC RX REV CODE- 250: Performed by: INTERNAL MEDICINE

## 2017-05-19 PROCEDURE — 74011000258 HC RX REV CODE- 258: Performed by: INTERNAL MEDICINE

## 2017-05-19 PROCEDURE — 85025 COMPLETE CBC W/AUTO DIFF WBC: CPT | Performed by: STUDENT IN AN ORGANIZED HEALTH CARE EDUCATION/TRAINING PROGRAM

## 2017-05-19 PROCEDURE — 74011250636 HC RX REV CODE- 250/636: Performed by: STUDENT IN AN ORGANIZED HEALTH CARE EDUCATION/TRAINING PROGRAM

## 2017-05-19 PROCEDURE — 74011250637 HC RX REV CODE- 250/637: Performed by: STUDENT IN AN ORGANIZED HEALTH CARE EDUCATION/TRAINING PROGRAM

## 2017-05-19 PROCEDURE — 65660000000 HC RM CCU STEPDOWN

## 2017-05-19 PROCEDURE — 85730 THROMBOPLASTIN TIME PARTIAL: CPT | Performed by: STUDENT IN AN ORGANIZED HEALTH CARE EDUCATION/TRAINING PROGRAM

## 2017-05-19 PROCEDURE — 97161 PT EVAL LOW COMPLEX 20 MIN: CPT

## 2017-05-19 PROCEDURE — 74011000258 HC RX REV CODE- 258: Performed by: STUDENT IN AN ORGANIZED HEALTH CARE EDUCATION/TRAINING PROGRAM

## 2017-05-19 PROCEDURE — 77010033678 HC OXYGEN DAILY

## 2017-05-19 RX ORDER — MAGNESIUM SULFATE HEPTAHYDRATE 40 MG/ML
2 INJECTION, SOLUTION INTRAVENOUS ONCE
Status: COMPLETED | OUTPATIENT
Start: 2017-05-19 | End: 2017-05-19

## 2017-05-19 RX ORDER — HEPARIN SODIUM 5000 [USP'U]/ML
2000 INJECTION, SOLUTION INTRAVENOUS; SUBCUTANEOUS ONCE
Status: COMPLETED | OUTPATIENT
Start: 2017-05-19 | End: 2017-05-19

## 2017-05-19 RX ORDER — SODIUM CHLORIDE 0.9 % (FLUSH) 0.9 %
10 SYRINGE (ML) INJECTION
Status: COMPLETED | OUTPATIENT
Start: 2017-05-19 | End: 2017-05-19

## 2017-05-19 RX ORDER — LEVOTHYROXINE SODIUM 50 UG/1
50 TABLET ORAL
Status: DISCONTINUED | OUTPATIENT
Start: 2017-05-20 | End: 2017-05-24 | Stop reason: HOSPADM

## 2017-05-19 RX ORDER — POTASSIUM CHLORIDE 29.8 MG/ML
20 INJECTION INTRAVENOUS
Status: COMPLETED | OUTPATIENT
Start: 2017-05-19 | End: 2017-05-19

## 2017-05-19 RX ORDER — SODIUM CHLORIDE 0.9 % (FLUSH) 0.9 %
5-10 SYRINGE (ML) INJECTION EVERY 8 HOURS
Status: DISCONTINUED | OUTPATIENT
Start: 2017-05-19 | End: 2017-05-24 | Stop reason: HOSPADM

## 2017-05-19 RX ADMIN — IOPAMIDOL 100 ML: 755 INJECTION, SOLUTION INTRAVENOUS at 16:00

## 2017-05-19 RX ADMIN — Medication 10 ML: at 16:00

## 2017-05-19 RX ADMIN — POTASSIUM CHLORIDE 20 MEQ: 400 INJECTION, SOLUTION INTRAVENOUS at 12:44

## 2017-05-19 RX ADMIN — DEXTROSE MONOHYDRATE AND POTASSIUM CHLORIDE: 5; .149 INJECTION, SOLUTION INTRAVENOUS at 08:00

## 2017-05-19 RX ADMIN — CARVEDILOL 3.12 MG: 3.12 TABLET, FILM COATED ORAL at 09:21

## 2017-05-19 RX ADMIN — IPRATROPIUM BROMIDE 0.5 MG: 0.5 SOLUTION RESPIRATORY (INHALATION) at 16:53

## 2017-05-19 RX ADMIN — SODIUM CHLORIDE 100 ML: 900 INJECTION, SOLUTION INTRAVENOUS at 16:00

## 2017-05-19 RX ADMIN — CEFTRIAXONE 2 G: 2 INJECTION, POWDER, FOR SOLUTION INTRAMUSCULAR; INTRAVENOUS at 06:35

## 2017-05-19 RX ADMIN — DEXTROSE MONOHYDRATE AND POTASSIUM CHLORIDE: 5; .149 INJECTION, SOLUTION INTRAVENOUS at 09:25

## 2017-05-19 RX ADMIN — SODIUM CHLORIDE: 900 INJECTION, SOLUTION INTRAVENOUS at 12:24

## 2017-05-19 RX ADMIN — Medication 10 ML: at 21:46

## 2017-05-19 RX ADMIN — ASPIRIN 81 MG 81 MG: 81 TABLET ORAL at 09:21

## 2017-05-19 RX ADMIN — HYDROMORPHONE HYDROCHLORIDE 1 MG: 1 INJECTION, SOLUTION INTRAMUSCULAR; INTRAVENOUS; SUBCUTANEOUS at 08:23

## 2017-05-19 RX ADMIN — CARVEDILOL 3.12 MG: 3.12 TABLET, FILM COATED ORAL at 17:46

## 2017-05-19 RX ADMIN — HYDROCORTISONE SODIUM SUCCINATE 25 MG: 100 INJECTION, POWDER, FOR SOLUTION INTRAMUSCULAR; INTRAVENOUS at 09:27

## 2017-05-19 RX ADMIN — Medication 10 ML: at 12:44

## 2017-05-19 RX ADMIN — MAGNESIUM SULFATE HEPTAHYDRATE 2 G: 40 INJECTION, SOLUTION INTRAVENOUS at 17:46

## 2017-05-19 RX ADMIN — HYDROCORTISONE SODIUM SUCCINATE 25 MG: 100 INJECTION, POWDER, FOR SOLUTION INTRAMUSCULAR; INTRAVENOUS at 21:46

## 2017-05-19 RX ADMIN — POTASSIUM CHLORIDE 20 MEQ: 400 INJECTION, SOLUTION INTRAVENOUS at 12:50

## 2017-05-19 RX ADMIN — IOHEXOL 50 ML: 240 INJECTION, SOLUTION INTRATHECAL; INTRAVASCULAR; INTRAVENOUS; ORAL at 16:00

## 2017-05-19 RX ADMIN — HEPARIN SODIUM 2000 UNITS: 5000 INJECTION, SOLUTION INTRAVENOUS; SUBCUTANEOUS at 00:32

## 2017-05-19 RX ADMIN — SODIUM CHLORIDE 40 MG: 9 INJECTION INTRAMUSCULAR; INTRAVENOUS; SUBCUTANEOUS at 09:22

## 2017-05-19 NOTE — PROGRESS NOTES
Just d/w Dr. Elliot Sainz from [facility].    Because of persistent high WBC, needs CT with IV contrast  Should be OK to get IV contrast and JEANINE has resolved as off today  But will increase IVF back to reduce risk of any contrast nephropathy    Santosh Geiger MD  [facility]

## 2017-05-19 NOTE — PROGRESS NOTES
Patient reports nausea this AM. No emesis. Tolerating diet. Tm 98.7 HR: 68 BP: 123/76 Resp Rate: 16 per minute 96% sat on room air. Intake/Output Summary (Last 24 hours) at 05/19/17 0925  Last data filed at 05/19/17 0644   Gross per 24 hour   Intake          1164.37 ml   Output             1750 ml   Net          -585.63 ml   Exam: Cor: RRR. Lungs: Bilat BS, CTA. Abd: Soft, Non distended. Non tender. No associated rebound or guarding. Labs:   Recent Results (from the past 12 hour(s))   PTT    Collection Time: 05/18/17 10:20 PM   Result Value Ref Range    aPTT 46.7 (H) 22.1 - 32.5 sec    aPTT, therapeutic range     58.0 - 77.0 SECS   PTT    Collection Time: 05/19/17  6:30 AM   Result Value Ref Range    aPTT 62.2 (H) 22.1 - 32.5 sec    aPTT, therapeutic range     58.0 - 77.0 SECS   CBC WITH AUTOMATED DIFF    Collection Time: 05/19/17  6:30 AM   Result Value Ref Range    WBC 26.1 (H) 3.6 - 11.0 K/uL    RBC 3.18 (L) 3.80 - 5.20 M/uL    HGB 9.7 (L) 11.5 - 16.0 g/dL    HCT 28.0 (L) 35.0 - 47.0 %    MCV 88.1 80.0 - 99.0 FL    MCH 30.5 26.0 - 34.0 PG    MCHC 34.6 30.0 - 36.5 g/dL    RDW 17.7 (H) 11.5 - 14.5 %    PLATELET 95 (L) 399 - 400 K/uL    NEUTROPHILS 76 (H) 32 - 75 %    BAND NEUTROPHILS 5 0 - 6 %    LYMPHOCYTES 9 (L) 12 - 49 %    MONOCYTES 10 5 - 13 %    EOSINOPHILS 0 0 - 7 %    BASOPHILS 0 0 - 1 %    ABS. NEUTROPHILS 21.2 (H) 1.8 - 8.0 K/UL    ABS. LYMPHOCYTES 2.3 0.8 - 3.5 K/UL    ABS. MONOCYTES 2.6 (H) 0.0 - 1.0 K/UL    ABS. EOSINOPHILS 0.0 0.0 - 0.4 K/UL    ABS.  BASOPHILS 0.0 0.0 - 0.1 K/UL    DF MANUAL      RBC COMMENTS ANISOCYTOSIS  1+        WBC COMMENTS DOHLE BODIES     METABOLIC PANEL, BASIC    Collection Time: 05/19/17  6:30 AM   Result Value Ref Range    Sodium 145 136 - 145 mmol/L    Potassium 3.1 (L) 3.5 - 5.1 mmol/L    Chloride 108 97 - 108 mmol/L    CO2 29 21 - 32 mmol/L    Anion gap 8 5 - 15 mmol/L    Glucose 105 (H) 65 - 100 mg/dL    BUN 19 6 - 20 MG/DL Creatinine 0.95 0.55 - 1.02 MG/DL    BUN/Creatinine ratio 20 12 - 20      GFR est AA >60 >60 ml/min/1.73m2    GFR est non-AA 58 (L) >60 ml/min/1.73m2    Calcium 7.8 (L) 8.5 - 10.1 MG/DL   MAGNESIUM    Collection Time: 05/19/17  6:30 AM   Result Value Ref Range    Magnesium 1.7 1.6 - 2.4 mg/dL   PHOSPHORUS    Collection Time: 05/19/17  6:30 AM   Result Value Ref Range    Phosphorus 1.6 (L) 2.6 - 4.7 MG/DL   Diet as tolerated. Nephrology input - noted. GI Following. IV abx - Cefepime per Dr. Geovani Babcock. Cardiology following. Will ask PT to see. Plans per Dr. Cindi Pierre.

## 2017-05-19 NOTE — PROGRESS NOTES
ID Progress Note  2017     Cefepime  Discontinued  Ceftriaxone     Subjective:     Afebrile. Feels good. No abdominal pain and no back pain. No dyspnea. No headache or sore throat. No diarrhea. Objective:     Vitals:   Visit Vitals    /76 (BP 1 Location: Right arm, BP Patient Position: At rest)    Pulse 61    Temp 98.4 °F (36.9 °C)    Resp 18    Ht 5' 4\" (1.626 m)    Wt 104.6 kg (230 lb 9.6 oz)    SpO2 98%    Breastfeeding No    BMI 39.58 kg/m2        Tmax:  Temp (24hrs), Av.5 °F (36.9 °C), Min:98.3 °F (36.8 °C), Max:98.7 °F (37.1 °C)      Exam:    Not in distress   Pink conjunctivae, anicteric sclerae  Lungs: clear to auscultation, no rales   Heart: s1, s2, no murmurs, rubs or clicks   Abdomen: soft, nontender, no rebound, no guarding   Knees not warm or tender  No cervical lymphadenopathy     Labs:   Lab Results   Component Value Date/Time    WBC 26.1 2017 06:30 AM    Hemoglobin (POC) 11.9 2010 01:40 AM    HGB 9.7 2017 06:30 AM    Hematocrit (POC) 35 2010 01:40 AM    HCT 28.0 2017 06:30 AM    PLATELET 95  06:30 AM    MCV 88.1 2017 06:30 AM     Lab Results   Component Value Date/Time    Sodium 145 2017 06:30 AM    Potassium 3.1 2017 06:30 AM    Chloride 108 2017 06:30 AM    CO2 29 2017 06:30 AM    Anion gap 8 2017 06:30 AM    Glucose 105 2017 06:30 AM    BUN 19 2017 06:30 AM    Creatinine 0.95 2017 06:30 AM    BUN/Creatinine ratio 20 2017 06:30 AM    GFR est AA >60 2017 06:30 AM    GFR est non-AA 58 2017 06:30 AM    Calcium 7.8 2017 06:30 AM    Bilirubin, total 1.3 2017 04:30 AM    AST (SGOT) 53 2017 04:30 AM    Alk.  phosphatase 97 2017 04:30 AM    Protein, total 5.3 2017 04:30 AM    Albumin 2.3 2017 04:30 AM    Globulin 3.0 2017 04:30 AM    A-G Ratio 0.8 2017 04:30 AM    ALT (SGPT) 42 2017 04:30 AM       US - no biliary dilatation      Assessment:       1. E coli  bacteremia. 2. History of recurrent Klebsiella bacteremia. 3. History of L4-L5 diskitis for which she has received greater than 12   weeks of IV therapy with normalization of her inflammation markers. 4. Renal failure. 5. Rheumatoid arthritis. 6. History of dilated bile ducts. 7. non-ST-segment elevation myocardial infarction.       Recommendations:      1. Continue ceftriaxone    2. WBC has increased. We have no definite source for the e coli. Her initial CT was done without contrast. I have spoken with renal and since cr has normalized, we can administer IV contrast for a better study. I will order CT of the abd/pelvis today.      Yara Batista MD

## 2017-05-19 NOTE — PROGRESS NOTES
Problem: Mobility Impaired (Adult and Pediatric)  Goal: *Acute Goals and Plan of Care (Insert Text)  Physical Therapy Goals  Initiated 5/19/2017  1. Patient will move from supine to sit and sit to supine in bed with contact guard assist within 7 day(s). 2. Patient will transfer from bed to chair and chair to bed with contact guard assist using the least restrictive device within 7 day(s). 3. Patient will perform sit to stand with contact guard assist within 7 day(s). 4. Patient will ambulate with minimal assistance/contact guard assist for 50 feet with the least restrictive device within 7 day(s). PHYSICAL THERAPY EVALUATION  Patient: Odell Tolentino (61 y.o. female)  Date: 5/19/2017  Primary Diagnosis: Septic shock [facility])        Precautions:  Fall      ASSESSMENT :  Based on the objective data described below, the patient presents with decreased strength, endurance, and overall decreased functional mobility. Pt lives alone on the second floor of an apartment building with 14 steps to enter. Has a son who lives in [place] and is unable to provide adequate social support for pts current condition. Was Independent with quad cane and rollator for longer distances PTA. Pt received in bed and agreeable to work with PT. Able to roll with mod I and cueing to use bed rail with increased time needed to perform. Supine to sit transfer with modA to aid pt in getting trunk to erect position. In sitting pt appeared SOB and stated both dizziness and SOB. BP was elevated to 150/82 in sitting. Sit to stand transfer requiring modA x2. Pt able to take side steps at EOB with modA x2, fatigued quickly but stated SOB and dizziness remained unchanged. Sit to supine required modA x2 to aid with both legs and trunk. Pt left in supine with all needs met. Due to decreased mobility, strength, and endurance recommending discharge to SNF. Will continue to follow to improve functional mobility.            Patient will benefit from skilled intervention to address the above impairments. Patients rehabilitation potential is considered to be Good  Factors which may influence rehabilitation potential include:   [ ]         None noted  [ ]         Mental ability/status  [ ]         Medical condition  [X]         Home/family situation and support systems  [ ]         Safety awareness  [ ]         Pain tolerance/management  [ ]         Other:        PLAN :  Recommendations and Planned Interventions:  [X]           Bed Mobility Training             [ ]    Neuromuscular Re-Education  [X]           Transfer Training                   [ ]    Orthotic/Prosthetic Training  [X]           Gait Training                         [ ]    Modalities  [X]           Therapeutic Exercises           [ ]    Edema Management/Control  [X]           Therapeutic Activities            [ ]    Patient and Family Training/Education  [ ]           Other (comment):     Frequency/Duration: Patient will be followed by physical therapy  5 times a week to address goals. Discharge Recommendations: Glenn Saldana  Further Equipment Recommendations for Discharge: TBD, own rollator and quad cane       SUBJECTIVE:   Patient stated I'm so weak.       OBJECTIVE DATA SUMMARY:   HISTORY:    Past Medical History:   Diagnosis Date    Abdominal pain 6/18/14     note from [facility] directive discussed with patient 07/22/2015    Arthritis       dr Maday buck        2/6/17 f/u note    Bacteremia due to Klebsiella pneumoniae 2/2/2016     [facility] note from Dr Pramod Baptiste, Infect Disease    Chronic pain       low back pain/arthritis      [facility] note 11/2/16    Contact dermatitis and other eczema, due to unspecified cause       hyperpigmented macules/seb k    Elevated LFTs       per info from Dr Adilene Larry at [facility] on report    Endocrine disease       hypothyroid    GERD (gastroesophageal reflux disease)       chelsy burns    HSV infection      Hypertension      Hypothyroid 10/2012    Insomnia      Melasma 3/3/15     [facility]    Nausea & vomiting 05/04/2017     report [facility]    Pain management counseling, encounter for 05/05/2016     sees Dr Jerel South 5/2/17 f/u    Rhinitis, allergic nonseasonal      Urge incontinence of urine 03/21/2017 initial [facility] consult     [facility] initial eval note Eather Rhyme 4/17/17 urodymanics study//5/9/17 f/u note    Well woman exam (no gynecological exam) 07/29/2016     zedler's note rec'd     Past Surgical History:   Procedure Laterality Date    COLONOSCOPY   8/1/11     dr Tamra Gutierrez 10 year repeat    HX CHOLECYSTECTOMY        HX ENDOSCOPY   2014     [address]    HX OTHER SURGICAL   04/17/2017     complex urodynamics study report from [facility]    HX TUBAL LIGATION         Prior Level of Function/Home Situation: Patient lives on the second floor of an apartment complex with 14 steps to enter. Has a son who lives in [place] who works and is unable to provide much social support. Home Situation  Home Environment: Apartment  # Steps to Enter: 15 (lives on second floor with no elevator available )  Rails to Enter: Yes  Hand Rails : Bilateral  One/Two Story Residence: One story  Living Alone: Yes  Support Systems: Child(wilber)  Patient Expects to be Discharged to[de-identified] Private residence  Current DME Used/Available at Home: [place], quad, [address], rollator     EXAMINATION/PRESENTATION/DECISION MAKING:   Critical Behavior:  Neurologic State: Alert  Orientation Level: Oriented X4  Cognition: Appropriate decision making, Follows commands        Range Of Motion:  AROM: Generally decreased, functional                       Strength:    Strength: Generally decreased, functional      Functional Mobility:  Bed Mobility:  Rolling: Modified independent  Supine to Sit: Moderate assistance  Sit to Supine: Moderate assistance;Assist x2  Scooting:  Moderate assistance;Assist x2  Transfers:  Sit to Stand: Moderate assistance;Assist x2  Stand to Sit: Moderate assistance;Assist x2                       Balance:   Sitting - Static: Fair (occasional)  Sitting - Dynamic: Fair (occasional)  Standing - Static: Fair  Standing - Dynamic : Fair  Ambulation/Gait Training:  Distance (ft): 6 Feet (ft)  Assistive Device: Gait belt;Walker, rolling  Ambulation - Level of Assistance: Minimal assistance        Gait Abnormalities: Decreased step clearance;Shuffling gait        Base of Support: Widened     Speed/Eunice: Pace decreased (<100 feet/min); Slow  Step Length: Left shortened;Right shortened                             Pt able to ambulate by taking side steps EOB. Fatigued quickly and became SOB with increased dizziness from supine position. Functional Measure:  Barthel Index:      Bathin  Bladder: 5  Bowels: 10  Groomin  Dressin  Feedin  Mobility: 0  Stairs: 0  Toilet Use: 5  Transfer (Bed to Chair and Back): 5  Total: 40         Barthel and G-code impairment scale:  Percentage of impairment CH  0% CI  1-19% CJ  20-39% CK  40-59% CL  60-79% CM  80-99% CN  100%   Barthel Score 0-100 100 99-80 79-60 59-40 20-39 1-19    0   Barthel Score 0-20 20 17-19 13-16 9-12 5-8 1-4 0      The Barthel ADL Index: Guidelines  1. The index should be used as a record of what a patient does, not as a record of what a patient could do. 2. The main aim is to establish degree of independence from any help, physical or verbal, however minor and for whatever reason. 3. The need for supervision renders the patient not independent. 4. A patient's performance should be established using the best available evidence. Asking the patient, friends/relatives and nurses are the usual sources, but direct observation and common sense are also important. However direct testing is not needed.   5. Usually the patient's performance over the preceding 24-48 hours is important, but occasionally longer periods will be relevant. 6. Middle categories imply that the patient supplies over 50 per cent of the effort. 7. Use of aids to be independent is allowed. Carlos A Adair., Barthel, D.W. (8544). Functional evaluation: the Barthel Index. [address] [facility] (14)2. JANAE Bee, Jarrod Abreu, Etelvina Marsh., Maria Teresa, [address] [facility] (1999). Measuring the change indisability after inpatient rehabilitation; comparison of the responsiveness of the Barthel Index and Functional Sanders Measure. Journal of Neurology, Neurosurgery, and Psychiatry, 66(4), 894-519. CRISTIANO Esparza.KALIE, KALIN Proctor, & Socorro [place] MRUPESH. (2004.) Assessment of post-stroke quality of life in cost-effectiveness studies: The usefulness of the Barthel Index and the EuroQoL-5D. Quality of Life Research, 13, 637-92         G codes: In compliance with CMSs Claims Based Outcome Reporting, the following G-code set was chosen for this patient based on their primary functional limitation being treated: The outcome measure chosen to determine the severity of the functional limitation was the Barthel Index with a score of 40/100 which was correlated with the impairment scale.       · Mobility - Walking and Moving Around:               - CURRENT STATUS:    CK - 40%-59% impaired, limited or restricted               - GOAL STATUS:           CJ - 20%-39% impaired, limited or restricted               - D/C STATUS:                       CJ - 20%-39% impaired, limited or restricted      Physical Therapy Evaluation Charge Determination   History Examination Presentation Decision-Making   LOW Complexity : Zero comorbidities / personal factors that will impact the outcome / POC LOW Complexity : 1-2 Standardized tests and measures addressing body structure, function, activity limitation and / or participation in recreation  LOW Complexity : Stable, uncomplicated  LOW Complexity : FOTO score of       Based on the above components, the patient evaluation is determined to be of the following complexity level: LOW      Pain:  Pain Scale 1: Numeric (0 - 10)  Pain Intensity 1: 0  Pain Location 1: Other (comment) (all over)     Pain Description 1: Aching  Pain Intervention(s) 1: Medication (see MAR)  Activity Tolerance:   BP elevated to 150/82 following bed mobility. Became SOB and dizzy. Please refer to the flowsheet for vital signs taken during this treatment. After treatment:   [ ]         Patient left in no apparent distress sitting up in chair  [X]         Patient left in no apparent distress in bed  [X]         Call bell left within reach  [X]         Nursing notified  [ ]         Caregiver present  [ ]         Bed alarm activated      COMMUNICATION/EDUCATION:   The patients plan of care was discussed with: Physical Therapist and Registered Nurse.  [X]         Fall prevention education was provided and the patient/caregiver indicated understanding. [X]         Patient/family have participated as able in goal setting and plan of care. [X]         Patient/family agree to work toward stated goals and plan of care. [ ]         Patient understands intent and goals of therapy, but is neutral about his/her participation. [ ]         Patient is unable to participate in goal setting and plan of care. Regarding student involvement in patient care:  A student participated in this treatment session. Per CMS Medicare statements and APTA guidelines I certify that the following was true:  1. I was present and directly observed the entire session. 2. I made all skilled judgments and clinical decisions regarding care. 3. I am the practitioner responsible for assessment, treatment, and documentation.   Thank you for this referral.  Abbey Bernal, SPT   Time Calculation: 21 mins

## 2017-05-19 NOTE — PROGRESS NOTES
Name: Yonny Muller   MRN: [id number]  : 1945      Assessment:  JEANINE- likely septic ATN in the setting of SBO/hypotension and NSTEMI  E. Coli Bacteremia  SBO-minimal NG drainage  Low Mg-better  Low K  High Na-resolved  Alkalosis-from HCO3 drip; better    JEANINE has resolved. Lytes are improved but still replacement    Plan/Recommendations:  Reduce hypotonic IVF (D5W with KCL) to 40 ml/hr and d/c by tomm if oral intake is fair  Encourage oral hydration  Repeat IV Kphos 15 mmol x 1  IV KCL 20 meq x 2   AM labs    Will sign off at this point. Please call us back with any questions/concerns    Subjective:  Taking PO. More alert awake.    WBC still up    ROS:   Denies n/v/cp or sob    Exam:  Visit Vitals    /76 (BP 1 Location: Right arm, BP Patient Position: At rest)    Pulse 61    Temp 98.4 °F (36.9 °C)    Resp 18    Ht 5' 4\" (1.626 m)    Wt 104.6 kg (230 lb 9.6 oz)    SpO2 98%    Breastfeeding No    BMI 39.58 kg/m2       NAD  Clear  RRR  Mild icterus+  Tr edema  Alert awake    Current Facility-Administered Medications   Medication Dose Route Frequency Last Dose    [START ON 2017] levothyroxine (SYNTHROID) tablet 50 mcg  50 mcg Oral ACB      potassium chloride 20 mEq in 50 ml IVPB  20 mEq IntraVENous Q1H      potassium phosphate 15 mmol in 0.9% sodium chloride 250 mL infusion   IntraVENous ONCE      carvedilol (COREG) tablet 3.125 mg  3.125 mg Oral BID 3.125 mg at 17 0921    dextrose 5% with KCl 20 mEq/L infusion   IntraVENous CONTINUOUS      cefTRIAXone (ROCEPHIN) 2 g in 0.9% sodium chloride (MBP/ADV) 50 mL  2 g IntraVENous Q24H 2 g at 17 0635    hydrocortisone Sod Succ (PF) (SOLU-CORTEF) injection 25 mg  25 mg IntraVENous Q12H 25 mg at 17 0927    ipratropium (ATROVENT) 0.02 % nebulizer solution 0.5 mg  0.5 mg Nebulization Q6H PRN 0.5 mg at 05/16/17 1459    aspirin chewable tablet 81 mg  81 mg Oral DAILY 81 mg at 05/19/17 0921    HYDROmorphone (PF) (DILAUDID) injection 1 mg  1 mg IntraVENous Q4H PRN 1 mg at 05/19/17 0823    ondansetron (ZOFRAN) injection 4 mg  4 mg IntraVENous Q4H PRN 4 mg at 05/18/17 1443    pantoprazole (PROTONIX) 40 mg in sodium chloride 0.9 % 10 mL injection  40 mg IntraVENous DAILY 40 mg at 05/19/17 3121       Labs/Data:    Lab Results   Component Value Date/Time    WBC 26.1 05/19/2017 06:30 AM    Hemoglobin (POC) 11.9 12/03/2010 01:40 AM    HGB 9.7 05/19/2017 06:30 AM    Hematocrit (POC) 35 12/03/2010 01:40 AM    HCT 28.0 05/19/2017 06:30 AM    PLATELET 95 86/28/9351 06:30 AM    MCV 88.1 05/19/2017 06:30 AM       Lab Results   Component Value Date/Time    Sodium 145 05/19/2017 06:30 AM    Potassium 3.1 05/19/2017 06:30 AM    Chloride 108 05/19/2017 06:30 AM    CO2 29 05/19/2017 06:30 AM    Anion gap 8 05/19/2017 06:30 AM    Glucose 105 05/19/2017 06:30 AM    BUN 19 05/19/2017 06:30 AM    Creatinine 0.95 05/19/2017 06:30 AM    BUN/Creatinine ratio 20 05/19/2017 06:30 AM    GFR est AA >60 05/19/2017 06:30 AM    GFR est non-AA 58 05/19/2017 06:30 AM    Calcium 7.8 05/19/2017 06:30 AM       Wt Readings from Last 3 Encounters:   05/17/17 104.6 kg (230 lb 9.6 oz)   02/02/17 96.6 kg (213 lb)   11/30/16 96.8 kg (213 lb 8 oz)         Intake/Output Summary (Last 24 hours) at 05/19/17 1042  Last data filed at 05/19/17 0644   Gross per 24 hour   Intake          1264.37 ml   Output             1750 ml   Net          -485.63 ml       Patient seen and examined. Chart reviewed. Labs, data and other pertinent notes reviewed in last 24 hrs.     PMH/SH/FH reviewed and unchanged compared to H&P    Discussed with pt, Dr. Wenceslao Troy MD

## 2017-05-19 NOTE — PROGRESS NOTES
[address]  [address], [address]       GI PROGRESS NOTE  [facility]  [phone]    NAME: Pat Mejia   :  1945   MRN:  [id number]       Subjective:     Nausea has resolved, overall feeling much better. Objective:     VITALS:   Last 24hrs VS reviewed since prior progress note. Most recent are:  Visit Vitals    /80 (BP 1 Location: Right arm, BP Patient Position: At rest)    Pulse 68    Temp 98.6 °F (37 °C)    Resp 18    Ht 5' 4\" (1.626 m)    Wt 104.6 kg (230 lb 9.6 oz)    SpO2 94%    Breastfeeding No    BMI 39.58 kg/m2       PHYSICAL EXAM:  General: Cooperative, no acute distress    Neurologic:  Alert and oriented X 3. HEENT: PERRLA, EOMI. Lungs:  No Wheezing  Heart:  s1 s2  Abdomen: Soft, Non distended, Non tender      Lab Data Reviewed:     Recent Results (from the past 24 hour(s))   PTT    Collection Time: 17  2:38 PM   Result Value Ref Range    aPTT 60.4 (H) 22.1 - 32.5 sec    aPTT, therapeutic range     58.0 - 77.0 SECS   PTT    Collection Time: 17 10:20 PM   Result Value Ref Range    aPTT 46.7 (H) 22.1 - 32.5 sec    aPTT, therapeutic range     58.0 - 77.0 SECS   PTT    Collection Time: 17  6:30 AM   Result Value Ref Range    aPTT 62.2 (H) 22.1 - 32.5 sec    aPTT, therapeutic range     58.0 - 77.0 SECS   CBC WITH AUTOMATED DIFF    Collection Time: 17  6:30 AM   Result Value Ref Range    WBC 26.1 (H) 3.6 - 11.0 K/uL    RBC 3.18 (L) 3.80 - 5.20 M/uL    HGB 9.7 (L) 11.5 - 16.0 g/dL    HCT 28.0 (L) 35.0 - 47.0 %    MCV 88.1 80.0 - 99.0 FL    MCH 30.5 26.0 - 34.0 PG    MCHC 34.6 30.0 - 36.5 g/dL    RDW 17.7 (H) 11.5 - 14.5 %    PLATELET 95 (L) 539 - 400 K/uL    NEUTROPHILS 76 (H) 32 - 75 %    BAND NEUTROPHILS 5 0 - 6 %    LYMPHOCYTES 9 (L) 12 - 49 %    MONOCYTES 10 5 - 13 %    EOSINOPHILS 0 0 - 7 %    BASOPHILS 0 0 - 1 %    ABS. NEUTROPHILS 21.2 (H) 1.8 - 8.0 K/UL    ABS. LYMPHOCYTES 2.3 0.8 - 3.5 K/UL    ABS. MONOCYTES 2.6 (H) 0.0 - 1.0 K/UL    ABS. EOSINOPHILS 0.0 0.0 - 0.4 K/UL    ABS. BASOPHILS 0.0 0.0 - 0.1 K/UL    DF MANUAL      RBC COMMENTS ANISOCYTOSIS  1+        WBC COMMENTS DOHLE BODIES     METABOLIC PANEL, BASIC    Collection Time: 05/19/17  6:30 AM   Result Value Ref Range    Sodium 145 136 - 145 mmol/L    Potassium 3.1 (L) 3.5 - 5.1 mmol/L    Chloride 108 97 - 108 mmol/L    CO2 29 21 - 32 mmol/L    Anion gap 8 5 - 15 mmol/L    Glucose 105 (H) 65 - 100 mg/dL    BUN 19 6 - 20 MG/DL    Creatinine 0.95 0.55 - 1.02 MG/DL    BUN/Creatinine ratio 20 12 - 20      GFR est AA >60 >60 ml/min/1.73m2    GFR est non-AA 58 (L) >60 ml/min/1.73m2    Calcium 7.8 (L) 8.5 - 10.1 MG/DL   MAGNESIUM    Collection Time: 05/19/17  6:30 AM   Result Value Ref Range    Magnesium 1.7 1.6 - 2.4 mg/dL   PHOSPHORUS    Collection Time: 05/19/17  6:30 AM   Result Value Ref Range    Phosphorus 1.6 (L) 2.6 - 4.7 MG/DL         ________________________________________________________________________       Assessment:   · PSBO - resolved, diet as tolerated, surgery following  · Elevated LFTs- resolving, likely from sepsis and hypotension     Patient Active Problem List   Diagnosis Code    RA (rheumatoid arthritis) ([facility]) M06.9    Essential hypertension I10    Postmenopausal Z78.0    Hypothyroidism E03.9    Allergic rhinitis J30.9    Tinea manus B35.2    Chronic constipation K59.09    Vitamin D deficiency E55.9    Abnormal gait R26.9    Prediabetes R73.03    Insomnia G47.00    Gastroesophageal reflux disease without esophagitis K21.9    Chronic midline low back pain without sciatica M54.5, G89.29    ACP (advance care planning) Z71.89    Septic shock ([facility]) A41.9, R65.21     Plan:   · Will sign off     Signed By: Nato Laws NP     5/19/2017  11:45 AM                 I have examined the patient.   I have reviewed the chart and agree with the documentation recorded by the NP, including the assessment, treatment plan, and disposition.         Akanksha Whiteside MD

## 2017-05-19 NOTE — PROGRESS NOTES
Chart reviewed. CM spoke with physical therapy. They are recommending SNF. CM met with pt to discuss SNF options. Pt stated that she would like to discuss with her son before deciding on a facility. Anticipate pt will be here through the weekend. Care management will follow.     Pérez Gerardo, MINGO/CRM

## 2017-05-19 NOTE — PROGRESS NOTES
Hospitalist Progress Note  Office: [phone]      Date of Service:  2017  NAME:  Kodak Romero  :  1945  MRN:  [id number]      Admission Summary:   66 yo woman with obesity, rheumatoid arthritis, chronic pain syndrome, gastroesophageal reflux disease,   hypertension, h/o herpes simplex virus infection was BIBEMS from home on 17 with nausea, vomiting, diarrhea and abdominal pain. Interval history / Subjective:    She had gone down for her stress test this morning. She reports feeling hungry, but otherwise well. No fevers, chills. Has some slight congestion. Assessment & Plan:     1. Septic shock (POA) with E. coli bacteremia, toxic encephalopathy   - leukocytosis, fever, hypotension, lactic acidosis, tachypnea, tachycardia on admission   - leukocytosis not significantly changed, though had been given stress dose steroids, given E. Coli bacteremia, concern raised for intra-abdominal source for infection   - abd US  - heterogenous liver, increase small right pleural effusion, normal biliary tree s/p cholecystectomy, no hydronephrosis   - aggressive IVF resuscitation   - was on norepinephrine gtt, now weaned off   - on empiric cefepime, received vancomycin, de-escalated to ceftriaxone   - blood cultures  - E. Coli 3/3 bottles   - repeat blood cultures  - no growth thus far   - stool Cx 5/15 - negative   - stool caliber not amenable to C diff testing currently   - repeat CT Abd/Pelvis with contrast    2. Troponin elevation / NSTEMI   - suspect demand ischemia in setting of septic shock   - she will require stress test prior to discharge, plan for 2 day stress test with first day on    - start aspirin   - received heparin drip x 48 hours   - seen by cardiology     3.   Partial small bowel obstruction (POA)   - RUQ and epigastic TTP, still with nausea, but has flatus   - KUB  -  Nasogastric tube in place with tip projected in the expected location of the stomach. Continued relative paucity of abdominal gas. - pain control, conservative management with bowel rest, diet advanced to clears, now back to cardiac diet   - GI / general surgery following    4. Hypokalemia (POA), hypophosphatemia   - replete prn    5. Anion gap metabolic acidosis (POA)   - likely due to lactic acidosis and exacerbated by GI losses and JEANINE, generally downtrending   - lactic acid normalized   - on sodium bicarbonate, now discontinued, started on normal saline    6. Acute kidney injury (POA)   - likely due to prerenal azotemia vs septic / hypotensive ATN   - on sodium bicarbonate, now discontinued   - CT abd/pelvis without contrast 5/15 - unremarkable kidneys   - nephrology following    7. Abnormal LFTs, hyperbilirubinemia (POA)   - suspect either dehydration or intra-abdominal sepsis, improving   - GI following   - avoid hepatotoxins    8. Acute gastroenteritis (POA)    - antiemetics prn   - pain control   - stool Cx 5/15 - negative   - C diff 5/15 - not able to be tested due to stool caliber   - PPI    9. Hypothyroidism   - TSH WNL; continue levothyroxine    10. Rheumatoid arthritis   - started stress dose steroids due to chronic steroid use, taper, to discontinue on 5/20   - hold home Imuran    11. Hypomagnesemia   - replete prn    12. Anemia   - suspect some element of hemodilution, seems improved, stable    Code status: FULL  DVT prophylaxis: heparin    Care Plan discussed with: Patient/Family and Nurse  Disposition: TBD. Came from home. Unclear disposition currently, will need second part of stress test on 5/22     Hospital Problems  Date Reviewed: 5/15/2017          Codes Class Noted POA    * (Principal)Septic shock ([facility] [address] ICD-10-CM: A41.9, R65.21  ICD-9-CM: 038.9, 785.52, 995.92  5/15/2017 Yes            Review of Systems:   Pertinent items are noted in HPI.      Vital Signs:    Last 24hrs VS reviewed since prior progress note. Most recent are:  Visit Vitals    /82 (BP Patient Position: Sitting)    Pulse 68    Temp 98.6 °F (37 °C)    Resp 18    Ht 5' 4\" (1.626 m)    Wt 104.6 kg (230 lb 9.6 oz)    SpO2 99%    Breastfeeding No    BMI 39.58 kg/m2         Intake/Output Summary (Last 24 hours) at 05/19/17 1757  Last data filed at 05/19/17 1315   Gross per 24 hour   Intake          1959.25 ml   Output             1475 ml   Net           484.25 ml      Physical Examination:     Constitutional:  awake, calm, obese   ENT:  oral mucosa slightly dry, oropharynx benign   Resp:  clear to auscultation anteriorly   CV:  regular rhythm, slightly tachycardic, distant heart sounds    GI:  hypoactive BS, soft, non distended, mildly tender to palpation in epigastrum, obese     Musculoskeletal:  moves all extremities    Neurologic:  AAOx3, responds appropriately to questions and commands     Skin:  Good turgor, no rashes or ulcers  Eyes:  PERRL    Data Review:    Review and/or order of clinical lab test  Review and/or order of tests in the radiology section of CPT  Review and/or order of tests in the medicine section of CPT    Labs:     Recent Labs      05/19/17 0630  05/18/17   0430   WBC  26.1*  23.6*   HGB  9.7*  9.5*   HCT  28.0*  27.3*   PLT  95*  95*     Recent Labs      05/19/17   0630  05/18/17   0430  05/17/17   0445   NA  145  148*  143   K  3.1*  2.7*  3.0*   CL  108  106  100   CO2  29  35*  35*   BUN  19  27*  31*   CREA  0.95  1.28*  1.93*   GLU  105*  120*  128*   CA  7.8*  7.7*  7.4*   MG  1.7  2.3  2.5*   PHOS  1.6*  1.8*  1.7*     Recent Labs      05/18/17   0430  05/17/17   0445   SGOT  53*  68*   ALT  42  45   AP  97  98   TBILI  1.3*  1.6*   TP  5.3*  5.4*   ALB  2.3*  2.6*   GLOB  3.0  2.8     Recent Labs      05/19/17   0630  05/18/17   2220  05/18/17   1438   APTT  62.2*  46.7*  60.4*      No results for input(s): FE, TIBC, PSAT, FERR in the last 72 hours.    No results found for: FOL, RBCF   No results for input(s): PH, PCO2, PO2 in the last 72 hours.   Recent Labs      05/17/17   0445   TROIQ  3.29*     Lab Results   Component Value Date/Time    Cholesterol, total 99 12/23/2015 01:45 AM    HDL Cholesterol 22 12/23/2015 01:45 AM    LDL, calculated 54.4 12/23/2015 01:45 AM    Triglyceride 113 12/23/2015 01:45 AM    CHOL/HDL Ratio 4.5 12/23/2015 01:45 AM     Lab Results   Component Value Date/Time    Glucose (POC) 98 05/19/2017 11:52 AM    Glucose (POC) 125 12/30/2015 11:28 AM    Glucose (POC) 101 12/23/2015 12:08 AM    Glucose (POC) 205 09/02/2015 11:44 AM    Glucose (POC) 112 09/02/2015 07:04 AM     Lab Results   Component Value Date/Time    Color DARK YELLOW 05/15/2017 01:44 PM    Appearance TURBID 05/15/2017 01:44 PM    Specific gravity 1.012 05/15/2017 01:44 PM    Specific gravity 1.005 12/23/2015 01:45 AM    pH (UA) 6.0 05/15/2017 01:44 PM    Protein 100 05/15/2017 01:44 PM    Glucose NEGATIVE  05/15/2017 01:44 PM    Ketone NEGATIVE  05/15/2017 01:44 PM    Bilirubin NEGATIVE  02/02/2017 01:27 AM    Urobilinogen 0.2 05/15/2017 01:44 PM    Nitrites NEGATIVE  05/15/2017 01:44 PM    Leukocyte Esterase MODERATE 05/15/2017 01:44 PM    Epithelial cells FEW 05/15/2017 01:44 PM    Bacteria 2+ 05/15/2017 01:44 PM    WBC 0-4 05/15/2017 01:44 PM    RBC 0-5 05/15/2017 01:44 PM     Medications Reviewed:     Current Facility-Administered Medications   Medication Dose Route Frequency    [START ON 5/20/2017] levothyroxine (SYNTHROID) tablet 50 mcg  50 mcg Oral ACB    sodium chloride (NS) flush 5-10 mL  5-10 mL IntraVENous Q8H    magnesium sulfate 2 g/50 ml IVPB (premix or compounded)  2 g IntraVENous ONCE    carvedilol (COREG) tablet 3.125 mg  3.125 mg Oral BID    dextrose 5% with KCl 20 mEq/L infusion   IntraVENous CONTINUOUS    cefTRIAXone (ROCEPHIN) 2 g in 0.9% sodium chloride (MBP/ADV) 50 mL  2 g IntraVENous Q24H    hydrocortisone Sod Succ (PF) (SOLU-CORTEF) injection 25 mg  25 mg IntraVENous Q12H    ipratropium (ATROVENT) 0.02 % nebulizer solution 0.5 mg  0.5 mg Nebulization Q6H PRN    aspirin chewable tablet 81 mg  81 mg Oral DAILY    HYDROmorphone (PF) (DILAUDID) injection 1 mg  1 mg IntraVENous Q4H PRN    ondansetron (ZOFRAN) injection 4 mg  4 mg IntraVENous Q4H PRN    pantoprazole (PROTONIX) 40 mg in sodium chloride 0.9 % 10 mL injection  40 mg IntraVENous DAILY     ______________________________________________________________________  EXPECTED LENGTH OF STAY: 5d 2h  ACTUAL LENGTH OF STAY:          [address], MD

## 2017-05-19 NOTE — PROGRESS NOTES
Cardiology Progress Note      5/19/2017 9:36 AM    Admit Date: 5/14/2017    Admit Diagnosis: Septic shock (HCC)      Subjective: Delores Bentley denies chest pain and SOB. Had nausea last night but no vomiting. Visit Vitals    /76    Pulse 68    Temp 98.6 °F (37 °C)    Resp 16    Ht 5' 4\" (1.626 m)    Wt 104.6 kg (230 lb 9.6 oz)    SpO2 96%    BMI 39.58 kg/m2     Current Facility-Administered Medications   Medication Dose Route Frequency    carvedilol (COREG) tablet 3.125 mg  3.125 mg Oral BID    dextrose 5% with KCl 20 mEq/L infusion   IntraVENous CONTINUOUS    cefTRIAXone (ROCEPHIN) 2 g in 0.9% sodium chloride (MBP/ADV) 50 mL  2 g IntraVENous Q24H    hydrocortisone Sod Succ (PF) (SOLU-CORTEF) injection 25 mg  25 mg IntraVENous Q12H    heparin 25,000 units in D5W 250 ml infusion  9-25 Units/kg/hr IntraVENous TITRATE    ipratropium (ATROVENT) 0.02 % nebulizer solution 0.5 mg  0.5 mg Nebulization Q6H PRN    aspirin chewable tablet 81 mg  81 mg Oral DAILY    HYDROmorphone (PF) (DILAUDID) injection 1 mg  1 mg IntraVENous Q4H PRN    ondansetron (ZOFRAN) injection 4 mg  4 mg IntraVENous Q4H PRN    pantoprazole (PROTONIX) 40 mg in sodium chloride 0.9 % 10 mL injection  40 mg IntraVENous DAILY         Objective:      Physical Exam:  Visit Vitals    /76    Pulse 68    Temp 98.6 °F (37 °C)    Resp 16    Ht 5' 4\" (1.626 m)    Wt 104.6 kg (230 lb 9.6 oz)    SpO2 96%    BMI 39.58 kg/m2     General Appearance:  Alert, no acute distress. Ears/Nose/Mouth/Throat:   Hearing grossly normal.         Neck: Supple. no JVD   Chest:   Clear to ausclatation bilaterally. No use of accessory muscles noted. Cardiovascular:  Regular rate and rhythm, S1, S2 normal, no murmur. Abdomen:   Soft, nondistended, generalized mild ttp, no gaurding. Extremities: No lower extremity edema bilaterally. Skin: Warm and dry.                Data Review:   Labs:    Recent Results (from the past 24 hour(s)) PTT    Collection Time: 05/18/17  2:38 PM   Result Value Ref Range    aPTT 60.4 (H) 22.1 - 32.5 sec    aPTT, therapeutic range     58.0 - 77.0 SECS   PTT    Collection Time: 05/18/17 10:20 PM   Result Value Ref Range    aPTT 46.7 (H) 22.1 - 32.5 sec    aPTT, therapeutic range     58.0 - 77.0 SECS       Telemetry: normal sinus rhythm        Assessment:     Principal Problem:    Septic shock ([address] (5/15/2017)        Plan:     1. NSTEMI: Pt with elevated cardiac markers, NSTEMI by definition. Probably has some degree of underlying CAD. Current elevations appear more from demand as pt is without chest pain, no ischemic ECG changes and preserved LV function with no wall motion abnormality by echo. Not a candidate for invasive work up at this time with sepsis/renal dysfunction/anemia, etc.  Continue aspirin. Heparin gtt for 48 hours total (completes today). (Started 5/17). Continue low dose Coreg (3.125 mg BID). No statin due to elevated LFTs. No ACE-I due to elevated creatinine. Lexiscan stress test today- will be 2 day test (due to body habitus)- resting images today with stress images to be completed on Monday, 5/22. 2. Acute renal insufficiency. - creatinine 1.28 yesterday - a.m. Labs still pending. 3. Sepsis: ID following. 4. E Coli bacteremia:  ID following. Surveillance cxs 5/16 NGTD  5. Anemia. Work up underway. Hgb stable yesterday (9.5) Hct 27.3 yesterday- a.m. Labs pending. 6. Hypokalemia- K=2.7 yesterday, repleted- repeat labs still pending this a.m. Tara Sánchez NP    Saw and evaluated pt and agree with above assessment and plan. First day of 2 day stress test today (resting images). Stable from cardiac standpoint otherwise and will follow back on Monday. Dr. Roge Justice available over weekend if needed. Serge Red MD       Signed By: Tara Sánchez NP     May 19, 2017

## 2017-05-20 ENCOUNTER — APPOINTMENT (OUTPATIENT)
Dept: NUCLEAR MEDICINE | Age: 72
DRG: 871 | End: 2017-05-20
Attending: INTERNAL MEDICINE
Payer: MEDICARE

## 2017-05-20 LAB
ANION GAP BLD CALC-SCNC: 7 MMOL/L (ref 5–15)
APTT PPP: 27.4 SEC (ref 22.1–32.5)
BASOPHILS # BLD AUTO: 0 K/UL (ref 0–0.1)
BASOPHILS # BLD: 0 % (ref 0–1)
BUN SERPL-MCNC: 14 MG/DL (ref 6–20)
BUN/CREAT SERPL: 16 (ref 12–20)
CALCIUM SERPL-MCNC: 8 MG/DL (ref 8.5–10.1)
CHLORIDE SERPL-SCNC: 105 MMOL/L (ref 97–108)
CO2 SERPL-SCNC: 31 MMOL/L (ref 21–32)
CREAT SERPL-MCNC: 0.89 MG/DL (ref 0.55–1.02)
DIFFERENTIAL METHOD BLD: ABNORMAL
EOSINOPHIL # BLD: 0.2 K/UL (ref 0–0.4)
EOSINOPHIL NFR BLD: 1 % (ref 0–7)
ERYTHROCYTE [DISTWIDTH] IN BLOOD BY AUTOMATED COUNT: 18.2 % (ref 11.5–14.5)
GLUCOSE SERPL-MCNC: 144 MG/DL (ref 65–100)
HCT VFR BLD AUTO: 30.4 % (ref 35–47)
HGB BLD-MCNC: 10.4 G/DL (ref 11.5–16)
LYMPHOCYTES # BLD AUTO: 4 % (ref 12–49)
LYMPHOCYTES # BLD: 0.9 K/UL (ref 0.8–3.5)
MAGNESIUM SERPL-MCNC: 1.9 MG/DL (ref 1.6–2.4)
MCH RBC QN AUTO: 30.5 PG (ref 26–34)
MCHC RBC AUTO-ENTMCNC: 34.2 G/DL (ref 30–36.5)
MCV RBC AUTO: 89.1 FL (ref 80–99)
METAMYELOCYTES NFR BLD MANUAL: 1 %
MONOCYTES # BLD: 0.7 K/UL (ref 0–1)
MONOCYTES NFR BLD AUTO: 3 % (ref 5–13)
NEUTS BAND NFR BLD MANUAL: 3 % (ref 0–6)
NEUTS SEG # BLD: 21.1 K/UL (ref 1.8–8)
NEUTS SEG NFR BLD AUTO: 88 % (ref 32–75)
PHOSPHATE SERPL-MCNC: 2 MG/DL (ref 2.6–4.7)
PLATELET # BLD AUTO: 113 K/UL (ref 150–400)
POTASSIUM SERPL-SCNC: 3.3 MMOL/L (ref 3.5–5.1)
RBC # BLD AUTO: 3.41 M/UL (ref 3.8–5.2)
RBC MORPH BLD: ABNORMAL
RBC MORPH BLD: ABNORMAL
SODIUM SERPL-SCNC: 143 MMOL/L (ref 136–145)
THERAPEUTIC RANGE,PTTT: NORMAL SECS (ref 58–77)
WBC # BLD AUTO: 23.2 K/UL (ref 3.6–11)
WBC MORPH BLD: ABNORMAL

## 2017-05-20 PROCEDURE — 65660000000 HC RM CCU STEPDOWN

## 2017-05-20 PROCEDURE — 74011250636 HC RX REV CODE- 250/636: Performed by: STUDENT IN AN ORGANIZED HEALTH CARE EDUCATION/TRAINING PROGRAM

## 2017-05-20 PROCEDURE — 74011250636 HC RX REV CODE- 250/636: Performed by: INTERNAL MEDICINE

## 2017-05-20 PROCEDURE — 80048 BASIC METABOLIC PNL TOTAL CA: CPT | Performed by: INTERNAL MEDICINE

## 2017-05-20 PROCEDURE — 74011250637 HC RX REV CODE- 250/637: Performed by: NURSE PRACTITIONER

## 2017-05-20 PROCEDURE — 94640 AIRWAY INHALATION TREATMENT: CPT

## 2017-05-20 PROCEDURE — 74011000250 HC RX REV CODE- 250: Performed by: STUDENT IN AN ORGANIZED HEALTH CARE EDUCATION/TRAINING PROGRAM

## 2017-05-20 PROCEDURE — C9113 INJ PANTOPRAZOLE SODIUM, VIA: HCPCS | Performed by: INTERNAL MEDICINE

## 2017-05-20 PROCEDURE — 83735 ASSAY OF MAGNESIUM: CPT | Performed by: INTERNAL MEDICINE

## 2017-05-20 PROCEDURE — 74011250637 HC RX REV CODE- 250/637: Performed by: SURGERY

## 2017-05-20 PROCEDURE — 85730 THROMBOPLASTIN TIME PARTIAL: CPT | Performed by: STUDENT IN AN ORGANIZED HEALTH CARE EDUCATION/TRAINING PROGRAM

## 2017-05-20 PROCEDURE — 85025 COMPLETE CBC W/AUTO DIFF WBC: CPT | Performed by: STUDENT IN AN ORGANIZED HEALTH CARE EDUCATION/TRAINING PROGRAM

## 2017-05-20 PROCEDURE — 74011000258 HC RX REV CODE- 258: Performed by: INTERNAL MEDICINE

## 2017-05-20 PROCEDURE — A9500 TC99M SESTAMIBI: HCPCS

## 2017-05-20 PROCEDURE — 84100 ASSAY OF PHOSPHORUS: CPT | Performed by: INTERNAL MEDICINE

## 2017-05-20 PROCEDURE — 74011250637 HC RX REV CODE- 250/637: Performed by: STUDENT IN AN ORGANIZED HEALTH CARE EDUCATION/TRAINING PROGRAM

## 2017-05-20 PROCEDURE — 74011000250 HC RX REV CODE- 250: Performed by: INTERNAL MEDICINE

## 2017-05-20 PROCEDURE — 36415 COLL VENOUS BLD VENIPUNCTURE: CPT | Performed by: STUDENT IN AN ORGANIZED HEALTH CARE EDUCATION/TRAINING PROGRAM

## 2017-05-20 PROCEDURE — 74011250637 HC RX REV CODE- 250/637: Performed by: INTERNAL MEDICINE

## 2017-05-20 RX ORDER — POTASSIUM CHLORIDE 750 MG/1
40 TABLET, FILM COATED, EXTENDED RELEASE ORAL
Status: COMPLETED | OUTPATIENT
Start: 2017-05-20 | End: 2017-05-20

## 2017-05-20 RX ADMIN — SODIUM CHLORIDE 40 MG: 9 INJECTION INTRAMUSCULAR; INTRAVENOUS; SUBCUTANEOUS at 09:56

## 2017-05-20 RX ADMIN — CARVEDILOL 3.12 MG: 3.12 TABLET, FILM COATED ORAL at 17:00

## 2017-05-20 RX ADMIN — CARVEDILOL 3.12 MG: 3.12 TABLET, FILM COATED ORAL at 09:55

## 2017-05-20 RX ADMIN — Medication 10 ML: at 14:00

## 2017-05-20 RX ADMIN — Medication 10 ML: at 07:23

## 2017-05-20 RX ADMIN — CEFTRIAXONE 2 G: 2 INJECTION, POWDER, FOR SOLUTION INTRAMUSCULAR; INTRAVENOUS at 07:22

## 2017-05-20 RX ADMIN — POTASSIUM CHLORIDE 40 MEQ: 750 TABLET, FILM COATED, EXTENDED RELEASE ORAL at 19:05

## 2017-05-20 RX ADMIN — HYDROCORTISONE SODIUM SUCCINATE 25 MG: 100 INJECTION, POWDER, FOR SOLUTION INTRAMUSCULAR; INTRAVENOUS at 09:55

## 2017-05-20 RX ADMIN — HYDROCORTISONE SODIUM SUCCINATE 25 MG: 100 INJECTION, POWDER, FOR SOLUTION INTRAMUSCULAR; INTRAVENOUS at 21:25

## 2017-05-20 RX ADMIN — ASPIRIN 81 MG 81 MG: 81 TABLET ORAL at 09:55

## 2017-05-20 RX ADMIN — IPRATROPIUM BROMIDE 0.5 MG: 0.5 SOLUTION RESPIRATORY (INHALATION) at 00:12

## 2017-05-20 RX ADMIN — Medication 10 ML: at 21:25

## 2017-05-20 RX ADMIN — LEVOTHYROXINE SODIUM 50 MCG: 50 TABLET ORAL at 07:23

## 2017-05-20 NOTE — PROGRESS NOTES
Bedside and Verbal shift change report given to Sima (oncoming nurse) by Reena Mares (offgoing nurse). Report included the following information SBAR, Kardex, Intake/Output, MAR and Accordion.

## 2017-05-20 NOTE — PROGRESS NOTES
Progress Note    Patient: Gabe Love MRN: [id number]  SSN: [id number]    YOB: 1945  Age: 67 y.o. Sex: female      Admit Date: 2017    Ileus versus SBO    Subjective:     No acute surgical issues. Pt reported feeling okay. Tolerating diet. Pt reported mild nausea but no vomiting. Passing flatus and having BM.     Objective:     Visit Vitals    /86 (BP 1 Location: Left arm, BP Patient Position: At rest;Supine)    Pulse 69    Temp 98.6 °F (37 °C)    Resp 18    Ht 5' 4\" (1.626 m)    Wt 230 lb 9.6 oz (104.6 kg)    SpO2 97%    Breastfeeding No    BMI 39.58 kg/m2       Temp (24hrs), Av.7 °F (37.1 °C), Min:98.2 °F (36.8 °C), Max:99.1 °F (37.3 °C)        Physical Exam:    Gen:  NAD  Pulm:  Unlabored  Abd:  S/moderately distended/mild TTP at supra-umbilical hernia site    Recent Results (from the past 24 hour(s))   PTT    Collection Time: 17  3:17 AM   Result Value Ref Range    aPTT 27.4 22.1 - 32.5 sec    aPTT, therapeutic range     58.0 - 98.5 SECS   METABOLIC PANEL, BASIC    Collection Time: 17  3:17 AM   Result Value Ref Range    Sodium 143 136 - 145 mmol/L    Potassium 3.3 (L) 3.5 - 5.1 mmol/L    Chloride 105 97 - 108 mmol/L    CO2 31 21 - 32 mmol/L    Anion gap 7 5 - 15 mmol/L    Glucose 144 (H) 65 - 100 mg/dL    BUN 14 6 - 20 MG/DL    Creatinine 0.89 0.55 - 1.02 MG/DL    BUN/Creatinine ratio 16 12 - 20      GFR est AA >60 >60 ml/min/1.73m2    GFR est non-AA >60 >60 ml/min/1.73m2    Calcium 8.0 (L) 8.5 - 10.1 MG/DL   PHOSPHORUS    Collection Time: 17  3:17 AM   Result Value Ref Range    Phosphorus 2.0 (L) 2.6 - 4.7 MG/DL   MAGNESIUM    Collection Time: 17  3:17 AM   Result Value Ref Range    Magnesium 1.9 1.6 - 2.4 mg/dL   CBC WITH AUTOMATED DIFF    Collection Time: 17  3:17 AM   Result Value Ref Range    WBC 23.2 (H) 3.6 - 11.0 K/uL    RBC 3.41 (L) 3.80 - 5.20 M/uL    HGB 10.4 (L) 11.5 - 16.0 g/dL    HCT 30.4 (L) 35.0 - 47.0 %    MCV 89.1 80.0 - 99.0 FL    MCH 30.5 26.0 - 34.0 PG    MCHC 34.2 30.0 - 36.5 g/dL    RDW 18.2 (H) 11.5 - 14.5 %    PLATELET 780 (L) 847 - 400 K/uL    NEUTROPHILS 88 (H) 32 - 75 %    BAND NEUTROPHILS 3 0 - 6 %    LYMPHOCYTES 4 (L) 12 - 49 %    MONOCYTES 3 (L) 5 - 13 %    EOSINOPHILS 1 0 - 7 %    BASOPHILS 0 0 - 1 %    METAMYELOCYTES 1 (H) 0 %    ABS. NEUTROPHILS 21.1 (H) 1.8 - 8.0 K/UL    ABS. LYMPHOCYTES 0.9 0.8 - 3.5 K/UL    ABS. MONOCYTES 0.7 0.0 - 1.0 K/UL    ABS. EOSINOPHILS 0.2 0.0 - 0.4 K/UL    ABS. BASOPHILS 0.0 0.0 - 0.1 K/UL    DF MANUAL      RBC COMMENTS ANISOCYTOSIS  1+        RBC COMMENTS TIDWELL JOLLY BODIES  PRESENT        WBC COMMENTS DOHLE BODIES           Assessment:     Hospital Problems  Date Reviewed: 5/15/2017          Codes Class Noted POA    * (Principal)Septic shock ([facility] ICD-10-CM: A41.9, R65.21  ICD-9-CM: 038.9, 785.52, 995.92  5/15/2017 Yes              Plan/Recommendations/Medical Decision Making:     - SBO versus ileus:  No acute indication for operation.   Appears to be resolving  - Continue diet  - Bowel regiment  - May follow-up as outpatient to discuss   - Will follow on periphery    Signed By: Mariana Gallo MD     May 20, 2017

## 2017-05-20 NOTE — PROGRESS NOTES
Name: Shelia Street   MRN: [id number]  : 1945      Assessment:  JEANINE- likely septic ATN in the setting of SBO/hypotension and NSTEMI; JEANINE has resolved. No evidence of Contrast nephropathy either (s/p IV Contrast on  with CT A/P)  E. Coli Bacteremia  SBO- resolving  Low Mg-better  Low K  High Na-resolved  Alkalosis-resolved  Fluid overload- residual volume excess from prior volume resuscitation. Wt also has gone up. CT A/P shows b/l pleural effusions/compressive atelectasis  JEANINE has resolved. Lytes are improved but still replacement    Plan/Recommendations:  D/C IVF  Will start diuresing - from tomm  Encourage oral hydration  Replace lytes PRN  AM labs    Subjective:  Taking PO. More alert awake. WBC still up    ROS:   Denies n/v/cp.  Mild sob  Exam:  Visit Vitals    /77 (BP 1 Location: Left arm, BP Patient Position: Sitting)    Pulse 69    Temp 98.4 °F (36.9 °C)    Resp 18    Ht 5' 4\" (1.626 m)    Wt 104.6 kg (230 lb 9.6 oz)    SpO2 95%    Breastfeeding No    BMI 39.58 kg/m2       NAD  Clear with dec BS  RRR  Tr edema  Alert awake  Wilkerson+    Current Facility-Administered Medications   Medication Dose Route Frequency Last Dose    levothyroxine (SYNTHROID) tablet 50 mcg  50 mcg Oral ACB 50 mcg at 17 0723    sodium chloride (NS) flush 5-10 mL  5-10 mL IntraVENous Q8H 10 mL at 17 1400    carvedilol (COREG) tablet 3.125 mg  3.125 mg Oral BID 3.125 mg at 17 1700    dextrose 5% with KCl 20 mEq/L infusion   IntraVENous CONTINUOUS 75 mL at 17 1218    cefTRIAXone (ROCEPHIN) 2 g in 0.9% sodium chloride (MBP/ADV) 50 mL  2 g IntraVENous Q24H 2 g at 17 2186    hydrocortisone Sod Succ (PF) (SOLU-CORTEF) injection 25 mg  25 mg IntraVENous Q12H 25 mg at 17 0955    ipratropium (ATROVENT) 0.02 % nebulizer solution 0.5 mg  0.5 mg Nebulization Q6H PRN 0.5 mg at 05/20/17 0012    aspirin chewable tablet 81 mg  81 mg Oral DAILY 81 mg at 05/20/17 0955    HYDROmorphone (PF) (DILAUDID) injection 1 mg  1 mg IntraVENous Q4H PRN 1 mg at 05/19/17 0823    ondansetron (ZOFRAN) injection 4 mg  4 mg IntraVENous Q4H PRN 4 mg at 05/18/17 1443    pantoprazole (PROTONIX) 40 mg in sodium chloride 0.9 % 10 mL injection  40 mg IntraVENous DAILY 40 mg at 05/20/17 0157       Labs/Data:    Lab Results   Component Value Date/Time    WBC 23.2 05/20/2017 03:17 AM    Hemoglobin (POC) 11.9 12/03/2010 01:40 AM    HGB 10.4 05/20/2017 03:17 AM    Hematocrit (POC) 35 12/03/2010 01:40 AM    HCT 30.4 05/20/2017 03:17 AM    PLATELET 543 83/08/4706 03:17 AM    MCV 89.1 05/20/2017 03:17 AM       Lab Results   Component Value Date/Time    Sodium 143 05/20/2017 03:17 AM    Potassium 3.3 05/20/2017 03:17 AM    Chloride 105 05/20/2017 03:17 AM    CO2 31 05/20/2017 03:17 AM    Anion gap 7 05/20/2017 03:17 AM    Glucose 144 05/20/2017 03:17 AM    BUN 14 05/20/2017 03:17 AM    Creatinine 0.89 05/20/2017 03:17 AM    BUN/Creatinine ratio 16 05/20/2017 03:17 AM    GFR est AA >60 05/20/2017 03:17 AM    GFR est non-AA >60 05/20/2017 03:17 AM    Calcium 8.0 05/20/2017 03:17 AM       Wt Readings from Last 3 Encounters:   05/17/17 104.6 kg (230 lb 9.6 oz)   02/02/17 96.6 kg (213 lb)   11/30/16 96.8 kg (213 lb 8 oz)         Intake/Output Summary (Last 24 hours) at 05/20/17 1807  Last data filed at 05/20/17 1704   Gross per 24 hour   Intake             1050 ml   Output             3677 ml   Net            -2627 ml       Patient seen and examined. Chart reviewed. Labs, data and other pertinent notes reviewed in last 24 hrs.     PMH/SH/FH reviewed and unchanged compared to H&P    Discussed with pt      Santosh Geiger MD

## 2017-05-20 NOTE — PROGRESS NOTES
Bedside shift change report given to Sahhid Richey (oncoming nurse) by Julius Louie (offgoing nurse). Report included the following information SBAR, Kardex, ED Summary, Intake/Output, MAR, Accordion and Recent Results.

## 2017-05-21 LAB
ANION GAP BLD CALC-SCNC: 8 MMOL/L (ref 5–15)
APTT PPP: 27.8 SEC (ref 22.1–32.5)
BACTERIA SPEC CULT: NORMAL
BUN SERPL-MCNC: 9 MG/DL (ref 6–20)
BUN/CREAT SERPL: 12 (ref 12–20)
CALCIUM SERPL-MCNC: 8.1 MG/DL (ref 8.5–10.1)
CHLORIDE SERPL-SCNC: 108 MMOL/L (ref 97–108)
CO2 SERPL-SCNC: 28 MMOL/L (ref 21–32)
CREAT SERPL-MCNC: 0.77 MG/DL (ref 0.55–1.02)
GLUCOSE SERPL-MCNC: 134 MG/DL (ref 65–100)
MAGNESIUM SERPL-MCNC: 1.7 MG/DL (ref 1.6–2.4)
PHOSPHATE SERPL-MCNC: 2.5 MG/DL (ref 2.6–4.7)
POTASSIUM SERPL-SCNC: 3.4 MMOL/L (ref 3.5–5.1)
SERVICE CMNT-IMP: NORMAL
SODIUM SERPL-SCNC: 144 MMOL/L (ref 136–145)
THERAPEUTIC RANGE,PTTT: NORMAL SECS (ref 58–77)

## 2017-05-21 PROCEDURE — 74011250636 HC RX REV CODE- 250/636: Performed by: INTERNAL MEDICINE

## 2017-05-21 PROCEDURE — 74011000250 HC RX REV CODE- 250: Performed by: INTERNAL MEDICINE

## 2017-05-21 PROCEDURE — 85730 THROMBOPLASTIN TIME PARTIAL: CPT | Performed by: STUDENT IN AN ORGANIZED HEALTH CARE EDUCATION/TRAINING PROGRAM

## 2017-05-21 PROCEDURE — 65660000000 HC RM CCU STEPDOWN

## 2017-05-21 PROCEDURE — 74011000250 HC RX REV CODE- 250: Performed by: STUDENT IN AN ORGANIZED HEALTH CARE EDUCATION/TRAINING PROGRAM

## 2017-05-21 PROCEDURE — 74011250637 HC RX REV CODE- 250/637: Performed by: SURGERY

## 2017-05-21 PROCEDURE — 74011250637 HC RX REV CODE- 250/637: Performed by: INTERNAL MEDICINE

## 2017-05-21 PROCEDURE — C9113 INJ PANTOPRAZOLE SODIUM, VIA: HCPCS | Performed by: INTERNAL MEDICINE

## 2017-05-21 PROCEDURE — 84100 ASSAY OF PHOSPHORUS: CPT | Performed by: INTERNAL MEDICINE

## 2017-05-21 PROCEDURE — 74011000258 HC RX REV CODE- 258: Performed by: INTERNAL MEDICINE

## 2017-05-21 PROCEDURE — 74011250637 HC RX REV CODE- 250/637: Performed by: NURSE PRACTITIONER

## 2017-05-21 PROCEDURE — 36415 COLL VENOUS BLD VENIPUNCTURE: CPT | Performed by: STUDENT IN AN ORGANIZED HEALTH CARE EDUCATION/TRAINING PROGRAM

## 2017-05-21 PROCEDURE — 83735 ASSAY OF MAGNESIUM: CPT | Performed by: INTERNAL MEDICINE

## 2017-05-21 PROCEDURE — 74011250637 HC RX REV CODE- 250/637: Performed by: STUDENT IN AN ORGANIZED HEALTH CARE EDUCATION/TRAINING PROGRAM

## 2017-05-21 PROCEDURE — 80048 BASIC METABOLIC PNL TOTAL CA: CPT | Performed by: INTERNAL MEDICINE

## 2017-05-21 RX ORDER — FUROSEMIDE 40 MG/1
40 TABLET ORAL DAILY
Status: DISPENSED | OUTPATIENT
Start: 2017-05-21 | End: 2017-05-24

## 2017-05-21 RX ORDER — POTASSIUM CHLORIDE 750 MG/1
20 TABLET, FILM COATED, EXTENDED RELEASE ORAL DAILY
Status: DISPENSED | OUTPATIENT
Start: 2017-05-21 | End: 2017-05-24

## 2017-05-21 RX ADMIN — LEVOTHYROXINE SODIUM 50 MCG: 50 TABLET ORAL at 07:13

## 2017-05-21 RX ADMIN — CEFTRIAXONE 2 G: 2 INJECTION, POWDER, FOR SOLUTION INTRAMUSCULAR; INTRAVENOUS at 07:13

## 2017-05-21 RX ADMIN — LIDOCAINE HYDROCHLORIDE 5 ML: 20 SOLUTION ORAL; TOPICAL at 13:29

## 2017-05-21 RX ADMIN — POTASSIUM CHLORIDE 20 MEQ: 750 TABLET, FILM COATED, EXTENDED RELEASE ORAL at 13:29

## 2017-05-21 RX ADMIN — ASPIRIN 81 MG 81 MG: 81 TABLET ORAL at 08:40

## 2017-05-21 RX ADMIN — Medication 10 ML: at 16:41

## 2017-05-21 RX ADMIN — CARVEDILOL 3.12 MG: 3.12 TABLET, FILM COATED ORAL at 17:42

## 2017-05-21 RX ADMIN — Medication 10 ML: at 13:33

## 2017-05-21 RX ADMIN — LIDOCAINE HYDROCHLORIDE 5 ML: 20 SOLUTION ORAL; TOPICAL at 16:37

## 2017-05-21 RX ADMIN — CARVEDILOL 3.12 MG: 3.12 TABLET, FILM COATED ORAL at 08:40

## 2017-05-21 RX ADMIN — SODIUM CHLORIDE 40 MG: 9 INJECTION INTRAMUSCULAR; INTRAVENOUS; SUBCUTANEOUS at 08:41

## 2017-05-21 RX ADMIN — FUROSEMIDE 40 MG: 40 TABLET ORAL at 13:29

## 2017-05-21 RX ADMIN — ONDANSETRON 4 MG: 2 INJECTION INTRAMUSCULAR; INTRAVENOUS at 16:41

## 2017-05-21 NOTE — PROGRESS NOTES
Bedside shift change report given to Shalonda Elizabeth RN (oncoming nurse) by Dianna Waters RN (offgoing nurse). Report included the following information SBAR, Intake/Output and MAR.

## 2017-05-21 NOTE — PROGRESS NOTES
Name: Esteban Watson   MRN: [id number]  : 1945      Assessment:  JEANINE- likely septic ATN in the setting of SBO/hypotension and NSTEMI; JEANINE has resolved. No evidence of Contrast nephropathy either (s/p IV Contrast on  with CT A/P)  E. Coli Bacteremia  Hx of recurrent Klebsiella bacteremia/Hx of L4-5 diskitis in past  SBO- resolved  Low K/low Phos  Fluid overload- residual volume excess from prior volume resuscitation. Wt also has gone up. CT A/P shows b/l pleural effusions/compressive atelectasis    JEANINE has resolved. Lytes are improved. No recent wt, but last wt was quite up. Will give couple days of lasix to get rid of excess volume, which she got before when she had SBO. Not much to add otherwise    Plan/Recommendations:  Start  Lasix 40 mg daily + oral KCL 20 meq daily for 3 days   Encourage oral hydration  Replace lytes PRN  AM labs    Will sign off at this point. Please call us back with any questions/concerns      Subjective:  Nausea from oral KCL. No vomiting. Taking PO    ROS:   Denies n/v/cp.  Mild sob  Exam:  Visit Vitals    /82 (BP 1 Location: Left arm, BP Patient Position: At rest)    Pulse 73    Temp 98.2 °F (36.8 °C)    Resp 18    Ht 5' 4\" (1.626 m)    Wt 104.6 kg (230 lb 9.6 oz)    SpO2 97%    Breastfeeding No    BMI 39.58 kg/m2       NAD  Clear with dec BS  RRR  Tr edema  Alert awake    Current Facility-Administered Medications   Medication Dose Route Frequency Last Dose    magic mouthwash cpd (without sucralfate)  5 mL Oral TIDAC      furosemide (LASIX) tablet 40 mg  40 mg Oral DAILY      potassium chloride SR (KLOR-CON 10) tablet 20 mEq  20 mEq Oral DAILY      levothyroxine (SYNTHROID) tablet 50 mcg  50 mcg Oral ACB 50 mcg at 17 0713    sodium chloride (NS) flush 5-10 mL  5-10 mL IntraVENous Q8H 10 mL at 17 7831    carvedilol (COREG) tablet 3.125 mg  3.125 mg Oral BID 3.125 mg at 05/21/17 0840    cefTRIAXone (ROCEPHIN) 2 g in 0.9% sodium chloride (MBP/ADV) 50 mL  2 g IntraVENous Q24H 2 g at 05/21/17 0713    ipratropium (ATROVENT) 0.02 % nebulizer solution 0.5 mg  0.5 mg Nebulization Q6H PRN 0.5 mg at 05/20/17 0012    aspirin chewable tablet 81 mg  81 mg Oral DAILY 81 mg at 05/21/17 0840    HYDROmorphone (PF) (DILAUDID) injection 1 mg  1 mg IntraVENous Q4H PRN 1 mg at 05/19/17 0823    ondansetron (ZOFRAN) injection 4 mg  4 mg IntraVENous Q4H PRN 4 mg at 05/18/17 1443    pantoprazole (PROTONIX) 40 mg in sodium chloride 0.9 % 10 mL injection  40 mg IntraVENous DAILY 40 mg at 05/21/17 0841       Labs/Data:    Lab Results   Component Value Date/Time    WBC 23.2 05/20/2017 03:17 AM    Hemoglobin (POC) 11.9 12/03/2010 01:40 AM    HGB 10.4 05/20/2017 03:17 AM    Hematocrit (POC) 35 12/03/2010 01:40 AM    HCT 30.4 05/20/2017 03:17 AM    PLATELET 399 72/69/3906 03:17 AM    MCV 89.1 05/20/2017 03:17 AM       Lab Results   Component Value Date/Time    Sodium 144 05/21/2017 04:30 AM    Potassium 3.4 05/21/2017 04:30 AM    Chloride 108 05/21/2017 04:30 AM    CO2 28 05/21/2017 04:30 AM    Anion gap 8 05/21/2017 04:30 AM    Glucose 134 05/21/2017 04:30 AM    BUN 9 05/21/2017 04:30 AM    Creatinine 0.77 05/21/2017 04:30 AM    BUN/Creatinine ratio 12 05/21/2017 04:30 AM    GFR est AA >60 05/21/2017 04:30 AM    GFR est non-AA >60 05/21/2017 04:30 AM    Calcium 8.1 05/21/2017 04:30 AM       Wt Readings from Last 3 Encounters:   05/17/17 104.6 kg (230 lb 9.6 oz)   02/02/17 96.6 kg (213 lb)   11/30/16 96.8 kg (213 lb 8 oz)         Intake/Output Summary (Last 24 hours) at 05/21/17 1202  Last data filed at 05/21/17 0424   Gross per 24 hour   Intake                0 ml   Output             2575 ml   Net            -2575 ml       Patient seen and examined. Chart reviewed. Labs, data and other pertinent notes reviewed in last 24 hrs.     PMH/SH/FH reviewed and unchanged compared to H&P    Discussed with pt      Kuldip Ervin MD

## 2017-05-21 NOTE — PROGRESS NOTES
Infectious Diseases Progress Note    Antibiotic Summary:  Vancomycin 5/15 x 1 dose  Flagyl  5/15 --   Cefepime 5/15 --   Rocephin  -- presenr      Subjective:     She had nausea today that she attributes to oral potassium supplement. Also has some upper abdominal pain. Objective:     Vitals:   Visit Vitals    /77 (BP 1 Location: Left arm, BP Patient Position: Sitting)    Pulse 69    Temp 98.4 °F (36.9 °C)    Resp 18    Ht 5' 4\" (1.626 m)    Wt 104.6 kg (230 lb 9.6 oz)    SpO2 95%    Breastfeeding No    BMI 39.58 kg/m2        Tmax:  Temp (24hrs), Av.6 °F (37 °C), Min:98.2 °F (36.8 °C), Max:99.1 °F (37.3 °C)      Exam:  General appearance: alert, no distress  Lungs: intermittent bilateral wheezing  Heart: regular rate and rhythm  Abdomen: mildly tender in the upper abdomen  Extremities: no cellulitis  Skin: no rash  Neurologic: A&O    IV Lines: Left subclavian CVL inserted 5/15/2017    Labs:    Recent Labs      17   0317  17   0630  17   0430   WBC  23.2*  26.1*  23.6*   HGB  10.4*  9.7*  9.5*   PLT  113*  95*  95*   BUN  14  19  27*   CREA  0.89  0.95  1.28*   TBILI   --    --   1.3*   SGOT   --    --   53*   AP   --    --   97      BLOOD CULTURES:    = E coli in 3 of 3 bottles    = NGSF    CT abdomen & pelvis on 2017:  IMPRESSION:   1. New moderate sized bilateral pleural effusions and worsening bibasilar    compressive atelectasis. 2. New, small amount of free intraperitoneal fluid. 3. New anasarca. 4. Indeterminate 1.1 x 1.1 cm right hepatic lobe lesion, new compared to prior    CT dated 2015. Multiphasic CT of the abdomen can be performed     for further evaluation, as indicated. 5. Circumferentially thickened long segment loop of small bowel. Assessment:     1. E coli bacteremia -- source ? -- temps down -- still with leukocytosis    2. History of recurrent Klebsiella bacteremia     3.  History of L4-L5 diskitis for which she has received greater than 12   weeks of IV therapy with normalization of her inflammation markers    4. Acute renal failure -- resolved    5. Rheumatoid arthriti     6. History of dilated bile ducts       Plan:     1.  Leonardo Rico MD

## 2017-05-22 LAB
ALBUMIN SERPL BCP-MCNC: 2.2 G/DL (ref 3.5–5)
ALBUMIN/GLOB SERPL: 0.7 {RATIO} (ref 1.1–2.2)
ALP SERPL-CCNC: 94 U/L (ref 45–117)
ALT SERPL-CCNC: 20 U/L (ref 12–78)
ANION GAP BLD CALC-SCNC: 7 MMOL/L (ref 5–15)
ANION GAP BLD CALC-SCNC: 8 MMOL/L (ref 5–15)
APTT PPP: 26 SEC (ref 22.1–32.5)
APTT PPP: 27 SEC (ref 22.1–32.5)
AST SERPL W P-5'-P-CCNC: 16 U/L (ref 15–37)
ATTENDING PHYSICIAN, CST07: NORMAL
BASOPHILS # BLD AUTO: 0 K/UL (ref 0–0.1)
BASOPHILS # BLD: 0 % (ref 0–1)
BILIRUB SERPL-MCNC: 1.1 MG/DL (ref 0.2–1)
BUN SERPL-MCNC: 7 MG/DL (ref 6–20)
BUN SERPL-MCNC: 7 MG/DL (ref 6–20)
BUN/CREAT SERPL: 10 (ref 12–20)
BUN/CREAT SERPL: 11 (ref 12–20)
CALCIUM SERPL-MCNC: 7.8 MG/DL (ref 8.5–10.1)
CALCIUM SERPL-MCNC: 7.9 MG/DL (ref 8.5–10.1)
CHLORIDE SERPL-SCNC: 111 MMOL/L (ref 97–108)
CHLORIDE SERPL-SCNC: 112 MMOL/L (ref 97–108)
CO2 SERPL-SCNC: 26 MMOL/L (ref 21–32)
CO2 SERPL-SCNC: 29 MMOL/L (ref 21–32)
CREAT SERPL-MCNC: 0.66 MG/DL (ref 0.55–1.02)
CREAT SERPL-MCNC: 0.7 MG/DL (ref 0.55–1.02)
DIAGNOSIS, 93000: NORMAL
DUKE TM SCORE RESULT, CST14: NORMAL
DUKE TREADMILL SCORE, CST13: NORMAL
ECG INTERP BEFORE EX, CST11: NORMAL
ECG INTERP DURING EX, CST12: NORMAL
EOSINOPHIL # BLD: 0.3 K/UL (ref 0–0.4)
EOSINOPHIL NFR BLD: 3 % (ref 0–7)
ERYTHROCYTE [DISTWIDTH] IN BLOOD BY AUTOMATED COUNT: 19.3 % (ref 11.5–14.5)
FUNCTIONAL CAPACITY, CST17: NORMAL
GLOBULIN SER CALC-MCNC: 3.3 G/DL (ref 2–4)
GLUCOSE SERPL-MCNC: 101 MG/DL (ref 65–100)
GLUCOSE SERPL-MCNC: 102 MG/DL (ref 65–100)
HCT VFR BLD AUTO: 30.3 % (ref 35–47)
HGB BLD-MCNC: 10 G/DL (ref 11.5–16)
KNOWN CARDIAC CONDITION, CST08: NORMAL
LYMPHOCYTES # BLD AUTO: 13 % (ref 12–49)
LYMPHOCYTES # BLD: 1.7 K/UL (ref 0.8–3.5)
MAGNESIUM SERPL-MCNC: 1.3 MG/DL (ref 1.6–2.4)
MAX. DIASTOLIC BP, CST04: 82 MMHG
MAX. HEART RATE, CST05: 90 BPM
MAX. SYSTOLIC BP, CST03: 164 MMHG
MCH RBC QN AUTO: 30.3 PG (ref 26–34)
MCHC RBC AUTO-ENTMCNC: 33 G/DL (ref 30–36.5)
MCV RBC AUTO: 91.8 FL (ref 80–99)
MONOCYTES # BLD: 1.6 K/UL (ref 0–1)
MONOCYTES NFR BLD AUTO: 12 % (ref 5–13)
NEUTS SEG # BLD: 9.8 K/UL (ref 1.8–8)
NEUTS SEG NFR BLD AUTO: 72 % (ref 32–75)
OVERALL BP RESPONSE TO EXERCISE, CST16: NORMAL
OVERALL HR RESPONSE TO EXERCISE, CST15: NORMAL
PEAK EX METS, CST10: 1 METS
PHOSPHATE SERPL-MCNC: 2.8 MG/DL (ref 2.6–4.7)
PLATELET # BLD AUTO: 159 K/UL (ref 150–400)
POTASSIUM SERPL-SCNC: 3.4 MMOL/L (ref 3.5–5.1)
POTASSIUM SERPL-SCNC: 3.4 MMOL/L (ref 3.5–5.1)
PROT SERPL-MCNC: 5.5 G/DL (ref 6.4–8.2)
PROTOCOL NAME, CST01: NORMAL
RBC # BLD AUTO: 3.3 M/UL (ref 3.8–5.2)
SODIUM SERPL-SCNC: 146 MMOL/L (ref 136–145)
SODIUM SERPL-SCNC: 147 MMOL/L (ref 136–145)
TEST INDICATION, CST09: NORMAL
THERAPEUTIC RANGE,PTTT: NORMAL SECS (ref 58–77)
THERAPEUTIC RANGE,PTTT: NORMAL SECS (ref 58–77)
WBC # BLD AUTO: 13.4 K/UL (ref 3.6–11)

## 2017-05-22 PROCEDURE — 36415 COLL VENOUS BLD VENIPUNCTURE: CPT | Performed by: STUDENT IN AN ORGANIZED HEALTH CARE EDUCATION/TRAINING PROGRAM

## 2017-05-22 PROCEDURE — 74011250636 HC RX REV CODE- 250/636: Performed by: INTERNAL MEDICINE

## 2017-05-22 PROCEDURE — 85025 COMPLETE CBC W/AUTO DIFF WBC: CPT

## 2017-05-22 PROCEDURE — 74011250637 HC RX REV CODE- 250/637: Performed by: SURGERY

## 2017-05-22 PROCEDURE — 85730 THROMBOPLASTIN TIME PARTIAL: CPT | Performed by: STUDENT IN AN ORGANIZED HEALTH CARE EDUCATION/TRAINING PROGRAM

## 2017-05-22 PROCEDURE — 84100 ASSAY OF PHOSPHORUS: CPT | Performed by: INTERNAL MEDICINE

## 2017-05-22 PROCEDURE — 74011250637 HC RX REV CODE- 250/637: Performed by: NURSE PRACTITIONER

## 2017-05-22 PROCEDURE — 80048 BASIC METABOLIC PNL TOTAL CA: CPT | Performed by: INTERNAL MEDICINE

## 2017-05-22 PROCEDURE — 74011000258 HC RX REV CODE- 258: Performed by: INTERNAL MEDICINE

## 2017-05-22 PROCEDURE — 83735 ASSAY OF MAGNESIUM: CPT | Performed by: INTERNAL MEDICINE

## 2017-05-22 PROCEDURE — C9113 INJ PANTOPRAZOLE SODIUM, VIA: HCPCS | Performed by: INTERNAL MEDICINE

## 2017-05-22 PROCEDURE — 74011250637 HC RX REV CODE- 250/637: Performed by: INTERNAL MEDICINE

## 2017-05-22 PROCEDURE — 80053 COMPREHEN METABOLIC PANEL: CPT

## 2017-05-22 PROCEDURE — 97116 GAIT TRAINING THERAPY: CPT

## 2017-05-22 PROCEDURE — 93017 CV STRESS TEST TRACING ONLY: CPT

## 2017-05-22 PROCEDURE — 74011000250 HC RX REV CODE- 250: Performed by: INTERNAL MEDICINE

## 2017-05-22 PROCEDURE — 65660000000 HC RM CCU STEPDOWN

## 2017-05-22 RX ORDER — MAGNESIUM SULFATE HEPTAHYDRATE 40 MG/ML
2 INJECTION, SOLUTION INTRAVENOUS ONCE
Status: COMPLETED | OUTPATIENT
Start: 2017-05-22 | End: 2017-05-22

## 2017-05-22 RX ORDER — POTASSIUM CHLORIDE 750 MG/1
40 TABLET, FILM COATED, EXTENDED RELEASE ORAL
Status: DISCONTINUED | OUTPATIENT
Start: 2017-05-22 | End: 2017-05-22

## 2017-05-22 RX ORDER — POTASSIUM CHLORIDE 750 MG/1
40 TABLET, FILM COATED, EXTENDED RELEASE ORAL
Status: COMPLETED | OUTPATIENT
Start: 2017-05-22 | End: 2017-05-22

## 2017-05-22 RX ORDER — SODIUM CHLORIDE 0.9 % (FLUSH) 0.9 %
20 SYRINGE (ML) INJECTION
Status: COMPLETED | OUTPATIENT
Start: 2017-05-22 | End: 2017-05-22

## 2017-05-22 RX ORDER — ATORVASTATIN CALCIUM 20 MG/1
20 TABLET, FILM COATED ORAL
Status: DISCONTINUED | OUTPATIENT
Start: 2017-05-22 | End: 2017-05-24 | Stop reason: HOSPADM

## 2017-05-22 RX ADMIN — LEVOTHYROXINE SODIUM 50 MCG: 50 TABLET ORAL at 07:05

## 2017-05-22 RX ADMIN — Medication 10 ML: at 07:05

## 2017-05-22 RX ADMIN — Medication 20 ML: at 10:47

## 2017-05-22 RX ADMIN — REGADENOSON 0.4 MG: 0.08 INJECTION, SOLUTION INTRAVENOUS at 10:46

## 2017-05-22 RX ADMIN — CEFTRIAXONE 2 G: 2 INJECTION, POWDER, FOR SOLUTION INTRAMUSCULAR; INTRAVENOUS at 07:08

## 2017-05-22 RX ADMIN — LIDOCAINE HYDROCHLORIDE 5 ML: 20 SOLUTION ORAL; TOPICAL at 16:53

## 2017-05-22 RX ADMIN — SODIUM CHLORIDE 40 MG: 9 INJECTION INTRAMUSCULAR; INTRAVENOUS; SUBCUTANEOUS at 08:38

## 2017-05-22 RX ADMIN — LIDOCAINE HYDROCHLORIDE 5 ML: 20 SOLUTION ORAL; TOPICAL at 07:14

## 2017-05-22 RX ADMIN — Medication 10 ML: at 14:00

## 2017-05-22 RX ADMIN — Medication 10 ML: at 21:42

## 2017-05-22 RX ADMIN — CARVEDILOL 3.12 MG: 3.12 TABLET, FILM COATED ORAL at 16:52

## 2017-05-22 RX ADMIN — POTASSIUM CHLORIDE 40 MEQ: 750 TABLET, FILM COATED, EXTENDED RELEASE ORAL at 16:52

## 2017-05-22 RX ADMIN — MAGNESIUM SULFATE HEPTAHYDRATE 2 G: 40 INJECTION, SOLUTION INTRAVENOUS at 09:52

## 2017-05-22 RX ADMIN — ATORVASTATIN CALCIUM 20 MG: 20 TABLET, FILM COATED ORAL at 21:43

## 2017-05-22 NOTE — PROGRESS NOTES
Ms. Thad Mckeon reports abdominal pain this AM. Overall feels better. Tolerating diet. Moving bowels. Tm 99 HR: 69 BP: 131/80 Resp Rate: 18 per minute 99% sat on room air. Intake/Output Summary (Last 24 hours) at 05/22/17 0854  Last data filed at 05/21/17 2245   Gross per 24 hour   Intake              300 ml   Output             2650 ml   Net            -2350 ml   Exam: Cor: RRR. Lungs: Bilat BS, CTA. Abd: Soft, Non distended. Tender. No associated rebound or guarding. No change in ventral hernia. Labs:   Recent Results (from the past 12 hour(s))   PTT    Collection Time: 05/22/17  4:04 AM   Result Value Ref Range    aPTT 26.0 22.1 - 32.5 sec    aPTT, therapeutic range     58.0 - 29.1 SECS   METABOLIC PANEL, BASIC    Collection Time: 05/22/17  4:04 AM   Result Value Ref Range    Sodium 147 (H) 136 - 145 mmol/L    Potassium 3.4 (L) 3.5 - 5.1 mmol/L    Chloride 111 (H) 97 - 108 mmol/L    CO2 29 21 - 32 mmol/L    Anion gap 7 5 - 15 mmol/L    Glucose 101 (H) 65 - 100 mg/dL    BUN 7 6 - 20 MG/DL    Creatinine 0.66 0.55 - 1.02 MG/DL    BUN/Creatinine ratio 11 (L) 12 - 20      GFR est AA >60 >60 ml/min/1.73m2    GFR est non-AA >60 >60 ml/min/1.73m2    Calcium 7.9 (L) 8.5 - 10.1 MG/DL   PHOSPHORUS    Collection Time: 05/22/17  4:04 AM   Result Value Ref Range    Phosphorus 2.8 2.6 - 4.7 MG/DL   MAGNESIUM    Collection Time: 05/22/17  4:04 AM   Result Value Ref Range    Magnesium 1.3 (L) 1.6 - 2.4 mg/dL   Diet as tolerated. IV abx - Rocephin per Dr. Karol Berry. Nephrology input - noted. Cardiology following. Plans per Dr. Dave Bernal. Following.

## 2017-05-22 NOTE — PROGRESS NOTES
Problem: Mobility Impaired (Adult and Pediatric)  Goal: *Acute Goals and Plan of Care (Insert Text)  Physical Therapy Goals  Initiated 5/19/2017  1. Patient will move from supine to sit and sit to supine in bed with contact guard assist within 7 day(s). 2. Patient will transfer from bed to chair and chair to bed with contact guard assist using the least restrictive device within 7 day(s). 3. Patient will perform sit to stand with contact guard assist within 7 day(s). 4. Patient will ambulate with minimal assistance/contact guard assist for 50 feet with the least restrictive device within 7 day(s). PHYSICAL THERAPY TREATMENT  Patient: Magaly Chester (65 y.o. female)  Date: 5/22/2017  Diagnosis: Septic shock ([address] Septic shock ([facility] [address]       Precautions: Fall  Chart, physical therapy assessment, plan of care and goals were reviewed. ASSESSMENT:  Pt received supine bed, very upset because she had not eaten all day and deferring therapy at this time because she has not done so. W/ coaxing, therapy offered to assist pt to chair in preparation for lunch, pt agreeable. Pt Min A for supine>sit transfer, needing assist at shoulders to upright position. Pt lunch tray delivered at this time. W/ encouragement, pt gait trained approx 40 FT (total) w/ CGA and RW, one standing rest break required (O2 SATS in upper 90's, HR 80's). Noted pt able to maneuver around obstacles in room w/ RW prior to returning to chair at bedside. Pt lunch tray set up, needs met, items in reach. Progression toward goals:  [X]      Improving appropriately and progressing toward goals  [ ]      Improving slowly and progressing toward goals  [ ]      Not making progress toward goals and plan of care will be adjusted       PLAN:  Patient continues to benefit from skilled intervention to address the above impairments. Continue treatment per established plan of care.   Discharge Recommendations:  Rehab  Further Equipment Recommendations for Discharge:  TBD       SUBJECTIVE:   Patient stated I'm telling you I can't do physical therapy! I haven't eaten anything all day and I don't have the strength!  Pt very upset and initially deferring PT because she had not eaten. W/coaxing pt agreeable to transfer to chair in preparation for lunch tray. OBJECTIVE DATA SUMMARY:   Critical Behavior:  Neurologic State: Alert  Orientation Level: Oriented X4  Cognition: Appropriate safety awareness     Functional Mobility Training:  Bed Mobility:     Supine to Sit: Minimum assistance; Additional time (pt able to initiate transfer to EOB)  Sit to Supine:  (pt returned to chair at bedside)                       Transfers:  Sit to Stand: Contact guard assistance  Stand to Sit: Contact guard assistance                             Balance:  Sitting: Intact  Standing: Intact; With support  Ambulation/Gait Training:  Distance (ft): 40 Feet (ft) (20 FT x2)  Assistive Device: Gait belt;Walker, rolling  Ambulation - Level of Assistance: Contact guard assistance        Gait Abnormalities: Decreased step clearance        Base of Support: Widened     Speed/Eunice: Shuffled; Slow  Step Length: Left shortened;Right shortened                    Stairs:   NT              Pain:  Pain Scale 1: Numeric (0 - 10)  Pain Intensity 1: 0              Activity Tolerance:   Good  Please refer to the flowsheet for vital signs taken during this treatment.   After treatment:   [X] Patient left in no apparent distress sitting up in chair  [ ] Patient left in no apparent distress in bed  [X] Call bell left within reach  [X] Nursing notified  [ ] Caregiver present  [ ] Bed alarm activated      COMMUNICATION/COLLABORATION:   The patients plan of care was discussed with: Registered Nurse Shania EcheverriaPTA   Time Calculation: 17 mins

## 2017-05-22 NOTE — PROGRESS NOTES
Chart reviewed. CM attempted to meet with pt this morning to obtain SNF choice. Pt was off the floor for stress a test.     2:00 PM: CM revisited pt to discuss SNF choice. Offered freedom of choice; patient declined to choose a facility, unsure about any of the options. Provided more information and the patient opted to have referrals sent to the two closest facilities, [facility] and [facility]. CM sent referrals via [facility]. Pt will likely need transportation at discharge as her son is out of the country. Both facilities are evaluating. CM following. 4:15 PM: [facility] willing to accept pt. CM attempted to meet with pt to find out which facility she prefers. Pt was on the phone. CM will revisit.     MINGO Fonseca/CRM

## 2017-05-22 NOTE — PROGRESS NOTES
Hospitalist Progress Note  Office: [phone]      Date of Service:  2017  NAME:  Delores Bentley  :  1945  MRN:  [id number]      Admission Summary:   68 yo woman with obesity, rheumatoid arthritis, chronic pain syndrome, gastroesophageal reflux disease,   hypertension, h/o herpes simplex virus infection was BIBEMS from home on 17 with nausea, vomiting, diarrhea and abdominal pain. Patient  states that the nausea and vomiting was actually intermittent for about three weeks prior to admission. Patient states that the vomiting was occurring about twice a week. She attributed it the vomiting to [employer] food, which she plans to avoid in the future. Upon arrival to the emergency department, patient was found to have partial small bowel obstruction on unenhanced CT scan of the abdomen 05/15/17, the date that patient was admitted. NG tube decompression was performed. Repeat CT scan with contrast on  did not show evidence for small bowel obstruction. Patient had 1.8 cm x 1.8 cm thin-walled focus of fluid posterior superior to the pancreatic body which was also apparent in prior CT of May 15. There was a small amount of free intraperitoneal fluid, but no free air. In the interim, a KUB was also performed on May 17, 2017, and showed paucity of air. Patient complained of RUQ pain. She is s/p cholecystectomy. In the emergency department, she had been found to be hypotensive with leukocytosis and elevated lactic acid level. Patient was started on vasopressors and transferred to the intensive care unit. She was also given albumin IV initially x 4 doses to maintain intravascular volume. Initial UA showed evidence for urinary tract infection, but urine cultures were ultimately negative. Initial blood cultures drawn on 17 came back positive for E. coli.      Patient had elevated troponins and is being followed by cardiologist. The troponins may be secondary to demand ischemia. Patient has had no chest pain or significant shortness of breath. She is in the process of undergoing two-part nuclear stress test, with stress portion scheduled for May 22. Echocardiogram of May 15, 2017 was essentially unremarkable with normal wall motion, normal LVEF and no significant valvular abnormalities. At admission, patient was found to have acute kidney injury (creatinine 2.6 ) possibly due to acute tubular necrosis in the setting of prolonged hypotension with sepsis vs. intravascular depletion with sepsis, metabolic acidosis, elevated lactic acid levels, electrolyte abnormalities with hypokalemia. This has all been corrected. Patient was initially on bicarbonate drip for metabolic acidosis. Patient also had mildly elevated liver enzymes with ALT 53, , alkaline phosphatase 217 at admission which are gradually trending downward. Renal function normalized on 5/19/17. Liver enzymes normalized on 5/16/17. Interval history / Subjective:   Patient has been having bowel movements overnight. She is tolerating diet. Patient has mild RUQ pain. Assessment & Plan:     1. Septic shock (POA) with E. coli bacteremia, toxic encephalopathy;    - was on norepinephrine gtt, now weaned off   - leukocytosis, fever, hypotension, lactic acidosis, tachypnea, tachycardia on admission   - leukocytosis not significantly changed, though patient had been given stress dose steroids (for hypotension, in case of adrenal insufficiency in the setting of chronic steroids being taken for rheumatoid arthritis), given E. Coli bacteremia, concern raised for intra-abdominal source for infection   - abd US 5/17 - heterogenous liver, increase small right pleural effusion, normal biliary tree s/p cholecystectomy, no hydronephrosis   - was on empiric cefepime, received vancomycin, de-escalated to ceftriaxone   - blood cultures 5/14 - E.  Coli 3/3 bottles   - repeat blood cultures 5/16 - no growth thus far   - stool Cx 5/15 - negative   - stool caliber not amenable to C diff testing currently   - repeat CT Abd/Pelvis with contrast  Patient is s/p aggressive IVF resuscitation, and is now actually developing edema. 2.  Troponin elevation. This could still be type 2 NSTEMI in the setting of septic shock with troponin up to 6.88 on 5/16/17.   - suspect demand ischemia in setting of septic shock   - she will require stress test prior to discharge, plan for 2 day stress test with first day on 5/19, to be completed on Monday, 5/22   - on aspirin, coreg   -  Echocardiogram of May 15, 2017 was essentially unremarkable with normal wall motion, normal LVEF    - seen by cardiology     3. Partial small bowel obstruction (POA)   - RUQ and epigastic TTP, still with nausea, but has flatus   - KUB 5/17 -  Nasogastric tube in place with tip projected in the expected location of the stomach. Continued relative paucity of abdominal gas. - pain control, conservative management with bowel rest, diet advanced to clears, now back to cardiac diet   - GI / general surgery following    4. Hypokalemia (POA), hypophosphatemia, hypomagnesemia   - replete prn    5. Anion gap metabolic acidosis (POA). Resolved. - likely due to lactic acidosis and exacerbated by GI losses and JEANINE. - lactic acid normalized   - was on sodium bicarbonate, now discontinued. 6.  Acute kidney injury (POA). Resolved. - likely due to prerenal azotemia vs septic / hypotensive ATN   - on sodium bicarbonate, now discontinued   - CT abd/pelvis without contrast 5/15 - unremarkable kidneys  -  Urine output > 1000 ml daily since 5/15/17   - nephrology following    7. Abnormal LFTs, hyperbilirubinemia (POA). Resolved. - suspect either dehydration or intra-abdominal sepsis, improving   - GI following   - avoid hepatotoxins    8.   Acute gastroenteritis (POA)    - antiemetics prn   - pain control   - stool Cx 5/15 - negative   - C diff 5/15 - not able to be tested due to stool caliber   - PPI    9. Hypothyroidism   - TSH WNL; continue levothyroxine    10. Rheumatoid arthritis   - started stress dose steroids due to chronic steroid use (home prednisone dose 10 mg po daily), tapered to discontinue on 5/20   - hold home Imuran    11. Anemia   - suspect some element of hemodilution, seems improved, stable    Code status: FULL  DVT prophylaxis: heparin    Care Plan discussed with: Patient/Family and Nurse  Disposition: TBD. Came from home. Unclear disposition currently, will need second part of stress test on 5/22. May need short-term rehab. Hospital Problems  Date Reviewed: 5/15/2017          Codes Class Noted POA    * (Principal)Septic shock ([facility] [address] ICD-10-CM: A41.9, R65.21  ICD-9-CM: 038.9, 785.52, 995.92  5/15/2017 Yes            Review of Systems:   Pertinent items are noted in HPI. Vital Signs:    Last 24hrs VS reviewed since prior progress note.  Most recent are:  Visit Vitals    /88 (BP 1 Location: Left arm, BP Patient Position: At rest)    Pulse 73    Temp 98.4 °F (36.9 °C)    Resp 18    Ht 5' 4\" (1.626 m)    Wt 104.6 kg (230 lb 9.6 oz)    SpO2 100%    Breastfeeding No    BMI 39.58 kg/m2       Physical Examination:     Constitutional:  awake, calm, obese   ENT:  oral mucosa moist, oropharynx benign   Resp:  clear to auscultation anteriorly   CV:  regular rhythm, slightly tachycardic, distant heart sounds    GI:  hypoactive BS, soft, non distended, mildly tender to palpation in RUQ/epigastrum, obese     Musculoskeletal:  moves all extremities    Neurologic:  AAOx3, responds appropriately to questions and commands     Skin:  Good turgor, no rashes or ulcers  Eyes:  PERRL    Data Review:    Review and/or order of clinical lab test  Review and/or order of tests in the radiology section of CPT  Review and/or order of tests in the medicine section of CPT    Labs:     Recent Labs      05/20/17   0317  05/19/17   0630   WBC 23.2*  26.1*   HGB  10.4*  9.7*   HCT  30.4*  28.0*   PLT  113*  95*     Recent Labs      05/20/17 0317 05/19/17   0630   NA  143  145   K  3.3*  3.1*   CL  105  108   CO2  31  29   BUN  14  19   CREA  0.89  0.95   GLU  144*  105*   CA  8.0*  7.8*   MG  1.9  1.7   PHOS  2.0*  1.6*     No results for input(s): SGOT, GPT, ALT, AP, TBIL, TBILI, TP, ALB, GLOB, GGT, AML, LPSE in the last 72 hours. No lab exists for component: AMYP, HLPSE  Recent Labs      05/21/17   0430  05/20/17 0317 05/19/17 0630   APTT  27.8  27.4  62.2*      No results for input(s): FE, TIBC, PSAT, FERR in the last 72 hours. No results found for: FOL, RBCF   No results for input(s): PH, PCO2, PO2 in the last 72 hours. No results for input(s): CPK, CKNDX, TROIQ in the last 72 hours.     No lab exists for component: CPKMB  Lab Results   Component Value Date/Time    Cholesterol, total 99 12/23/2015 01:45 AM    HDL Cholesterol 22 12/23/2015 01:45 AM    LDL, calculated 54.4 12/23/2015 01:45 AM    Triglyceride 113 12/23/2015 01:45 AM    CHOL/HDL Ratio 4.5 12/23/2015 01:45 AM     Lab Results   Component Value Date/Time    Glucose (POC) 98 05/19/2017 11:52 AM    Glucose (POC) 125 12/30/2015 11:28 AM    Glucose (POC) 101 12/23/2015 12:08 AM    Glucose (POC) 205 09/02/2015 11:44 AM    Glucose (POC) 112 09/02/2015 07:04 AM     Lab Results   Component Value Date/Time    Color DARK YELLOW 05/15/2017 01:44 PM    Appearance TURBID 05/15/2017 01:44 PM    Specific gravity 1.012 05/15/2017 01:44 PM    Specific gravity 1.005 12/23/2015 01:45 AM    pH (UA) 6.0 05/15/2017 01:44 PM    Protein 100 05/15/2017 01:44 PM    Glucose NEGATIVE  05/15/2017 01:44 PM    Ketone NEGATIVE  05/15/2017 01:44 PM    Bilirubin NEGATIVE  02/02/2017 01:27 AM    Urobilinogen 0.2 05/15/2017 01:44 PM    Nitrites NEGATIVE  05/15/2017 01:44 PM    Leukocyte Esterase MODERATE 05/15/2017 01:44 PM    Epithelial cells FEW 05/15/2017 01:44 PM    Bacteria 2+ 05/15/2017 01:44 PM    WBC 0-4 05/15/2017 01:44 PM    RBC 0-5 05/15/2017 01:44 PM     Medications Reviewed:     Current Facility-Administered Medications   Medication Dose Route Frequency    magic mouthwash cpd (without sucralfate)  5 mL Oral TIDAC    potassium chloride SR (KLOR-CON 10) tablet 20 mEq  20 mEq Oral DAILY    levothyroxine (SYNTHROID) tablet 50 mcg  50 mcg Oral ACB    sodium chloride (NS) flush 5-10 mL  5-10 mL IntraVENous Q8H    carvedilol (COREG) tablet 3.125 mg  3.125 mg Oral BID    cefTRIAXone (ROCEPHIN) 2 g in 0.9% sodium chloride (MBP/ADV) 50 mL  2 g IntraVENous Q24H    ipratropium (ATROVENT) 0.02 % nebulizer solution 0.5 mg  0.5 mg Nebulization Q6H PRN    aspirin chewable tablet 81 mg  81 mg Oral DAILY    HYDROmorphone (PF) (DILAUDID) injection 1 mg  1 mg IntraVENous Q4H PRN    ondansetron (ZOFRAN) injection 4 mg  4 mg IntraVENous Q4H PRN    pantoprazole (PROTONIX) 40 mg in sodium chloride 0.9 % 10 mL injection  40 mg IntraVENous DAILY     ______________________________________________________________________  EXPECTED LENGTH OF STAY: 5d 2h  ACTUAL LENGTH OF STAY:          [address], DO

## 2017-05-22 NOTE — PROGRESS NOTES
NUTRITION  Pt seen for:     [x]           LOS        RECOMMENDATIONS:   None at this time. SUBJECTIVE/OBJECTIVE:   Information obtained from:   Patient. Pt admitted for septic shock. JEANINE 2/2 sepsis noted, renal following. No edema documented however severe wt gain of 17kg since admit. Fluid overload noted per renal - lasix rx. Hypokalemia noted and repleted. PSBO present on admit, improved. Some abd pain and nausea continues but tolerating diet with BM's noted. Pt visited and reports appetite down some. Drinks Boost shakes at home 1-2x/day. Agreeable to Ensure BID (vanilla or strawberry) for 700kcal, 40g protein. Will continue to rescreen per protocol. Diet:  GI lite (NPO today for stress test)  Intake: [x]           Good     []           Fair      []           Poor   Patient Vitals for the past 100 hrs:   % Diet Eaten   05/21/17 1855 100 %   05/19/17 2321 75 %   05/18/17 1853 75 %   05/18/17 1500 50 %     Weight Changes:   [x]            Gain - See above comments. Last weighed on 5/17 - please reweigh and document bedding present when weighing.    Wt Readings from Last 10 Encounters:   05/17/17 104.6 kg (230 lb 9.6 oz)   02/02/17 96.6 kg (213 lb)   11/30/16 96.8 kg (213 lb 8 oz)   10/24/16 90.7 kg (200 lb 0.6 oz)   09/21/16 88 kg (194 lb 0.2 oz)   01/04/16 83.9 kg (185 lb 0.8 oz)   12/26/15 82.1 kg (181 lb)   12/23/15 83.5 kg (184 lb)   12/09/15 84.4 kg (186 lb 0.1 oz)   08/27/15 92.1 kg (203 lb 0.7 oz)     Nutrition Problems Identified  []      None  [x]           Nausea    PLAN:   []           Obtained/adjusted food preferences/tolerances and/or snacks options   [x]            Rescreen per screening protocol  []             Add Supplements    Rescreen:  []            At Nutrition Risk           [x]            Not at Nutrition Risk, rescreen per screening protocol    Balaji Car RD

## 2017-05-22 NOTE — PROGRESS NOTES
Name: Randy Real   MRN: [id number]  : 1945      Assessment:  JEANINE- likely septic ATN in the setting of SBO/hypotension and NSTEMI; JEANINE has resolved. No evidence of Contrast nephropathy either (s/p IV Contrast on  with CT A/P)  E. Coli Bacteremia  Hx of recurrent Klebsiella bacteremia/Hx of L4-5 diskitis in past  SBO- resolved  Hypokalemia/Hypomagnesemia  Hypernatremia: Need to watch closely with resumption of lasix  Fluid overload- residual volume excess from prior volume resuscitation. Wt also has gone up. CT A/P shows b/l pleural effusions/compressive atelectasis      Plan/Recommendations:  Ct Lasix 40 mg daily   Additional KCl 40meq x1 dose  IV mag sulfate 2gm x1 dose  Encourage free water intake to improve hypernatremia  Replace lytes PRN  AM labs      Subjective:  Nausea. No vomiting. Taking PO    ROS:   Denies n/v/cp.      Exam:  Visit Vitals    /80 (BP 1 Location: Left arm, BP Patient Position: At rest)    Pulse 69    Temp 98.6 °F (37 °C)    Resp 18    Ht 5' 4\" (1.626 m)    Wt 104.6 kg (230 lb 9.6 oz)    SpO2 99%    Breastfeeding No    BMI 39.58 kg/m2       NAD  Clear with dec BS  RRR  Tr edema  Alert awake    Current Facility-Administered Medications   Medication Dose Route Frequency Last Dose    regadenoson (LEXISCAN) injection 0.4 mg  0.4 mg IntraVENous RAD ONCE      magnesium sulfate 2 g/50 ml IVPB (premix or compounded)  2 g IntraVENous ONCE      potassium chloride SR (KLOR-CON 10) tablet 40 mEq  40 mEq Oral NOW      magic mouthwash cpd (without sucralfate)  5 mL Oral TIDAC 5 mL at 17 0714    furosemide (LASIX) tablet 40 mg  40 mg Oral DAILY Stopped at 17 0900    potassium chloride SR (KLOR-CON 10) tablet 20 mEq  20 mEq Oral DAILY Stopped at 17 0900    levothyroxine (SYNTHROID) tablet 50 mcg  50 mcg Oral ACB 50 mcg at 05/22/17 0705    sodium chloride (NS) flush 5-10 mL  5-10 mL IntraVENous Q8H 10 mL at 05/22/17 0705    carvedilol (COREG) tablet 3.125 mg  3.125 mg Oral BID Stopped at 05/22/17 0900    cefTRIAXone (ROCEPHIN) 2 g in 0.9% sodium chloride (MBP/ADV) 50 mL  2 g IntraVENous Q24H 2 g at 05/22/17 0708    ipratropium (ATROVENT) 0.02 % nebulizer solution 0.5 mg  0.5 mg Nebulization Q6H PRN 0.5 mg at 05/20/17 0012    aspirin chewable tablet 81 mg  81 mg Oral DAILY Stopped at 05/22/17 0900    HYDROmorphone (PF) (DILAUDID) injection 1 mg  1 mg IntraVENous Q4H PRN 1 mg at 05/19/17 0823    ondansetron (ZOFRAN) injection 4 mg  4 mg IntraVENous Q4H PRN 4 mg at 05/21/17 1641    pantoprazole (PROTONIX) 40 mg in sodium chloride 0.9 % 10 mL injection  40 mg IntraVENous DAILY 40 mg at 05/22/17 5315       Labs/Data:    Lab Results   Component Value Date/Time    WBC 23.2 05/20/2017 03:17 AM    Hemoglobin (POC) 11.9 12/03/2010 01:40 AM    HGB 10.4 05/20/2017 03:17 AM    Hematocrit (POC) 35 12/03/2010 01:40 AM    HCT 30.4 05/20/2017 03:17 AM    PLATELET 081 24/19/3027 03:17 AM    MCV 89.1 05/20/2017 03:17 AM       Lab Results   Component Value Date/Time    Sodium 147 05/22/2017 04:04 AM    Potassium 3.4 05/22/2017 04:04 AM    Chloride 111 05/22/2017 04:04 AM    CO2 29 05/22/2017 04:04 AM    Anion gap 7 05/22/2017 04:04 AM    Glucose 101 05/22/2017 04:04 AM    BUN 7 05/22/2017 04:04 AM    Creatinine 0.66 05/22/2017 04:04 AM    BUN/Creatinine ratio 11 05/22/2017 04:04 AM    GFR est AA >60 05/22/2017 04:04 AM    GFR est non-AA >60 05/22/2017 04:04 AM    Calcium 7.9 05/22/2017 04:04 AM       Wt Readings from Last 3 Encounters:   05/17/17 104.6 kg (230 lb 9.6 oz)   02/02/17 96.6 kg (213 lb)   11/30/16 96.8 kg (213 lb 8 oz)         Intake/Output Summary (Last 24 hours) at 05/22/17 0916  Last data filed at 05/21/17 2245   Gross per 24 hour   Intake              300 ml   Output             2650 ml   Net            -2350 ml Patient seen and examined. Chart reviewed. Labs, data and other pertinent notes reviewed in last 24 hrs.     PMH/SH/FH reviewed and unchanged compared to H&P    Discussed with pt      Rusty Doshi MD

## 2017-05-22 NOTE — PROGRESS NOTES
Hospitalist Progress Note  Office: [phone]      Date of Service:  2017  NAME:  Delores Bentley  :  1945  MRN:  [id number]      Admission Summary:     66 yo woman with obesity, rheumatoid arthritis, chronic pain syndrome, gastroesophageal reflux disease,   hypertension, h/o herpes simplex virus infection was BIBEMS from home on 17 with nausea, vomiting, diarrhea and abdominal pain.      Patient states that the nausea and vomiting was actually intermittent for about three weeks prior to admission. Patient states that the vomiting was occurring about twice a week. She attributed it the vomiting to [employer] food, which she plans to avoid in the future. Upon arrival to the emergency department, patient was found to have partial small bowel obstruction on unenhanced CT scan of the abdomen 05/15/17, the date that patient was admitted. NG tube decompression was performed. Repeat CT scan with contrast on  did not show evidence for small bowel obstruction. Patient had 1.8 cm x 1.8 cm thin-walled focus of fluid posterior superior to the pancreatic body which was also apparent in prior CT of May 15. There was a small amount of free intraperitoneal fluid, but no free air. In the interim, a KUB was also performed on May 17, 2017, and showed paucity of air. Patient complained of RUQ pain. She is s/p cholecystectomy.     In the emergency department, she had been found to be hypotensive with leukocytosis and elevated lactic acid level. Patient was started on vasopressors and transferred to the intensive care unit. She was also given albumin IV initially x 4 doses to maintain intravascular volume. Initial UA showed evidence for urinary tract infection, but urine cultures were ultimately negative.  Initial blood cultures drawn on 17 came back positive for E. coli.      Patient had elevated troponins and is being followed by cardiologist. The troponins may be secondary to demand ischemia. Patient has had no chest pain or significant shortness of breath. She is in the process of undergoing two-part nuclear stress test, with stress portion scheduled for May 22. Echocardiogram of May 15, 2017 was essentially unremarkable with normal wall motion, normal LVEF and no significant valvular abnormalities.     At admission, patient was found to have acute kidney injury (creatinine 2.6 ) possibly due to acute tubular necrosis in the setting of prolonged hypotension with sepsis vs. intravascular depletion with sepsis, metabolic acidosis, elevated lactic acid levels, electrolyte abnormalities with hypokalemia. This has all been corrected. Patient was initially on bicarbonate drip for metabolic acidosis. Patient also had mildly elevated liver enzymes with ALT 53, , alkaline phosphatase 217 at admission which are gradually trending downward. Renal function normalized on 5/19/17. Liver enzymes normalized on 5/16/17.     Interval history / Subjective:   Patient continues to have bowel movements (loose) x 3 overnight. She is tolerating diet. Patient has mild RUQ pain.      Assessment & Plan:      1. Septic shock (POA) with E. coli bacteremia, toxic encephalopathy;   - was on norepinephrine gtt, now weaned off  - leukocytosis, fever, hypotension, lactic acidosis, tachypnea, tachycardia on admission  - leukocytosis not significantly changed, though patient had been given stress dose steroids (for hypotension, in case of adrenal insufficiency in the setting of chronic steroids being taken for rheumatoid arthritis), given E. Coli bacteremia, concern raised for intra-abdominal source for infection. Repeat labs.   - abd US 5/17 - heterogenous liver, increase small right pleural effusion, normal biliary tree s/p cholecystectomy, no hydronephrosis  - was on empiric cefepime, received vancomycin, de-escalated to ceftriaxone  - blood cultures 5/14 - E. Coli 3/3 bottles  - repeat blood cultures 5/16 - no growth thus far  - stool Cx 5/15 - negative  - stool caliber not amenable to C diff testing currently  - repeat CT Abd/Pelvis with contrast  Patient is s/p aggressive IVF resuscitation, and is now actually developing edema.     2. Troponin elevation. This could still be type 2 NSTEMI in the setting of septic shock with troponin up to 6.88 on 5/16/17.  - suspect demand ischemia in setting of septic shock  - she will require stress test prior to discharge, plan for 2 day stress test with first day on 5/19, to be completed on Monday, 5/22  - on aspirin, coreg  -  Echocardiogram of May 15, 2017 was essentially unremarkable with normal wall motion, normal LVEF   - seen by cardiology      3. Partial small bowel obstruction (POA)  - RUQ and epigastic TTP, still with nausea, but has flatus  - KUB 5/17 - Nasogastric tube in place with tip projected in the expected location of the stomach. Continued relative paucity of abdominal gas. - pain control, conservative management with bowel rest, diet advanced to clears, now back to cardiac diet  - GI / general surgery following     4. Hypokalemia (POA), hypophosphatemia, hypomagnesemia  - replete prn     5. Anion gap metabolic acidosis (POA). Resolved. - likely due to lactic acidosis and exacerbated by GI losses and JEANINE. - lactic acid normalized  - was on sodium bicarbonate, now discontinued.     6. Acute kidney injury (POA). Resolved. - likely due to prerenal azotemia vs septic / hypotensive ATN  - on sodium bicarbonate, now discontinued  - CT abd/pelvis without contrast 5/15 - unremarkable kidneys  - Urine output > 1000 ml daily since 5/15/17  - nephrology following     7. Abnormal LFTs, hyperbilirubinemia (POA). Resolved. - suspect either dehydration or intra-abdominal sepsis, improving  - GI following  - avoid hepatotoxins     8.  Acute gastroenteritis (POA)   - antiemetics prn  - pain control  - stool Cx 5/15 - negative  - C diff 5/15 - not able to be tested due to stool caliber  - PPI     9. Hypothyroidism  - TSH WNL; continue levothyroxine     10. Rheumatoid arthritis  - started stress dose steroids due to chronic steroid use (home prednisone dose 10 mg po daily), tapered to discontinue on 5/20  - hold home Imuran     11. Anemia  - suspect some element of hemodilution, seems improved, stable     Code status: FULL  DVT prophylaxis: heparin     Care Plan discussed with: Patient/Family and Nurse  Disposition: TBD. Came from home. Unclear disposition currently, will need second part of stress test on 5/22. May need short-term rehab. Hospital Problems  Date Reviewed: 5/15/2017          Codes Class Noted POA    * (Principal)Septic shock ([facility] [address] ICD-10-CM: A41.9, R65.21  ICD-9-CM: 038.9, 785.52, 995.92  5/15/2017 Yes            Review of Systems:   Pertinent items are noted in HPI. Vital Signs:    Vital signs: temperature 98°F, pulse rate 73, blood pressure 144/87, respiratory rate 18, oxygen saturation 97% on room air.     Intake/Output Summary (Last 24 hours) at 05/21/17 2315  Last data filed at 05/21/17 2245   Gross per 24 hour   Intake              300 ml   Output             3650 ml   Net            -3350 ml      Physical Examination:     Constitutional:  awake, calm, obese   ENT:  oral mucosa slightly dry, oropharynx benign   Resp:  clear to auscultation anteriorly   CV:  regular rhythm, slightly tachycardic, distant heart sounds    GI:  hypoactive BS, soft, non distended, mildly tender to palpation in epigastrum, obese     Musculoskeletal:  moves all extremities    Neurologic:  AAOx3, responds appropriately to questions and commands     Skin:  Good turgor, no rashes or ulcers  Eyes:  PERRL    Data Review:    Review and/or order of clinical lab test  Review and/or order of tests in the radiology section of CPT  Review and/or order of tests in the medicine section of CPT    Labs:     Recent Labs 05/20/17 0317 05/19/17 0630   WBC  23.2*  26.1*   HGB  10.4*  9.7*   HCT  30.4*  28.0*   PLT  113*  95*     Recent Labs      05/21/17 0430 05/20/17 0317 05/19/17 0630   NA  144  143  145   K  3.4*  3.3*  3.1*   CL  108  105  108   CO2  28  31  29   BUN  9  14  19   CREA  0.77  0.89  0.95   GLU  134*  144*  105*   CA  8.1*  8.0*  7.8*   MG  1.7  1.9  1.7   PHOS  2.5*  2.0*  1.6*     No results for input(s): SGOT, GPT, ALT, AP, TBIL, TBILI, TP, ALB, GLOB, GGT, AML, LPSE in the last 72 hours. No lab exists for component: AMYP, HLPSE  Recent Labs      05/21/17 0430 05/20/17 0317 05/19/17 0630   APTT  27.8  27.4  62.2*      No results for input(s): FE, TIBC, PSAT, FERR in the last 72 hours. No results found for: FOL, RBCF   No results for input(s): PH, PCO2, PO2 in the last 72 hours. No results for input(s): CPK, CKNDX, TROIQ in the last 72 hours.     No lab exists for component: CPKMB  Lab Results   Component Value Date/Time    Cholesterol, total 99 12/23/2015 01:45 AM    HDL Cholesterol 22 12/23/2015 01:45 AM    LDL, calculated 54.4 12/23/2015 01:45 AM    Triglyceride 113 12/23/2015 01:45 AM    CHOL/HDL Ratio 4.5 12/23/2015 01:45 AM     Lab Results   Component Value Date/Time    Glucose (POC) 98 05/19/2017 11:52 AM    Glucose (POC) 125 12/30/2015 11:28 AM    Glucose (POC) 101 12/23/2015 12:08 AM    Glucose (POC) 205 09/02/2015 11:44 AM    Glucose (POC) 112 09/02/2015 07:04 AM     Lab Results   Component Value Date/Time    Color DARK YELLOW 05/15/2017 01:44 PM    Appearance TURBID 05/15/2017 01:44 PM    Specific gravity 1.012 05/15/2017 01:44 PM    Specific gravity 1.005 12/23/2015 01:45 AM    pH (UA) 6.0 05/15/2017 01:44 PM    Protein 100 05/15/2017 01:44 PM    Glucose NEGATIVE  05/15/2017 01:44 PM    Ketone NEGATIVE  05/15/2017 01:44 PM    Bilirubin NEGATIVE  02/02/2017 01:27 AM    Urobilinogen 0.2 05/15/2017 01:44 PM    Nitrites NEGATIVE  05/15/2017 01:44 PM    Leukocyte Esterase MODERATE 05/15/2017 01:44 PM    Epithelial cells FEW 05/15/2017 01:44 PM    Bacteria 2+ 05/15/2017 01:44 PM    WBC 0-4 05/15/2017 01:44 PM    RBC 0-5 05/15/2017 01:44 PM     Medications Reviewed:     Current Facility-Administered Medications   Medication Dose Route Frequency    magic mouthwash cpd (without sucralfate)  5 mL Oral TIDAC    furosemide (LASIX) tablet 40 mg  40 mg Oral DAILY    potassium chloride SR (KLOR-CON 10) tablet 20 mEq  20 mEq Oral DAILY    levothyroxine (SYNTHROID) tablet 50 mcg  50 mcg Oral ACB    sodium chloride (NS) flush 5-10 mL  5-10 mL IntraVENous Q8H    carvedilol (COREG) tablet 3.125 mg  3.125 mg Oral BID    cefTRIAXone (ROCEPHIN) 2 g in 0.9% sodium chloride (MBP/ADV) 50 mL  2 g IntraVENous Q24H    ipratropium (ATROVENT) 0.02 % nebulizer solution 0.5 mg  0.5 mg Nebulization Q6H PRN    aspirin chewable tablet 81 mg  81 mg Oral DAILY    HYDROmorphone (PF) (DILAUDID) injection 1 mg  1 mg IntraVENous Q4H PRN    ondansetron (ZOFRAN) injection 4 mg  4 mg IntraVENous Q4H PRN    pantoprazole (PROTONIX) 40 mg in sodium chloride 0.9 % 10 mL injection  40 mg IntraVENous DAILY     ______________________________________________________________________  EXPECTED LENGTH OF STAY: 5d 2h  ACTUAL LENGTH OF STAY:          [address], DO

## 2017-05-22 NOTE — PROGRESS NOTES
ID Progress Note  2017     Cefepime  Discontinued  Ceftriaxone     Subjective:     Afebrile. Feels good. WBC down. Only has slight abdominal soreness. Objective:     Vitals:   Visit Vitals    /80 (BP 1 Location: Left arm, BP Patient Position: At rest)    Pulse 69    Temp 98.6 °F (37 °C)    Resp 18    Ht 5' 4\" (1.626 m)    Wt 104.6 kg (230 lb 9.6 oz)    SpO2 99%    Breastfeeding No    BMI 39.58 kg/m2        Tmax:  Temp (24hrs), Av.6 °F (37 °C), Min:98 °F (36.7 °C), Max:99 °F (37.2 °C)      Exam:    Not in distress   Lungs: clear to auscultation, no rales   Heart: s1, s2, no murmurs, rubs or clicks   Abdomen: soft, nontender, no rebound, no guarding       Labs:   Lab Results   Component Value Date/Time    WBC 23.2 2017 03:17 AM    Hemoglobin (POC) 11.9 2010 01:40 AM    HGB 10.4 2017 03:17 AM    Hematocrit (POC) 35 2010 01:40 AM    HCT 30.4 2017 03:17 AM    PLATELET 761  03:17 AM    MCV 89.1 2017 03:17 AM     Lab Results   Component Value Date/Time    Sodium 147 2017 04:04 AM    Potassium 3.4 2017 04:04 AM    Chloride 111 2017 04:04 AM    CO2 29 2017 04:04 AM    Anion gap 7 2017 04:04 AM    Glucose 101 2017 04:04 AM    BUN 7 2017 04:04 AM    Creatinine 0.66 2017 04:04 AM    BUN/Creatinine ratio 11 2017 04:04 AM    GFR est AA >60 2017 04:04 AM    GFR est non-AA >60 2017 04:04 AM    Calcium 7.9 2017 04:04 AM    Bilirubin, total 1.3 2017 04:30 AM    AST (SGOT) 53 2017 04:30 AM    Alk. phosphatase 97 2017 04:30 AM    Protein, total 5.3 2017 04:30 AM    Albumin 2.3 2017 04:30 AM    Globulin 3.0 2017 04:30 AM    A-G Ratio 0.8 2017 04:30 AM    ALT (SGPT) 42 2017 04:30 AM       US - no biliary dilatation      Assessment:       1. E coli  bacteremia. 2. History of recurrent Klebsiella bacteremia.    3. History of L4-L5 diskitis for which she has received greater than 12   weeks of IV therapy with normalization of her inflammation markers. 4. Renal failure. 5. Rheumatoid arthritis. 6. History of dilated bile ducts. 7. non-ST-segment elevation myocardial infarction.       Recommendations:      1. Continue ceftriaxone. WBC down.        Sridhar Chase MD

## 2017-05-22 NOTE — PROGRESS NOTES
Infectious Diseases Progress Note    Antibiotic Summary:  Vancomycin 5/15 x 1 dose  Flagyl  5/15 --   Cefepime 5/15 --   Rocephin  -- present      Subjective:     No new symptoms. She admits to some abdominal pain diffusely. Objective:     Vitals:   Visit Vitals    /88 (BP 1 Location: Left arm, BP Patient Position: At rest)    Pulse 73    Temp 98.4 °F (36.9 °C)    Resp 18    Ht 5' 4\" (1.626 m)    Wt 104.6 kg (230 lb 9.6 oz)    SpO2 100%    Breastfeeding No    BMI 39.58 kg/m2        Tmax:  Temp (24hrs), Av.4 °F (36.9 °C), Min:98 °F (36.7 °C), Max:98.7 °F (37.1 °C)      Exam:  General appearance: alert, no distress  Lungs: scattered wheezes  Heart: regular rate and rhythm  Abdomen: mildly tender diffusely; BS+  Extremities: no cellulitis  Skin: no rash  Neurologic: A&O    IV Lines: Left subclavian CVL inserted 5/15/2017    Labs:    Recent Labs      17   0430  17   0317  17   0630   WBC   --   23.2*  26.1*   HGB   --   10.4*  9.7*   PLT   --   113*  95*   BUN  19   CREA  0.77  0.89  0.95      BLOOD CULTURES:    = E coli in 3 of 3 bottles    = NGSF    CT abdomen & pelvis on 2017:  IMPRESSION:   1. New moderate sized bilateral pleural effusions and worsening bibasilar    compressive atelectasis. 2. New, small amount of free intraperitoneal fluid. 3. New anasarca. 4. Indeterminate 1.1 x 1.1 cm right hepatic lobe lesion, new compared to prior    CT dated 2015. Multiphasic CT of the abdomen can be performed     for further evaluation, as indicated. 5. Circumferentially thickened long segment loop of small bowel. Assessment:     1. E coli bacteremia -- source ? -- temps down -- persistent leukocytosis and mild abd tenderness is concerning    2. History of recurrent Klebsiella bacteremia     3. History of L4-L5 diskitis for which she has received greater than 12   weeks of IV therapy with normalization of her inflammation markers    4. Acute renal failure -- resolved    5. Rheumatoid arthriti     6. History of dilated bile ducts       Plan:     1.  Priyanka Rivas MD

## 2017-05-22 NOTE — PROGRESS NOTES
Bedside shift change report given to Jakub Angel (oncoming nurse) by Temo Cosby (offgoing nurse). Report included the following information SBAR, Kardex, ED Summary, Intake/Output, Accordion and Recent Results.

## 2017-05-22 NOTE — PROGRESS NOTES
Cardiology Progress Note      5/22/2017 9:36 AM    Admit Date: 5/14/2017    Admit Diagnosis: Septic shock (HCC)      Subjective: Shelia [address] denies chest pain and SOB. Still occasional abdominal pain. Visit Vitals    /80 (BP 1 Location: Left arm, BP Patient Position: At rest)    Pulse 69    Temp 98.6 °F (37 °C)    Resp 18    Ht 5' 4\" (1.626 m)    Wt 230 lb 9.6 oz (104.6 kg)    SpO2 98%    Breastfeeding No    BMI 39.58 kg/m2     Current Facility-Administered Medications   Medication Dose Route Frequency    potassium chloride SR (KLOR-CON 10) tablet 40 mEq  40 mEq Oral NOW    magic mouthwash cpd (without sucralfate)  5 mL Oral TIDAC    furosemide (LASIX) tablet 40 mg  40 mg Oral DAILY    potassium chloride SR (KLOR-CON 10) tablet 20 mEq  20 mEq Oral DAILY    levothyroxine (SYNTHROID) tablet 50 mcg  50 mcg Oral ACB    sodium chloride (NS) flush 5-10 mL  5-10 mL IntraVENous Q8H    carvedilol (COREG) tablet 3.125 mg  3.125 mg Oral BID    cefTRIAXone (ROCEPHIN) 2 g in 0.9% sodium chloride (MBP/ADV) 50 mL  2 g IntraVENous Q24H    ipratropium (ATROVENT) 0.02 % nebulizer solution 0.5 mg  0.5 mg Nebulization Q6H PRN    aspirin chewable tablet 81 mg  81 mg Oral DAILY    HYDROmorphone (PF) (DILAUDID) injection 1 mg  1 mg IntraVENous Q4H PRN    ondansetron (ZOFRAN) injection 4 mg  4 mg IntraVENous Q4H PRN    pantoprazole (PROTONIX) 40 mg in sodium chloride 0.9 % 10 mL injection  40 mg IntraVENous DAILY         Objective:      Physical Exam:  Visit Vitals    /80 (BP 1 Location: Left arm, BP Patient Position: At rest)    Pulse 69    Temp 98.6 °F (37 °C)    Resp 18    Ht 5' 4\" (1.626 m)    Wt 230 lb 9.6 oz (104.6 kg)    SpO2 98%    Breastfeeding No    BMI 39.58 kg/m2     General Appearance:  Alert, no acute distress. Ears/Nose/Mouth/Throat:   Hearing grossly normal.         Neck: Supple. no JVD   Chest:   Clear to ausclatation bilaterally. No use of accessory muscles noted. Cardiovascular:  Regular rate and rhythm, S1, S2 normal, no murmur. Abdomen:   Soft, nondistended, generalized mild ttp, no gaurding. Extremities: No lower extremity edema bilaterally. Skin: Warm and dry. Data Review:   Labs:    Recent Results (from the past 24 hour(s))   PTT    Collection Time: 05/22/17  4:04 AM   Result Value Ref Range    aPTT 26.0 22.1 - 32.5 sec    aPTT, therapeutic range     58.0 - 27.3 SECS   METABOLIC PANEL, BASIC    Collection Time: 05/22/17  4:04 AM   Result Value Ref Range    Sodium 147 (H) 136 - 145 mmol/L    Potassium 3.4 (L) 3.5 - 5.1 mmol/L    Chloride 111 (H) 97 - 108 mmol/L    CO2 29 21 - 32 mmol/L    Anion gap 7 5 - 15 mmol/L    Glucose 101 (H) 65 - 100 mg/dL    BUN 7 6 - 20 MG/DL    Creatinine 0.66 0.55 - 1.02 MG/DL    BUN/Creatinine ratio 11 (L) 12 - 20      GFR est AA >60 >60 ml/min/1.73m2    GFR est non-AA >60 >60 ml/min/1.73m2    Calcium 7.9 (L) 8.5 - 10.1 MG/DL   PHOSPHORUS    Collection Time: 05/22/17  4:04 AM   Result Value Ref Range    Phosphorus 2.8 2.6 - 4.7 MG/DL   MAGNESIUM    Collection Time: 05/22/17  4:04 AM   Result Value Ref Range    Magnesium 1.3 (L) 1.6 - 2.4 mg/dL   CBC WITH AUTOMATED DIFF    Collection Time: 05/22/17  9:57 AM   Result Value Ref Range    WBC 13.4 (H) 3.6 - 11.0 K/uL    RBC 3.30 (L) 3.80 - 5.20 M/uL    HGB 10.0 (L) 11.5 - 16.0 g/dL    HCT 30.3 (L) 35.0 - 47.0 %    MCV 91.8 80.0 - 99.0 FL    MCH 30.3 26.0 - 34.0 PG    MCHC 33.0 30.0 - 36.5 g/dL    RDW 19.3 (H) 11.5 - 14.5 %    PLATELET 381 635 - 104 K/uL    NEUTROPHILS 72 32 - 75 %    LYMPHOCYTES 13 12 - 49 %    MONOCYTES 12 5 - 13 %    EOSINOPHILS 3 0 - 7 %    BASOPHILS 0 0 - 1 %    ABS. NEUTROPHILS 9.8 (H) 1.8 - 8.0 K/UL    ABS. LYMPHOCYTES 1.7 0.8 - 3.5 K/UL    ABS. MONOCYTES 1.6 (H) 0.0 - 1.0 K/UL    ABS. EOSINOPHILS 0.3 0.0 - 0.4 K/UL    ABS.  BASOPHILS 0.0 0.0 - 0.1 K/UL   METABOLIC PANEL, COMPREHENSIVE    Collection Time: 05/22/17  9:57 AM   Result Value Ref Range Sodium 146 (H) 136 - 145 mmol/L    Potassium 3.4 (L) 3.5 - 5.1 mmol/L    Chloride 112 (H) 97 - 108 mmol/L    CO2 26 21 - 32 mmol/L    Anion gap 8 5 - 15 mmol/L    Glucose 102 (H) 65 - 100 mg/dL    BUN 7 6 - 20 MG/DL    Creatinine 0.70 0.55 - 1.02 MG/DL    BUN/Creatinine ratio 10 (L) 12 - 20      GFR est AA >60 >60 ml/min/1.73m2    GFR est non-AA >60 >60 ml/min/1.73m2    Calcium 7.8 (L) 8.5 - 10.1 MG/DL    Bilirubin, total 1.1 (H) 0.2 - 1.0 MG/DL    ALT (SGPT) 20 12 - 78 U/L    AST (SGOT) 16 15 - 37 U/L    Alk. phosphatase 94 45 - 117 U/L    Protein, total 5.5 (L) 6.4 - 8.2 g/dL    Albumin 2.2 (L) 3.5 - 5.0 g/dL    Globulin 3.3 2.0 - 4.0 g/dL    A-G Ratio 0.7 (L) 1.1 - 2.2     PTT    Collection Time: 05/22/17  9:57 AM   Result Value Ref Range    aPTT 27.0 22.1 - 32.5 sec    aPTT, therapeutic range     58.0 - 77.0 SECS       Telemetry: normal sinus rhythm        Assessment:     Principal Problem:    Septic shock ([address] (5/15/2017)        Plan:     1. NSTEMI: Pt with elevated cardiac markers, NSTEMI by definition. Probably has some degree of underlying CAD. Current elevations appear more from demand as pt is without chest pain, no ischemic ECG changes and preserved LV function with no wall motion abnormality by echo. Continue aspirin. Was on heparin gtt for 48 hrs. Continue BB. No statin due to elevated LFTs. No ACE-I due to elevated creatinine. Lexiscan stress test 2nd day of 2 day test today (due to body habitus). Medical management and ok for hospital d/c from cardiac standpoint if stress test is normal.  2. Acute renal insufficiency. .   3. Sepsis: ID following. 4. E Coli bacteremia:  ID following. Surveillance cxs 5/16 NGTD  5. Anemia. ADDENDUM: stress test was normal.  Medical management. Continue ASA, BB.  LFTs normal and will start statin. Noted plans for SNF. Will schedule outpt f/u with me in 1 month.     Margaret Cohen MD    Signed By: Margaret Cohen MD     May 22, 2017

## 2017-05-23 LAB
ALBUMIN SERPL BCP-MCNC: 2.1 G/DL (ref 3.5–5)
ALBUMIN/GLOB SERPL: 0.7 {RATIO} (ref 1.1–2.2)
ALP SERPL-CCNC: 84 U/L (ref 45–117)
ALT SERPL-CCNC: 17 U/L (ref 12–78)
ANION GAP BLD CALC-SCNC: 8 MMOL/L (ref 5–15)
APTT PPP: 27.4 SEC (ref 22.1–32.5)
AST SERPL W P-5'-P-CCNC: 14 U/L (ref 15–37)
BILIRUB SERPL-MCNC: 0.8 MG/DL (ref 0.2–1)
BUN SERPL-MCNC: 6 MG/DL (ref 6–20)
BUN/CREAT SERPL: 9 (ref 12–20)
CALCIUM SERPL-MCNC: 7.8 MG/DL (ref 8.5–10.1)
CHLORIDE SERPL-SCNC: 113 MMOL/L (ref 97–108)
CO2 SERPL-SCNC: 26 MMOL/L (ref 21–32)
CREAT SERPL-MCNC: 0.66 MG/DL (ref 0.55–1.02)
ERYTHROCYTE [DISTWIDTH] IN BLOOD BY AUTOMATED COUNT: 19.3 % (ref 11.5–14.5)
GLOBULIN SER CALC-MCNC: 3 G/DL (ref 2–4)
GLUCOSE SERPL-MCNC: 142 MG/DL (ref 65–100)
HCT VFR BLD AUTO: 26.2 % (ref 35–47)
HGB BLD-MCNC: 8.7 G/DL (ref 11.5–16)
MAGNESIUM SERPL-MCNC: 1.6 MG/DL (ref 1.6–2.4)
MCH RBC QN AUTO: 30.6 PG (ref 26–34)
MCHC RBC AUTO-ENTMCNC: 33.2 G/DL (ref 30–36.5)
MCV RBC AUTO: 92.3 FL (ref 80–99)
PHOSPHATE SERPL-MCNC: 2.3 MG/DL (ref 2.6–4.7)
PLATELET # BLD AUTO: 147 K/UL (ref 150–400)
POTASSIUM SERPL-SCNC: 3.4 MMOL/L (ref 3.5–5.1)
PROT SERPL-MCNC: 5.1 G/DL (ref 6.4–8.2)
RBC # BLD AUTO: 2.84 M/UL (ref 3.8–5.2)
SODIUM SERPL-SCNC: 147 MMOL/L (ref 136–145)
THERAPEUTIC RANGE,PTTT: NORMAL SECS (ref 58–77)
WBC # BLD AUTO: 10.6 K/UL (ref 3.6–11)

## 2017-05-23 PROCEDURE — 74011250637 HC RX REV CODE- 250/637: Performed by: STUDENT IN AN ORGANIZED HEALTH CARE EDUCATION/TRAINING PROGRAM

## 2017-05-23 PROCEDURE — 74011250637 HC RX REV CODE- 250/637: Performed by: INTERNAL MEDICINE

## 2017-05-23 PROCEDURE — 80053 COMPREHEN METABOLIC PANEL: CPT | Performed by: INTERNAL MEDICINE

## 2017-05-23 PROCEDURE — 74011000250 HC RX REV CODE- 250: Performed by: INTERNAL MEDICINE

## 2017-05-23 PROCEDURE — 85027 COMPLETE CBC AUTOMATED: CPT | Performed by: INTERNAL MEDICINE

## 2017-05-23 PROCEDURE — 74011250636 HC RX REV CODE- 250/636: Performed by: INTERNAL MEDICINE

## 2017-05-23 PROCEDURE — 74011250637 HC RX REV CODE- 250/637: Performed by: SURGERY

## 2017-05-23 PROCEDURE — 74011000258 HC RX REV CODE- 258: Performed by: INTERNAL MEDICINE

## 2017-05-23 PROCEDURE — 85730 THROMBOPLASTIN TIME PARTIAL: CPT | Performed by: INTERNAL MEDICINE

## 2017-05-23 PROCEDURE — 74011250637 HC RX REV CODE- 250/637: Performed by: NURSE PRACTITIONER

## 2017-05-23 PROCEDURE — 84100 ASSAY OF PHOSPHORUS: CPT | Performed by: INTERNAL MEDICINE

## 2017-05-23 PROCEDURE — 65660000000 HC RM CCU STEPDOWN

## 2017-05-23 PROCEDURE — 36415 COLL VENOUS BLD VENIPUNCTURE: CPT | Performed by: INTERNAL MEDICINE

## 2017-05-23 PROCEDURE — 97116 GAIT TRAINING THERAPY: CPT

## 2017-05-23 PROCEDURE — 83735 ASSAY OF MAGNESIUM: CPT | Performed by: INTERNAL MEDICINE

## 2017-05-23 PROCEDURE — C9113 INJ PANTOPRAZOLE SODIUM, VIA: HCPCS | Performed by: INTERNAL MEDICINE

## 2017-05-23 PROCEDURE — 97530 THERAPEUTIC ACTIVITIES: CPT

## 2017-05-23 RX ORDER — NYSTATIN 100000 [USP'U]/ML
500000 SUSPENSION ORAL 4 TIMES DAILY
Status: DISCONTINUED | OUTPATIENT
Start: 2017-05-23 | End: 2017-05-24 | Stop reason: HOSPADM

## 2017-05-23 RX ORDER — MAGNESIUM SULFATE HEPTAHYDRATE 40 MG/ML
2 INJECTION, SOLUTION INTRAVENOUS ONCE
Status: COMPLETED | OUTPATIENT
Start: 2017-05-23 | End: 2017-05-23

## 2017-05-23 RX ORDER — POTASSIUM CHLORIDE 750 MG/1
40 TABLET, FILM COATED, EXTENDED RELEASE ORAL 2 TIMES DAILY
Status: COMPLETED | OUTPATIENT
Start: 2017-05-23 | End: 2017-05-23

## 2017-05-23 RX ORDER — FUROSEMIDE 40 MG/1
40 TABLET ORAL DAILY
Status: DISCONTINUED | OUTPATIENT
Start: 2017-05-24 | End: 2017-05-24 | Stop reason: HOSPADM

## 2017-05-23 RX ADMIN — Medication 10 ML: at 06:45

## 2017-05-23 RX ADMIN — HYDROMORPHONE HYDROCHLORIDE 1 MG: 1 INJECTION, SOLUTION INTRAMUSCULAR; INTRAVENOUS; SUBCUTANEOUS at 14:29

## 2017-05-23 RX ADMIN — NYSTATIN 500000 UNITS: 100000 SUSPENSION ORAL at 22:11

## 2017-05-23 RX ADMIN — MAGNESIUM SULFATE HEPTAHYDRATE 2 G: 40 INJECTION, SOLUTION INTRAVENOUS at 11:48

## 2017-05-23 RX ADMIN — CARVEDILOL 3.12 MG: 3.12 TABLET, FILM COATED ORAL at 18:57

## 2017-05-23 RX ADMIN — POTASSIUM CHLORIDE 40 MEQ: 750 TABLET, FILM COATED, EXTENDED RELEASE ORAL at 18:57

## 2017-05-23 RX ADMIN — NYSTATIN 500000 UNITS: 100000 SUSPENSION ORAL at 18:57

## 2017-05-23 RX ADMIN — Medication 10 ML: at 14:17

## 2017-05-23 RX ADMIN — CARVEDILOL 3.12 MG: 3.12 TABLET, FILM COATED ORAL at 09:31

## 2017-05-23 RX ADMIN — FUROSEMIDE 40 MG: 40 TABLET ORAL at 09:31

## 2017-05-23 RX ADMIN — LIDOCAINE HYDROCHLORIDE 5 ML: 20 SOLUTION ORAL; TOPICAL at 11:50

## 2017-05-23 RX ADMIN — CEFTRIAXONE 2 G: 2 INJECTION, POWDER, FOR SOLUTION INTRAMUSCULAR; INTRAVENOUS at 06:46

## 2017-05-23 RX ADMIN — POTASSIUM CHLORIDE 20 MEQ: 750 TABLET, FILM COATED, EXTENDED RELEASE ORAL at 09:31

## 2017-05-23 RX ADMIN — ATORVASTATIN CALCIUM 20 MG: 20 TABLET, FILM COATED ORAL at 22:11

## 2017-05-23 RX ADMIN — Medication 20 ML: at 22:13

## 2017-05-23 RX ADMIN — LIDOCAINE HYDROCHLORIDE 5 ML: 20 SOLUTION ORAL; TOPICAL at 17:28

## 2017-05-23 RX ADMIN — ASPIRIN 81 MG 81 MG: 81 TABLET ORAL at 09:31

## 2017-05-23 RX ADMIN — LIDOCAINE HYDROCHLORIDE 5 ML: 20 SOLUTION ORAL; TOPICAL at 06:47

## 2017-05-23 RX ADMIN — SODIUM CHLORIDE 40 MG: 9 INJECTION INTRAMUSCULAR; INTRAVENOUS; SUBCUTANEOUS at 09:31

## 2017-05-23 RX ADMIN — POTASSIUM CHLORIDE 40 MEQ: 750 TABLET, FILM COATED, EXTENDED RELEASE ORAL at 11:47

## 2017-05-23 RX ADMIN — LEVOTHYROXINE SODIUM 50 MCG: 50 TABLET ORAL at 06:46

## 2017-05-23 NOTE — PROGRESS NOTES
Chart reviewed. CM met with pt to inform her that [address] and [place] have both accepted her. Pt chose [facility]. Pt will need wheelchair [facility] transport at discharge. Pt stated she does not have any forms of payment  for the transport. Pt's only family is her son who is currently out of the country. CM will research to try and find a wheelchair [employer] that will send the pt a bill. CM will follow.     MINGO Fonseca/CRM

## 2017-05-23 NOTE — PROGRESS NOTES
Name: Ruthy Flores   MRN: [id number]  : 1945      Assessment:  JEANINE- likely septic ATN in the setting of SBO/hypotension and NSTEMI; JEANINE has resolved. No evidence of Contrast nephropathy either (s/p IV Contrast on  with CT A/P)  E. Coli Bacteremia  Hx of recurrent Klebsiella bacteremia/Hx of L4-5 diskitis in past  SBO- resolved  Hypokalemia/Hypomagnesemia  Hypernatremia: Need to watch closely with resumption of lasix  Fluid overload- residual volume excess from prior volume resuscitation. Wt also has gone up. CT A/P shows b/l pleural effusions/compressive atelectasis      Plan/Recommendations:  Renew Lasix 40 mg daily    KCl 40meq bid x 2 doses  IV mag sulfate 2gm x1 dose  Encourage free water intake to improve hypernatremia  Consider removal of ponec  AM labs      Subjective:  Mild Nausea. No vomiting. Taking PO-> trying to drink more water. ROS:   Denies n/v/cp.      Exam:  Visit Vitals    /75    Pulse 73    Temp 98 °F (36.7 °C)    Resp 18    Ht 5' 4\" (1.626 m)    Wt 104.6 kg (230 lb 9.6 oz)    SpO2 100%    Breastfeeding No    BMI 39.58 kg/m2       NAD  Clear with dec BS  RRR  Tr edema  Alert awake    Current Facility-Administered Medications   Medication Dose Route Frequency Last Dose    magnesium sulfate 2 g/50 ml IVPB (premix or compounded)  2 g IntraVENous ONCE      potassium chloride SR (KLOR-CON 10) tablet 40 mEq  40 mEq Oral BID      [START ON 2017] furosemide (LASIX) tablet 40 mg  40 mg Oral DAILY      atorvastatin (LIPITOR) tablet 20 mg  20 mg Oral QHS 20 mg at 17 2143    magic mouthwash cpd (without sucralfate)  5 mL Oral TIDAC 5 mL at 17 0647    furosemide (LASIX) tablet 40 mg  40 mg Oral DAILY 40 mg at 17 0931    potassium chloride SR (KLOR-CON 10) tablet 20 mEq  20 mEq Oral DAILY 20 mEq at 17 6856    levothyroxine (SYNTHROID) tablet 50 mcg  50 mcg Oral ACB 50 mcg at 05/23/17 0646    sodium chloride (NS) flush 5-10 mL  5-10 mL IntraVENous Q8H 10 mL at 05/23/17 0645    carvedilol (COREG) tablet 3.125 mg  3.125 mg Oral BID 3.125 mg at 05/23/17 0931    cefTRIAXone (ROCEPHIN) 2 g in 0.9% sodium chloride (MBP/ADV) 50 mL  2 g IntraVENous Q24H 2 g at 05/23/17 0646    ipratropium (ATROVENT) 0.02 % nebulizer solution 0.5 mg  0.5 mg Nebulization Q6H PRN 0.5 mg at 05/20/17 0012    aspirin chewable tablet 81 mg  81 mg Oral DAILY 81 mg at 05/23/17 0931    HYDROmorphone (PF) (DILAUDID) injection 1 mg  1 mg IntraVENous Q4H PRN 1 mg at 05/19/17 0823    ondansetron (ZOFRAN) injection 4 mg  4 mg IntraVENous Q4H PRN 4 mg at 05/21/17 1641    pantoprazole (PROTONIX) 40 mg in sodium chloride 0.9 % 10 mL injection  40 mg IntraVENous DAILY 40 mg at 05/23/17 0931       Labs/Data:    Lab Results   Component Value Date/Time    WBC 10.6 05/23/2017 06:25 AM    Hemoglobin (POC) 11.9 12/03/2010 01:40 AM    HGB 8.7 05/23/2017 06:25 AM    Hematocrit (POC) 35 12/03/2010 01:40 AM    HCT 26.2 05/23/2017 06:25 AM    PLATELET 184 53/33/7108 06:25 AM    MCV 92.3 05/23/2017 06:25 AM       Lab Results   Component Value Date/Time    Sodium 147 05/23/2017 04:01 AM    Potassium 3.4 05/23/2017 04:01 AM    Chloride 113 05/23/2017 04:01 AM    CO2 26 05/23/2017 04:01 AM    Anion gap 8 05/23/2017 04:01 AM    Glucose 142 05/23/2017 04:01 AM    BUN 6 05/23/2017 04:01 AM    Creatinine 0.66 05/23/2017 04:01 AM    BUN/Creatinine ratio 9 05/23/2017 04:01 AM    GFR est AA >60 05/23/2017 04:01 AM    GFR est non-AA >60 05/23/2017 04:01 AM    Calcium 7.8 05/23/2017 04:01 AM       Wt Readings from Last 3 Encounters:   05/17/17 104.6 kg (230 lb 9.6 oz)   02/02/17 96.6 kg (213 lb)   11/30/16 96.8 kg (213 lb 8 oz)         Intake/Output Summary (Last 24 hours) at 05/23/17 1029  Last data filed at 05/23/17 0805   Gross per 24 hour   Intake              980 ml Output             4150 ml   Net            -3170 ml       Patient seen and examined. Chart reviewed. Labs, data and other pertinent notes reviewed in last 24 hrs.     PMH/SH/FH reviewed and unchanged compared to H&P    Discussed with pt      Iris Dinero MD

## 2017-05-23 NOTE — PROGRESS NOTES
Problem: Mobility Impaired (Adult and Pediatric)  Goal: *Acute Goals and Plan of Care (Insert Text)  Physical Therapy Goals  Initiated 5/19/2017  1. Patient will move from supine to sit and sit to supine in bed with contact guard assist within 7 day(s). 2. Patient will transfer from bed to chair and chair to bed with contact guard assist using the least restrictive device within 7 day(s). 3. Patient will perform sit to stand with contact guard assist within 7 day(s). 4. Patient will ambulate with minimal assistance/contact guard assist for 50 feet with the least restrictive device within 7 day(s). PHYSICAL THERAPY TREATMENT  Patient: Simeon Traore (41 y.o. female)  Date: 5/23/2017  Diagnosis: Septic shock ([address] Septic shock ([facility]       Precautions: Fall      ASSESSMENT:  Pt is progressing well with all goals. She was agreeable to bed mobility, transfers, and gait with RW/assist x1/gait belt. Pt slow but generally steady without overt LOB, increased trunk sway noted with multiple standing rest breaks 2* SOB (VSS on RA 98%). Pt remains appropriate for D/C to rehab once medically stable. Pt planning for SNF at this time. Pt back in bed with NAD when left. Of note: pt seen following personal care with PCT; requested we look for her \"small black phone\" once up, unable to find phone in bed or linens at this time. Progression toward goals:  [ ]    Improving appropriately and progressing toward goals  [X]    Improving slowly and progressing toward goals  [ ]    Not making progress toward goals and plan of care will be adjusted       PLAN:  Patient continues to benefit from skilled intervention to address the above impairments. Continue treatment per established plan of care. Discharge Recommendations:  Glenn Saldana  Further Equipment Recommendations for Discharge:  NA       SUBJECTIVE:   Patient stated A little [SOB]. ..      OBJECTIVE DATA SUMMARY:   Critical Behavior:  Neurologic State: Alert  Orientation Level: Oriented X4  Cognition: Appropriate decision making, Appropriate for age attention/concentration, Appropriate safety awareness     Functional Mobility Training:  Bed Mobility:  Rolling: Stand-by asssistance; Additional time (cues for rail use/pull)  Supine to Sit: Minimum assistance; Additional time  Sit to Supine: Maximum assistance; Additional time (assistance with BLE lift onto EOB)  Scooting: Stand-by asssistance; Additional time        Transfers:  Sit to Stand: Contact guard assistance; Additional time  Stand to Sit: Contact guard assistance; Additional time                             Balance:  Sitting: Intact  Sitting - Static: Good (unsupported)  Sitting - Dynamic: Fair (occasional)  Standing: Intact; With support  Standing - Static: Fair  Standing - Dynamic : Fair  Ambulation/Gait Training:  Distance (ft): 45 Feet (ft) (x2)  Assistive Device: Gait belt;Walker, rolling  Ambulation - Level of Assistance: Contact guard assistance  Gait Abnormalities: Decreased step clearance;Trunk sway increased; Step to gait  Base of Support: Widened  Speed/Eunice: Slow;Pace decreased (<100 feet/min)  Step Length: Right shortened;Left shortened  Pain:  Pain Scale 1: Numeric (0 - 10)  Pain Intensity 1: 0  Activity Tolerance:   Multiple standing rest breaks required; SpO2 >/=98% during therapy on RA  Please refer to the flowsheet for vital signs taken during this treatment.   After treatment:   [ ]    Patient left in no apparent distress sitting up in chair  [X]    Patient left in no apparent distress in bed  [X]    Call bell left within reach  [X]    Nursing notified  [ ]    Caregiver present  [ ]    Bed alarm activated      COMMUNICATION/COLLABORATION:   The patients plan of care was discussed with: Registered Nurse     Jose Jones   Time Calculation: 24 mins

## 2017-05-23 NOTE — PROGRESS NOTES
ID Progress Note  2017     Cefepime  Discontinued  Ceftriaxone     Subjective:     Afebrile. Feels good. WBC normal. Only has slight abdominal soreness. Objective:     Vitals:   Visit Vitals    /82 (BP 1 Location: Right arm)    Pulse 71    Temp 98.5 °F (36.9 °C)    Resp 18    Ht 5' 4\" (1.626 m)    Wt 104.6 kg (230 lb 9.6 oz)    SpO2 98%    Breastfeeding No    BMI 39.58 kg/m2        Tmax:  Temp (24hrs), Av.3 °F (36.8 °C), Min:98 °F (36.7 °C), Max:98.7 °F (37.1 °C)      Exam:    Not in distress   Lungs: clear to auscultation, no rales   Heart: s1, s2, no murmurs, rubs or clicks   Abdomen: soft, nontender, no rebound, no guarding       Labs:   Lab Results   Component Value Date/Time    WBC 10.6 2017 06:25 AM    Hemoglobin (POC) 11.9 2010 01:40 AM    HGB 8.7 2017 06:25 AM    Hematocrit (POC) 35 2010 01:40 AM    HCT 26.2 2017 06:25 AM    PLATELET 110 6190 06:25 AM    MCV 92.3 2017 06:25 AM     Lab Results   Component Value Date/Time    Sodium 147 2017 04:01 AM    Potassium 3.4 2017 04:01 AM    Chloride 113 2017 04:01 AM    CO2 26 2017 04:01 AM    Anion gap 8 2017 04:01 AM    Glucose 142 2017 04:01 AM    BUN 6 2017 04:01 AM    Creatinine 0.66 2017 04:01 AM    BUN/Creatinine ratio 9 2017 04:01 AM    GFR est AA >60 2017 04:01 AM    GFR est non-AA >60 2017 04:01 AM    Calcium 7.8 2017 04:01 AM    Bilirubin, total 0.8 2017 04:01 AM    AST (SGOT) 14 2017 04:01 AM    Alk. phosphatase 84 2017 04:01 AM    Protein, total 5.1 2017 04:01 AM    Albumin 2.1 2017 04:01 AM    Globulin 3.0 2017 04:01 AM    A-G Ratio 0.7 2017 04:01 AM    ALT (SGPT) 17 2017 04:01 AM       US - no biliary dilatation      Assessment:       1. E coli  bacteremia. 2. History of recurrent Klebsiella bacteremia.    3. History of L4-L5 diskitis for which she has received greater than 12   weeks of IV therapy with normalization of her inflammation markers. 4. Renal failure. 5. Rheumatoid arthritis. 6. History of dilated bile ducts. 7. non-ST-segment elevation myocardial infarction.       Recommendations:      1. Continue ceftriaxone. WBC normal. She will need IV treatment until the 30th of May.        Prirene Mojica MD

## 2017-05-23 NOTE — PROGRESS NOTES
Tamar Carson from the lab called an asked me to redraw the CBC because HGB had dropped from 10 to 5 and HCT had dropped from 30 to 15. Will redraw specimen. Tonia Melara pt's chemistry at the same time and that is fine.

## 2017-05-23 NOTE — PROGRESS NOTES
Cardiology Progress Note      5/23/2017 9:36 AM    Admit Date: 5/14/2017    Admit Diagnosis: Septic shock (HCC)      Subjective: Rey Linder denies chest pain and SOB. Still occasional abdominal pain. Reviewed results of stress test.  Visit Vitals    /75    Pulse 73    Temp 98 °F (36.7 °C)    Resp 18    Ht 5' 4\" (1.626 m)    Wt 230 lb 9.6 oz (104.6 kg)    SpO2 100%    Breastfeeding No    BMI 39.58 kg/m2     Current Facility-Administered Medications   Medication Dose Route Frequency    atorvastatin (LIPITOR) tablet 20 mg  20 mg Oral QHS    magic mouthwash cpd (without sucralfate)  5 mL Oral TIDAC    furosemide (LASIX) tablet 40 mg  40 mg Oral DAILY    potassium chloride SR (KLOR-CON 10) tablet 20 mEq  20 mEq Oral DAILY    levothyroxine (SYNTHROID) tablet 50 mcg  50 mcg Oral ACB    sodium chloride (NS) flush 5-10 mL  5-10 mL IntraVENous Q8H    carvedilol (COREG) tablet 3.125 mg  3.125 mg Oral BID    cefTRIAXone (ROCEPHIN) 2 g in 0.9% sodium chloride (MBP/ADV) 50 mL  2 g IntraVENous Q24H    ipratropium (ATROVENT) 0.02 % nebulizer solution 0.5 mg  0.5 mg Nebulization Q6H PRN    aspirin chewable tablet 81 mg  81 mg Oral DAILY    HYDROmorphone (PF) (DILAUDID) injection 1 mg  1 mg IntraVENous Q4H PRN    ondansetron (ZOFRAN) injection 4 mg  4 mg IntraVENous Q4H PRN    pantoprazole (PROTONIX) 40 mg in sodium chloride 0.9 % 10 mL injection  40 mg IntraVENous DAILY         Objective:      Physical Exam:  Visit Vitals    /75    Pulse 73    Temp 98 °F (36.7 °C)    Resp 18    Ht 5' 4\" (1.626 m)    Wt 230 lb 9.6 oz (104.6 kg)    SpO2 100%    Breastfeeding No    BMI 39.58 kg/m2     General Appearance:  Alert, no acute distress. Ears/Nose/Mouth/Throat:   Hearing grossly normal.         Neck: Supple. no JVD   Chest:   Clear to ausclatation bilaterally. No use of accessory muscles noted. Cardiovascular:  Regular rate and rhythm, S1, S2 normal, no murmur.    Abdomen:   Soft, nondistended, generalized mild ttp, no gaurding. Extremities: No lower extremity edema bilaterally. Skin: Warm and dry. Data Review:   Labs:    Recent Results (from the past 24 hour(s))   NUCLEAR STRESS TEST    Collection Time: 05/22/17 10:38 AM   Result Value Ref Range    Diagnosis         Confirmed by Keith Fink MD, Ry Clark ([id number]),  Elvisamyvarsha Proctor ([id number]) on   5/22/2017 1:32:51 PM      Test indication NSTEMI     Functional capacity Could Not Be Adequately Assessed     ECG Interp. Before Exercise non-specific ST/T abnormality     ECG Interp. During Exercise none     Overall HR response to exercise appropriate     Overall BP response to exercise resting hypertension - blunted response     Max. Systolic  mmHg    Max. Diastolic BP 82 mmHg    Max. Heart rate 90 BPM    [facility] treadmill score      Lama TM score result      Peak Ex METs 1.0 METS    Protocol name LEXISCAN             Known cardiac condition      Attending physician Alan Beck MD    MAGNESIUM    Collection Time: 05/23/17  4:01 AM   Result Value Ref Range    Magnesium 1.6 1.6 - 2.4 mg/dL   PHOSPHORUS    Collection Time: 05/23/17  4:01 AM   Result Value Ref Range    Phosphorus 2.3 (L) 2.6 - 4.7 MG/DL   METABOLIC PANEL, COMPREHENSIVE    Collection Time: 05/23/17  4:01 AM   Result Value Ref Range    Sodium 147 (H) 136 - 145 mmol/L    Potassium 3.4 (L) 3.5 - 5.1 mmol/L    Chloride 113 (H) 97 - 108 mmol/L    CO2 26 21 - 32 mmol/L    Anion gap 8 5 - 15 mmol/L    Glucose 142 (H) 65 - 100 mg/dL    BUN 6 6 - 20 MG/DL    Creatinine 0.66 0.55 - 1.02 MG/DL    BUN/Creatinine ratio 9 (L) 12 - 20      GFR est AA >60 >60 ml/min/1.73m2    GFR est non-AA >60 >60 ml/min/1.73m2    Calcium 7.8 (L) 8.5 - 10.1 MG/DL    Bilirubin, total 0.8 0.2 - 1.0 MG/DL    ALT (SGPT) 17 12 - 78 U/L    AST (SGOT) 14 (L) 15 - 37 U/L    Alk.  phosphatase 84 45 - 117 U/L    Protein, total 5.1 (L) 6.4 - 8.2 g/dL    Albumin 2.1 (L) 3.5 - 5.0 g/dL    Globulin 3.0 2.0 - 4.0 g/dL    A-G Ratio 0.7 (L) 1.1 - 2.2     PTT    Collection Time: 05/23/17  4:01 AM   Result Value Ref Range    aPTT 27.4 22.1 - 32.5 sec    aPTT, therapeutic range     58.0 - 77.0 SECS   CBC W/O DIFF    Collection Time: 05/23/17  6:25 AM   Result Value Ref Range    WBC 10.6 3.6 - 11.0 K/uL    RBC 2.84 (L) 3.80 - 5.20 M/uL    HGB 8.7 (L) 11.5 - 16.0 g/dL    HCT 26.2 (L) 35.0 - 47.0 %    MCV 92.3 80.0 - 99.0 FL    MCH 30.6 26.0 - 34.0 PG    MCHC 33.2 30.0 - 36.5 g/dL    RDW 19.3 (H) 11.5 - 14.5 %    PLATELET 466 (L) 326 - 400 K/uL       Telemetry: normal sinus rhythm        Assessment:     Principal Problem:    Septic shock ([address] (5/15/2017)        Plan:     1. NSTEMI. Pt with elevated cardiac markers and NSTEMI by definition, but stress test and echo were normal.  Consistent with being from demand and not acute ischemia. Medical management. Continue ASA, BB.  LFTs normal and started statin. Noted plans for SNF. Will schedule outpt f/u with me in 1 month. [id number] [address] for hospital d/c from cardiac standpoint and will sign off. Please call if any new cardiac issues arise. 2. Acute renal insufficiency. .   3. Sepsis: ID following. 4. E Coli bacteremia:  ID following. 5. Anemia.        Signed By: Lala Aaron MD     May 23, 2017

## 2017-05-23 NOTE — PROGRESS NOTES
Hospitalist Progress Note  Office: [phone]      Date of Service:  2017  NAME:  Home Roca  :  1945  MRN:  [id number]      Admission Summary:     66 yo woman with obesity, rheumatoid arthritis, chronic pain syndrome, gastroesophageal reflux disease,   hypertension, h/o herpes simplex virus infection was BIBEMS from home on 17 with nausea, vomiting, diarrhea and abdominal pain.      Patient states that the nausea and vomiting was actually intermittent for about three weeks prior to admission. Patient states that the vomiting was occurring about twice a week. She attributed it the vomiting to [employer] food, which she plans to avoid in the future. Upon arrival to the emergency department, patient was found to have partial small bowel obstruction on unenhanced CT scan of the abdomen 05/15/17, the date that patient was admitted. NG tube decompression was performed. Repeat CT scan with contrast on  did not show evidence for small bowel obstruction. Patient had 1.8 cm x 1.8 cm thin-walled focus of fluid posterior superior to the pancreatic body which was also apparent in prior CT of May 15. There was a small amount of free intraperitoneal fluid, but no free air. In the interim, a KUB was also performed on May 17, 2017, and showed paucity of air. Patient complained of RUQ pain. She is s/p cholecystectomy.     In the emergency department, she had been found to be hypotensive with leukocytosis and elevated lactic acid level. Patient was started on vasopressors and transferred to the intensive care unit. She was also given albumin IV initially x 4 doses to maintain intravascular volume. Initial UA showed evidence for urinary tract infection, but urine cultures were ultimately negative.  Initial blood cultures drawn on 17 came back positive for E. coli.      Patient had elevated troponins and is being followed by cardiologist. The troponins may be secondary to demand ischemia. Patient has had no chest pain or significant shortness of breath. She is in the process of undergoing two-part nuclear stress test, with stress portion scheduled for May 22. Echocardiogram of May 15, 2017 was essentially unremarkable with normal wall motion, normal LVEF and no significant valvular abnormalities.     At admission, patient was found to have acute kidney injury (creatinine 2.6 ) possibly due to acute tubular necrosis in the setting of prolonged hypotension with sepsis vs. intravascular depletion with sepsis, metabolic acidosis, elevated lactic acid levels, electrolyte abnormalities with hypokalemia. This has all been corrected. Patient was initially on bicarbonate drip for metabolic acidosis. Patient also had mildly elevated liver enzymes with ALT 53, , alkaline phosphatase 217 at admission which are gradually trending downward. Renal function normalized on 5/19/17. Liver enzymes normalized on 5/16/17.     Interval history / Subjective:   Patient continues to have bowel movements (loose) about 3 times daily. She is tolerating diet. Patient has mild RUQ pain intermittently. She complains of fatigue. She completed nuclear stress test today. Nuclear stress test results came back negative.      Assessment & Plan:      1. Septic shock (POA) with E. coli bacteremia, toxic encephalopathy;   - was on norepinephrine gtt, now weaned off  - leukocytosis, fever, hypotension, lactic acidosis, tachypnea, tachycardia on admission  - leukocytosis not significantly changed, though patient had been given stress dose steroids (for hypotension, in case of adrenal insufficiency in the setting of chronic steroids being taken for rheumatoid arthritis), given E. Coli bacteremia, concern raised for intra-abdominal source for infection. Repeat labs.   - abd US 5/17 - heterogenous liver, increase small right pleural effusion, normal biliary tree s/p cholecystectomy, no hydronephrosis  - was on empiric cefepime, received vancomycin, de-escalated to ceftriaxone  - blood cultures 5/14 - E. Coli 3/3 bottles  - repeat blood cultures 5/16 - no growth thus far  - stool Cx 5/15 - negative  - stool caliber not amenable to C diff testing currently  - repeat CT Abd/Pelvis with contrast  Patient is s/p aggressive IVF resuscitation, and is now actually developing edema.     2. Troponin elevation. This could still be type 2 NSTEMI in the setting of septic shock with troponin up to 6.88 on 5/16/17.  - suspect demand ischemia in setting of septic shock  - Nuclear stress test negative as reported on Monday, 5/22. May still have some underlying non-obstructive coronary artery disease. Outpatient follow-up with Dr. Valentine Mcgrath, cardiologist.  - on aspirin, coreg. Start stain as liver enzymes have normalized. -  Echocardiogram of May 15, 2017 was essentially unremarkable with normal wall motion, normal LVEF   - seen by cardiology      3. Partial small bowel obstruction (POA)  - RUQ and epigastic TTP, still with nausea, but has flatus  - KUB 5/17 - Nasogastric tube in place with tip projected in the expected location of the stomach. Continued relative paucity of abdominal gas. - pain control, conservative management with bowel rest, diet advanced to clears, now back to cardiac diet  - GI / general surgery following     4. Hypokalemia (POA), hypophosphatemia, hypomagnesemia  - replete prn     5. Anion gap metabolic acidosis (POA). Resolved. - likely due to lactic acidosis and exacerbated by GI losses and JEANINE. - lactic acid normalized  - was on sodium bicarbonate, now discontinued.     6. Acute kidney injury (POA). Resolved. - likely due to prerenal azotemia vs septic / hypotensive ATN  - on sodium bicarbonate, now discontinued  - CT abd/pelvis without contrast 5/15 - unremarkable kidneys  - Urine output > 1000 ml daily since 5/15/17  - nephrology following     7.  Abnormal LFTs, hyperbilirubinemia (POA). Resolved. - suspect either dehydration or intra-abdominal sepsis, improving  - GI following  - avoid hepatotoxins     8. Acute gastroenteritis (POA)   - antiemetics prn  - pain control  - stool Cx 5/15 - negative  - C diff 5/15 - not able to be tested due to stool caliber  - PPI     9. Hypothyroidism  - TSH WNL; continue levothyroxine     10. Rheumatoid arthritis  - started stress dose steroids due to chronic steroid use (home prednisone dose 10 mg po daily), tapered to discontinue on 5/20  - hold home Imuran     11. Anemia  - suspect some element of hemodilution, seems improved, stable     Code status: FULL  DVT prophylaxis: heparin     Care Plan discussed with: Patient/Family and Nurse  Disposition: TBD. Came from home. Unclear disposition currently, will need second part of stress test on 5/22. May need short-term rehab. Hospital Problems  Date Reviewed: 5/15/2017          Codes Class Noted POA    * (Principal)Septic shock ([facility] [address] ICD-10-CM: A41.9, R65.21  ICD-9-CM: 038.9, 785.52, 995.92  5/15/2017 Yes            Review of Systems:   Pertinent items are noted in HPI. Vital Signs:    Vital signs: temperature 98°F, pulse rate 73, blood pressure 144/87, respiratory rate 18, oxygen saturation 97% on room air.     Intake/Output Summary (Last 24 hours) at 05/23/17 0603  Last data filed at 05/22/17 2300   Gross per 24 hour   Intake              980 ml   Output             4250 ml   Net            -3270 ml      Physical Examination:     Constitutional:  awake, calm, obese   ENT:  oral mucosa slightly dry, oropharynx benign   Resp:  clear to auscultation anteriorly   CV:  regular rhythm, slightly tachycardic, distant heart sounds    GI:  hypoactive BS, soft, non distended, mildly tender to palpation in epigastrum, obese     Musculoskeletal:  moves all extremities    Neurologic:  AAOx3, responds appropriately to questions and commands     Skin:  Good turgor, no rashes or ulcers  Eyes:  PERRL    Data Review:    Review and/or order of clinical lab test  Review and/or order of tests in the radiology section of CPT  Review and/or order of tests in the medicine section of CPT    Labs:     Recent Labs      05/22/17   0957   WBC  13.4*   HGB  10.0*   HCT  30.3*   PLT  159     Recent Labs      05/23/17   0401  05/22/17   0957  05/22/17   0404  05/21/17   0430   NA  147*  146*  147*  144   K  3.4*  3.4*  3.4*  3.4*   CL  113*  112*  111*  108   CO2  26  26  29  28   BUN  6  7  7  9   CREA  0.66  0.70  0.66  0.77   GLU  142*  102*  101*  134*   CA  7.8*  7.8*  7.9*  8.1*   MG  1.6   --   1.3*  1.7   PHOS  2.3*   --   2.8  2.5*     Recent Labs      05/23/17   0401  05/22/17   0957   SGOT  14*  16   ALT  17  20   AP  84  94   TBILI  0.8  1.1*   TP  5.1*  5.5*   ALB  2.1*  2.2*   GLOB  3.0  3.3     Recent Labs      05/23/17   0401  05/22/17   0957  05/22/17   0404   APTT  27.4  27.0  26.0      No results for input(s): FE, TIBC, PSAT, FERR in the last 72 hours. No results found for: FOL, RBCF   No results for input(s): PH, PCO2, PO2 in the last 72 hours. No results for input(s): CPK, CKNDX, TROIQ in the last 72 hours.     No lab exists for component: CPKMB  Lab Results   Component Value Date/Time    Cholesterol, total 99 12/23/2015 01:45 AM    HDL Cholesterol 22 12/23/2015 01:45 AM    LDL, calculated 54.4 12/23/2015 01:45 AM    Triglyceride 113 12/23/2015 01:45 AM    CHOL/HDL Ratio 4.5 12/23/2015 01:45 AM     Lab Results   Component Value Date/Time    Glucose (POC) 98 05/19/2017 11:52 AM    Glucose (POC) 125 12/30/2015 11:28 AM    Glucose (POC) 101 12/23/2015 12:08 AM    Glucose (POC) 205 09/02/2015 11:44 AM    Glucose (POC) 112 09/02/2015 07:04 AM     Lab Results   Component Value Date/Time    Color DARK YELLOW 05/15/2017 01:44 PM    Appearance TURBID 05/15/2017 01:44 PM    Specific gravity 1.012 05/15/2017 01:44 PM    Specific gravity 1.005 12/23/2015 01:45 AM    pH (UA) 6.0 05/15/2017 01:44 PM    Protein 100 05/15/2017 01:44 PM    Glucose NEGATIVE  05/15/2017 01:44 PM    Ketone NEGATIVE  05/15/2017 01:44 PM    Bilirubin NEGATIVE  02/02/2017 01:27 AM    Urobilinogen 0.2 05/15/2017 01:44 PM    Nitrites NEGATIVE  05/15/2017 01:44 PM    Leukocyte Esterase MODERATE 05/15/2017 01:44 PM    Epithelial cells FEW 05/15/2017 01:44 PM    Bacteria 2+ 05/15/2017 01:44 PM    WBC 0-4 05/15/2017 01:44 PM    RBC 0-5 05/15/2017 01:44 PM     Medications Reviewed:     Current Facility-Administered Medications   Medication Dose Route Frequency    atorvastatin (LIPITOR) tablet 20 mg  20 mg Oral QHS    magic mouthwash cpd (without sucralfate)  5 mL Oral TIDAC    furosemide (LASIX) tablet 40 mg  40 mg Oral DAILY    potassium chloride SR (KLOR-CON 10) tablet 20 mEq  20 mEq Oral DAILY    levothyroxine (SYNTHROID) tablet 50 mcg  50 mcg Oral ACB    sodium chloride (NS) flush 5-10 mL  5-10 mL IntraVENous Q8H    carvedilol (COREG) tablet 3.125 mg  3.125 mg Oral BID    cefTRIAXone (ROCEPHIN) 2 g in 0.9% sodium chloride (MBP/ADV) 50 mL  2 g IntraVENous Q24H    ipratropium (ATROVENT) 0.02 % nebulizer solution 0.5 mg  0.5 mg Nebulization Q6H PRN    aspirin chewable tablet 81 mg  81 mg Oral DAILY    HYDROmorphone (PF) (DILAUDID) injection 1 mg  1 mg IntraVENous Q4H PRN    ondansetron (ZOFRAN) injection 4 mg  4 mg IntraVENous Q4H PRN    pantoprazole (PROTONIX) 40 mg in sodium chloride 0.9 % 10 mL injection  40 mg IntraVENous DAILY     ______________________________________________________________________  EXPECTED LENGTH OF STAY: 5d 2h  ACTUAL LENGTH OF STAY:          54 Olga Carcamo DO

## 2017-05-23 NOTE — PROGRESS NOTES
Hospitalist Progress Note  Office: [phone]      Date of Service:  2017  NAME:  Gabe Love  :  1945  MRN:  [id number]      Admission Summary:   66 yo woman with obesity, rheumatoid arthritis, chronic pain syndrome, gastroesophageal reflux disease,   hypertension, h/o herpes simplex virus infection was BIBEMS from home on 17 with nausea, vomiting, diarrhea and abdominal pain. Interval history / Subjective:   C/o discomfort from the ponce catheter and pain with swallowing     Assessment & Plan:     Septic shock (POA) with E. coli bacteremia, toxic encephalopathy  - resolved s/p norepinephrine gtt  - leukocytosis, fever, hypotension, lactic acidosis, tachypnea, tachycardia on admission  - leukocytosis not significantly changed, though patient had been given stress dose steroids (for hypotension, in case of adrenal insufficiency in the setting of chronic steroids being taken for rheumatoid arthritis), given E.  Coli bacteremia, concern raised for intra-abdominal source for infection  - abd US  heterogenous liver, increase small right pleural effusion, normal biliary tree s/p cholecystectomy, no hydronephrosis  - s/p empiric cefepime, received vancomycin, de-escalated to ceftriaxone  - BCx  E coli 3/3 bottles  - repeat BCx  negative  - stool Cx 5/15 negative  - UCx 5/15 negative  - stool caliber not amenable to C diff testing  - repeat CT Abd/Pelvis with contrast  - s/p aggressive IVF resuscitation, and is now actually developing edema      Troponin elevation  - suspect demand ischemia in setting of septic shock  - s/p nuclear stress test  negative, EF 61%  - on aspirin, coreg  - started statin as liver enzymes normalized  - TTE  normal wall motion, normal LVEF  - cleared by Cardiology: outpatient follow-up with Dr. Marina Graves, cardiologist       Partial small bowel obstruction (POA)  - RUQ and epigastic TTP, still with nausea, but has flatus  - KUB 5/17 nasogastric tube in place with tip projected in the expected location of the stomach. Continued relative paucity of abdominal gas. - pain control, conservative management with bowel rest, diet advanced to clears, now back to cardiac diet  - GI / general surgery following      Hypokalemia (POA), hypophosphatemia, hypomagnesemia  - replete prn      Anion gap metabolic acidosis (POA)  - resolved  - likely due to lactic acidosis and exacerbated by GI losses and JEANINE  - lactic acid normalized  - s/p IV sodium bicarbonate      Acute kidney injury (POA)  - resolved  - likely due to prerenal azotemia vs septic / hypotensive ATN  - s/p IV bicarb  - CT abd/pelvis without contrast 5/15 unremarkable kidneys  - Urine output > 1000 ml daily since 5/15/17  - nephrology following      Abnormal LFTs, hyperbilirubinemia (POA)  - resolved  - suspect either dehydration or intra-abdominal sepsis, improving  - GI following  - avoid hepatotoxins      Acute gastroenteritis (POA)   - antiemetics prn  - pain control  - stool Cx 5/15 negative  - C diff 5/15 not able to be tested due to stool caliber  - PPI      Hypothyroidism - TSH WNL; continue levothyroxine      Rheumatoid arthritis  - started stress dose steroids due to chronic steroid use (home prednisone dose 10 mg po daily), tapered and discontinued on 5/20  - hold home Imuran      Anemia - suspect some element of hemodilution, seems improved, stable    Oral candidiasis - start nystatin 4x daily swish and spit on 5/23 x14 days    Code status: Full  DVT prophylaxis: heparin    Care Plan discussed with: Patient/Family, Nurse and   Disposition: TBD. Hospital Problems  Date Reviewed: 5/15/2017          Codes Class Noted POA    * (Principal)Septic shock ([facility] [address] ICD-10-CM: A41.9, R65.21  ICD-9-CM: 038.9, 785.52, 995.92  5/15/2017 Yes            Review of Systems:   Pertinent items are noted in HPI.      Vital Signs:    Last 24hrs VS reviewed since prior progress note.  Most recent are:  Visit Vitals    /82 (BP 1 Location: Right arm)    Pulse 71    Temp 98.5 °F (36.9 °C)    Resp 18    Ht 5' 4\" (1.626 m)    Wt 104.6 kg (230 lb 9.6 oz)    SpO2 98%    Breastfeeding No    BMI 39.58 kg/m2         Intake/Output Summary (Last 24 hours) at 05/23/17 1426  Last data filed at 05/23/17 0805   Gross per 24 hour   Intake              980 ml   Output             3500 ml   Net            -2520 ml      Physical Examination:     Constitutional: awake, NAD, obese   ENT: oral mucosa slightly dry, oropharyngeal thrush   Resp: clear to auscultation anteriorly   CV: regular rhythm, slightly tachycardic, distant heart sounds, anasarca   GI: hypoactive BS, soft, non distended, mildly tender to palpation in epigastrum, obese    Musculoskeletal: moves all extremities   Neurologic: AAOx3, responds appropriately to questions and commands   Skin: warm, dry  Eyes: PERRL    Data Review:    Review and/or order of clinical lab test  Review and/or order of tests in the radiology section of CPT  Review and/or order of tests in the medicine section of CPT    Labs:     Recent Labs      05/23/17   0625  05/22/17   0957   WBC  10.6  13.4*   HGB  8.7*  10.0*   HCT  26.2*  30.3*   PLT  147*  159     Recent Labs      05/23/17   0401  05/22/17   0957  05/22/17   0404  05/21/17   0430   NA  147*  146*  147*  144   K  3.4*  3.4*  3.4*  3.4*   CL  113*  112*  111*  108   CO2  26  26  29  28   BUN  6  7  7  9   CREA  0.66  0.70  0.66  0.77   GLU  142*  102*  101*  134*   CA  7.8*  7.8*  7.9*  8.1*   MG  1.6   --   1.3*  1.7   PHOS  2.3*   --   2.8  2.5*     Recent Labs      05/23/17   0401  05/22/17   0957   SGOT  14*  16   ALT  17  20   AP  84  94   TBILI  0.8  1.1*   TP  5.1*  5.5*   ALB  2.1*  2.2*   GLOB  3.0  3.3     Recent Labs      05/23/17   0401  05/22/17   0957  05/22/17   0404   APTT  27.4  27.0  26.0      No results for input(s): FE, TIBC, PSAT, FERR in the last 72 hours. No results found for: FOL, RBCF   No results for input(s): PH, PCO2, PO2 in the last 72 hours. No results for input(s): CPK, CKNDX, TROIQ in the last 72 hours.     No lab exists for component: CPKMB  Lab Results   Component Value Date/Time    Cholesterol, total 99 12/23/2015 01:45 AM    HDL Cholesterol 22 12/23/2015 01:45 AM    LDL, calculated 54.4 12/23/2015 01:45 AM    Triglyceride 113 12/23/2015 01:45 AM    CHOL/HDL Ratio 4.5 12/23/2015 01:45 AM     Lab Results   Component Value Date/Time    Glucose (POC) 98 05/19/2017 11:52 AM    Glucose (POC) 125 12/30/2015 11:28 AM    Glucose (POC) 101 12/23/2015 12:08 AM    Glucose (POC) 205 09/02/2015 11:44 AM    Glucose (POC) 112 09/02/2015 07:04 AM     Lab Results   Component Value Date/Time    Color DARK YELLOW 05/15/2017 01:44 PM    Appearance TURBID 05/15/2017 01:44 PM    Specific gravity 1.012 05/15/2017 01:44 PM    Specific gravity 1.005 12/23/2015 01:45 AM    pH (UA) 6.0 05/15/2017 01:44 PM    Protein 100 05/15/2017 01:44 PM    Glucose NEGATIVE  05/15/2017 01:44 PM    Ketone NEGATIVE  05/15/2017 01:44 PM    Bilirubin NEGATIVE  02/02/2017 01:27 AM    Urobilinogen 0.2 05/15/2017 01:44 PM    Nitrites NEGATIVE  05/15/2017 01:44 PM    Leukocyte Esterase MODERATE 05/15/2017 01:44 PM    Epithelial cells FEW 05/15/2017 01:44 PM    Bacteria 2+ 05/15/2017 01:44 PM    WBC 0-4 05/15/2017 01:44 PM    RBC 0-5 05/15/2017 01:44 PM     Medications Reviewed:     Current Facility-Administered Medications   Medication Dose Route Frequency    potassium chloride SR (KLOR-CON 10) tablet 40 mEq  40 mEq Oral BID    [START ON 5/24/2017] furosemide (LASIX) tablet 40 mg  40 mg Oral DAILY    atorvastatin (LIPITOR) tablet 20 mg  20 mg Oral QHS    magic mouthwash cpd (without sucralfate)  5 mL Oral TIDAC    furosemide (LASIX) tablet 40 mg  40 mg Oral DAILY    potassium chloride SR (KLOR-CON 10) tablet 20 mEq  20 mEq Oral DAILY    levothyroxine (SYNTHROID) tablet 50 mcg  50 mcg Oral ACB    sodium chloride (NS) flush 5-10 mL  5-10 mL IntraVENous Q8H    carvedilol (COREG) tablet 3.125 mg  3.125 mg Oral BID    cefTRIAXone (ROCEPHIN) 2 g in 0.9% sodium chloride (MBP/ADV) 50 mL  2 g IntraVENous Q24H    ipratropium (ATROVENT) 0.02 % nebulizer solution 0.5 mg  0.5 mg Nebulization Q6H PRN    aspirin chewable tablet 81 mg  81 mg Oral DAILY    HYDROmorphone (PF) (DILAUDID) injection 1 mg  1 mg IntraVENous Q4H PRN    ondansetron (ZOFRAN) injection 4 mg  4 mg IntraVENous Q4H PRN    pantoprazole (PROTONIX) 40 mg in sodium chloride 0.9 % 10 mL injection  40 mg IntraVENous DAILY     ______________________________________________________________________  EXPECTED LENGTH OF STAY: 5d 2h  ACTUAL LENGTH OF STAY:          8                 Marcos Galvan MD

## 2017-05-24 VITALS
HEART RATE: 83 BPM | BODY MASS INDEX: 39.37 KG/M2 | RESPIRATION RATE: 18 BRPM | WEIGHT: 230.6 LBS | DIASTOLIC BLOOD PRESSURE: 83 MMHG | SYSTOLIC BLOOD PRESSURE: 138 MMHG | TEMPERATURE: 98.4 F | OXYGEN SATURATION: 99 % | HEIGHT: 64 IN

## 2017-05-24 PROBLEM — E87.6 HYPOKALEMIA: Status: RESOLVED | Noted: 2017-05-15 | Resolved: 2017-05-24

## 2017-05-24 PROBLEM — N17.9 AKI (ACUTE KIDNEY INJURY) (HCC): Status: RESOLVED | Noted: 2017-05-15 | Resolved: 2017-05-24

## 2017-05-24 PROBLEM — K56.600 PARTIAL SMALL BOWEL OBSTRUCTION (HCC): Status: ACTIVE | Noted: 2017-05-15

## 2017-05-24 PROBLEM — B96.20 E COLI BACTEREMIA: Status: ACTIVE | Noted: 2017-05-24

## 2017-05-24 PROBLEM — R78.81 E COLI BACTEREMIA: Status: ACTIVE | Noted: 2017-05-24

## 2017-05-24 PROBLEM — B37.0 ORAL CANDIDIASIS: Status: ACTIVE | Noted: 2017-05-24

## 2017-05-24 PROBLEM — R79.89 ABNORMAL LFTS: Status: RESOLVED | Noted: 2017-05-15 | Resolved: 2017-05-24

## 2017-05-24 PROBLEM — I24.8 DEMAND ISCHEMIA (HCC): Status: ACTIVE | Noted: 2017-05-15

## 2017-05-24 PROBLEM — K52.9 ACUTE GASTROENTERITIS: Status: RESOLVED | Noted: 2017-05-15 | Resolved: 2017-05-24

## 2017-05-24 PROBLEM — E87.29 HIGH ANION GAP METABOLIC ACIDOSIS: Status: RESOLVED | Noted: 2017-05-15 | Resolved: 2017-05-24

## 2017-05-24 PROBLEM — G93.41 SEPTIC ENCEPHALOPATHY: Status: RESOLVED | Noted: 2017-05-15 | Resolved: 2017-05-24

## 2017-05-24 LAB
ALBUMIN SERPL BCP-MCNC: 2.2 G/DL (ref 3.5–5)
ALBUMIN/GLOB SERPL: 0.7 {RATIO} (ref 1.1–2.2)
ALP SERPL-CCNC: 79 U/L (ref 45–117)
ALT SERPL-CCNC: 17 U/L (ref 12–78)
ANION GAP BLD CALC-SCNC: 7 MMOL/L (ref 5–15)
APTT PPP: 26.4 SEC (ref 22.1–32.5)
AST SERPL W P-5'-P-CCNC: 15 U/L (ref 15–37)
BILIRUB SERPL-MCNC: 0.8 MG/DL (ref 0.2–1)
BUN SERPL-MCNC: 3 MG/DL (ref 6–20)
BUN/CREAT SERPL: 5 (ref 12–20)
CALCIUM SERPL-MCNC: 8 MG/DL (ref 8.5–10.1)
CHLORIDE SERPL-SCNC: 113 MMOL/L (ref 97–108)
CO2 SERPL-SCNC: 25 MMOL/L (ref 21–32)
CREAT SERPL-MCNC: 0.55 MG/DL (ref 0.55–1.02)
ERYTHROCYTE [DISTWIDTH] IN BLOOD BY AUTOMATED COUNT: 19 % (ref 11.5–14.5)
GLOBULIN SER CALC-MCNC: 3.3 G/DL (ref 2–4)
GLUCOSE SERPL-MCNC: 118 MG/DL (ref 65–100)
HCT VFR BLD AUTO: 25.7 % (ref 35–47)
HGB BLD-MCNC: 8.6 G/DL (ref 11.5–16)
MAGNESIUM SERPL-MCNC: 1.8 MG/DL (ref 1.6–2.4)
MCH RBC QN AUTO: 30.6 PG (ref 26–34)
MCHC RBC AUTO-ENTMCNC: 33.5 G/DL (ref 30–36.5)
MCV RBC AUTO: 91.5 FL (ref 80–99)
PHOSPHATE SERPL-MCNC: 2.6 MG/DL (ref 2.6–4.7)
PLATELET # BLD AUTO: 162 K/UL (ref 150–400)
POTASSIUM SERPL-SCNC: 3.7 MMOL/L (ref 3.5–5.1)
PROT SERPL-MCNC: 5.5 G/DL (ref 6.4–8.2)
RBC # BLD AUTO: 2.81 M/UL (ref 3.8–5.2)
SODIUM SERPL-SCNC: 145 MMOL/L (ref 136–145)
THERAPEUTIC RANGE,PTTT: NORMAL SECS (ref 58–77)
WBC # BLD AUTO: 9.6 K/UL (ref 3.6–11)

## 2017-05-24 PROCEDURE — 74011250637 HC RX REV CODE- 250/637: Performed by: INTERNAL MEDICINE

## 2017-05-24 PROCEDURE — 74011250637 HC RX REV CODE- 250/637: Performed by: NURSE PRACTITIONER

## 2017-05-24 PROCEDURE — 80053 COMPREHEN METABOLIC PANEL: CPT | Performed by: INTERNAL MEDICINE

## 2017-05-24 PROCEDURE — 74011000250 HC RX REV CODE- 250: Performed by: INTERNAL MEDICINE

## 2017-05-24 PROCEDURE — 74011250637 HC RX REV CODE- 250/637: Performed by: STUDENT IN AN ORGANIZED HEALTH CARE EDUCATION/TRAINING PROGRAM

## 2017-05-24 PROCEDURE — 36415 COLL VENOUS BLD VENIPUNCTURE: CPT | Performed by: INTERNAL MEDICINE

## 2017-05-24 PROCEDURE — 74011250636 HC RX REV CODE- 250/636: Performed by: INTERNAL MEDICINE

## 2017-05-24 PROCEDURE — 85730 THROMBOPLASTIN TIME PARTIAL: CPT | Performed by: INTERNAL MEDICINE

## 2017-05-24 PROCEDURE — 97116 GAIT TRAINING THERAPY: CPT | Performed by: PHYSICAL THERAPIST

## 2017-05-24 PROCEDURE — 74011000258 HC RX REV CODE- 258: Performed by: INTERNAL MEDICINE

## 2017-05-24 PROCEDURE — 97530 THERAPEUTIC ACTIVITIES: CPT | Performed by: PHYSICAL THERAPIST

## 2017-05-24 PROCEDURE — C9113 INJ PANTOPRAZOLE SODIUM, VIA: HCPCS | Performed by: INTERNAL MEDICINE

## 2017-05-24 PROCEDURE — 74011250637 HC RX REV CODE- 250/637: Performed by: SURGERY

## 2017-05-24 PROCEDURE — 84100 ASSAY OF PHOSPHORUS: CPT | Performed by: INTERNAL MEDICINE

## 2017-05-24 PROCEDURE — 83735 ASSAY OF MAGNESIUM: CPT | Performed by: INTERNAL MEDICINE

## 2017-05-24 PROCEDURE — 85027 COMPLETE CBC AUTOMATED: CPT | Performed by: INTERNAL MEDICINE

## 2017-05-24 RX ORDER — POTASSIUM CHLORIDE 750 MG/1
40 TABLET, FILM COATED, EXTENDED RELEASE ORAL 2 TIMES DAILY
Status: DISCONTINUED | OUTPATIENT
Start: 2017-05-24 | End: 2017-05-24 | Stop reason: HOSPADM

## 2017-05-24 RX ORDER — NYSTATIN 100000 [USP'U]/ML
500000 SUSPENSION ORAL 4 TIMES DAILY
Qty: 250 ML | Refills: 0 | Status: SHIPPED
Start: 2017-05-24 | End: 2017-06-06

## 2017-05-24 RX ORDER — PANTOPRAZOLE SODIUM 40 MG/1
40 TABLET, DELAYED RELEASE ORAL DAILY
Qty: 30 TAB | Refills: 0 | Status: SHIPPED
Start: 2017-05-24 | End: 2017-06-26 | Stop reason: SDUPTHER

## 2017-05-24 RX ORDER — ATORVASTATIN CALCIUM 20 MG/1
20 TABLET, FILM COATED ORAL
Qty: 30 TAB | Refills: 0 | Status: SHIPPED
Start: 2017-05-24 | End: 2017-06-26 | Stop reason: SDUPTHER

## 2017-05-24 RX ORDER — IPRATROPIUM BROMIDE 0.5 MG/2.5ML
0.5 SOLUTION RESPIRATORY (INHALATION)
Qty: 100 ML | Refills: 0 | Status: SHIPPED
Start: 2017-05-24 | End: 2018-07-09

## 2017-05-24 RX ORDER — FUROSEMIDE 40 MG/1
40 TABLET ORAL DAILY
Qty: 30 TAB | Refills: 0 | Status: SHIPPED
Start: 2017-05-24 | End: 2017-06-26 | Stop reason: ALTCHOICE

## 2017-05-24 RX ORDER — CARVEDILOL 3.12 MG/1
3.12 TABLET ORAL 2 TIMES DAILY
Qty: 60 TAB | Refills: 0 | Status: SHIPPED
Start: 2017-05-24 | End: 2017-06-26 | Stop reason: SDUPTHER

## 2017-05-24 RX ADMIN — NYSTATIN 500000 UNITS: 100000 SUSPENSION ORAL at 08:51

## 2017-05-24 RX ADMIN — NYSTATIN 500000 UNITS: 100000 SUSPENSION ORAL at 12:22

## 2017-05-24 RX ADMIN — CARVEDILOL 3.12 MG: 3.12 TABLET, FILM COATED ORAL at 08:51

## 2017-05-24 RX ADMIN — SODIUM CHLORIDE 40 MG: 9 INJECTION INTRAMUSCULAR; INTRAVENOUS; SUBCUTANEOUS at 08:51

## 2017-05-24 RX ADMIN — POTASSIUM CHLORIDE 40 MEQ: 750 TABLET, FILM COATED, EXTENDED RELEASE ORAL at 12:21

## 2017-05-24 RX ADMIN — LEVOTHYROXINE SODIUM 50 MCG: 50 TABLET ORAL at 07:08

## 2017-05-24 RX ADMIN — CEFTRIAXONE 2 G: 2 INJECTION, POWDER, FOR SOLUTION INTRAMUSCULAR; INTRAVENOUS at 06:24

## 2017-05-24 RX ADMIN — Medication 10 ML: at 06:24

## 2017-05-24 RX ADMIN — FUROSEMIDE 40 MG: 40 TABLET ORAL at 08:51

## 2017-05-24 RX ADMIN — Medication 10 ML: at 14:10

## 2017-05-24 RX ADMIN — ASPIRIN 81 MG 81 MG: 81 TABLET ORAL at 08:51

## 2017-05-24 RX ADMIN — LIDOCAINE HYDROCHLORIDE 5 ML: 20 SOLUTION ORAL; TOPICAL at 12:22

## 2017-05-24 RX ADMIN — LIDOCAINE HYDROCHLORIDE 5 ML: 20 SOLUTION ORAL; TOPICAL at 07:11

## 2017-05-24 NOTE — DISCHARGE SUMMARY
Discharge Summary       PATIENT ID: Elliot Camargo  MRN: [id number]   YOB: 1945    DATE OF ADMISSION: 5/14/2017 10:34 PM    DATE OF DISCHARGE: 5/24/17   PRIMARY CARE PROVIDER: Tye Lawrence MD     ATTENDING PHYSICIAN: Claudette Massed, MD, S  DISCHARGING PROVIDER: Claudette Massed, MD, [facility]    To contact this individual call [phone] and ask the  to page. If unavailable ask to be transferred the Adult Hospitalist Department. CONSULTATIONS: IP CONSULT TO GENERAL SURGERY  IP CONSULT TO GASTROENTEROLOGY  IP CONSULT TO NEPHROLOGY  IP CONSULT TO INFECTIOUS DISEASES  IP CONSULT TO CARDIOLOGY   IP CONSULT TO INTENSIVIST    PROCEDURES/SURGERIES: * No surgery found *  5/15 left IJ central line placement  5/15 CT abd/pelvis wo contrast  5/15 CT chest wo contrast  5/15 TTE  5/17 US abd limited  5/19 CT abd/pelvis w contrast  5/22 NM stress test    ADMITTING DIAGNOSES & HOSPITAL COURSE:   66 yo woman with obesity, rheumatoid arthritis, chronic pain syndrome, gastroesophageal reflux disease,   hypertension, h/o herpes simplex virus infection was BIBEMS from home on 5/14/17 with nausea, vomiting, diarrhea and abdominal pain. Septic shock (POA) with E. coli bacteremia, toxic encephalopathy  - resolved s/p norepinephrine gtt  - leukocytosis, fever, hypotension, lactic acidosis, tachypnea, tachycardia on admission  - leukocytosis not significantly changed, though patient had been given stress dose steroids (for hypotension, in case of adrenal insufficiency in the setting of chronic steroids being taken for rheumatoid arthritis), given E.  Coli bacteremia, concern raised for intra-abdominal source for infection  - abd US 5/17 heterogenous liver, increase small right pleural effusion, normal biliary tree s/p cholecystectomy, no hydronephrosis  - s/p empiric cefepime, received vancomycin, de-escalated to ceftriaxone  - BCx 5/14 E coli 3/3 bottles  - repeat BCx 5/16 negative  - stool Cx 5/15 negative  - UCx 5/15 negative  - stool caliber not amenable to C diff testing  - repeat CT Abd/Pelvis with contrast  - s/p aggressive IVF resuscitation, and is now actually developing edema      Troponin elevation  - suspect demand ischemia in setting of septic shock  - s/p nuclear stress test 5/22 negative, EF 61%  - on aspirin, coreg  - started statin as liver enzymes normalized  - TTE 5/17 normal wall motion, normal LVEF  - cleared by Cardiology: outpatient follow-up with Dr. Mikal Riley, cardiologist       Partial small bowel obstruction (POA)  - RUQ and epigastic TTP, still with nausea, but has flatus  - KUB 5/17 nasogastric tube in place with tip projected in the expected location of the stomach. Continued relative paucity of abdominal gas.   - pain control, conservative management with bowel rest, diet advanced to clears, now back to cardiac diet  - GI / general surgery following      Hypokalemia (POA), hypophosphatemia, hypomagnesemia  - replete prn      Anion gap metabolic acidosis (POA)  - resolved  - likely due to lactic acidosis and exacerbated by GI losses and JEANINE  - lactic acid normalized  - s/p IV sodium bicarbonate      Acute kidney injury (POA)  - resolved  - likely due to prerenal azotemia vs septic / hypotensive ATN  - s/p IV bicarb  - CT abd/pelvis without contrast 5/15 unremarkable kidneys  - Urine output > 1000 ml daily since 5/15/17  - nephrology following      Abnormal LFTs, hyperbilirubinemia (POA)  - resolved  - suspect either dehydration or intra-abdominal sepsis, improving  - GI following  - avoid hepatotoxins      Acute gastroenteritis (POA)   - antiemetics prn  - pain control  - stool Cx 5/15 negative  - C diff 5/15 not able to be tested due to stool caliber  - PPI      Hypothyroidism - TSH WNL; continue levothyroxine      Rheumatoid arthritis  - started stress dose steroids due to chronic steroid use (home prednisone dose 10 mg po daily), tapered and discontinued on 5/20  - resume home Imuran on discharge      Anemia - suspect some element of hemodilution, seems improved, stable     Oral candidiasis - start nystatin 4x daily swish and spit on 5/23 x14 days    Hypernatremia - likely due to free water deficit; resolved with IVF    DISCHARGE DIAGNOSES / PLAN:      Stable for discharge to [facility]. Follow up with PCP, Renal, ID       PENDING TEST RESULTS:   At the time of discharge the following test results are still pending: none    FOLLOW UP APPOINTMENTS:    Follow-up Information     Follow up With Details Comments Contact Info    [facility]  For rehab [address] [facility] [address]  [phone]    Glenn Woodard MD In 1 week hospital follow up [address] [facility] [employer] [facility]  Melquiades Torres MD  Infectious Disease; as instructed [address] [facility] 1425  [address] [address]  [phone]      [facility] Yolanda Warren MD  Renal; call as needed [address] [facility] [address]  [facility]  [phone]             ADDITIONAL CARE RECOMMENDATIONS:   1. Take medications as prescribed. 2. Keep appointment(s) as recommended/scheduled. 3. Encourage free water intake to improve hypernatremia. DIET: Cardiac Diet    Oral Nutritional Supplements: Ensure Enlive with breakfast and dinner    ACTIVITY: PT/OT Eval and Treat    WOUND CARE: routine left IJ central line care    EQUIPMENT needed: as per PT/OT      DISCHARGE MEDICATIONS:  Current Discharge Medication List      START taking these medications    Details   atorvastatin (LIPITOR) 20 mg tablet Take 1 Tab by mouth nightly. Qty: 30 Tab, Refills: 0      carvedilol (COREG) 3.125 mg tablet Take 1 Tab by mouth two (2) times a day. Qty: 60 Tab, Refills: 0      cefTRIAXone 2 gram 2 g, ADDaptor 1 Device IVPB 2 g by IntraVENous route every twenty-four (24) hours.   Qty: 1 Dose, Refills: 0      furosemide (LASIX) 40 mg tablet Take 1 Tab by mouth daily. Qty: 30 Tab, Refills: 0      ipratropium (ATROVENT) 0.02 % nebulizer solution 2.5 mL by Nebulization route every six (6) hours as needed. Qty: 100 mL, Refills: 0      aluminum-magnesium hydroxide 200-200 mg/5 mL susp 30 mL, diphenhydrAMINE 12.5 mg/5 mL elix 75 mg, lidocaine 2 % soln 30 mL oral suspension (compounded) Take 5 mL by mouth Before breakfast, lunch, and dinner. Qty: 50 mL, Refills: 0      nystatin (MYCOSTATIN) 100,000 unit/mL suspension Take 5 mL by mouth four (4) times daily for 13 days. swish and spit  Indications: ORAL CANDIDIASIS  Qty: 250 mL, Refills: 0      pantoprazole (PROTONIX) 40 mg tablet Take 1 Tab by mouth daily. Indications: gastroenteritis  Qty: 27 Tab, Refills: 0         CONTINUE these medications which have NOT CHANGED    Details   KLOR-CON M20 20 mEq tablet TAKE 1 TABLET DAILY  Qty: 90 Tab, Refills: 2      hydroCHLOROthiazide (HYDRODIURIL) 25 mg tablet Take 1 Tab by mouth daily. 1 tablet oe time daily for edema ,HTN  Qty: 90 Tab, Refills: 0    Associated Diagnoses: Essential hypertension with goal blood pressure less than 130/85      levothyroxine (SYNTHROID) 50 mcg tablet TAKE 1 TABLET DAILY  Qty: 90 Tab, Refills: 2    Associated Diagnoses: Acquired hypothyroidism      nystatin (MYCOSTATIN) topical cream Apply  to affected area two (2) times a day. Qty: 30 g, Refills: 0      montelukast (SINGULAIR) 10 mg tablet Take 1 Tab by mouth daily. Qty: 90 Tab, Refills: 3    Associated Diagnoses: Other seasonal allergic rhinitis      cholecalciferol (VITAMIN D3) 1,000 unit tablet Take 1,000 Units by mouth two (2) times a day. calcium-cholecalciferol, d3, (CALCIUM 600 + D) 600-125 mg-unit tab Take  by mouth.      guaiFENesin (ROBITUSSIN) 100 mg/5 mL liquid Take 100 mg by mouth three (3) times daily as needed for Cough.      multivitamin (ONE A DAY) tablet Take 1 Tab by mouth daily.       baclofen (LIORESAL) 10 mg tablet Take 0.5 Tabs by mouth three (3) times daily. Take one half tablet every 8 hr.prn for muscle spasm  Qty: 30 Tab, Refills: 0    Associated Diagnoses: Left-sided low back pain with left-sided sciatica; Discitis of lumbar region      gabapentin (NEURONTIN) 100 mg capsule Take 1 Cap by mouth two (2) times a day. Qty: 60 Cap, Refills: 0    Associated Diagnoses: Left-sided low back pain with left-sided sciatica; Discitis of lumbar region      azaTHIOprine (IMURAN) 50 mg tablet Take 150 mg by mouth daily. VITAMIN B COMPLEX PO Take 1 Tab by mouth daily. 1 tab, PO, daily, 0 Refills      aspirin 81 mg tablet Take 81 mg by mouth daily. vitamin E (AQUA GEMS) 400 unit capsule Take 400 Units by mouth two (2) times a day. STOP taking these medications       verapamil ER (CALAN-SR) 120 mg tablet Comments:   Reason for Stopping:         oxyCODONE IR (ROXICODONE) 5 mg immediate release tablet Comments:   Reason for Stopping:         predniSONE (DELTASONE) 5 mg tablet Comments:   Reason for Stopping:         acyclovir (ZOVIRAX) 400 mg tablet Comments:   Reason for Stopping:                 NOTIFY YOUR PHYSICIAN FOR ANY OF THE FOLLOWING:   Fever over 101 degrees for 24 hours. Chest pain, shortness of breath, fever, chills, nausea, vomiting, diarrhea, change in mentation, falling, weakness, bleeding. Severe pain or pain not relieved by medications. Or, any other signs or symptoms that you may have questions about.     DISPOSITION:    Home With:   OT  PT  HH  RN      x [facility]    Independent/assisted living    Hospice    Other:       PATIENT CONDITION AT DISCHARGE:     Functional status    Poor    x Deconditioned     Independent      Cognition   x  Lucid     Forgetful     Dementia      Catheters/lines (plus indication)    Wilkerson     PICC     PEG    x Left IJ central line     Code status    x Full code     DNR      PHYSICAL EXAMINATION AT DISCHARGE:  Visit Vitals    /84    Pulse 73    Temp 98.4 °F (36.9 °C)    Resp 18    Ht 5' 4\" (1.626 m)    Wt 104.6 kg (230 lb 9.6 oz)    SpO2 99%    Breastfeeding No    BMI 39.58 kg/m2     Constitutional: awake, NAD, obese   ENT: oral mucosa slightly dry, oropharyngeal thrush  Neck: left IJ central line   Resp: clear to auscultation anteriorly   CV: regular rhythm, slightly tachycardic, distant heart sounds, anasarca   GI: hypoactive BS, soft, non distended, mildly tender to palpation in epigastrum, obese    Musculoskeletal: moves all extremities   Neurologic: AAOx3, responds appropriately to questions and commands   Skin: warm, dry  Eyes: PERRL    Labs  Recent Results (from the past 24 hour(s))   PTT    Collection Time: 05/24/17  3:09 AM   Result Value Ref Range    aPTT 26.4 22.1 - 32.5 sec    aPTT, therapeutic range     58.0 - 77.0 SECS   CBC W/O DIFF    Collection Time: 05/24/17  3:09 AM   Result Value Ref Range    WBC 9.6 3.6 - 11.0 K/uL    RBC 2.81 (L) 3.80 - 5.20 M/uL    HGB 8.6 (L) 11.5 - 16.0 g/dL    HCT 25.7 (L) 35.0 - 47.0 %    MCV 91.5 80.0 - 99.0 FL    MCH 30.6 26.0 - 34.0 PG    MCHC 33.5 30.0 - 36.5 g/dL    RDW 19.0 (H) 11.5 - 14.5 %    PLATELET 905 701 - 671 K/uL   MAGNESIUM    Collection Time: 05/24/17  3:09 AM   Result Value Ref Range    Magnesium 1.8 1.6 - 2.4 mg/dL   PHOSPHORUS    Collection Time: 05/24/17  3:09 AM   Result Value Ref Range    Phosphorus 2.6 2.6 - 4.7 MG/DL   METABOLIC PANEL, COMPREHENSIVE    Collection Time: 05/24/17  3:09 AM   Result Value Ref Range    Sodium 145 136 - 145 mmol/L    Potassium 3.7 3.5 - 5.1 mmol/L    Chloride 113 (H) 97 - 108 mmol/L    CO2 25 21 - 32 mmol/L    Anion gap 7 5 - 15 mmol/L    Glucose 118 (H) 65 - 100 mg/dL    BUN 3 (L) 6 - 20 MG/DL    Creatinine 0.55 0.55 - 1.02 MG/DL    BUN/Creatinine ratio 5 (L) 12 - 20      GFR est AA >60 >60 ml/min/1.73m2    GFR est non-AA >60 >60 ml/min/1.73m2    Calcium 8.0 (L) 8.5 - 10.1 MG/DL    Bilirubin, total 0.8 0.2 - 1.0 MG/DL    ALT (SGPT) 17 12 - 78 U/L    AST (SGOT) 15 15 - 37 U/L    Alk.  phosphatase 79 45 - 117 U/L    Protein, total 5.5 (L) 6.4 - 8.2 g/dL    Albumin 2.2 (L) 3.5 - 5.0 g/dL    Globulin 3.3 2.0 - 4.0 g/dL    A-G Ratio 0.7 (L) 1.1 - 2.2         CHRONIC MEDICAL DIAGNOSES:  Problem List as of 5/24/2017  Date Reviewed: 5/24/2017          Codes Class Noted - Resolved    E coli bacteremia ICD-10-CM: R78.81  ICD-9-CM: 790.7, 041.49  5/24/2017 - Present        Oral candidiasis ICD-10-CM: B37.0  ICD-9-CM: 112.0  5/24/2017 - Present        * (Principal)Septic shock ([facility] ICD-10-CM: A41.9, R65.21  ICD-9-CM: 038.9, 785.52, 995.92  5/15/2017 - Present        Demand ischemia ([facility] 75.) ICD-10-CM: I24.8  ICD-9-CM: 411.89  5/15/2017 - Present        Partial small bowel obstruction ([facility] 75.) ICD-10-CM: K56.69  ICD-9-CM: 560.9  5/15/2017 - Present        ACP (advance care planning) ICD-10-CM: Z71.89  ICD-9-CM: V65.49  11/30/2016 - Present    Overview Signed 11/30/2016 12:23 PM by Juwan Mccoy NP     Patient brought her ACP to visit. It was reviewed by me.               Gastroesophageal reflux disease without esophagitis ICD-10-CM: K21.9  ICD-9-CM: 530.81  10/24/2016 - Present        Chronic midline low back pain without sciatica ICD-10-CM: M54.5, G89.29  ICD-9-CM: 724.2, 338.29  10/24/2016 - Present        Prediabetes ICD-10-CM: R73.03  ICD-9-CM: 790.29  7/22/2015 - Present        Insomnia ICD-10-CM: G47.00  ICD-9-CM: 780.52  7/22/2015 - Present        Abnormal gait ICD-10-CM: R26.9  ICD-9-CM: 781.2  5/14/2015 - Present        Vitamin D deficiency ICD-10-CM: E55.9  ICD-9-CM: 268.9  3/16/2015 - Present        Allergic rhinitis ICD-10-CM: J30.9  ICD-9-CM: 477.9  10/7/2014 - Present        Tinea manus ICD-10-CM: B35.2  ICD-9-CM: 110.2  10/7/2014 - Present        Chronic constipation ICD-10-CM: K59.09  ICD-9-CM: 564.00  10/7/2014 - Present        Hypothyroidism ICD-10-CM: E03.9  ICD-9-CM: 244.9  11/30/2012 - Present        Postmenopausal ICD-10-CM: Z78.0  ICD-9-CM: V49.81  10/17/2012 - Present        RA (rheumatoid arthritis) [facility]) ICD-10-CM: M06.9  ICD-9-CM: 714.0  2/9/2010 - Present        Essential hypertension ICD-10-CM: I10  ICD-9-CM: 401.9  2/9/2010 - Present        RESOLVED: Septic encephalopathy ICD-10-CM: G93.41  ICD-9-CM: 348.31  5/15/2017 - 5/24/2017        RESOLVED: Hypokalemia ICD-10-CM: E87.6  ICD-9-CM: 276.8  5/15/2017 - 5/24/2017        RESOLVED: High anion gap metabolic acidosis BBO-03-GM: E87.2  ICD-9-CM: 276.2  5/15/2017 - 5/24/2017        RESOLVED: JEANINE (acute kidney injury) ([facility] 75.) ICD-10-CM: N17.9  ICD-9-CM: 584.9  5/15/2017 - 5/24/2017        RESOLVED: Abnormal LFTs ICD-10-CM: R79.89  ICD-9-CM: 790.6  5/15/2017 - 5/24/2017        RESOLVED: Acute gastroenteritis ICD-10-CM: K52.9  ICD-9-CM: 558.9  5/15/2017 - 5/24/2017        RESOLVED: Elevated BP ICD-10-CM: MCL4749  ICD-9-CM: Ardean Flatter  9/21/2016 - 10/24/2016        RESOLVED: Thalamic hemorrhage ([facility] 75.) ICD-10-CM: I61.0  ICD-9-CM: 316  12/29/2015 - 10/24/2016        RESOLVED: TIA (transient ischemic attack) ICD-10-CM: G45.9  ICD-9-CM: 435.9  12/23/2015 - 10/24/2016        RESOLVED: Chest pain ICD-10-CM: R07.9  ICD-9-CM: 786.50  12/22/2015 - 10/24/2016        RESOLVED: Discitis of lumbar region ICD-10-CM: M46.46  ICD-9-CM: 722.93  12/9/2015 - 10/24/2016        RESOLVED: Left-sided low back pain with left-sided sciatica ICD-10-CM: M54.42  ICD-9-CM: 724.3  12/9/2015 - 10/24/2016        RESOLVED: Back pain ICD-10-CM: M54.9  ICD-9-CM: 724.5  8/23/2015 - 10/24/2016        RESOLVED: Hypotension ICD-10-CM: I95.9  ICD-9-CM: 458.9  8/23/2015 - 9/21/2016        RESOLVED: Glossitis ICD-10-CM: K14.0  ICD-9-CM: 529.0  7/22/2015 - 10/24/2016        RESOLVED: Sore throat ICD-10-CM: J02.9  ICD-9-CM: 973  3/16/2015 - 7/22/2015        RESOLVED: Obesity, Class II, BMI 35-39.9 ([facility] ICD-10-CM: E66.9  ICD-9-CM: 278.00  1/21/2015 - 10/24/2016        RESOLVED: LLQ abdominal pain ICD-10-CM: R10.32  ICD-9-CM: 789.04  10/7/2014 - 1/21/2015        RESOLVED: Obesity, unspecified ICD-10-CM: E66.9  ICD-9-CM: 278.00  12/10/2010 - 1/21/2015              Greater than 30 minutes were spent with the patient on counseling and coordination of care.     Signed:   Ernestine Mendez MD  5/24/2017  2:46 PM

## 2017-05-24 NOTE — PROGRESS NOTES
Home IV Orders  1. Diagnosis:  E coli bacteremia   2. Routine central line care  3. Antibiotic:  Ceftriaxone 2 gm daily IV  4. Lab each Monday:   CBC/diff/platelets   CMP     5. Lab each Thursday:   CBC/diff/platelets   BUN   Creatinine     6. Fax lab to Dr. Giovanna Armenta @ [phone].  7.  Call Dr. Giovanna Armenta @ [phone] for fever >100.5, infection at PICC site, diarrhea, WBC > 15 or < 3, cr > 1.8  8. Duration of therapy: last day of therapy should be May 30, 2017. Please call Dr. Giovanna Armenta @ [phone] before stopping therapy. 9.  Allergies: Allergies   Allergen Reactions    Pcn [Penicillins] Swelling     Has tolerated Cefepime    Tramadol Nausea and Vomiting     SEVERE If taken w/o food    Proventil [Albuterol Sulfate] Hives    Ace Inhibitors Angioedema    Albuterol Swelling     swelling    Ambien [Zolpidem] Other (comments)     Nightmares and memory disturbance    Ciprofloxacin Other (comments)     Sore throat and trouble swallowing    Lisinopril Swelling    Oxaprozin Nausea and Vomiting     severe stomach upset    Sulfadiazine Swelling     swelling    Trazodone Other (comments)     Vivid dreams and felt disoriented     10. Make appointment on May 30.      Jose Parks MD

## 2017-05-24 NOTE — PROGRESS NOTES
Name: Corinne Schirmer   MRN: [id number]  : 1945      Assessment:  JEANINE- likely septic ATN in the setting of SBO/hypotension and NSTEMI; JEANINE has resolved. No evidence of Contrast nephropathy either (s/p IV Contrast on  with CT A/P)  E. Coli Bacteremia  Hx of recurrent Klebsiella bacteremia/Hx of L4-5 diskitis in past  SBO- resolved  Hypokalemia/Hypomagnesemia  Hypernatremia: Need to watch closely with resumption of lasix  Fluid overload- residual volume excess from prior volume resuscitation. Wt also has gone up. CT A/P shows b/l pleural effusions/compressive atelectasis      Plan/Recommendations:  Ct Lasix 40 mg daily   KCl 40meq bid x 2 doses today-> then back on 20meq daily as outpt  Encourage free water intake to improve hypernatremia  Remove ponce  AM labs if still here  Will sign off and follow peripherally      Subjective:  No events overnight. No complaints    ROS:   Denies n/v/cp.      Exam:  Visit Vitals    /76 (BP 1 Location: Right arm, BP Patient Position: At rest)    Pulse 71    Temp 98 °F (36.7 °C)    Resp 18    Ht 5' 4\" (1.626 m)    Wt 104.6 kg (230 lb 9.6 oz)    SpO2 99%    Breastfeeding No    BMI 39.58 kg/m2       NAD  Clear with dec BS  RRR  Tr/+1 LE edema  Alert awake    Current Facility-Administered Medications   Medication Dose Route Frequency Last Dose    potassium chloride SR (KLOR-CON 10) tablet 40 mEq  40 mEq Oral BID      furosemide (LASIX) tablet 40 mg  40 mg Oral DAILY 40 mg at 17 0851    nystatin (MYCOSTATIN) 100,000 unit/mL oral suspension 500,000 Units  500,000 Units Oral ,000 Units at 17 0851    atorvastatin (LIPITOR) tablet 20 mg  20 mg Oral QHS 20 mg at 17 2211    magic mouthwash cpd (without sucralfate)  5 mL Oral TIDAC 5 mL at 17 0711    levothyroxine (SYNTHROID) tablet 50 mcg  50 mcg Oral ACB 50 mcg at 05/24/17 0708    sodium chloride (NS) flush 5-10 mL  5-10 mL IntraVENous Q8H 10 mL at 05/24/17 0624    carvedilol (COREG) tablet 3.125 mg  3.125 mg Oral BID 3.125 mg at 05/24/17 0851    cefTRIAXone (ROCEPHIN) 2 g in 0.9% sodium chloride (MBP/ADV) 50 mL  2 g IntraVENous Q24H 2 g at 05/24/17 9275    ipratropium (ATROVENT) 0.02 % nebulizer solution 0.5 mg  0.5 mg Nebulization Q6H PRN 0.5 mg at 05/20/17 0012    aspirin chewable tablet 81 mg  81 mg Oral DAILY 81 mg at 05/24/17 0851    HYDROmorphone (PF) (DILAUDID) injection 1 mg  1 mg IntraVENous Q4H PRN 1 mg at 05/19/17 0823    ondansetron (ZOFRAN) injection 4 mg  4 mg IntraVENous Q4H PRN 4 mg at 05/21/17 1641    pantoprazole (PROTONIX) 40 mg in sodium chloride 0.9 % 10 mL injection  40 mg IntraVENous DAILY 40 mg at 05/24/17 0851       Labs/Data:    Lab Results   Component Value Date/Time    WBC 9.6 05/24/2017 03:09 AM    Hemoglobin (POC) 11.9 12/03/2010 01:40 AM    HGB 8.6 05/24/2017 03:09 AM    Hematocrit (POC) 35 12/03/2010 01:40 AM    HCT 25.7 05/24/2017 03:09 AM    PLATELET 618 52/25/1232 03:09 AM    MCV 91.5 05/24/2017 03:09 AM       Lab Results   Component Value Date/Time    Sodium 145 05/24/2017 03:09 AM    Potassium 3.7 05/24/2017 03:09 AM    Chloride 113 05/24/2017 03:09 AM    CO2 25 05/24/2017 03:09 AM    Anion gap 7 05/24/2017 03:09 AM    Glucose 118 05/24/2017 03:09 AM    BUN 3 05/24/2017 03:09 AM    Creatinine 0.55 05/24/2017 03:09 AM    BUN/Creatinine ratio 5 05/24/2017 03:09 AM    GFR est AA >60 05/24/2017 03:09 AM    GFR est non-AA >60 05/24/2017 03:09 AM    Calcium 8.0 05/24/2017 03:09 AM       Wt Readings from Last 3 Encounters:   05/17/17 104.6 kg (230 lb 9.6 oz)   02/02/17 96.6 kg (213 lb)   11/30/16 96.8 kg (213 lb 8 oz)         Intake/Output Summary (Last 24 hours) at 05/24/17 0957  Last data filed at 05/24/17 0850   Gross per 24 hour   Intake             1040 ml   Output             4275 ml   Net            -3235 ml Patient seen and examined. Chart reviewed. Labs, data and other pertinent notes reviewed in last 24 hrs.     PMH/SH/FH reviewed and unchanged compared to H&P    Discussed with pt      Aashish Patel MD

## 2017-05-24 NOTE — PROGRESS NOTES
ID Progress Note  2017     Cefepime  Discontinued  Ceftriaxone     Subjective:     Afebrile. Feels good. WBC normal. Abdomen better. Objective:     Vitals:   Visit Vitals    /76 (BP 1 Location: Right arm, BP Patient Position: At rest)    Pulse 71    Temp 98 °F (36.7 °C)    Resp 18    Ht 5' 4\" (1.626 m)    Wt 104.6 kg (230 lb 9.6 oz)    SpO2 99%    Breastfeeding No    BMI 39.58 kg/m2        Tmax:  Temp (24hrs), Av.7 °F (37.1 °C), Min:98 °F (36.7 °C), Max:99.5 °F (37.5 °C)      Exam:    Not in distress   Lungs: clear to auscultation, no rales   Heart: s1, s2, no murmurs, rubs or clicks   Abdomen: soft, nontender, no rebound, no guarding       Labs:   Lab Results   Component Value Date/Time    WBC 9.6 2017 03:09 AM    Hemoglobin (POC) 11.9 2010 01:40 AM    HGB 8.6 2017 03:09 AM    Hematocrit (POC) 35 2010 01:40 AM    HCT 25.7 2017 03:09 AM    PLATELET 627  03:09 AM    MCV 91.5 2017 03:09 AM     Lab Results   Component Value Date/Time    Sodium 145 2017 03:09 AM    Potassium 3.7 2017 03:09 AM    Chloride 113 2017 03:09 AM    CO2 25 2017 03:09 AM    Anion gap 7 2017 03:09 AM    Glucose 118 2017 03:09 AM    BUN 3 2017 03:09 AM    Creatinine 0.55 2017 03:09 AM    BUN/Creatinine ratio 5 2017 03:09 AM    GFR est AA >60 2017 03:09 AM    GFR est non-AA >60 2017 03:09 AM    Calcium 8.0 2017 03:09 AM    Bilirubin, total 0.8 2017 03:09 AM    AST (SGOT) 15 2017 03:09 AM    Alk. phosphatase 79 2017 03:09 AM    Protein, total 5.5 2017 03:09 AM    Albumin 2.2 2017 03:09 AM    Globulin 3.3 2017 03:09 AM    A-G Ratio 0.7 2017 03:09 AM    ALT (SGPT) 17 2017 03:09 AM       US - no biliary dilatation      Assessment:       1. E coli  bacteremia. 2. History of recurrent Klebsiella bacteremia.    3. History of L4-L5 diskitis for which she has received greater than 12   weeks of IV therapy with normalization of her inflammation markers. 4. Renal failure. 5. Rheumatoid arthritis. 6. History of dilated bile ducts. 7. non-ST-segment elevation myocardial infarction.       Recommendations:      1. Continue ceftriaxone. WBC normal. She will need IV treatment until the 30th of May. I will write orders.        Marleen Sim MD

## 2017-05-24 NOTE — CARDIO/PULMONARY
Cardiac Wellness: Per Dr. Park Pencil note from 5/23/15 \"NSTEMI by definition\". Spoke with Dr. Eli Gordon who maintains \"The patient did not have an MI. It was supply demand ischemia\" and \"I told her she did not have an MI\". Met with Dale Bertrand who reports \"They told me my heart was fine\", thus MI education deferred. Stress test brochure given to Dale Bertrand. She states \"I am very independent but use a cane/rollator to walk. \". She is being discharged today to a SNF. Due to the patient's lack of knowledge about her NSTEMI and balance/safety issues, she would not be a cardiac rehab candidate.

## 2017-05-24 NOTE — PROGRESS NOTES
Problem: Mobility Impaired (Adult and Pediatric)  Goal: *Acute Goals and Plan of Care (Insert Text)  Physical Therapy Goals  Initiated 5/19/2017  1. Patient will move from supine to sit and sit to supine in bed with contact guard assist within 7 day(s). 2. Patient will transfer from bed to chair and chair to bed with contact guard assist using the least restrictive device within 7 day(s). 3. Patient will perform sit to stand with contact guard assist within 7 day(s). 4. Patient will ambulate with minimal assistance/contact guard assist for 50 feet with the least restrictive device within 7 day(s). PHYSICAL THERAPY TREATMENT  Patient: Corinne Schirmer (14 y.o. female)  Date: 5/24/2017  Diagnosis: Septic shock ([address] Septic shock ([facility] [address]       Precautions: Fall      ASSESSMENT:  Pt is making excellent progress towards goals. Pt does require cues for safety with transfers. However, once standing, pt is able to ambulate with steady gait with no overt LOB noted. Pt is also able to safely maneuver walker in tight spaces. Standing balance is fair with ability to manage clothing and hygiene during toileting task with minimal assistance with 1UE support. Recommend SNF placement for rehab to maximize independence with functional mobility prior to discharge home. Progression toward goals:  [X]    Improving appropriately and progressing toward goals  [ ]    Improving slowly and progressing toward goals  [ ]    Not making progress toward goals and plan of care will be adjusted       PLAN:  Patient continues to benefit from skilled intervention to address the above impairments. Continue treatment per established plan of care. Discharge Recommendations:  Glenn Saldana  Further Equipment Recommendations for Discharge:  TBD at rehab - pt has a rollator and quad cane       SUBJECTIVE:   Patient stated I don't have a ponce anymore.       OBJECTIVE DATA SUMMARY:   Critical Behavior:  Neurologic State: Alert  Orientation Level: Oriented X4  Cognition: Appropriate decision making, Appropriate for age attention/concentration, Appropriate safety awareness, Follows commands     Functional Mobility Training:  Bed Mobility:  Supine to Sit: Stand-by asssistance (HOB slightly elevated)  Transfers:  Sit to Stand: Contact guard assistance (verbal cues for hand placement)  Stand to Sit: Contact guard assistance (verbal cues for hand placement )  Balance:  Sitting: Intact  Sitting - Static: Good (unsupported)  Sitting - Dynamic: Good (unsupported)  Standing: Impaired  Standing - Static: Fair  Standing - Dynamic : Fair  Ambulation/Gait Training:  Distance (ft): 180 Feet (ft) (x 1 and 20 feet x 1 from bathroom to chair at bedside)  Assistive Device: Gait belt;Walker, rolling  Ambulation - Level of Assistance: Contact guard assistance  Gait Abnormalities: Decreased step clearance;Trunk sway increased  Base of Support: Widened  Speed/Eunice: Pace decreased (<100 feet/min)  Step Length: Left shortened;Right shortened                Pain:  Pain Scale 1: Numeric (0 - 10)  Pain Intensity 1: 0     Activity Tolerance:   Good - no pain, lightheadedness, dizziness  Please refer to the flowsheet for vital signs taken during this treatment.   After treatment:   [X]    Patient left in no apparent distress sitting up in chair  [ ]    Patient left in no apparent distress in bed  [X]    Call bell left within reach  [X]    Nursing notified  [ ]    Caregiver present  [ ]    Bed alarm activated      COMMUNICATION/COLLABORATION:   The patients plan of care was discussed with: Registered Nurse     Petra Morales, PT   Time Calculation: 23 mins

## 2017-05-24 NOTE — PROGRESS NOTES
Chart reviewed. CM spoke with Heriberto Mackey with [facility] who stated they CAN accept pt with a central line and do not require a PICC. CM informed RN to cancel PICC line order. CM called [facility], [facility] and [facility] services to find out if any of them are willing to bill pt for transport. All of the companies require payment at the time of transport. CM will meet with pt to discuss alterate transportation options. Pt discussed in rounds. Spoke with MD, due to pt discharging with central line, ambulance transport is most appropriate mode of transport. CM set up ambulance transport for 3 PM with [facility] via [employer]. They are able to accomodate 3:30 PM. Cheryl Flores RN is aware. Spoke with MD, changed ambulance time to 4:30 PM. Pt and RN are both aware. CM notified Lisset Donovan from [facility] of transport time. PCS and discharge instructions are located in discharge folder on pt chart. Rn to follow with [facility]. RN to call report to [phone].     Mirian Scott, HALINAW/CRM

## 2017-05-24 NOTE — DISCHARGE INSTRUCTIONS
DISCHARGE SUMMARY from Nurse    The following personal items are in your possession at time of discharge:    Dental Appliances: None  Visual Aid: None     Home Medications: None  Jewelry: None  Clothing: At bedside  Other Valuables: None  Personal Items Sent to Safe: none          PATIENT INSTRUCTIONS:    After general anesthesia or intravenous sedation, for 24 hours or while taking prescription Narcotics:  · Limit your activities  · Do not drive and operate hazardous machinery  · Do not make important personal or business decisions  · Do  not drink alcoholic beverages  If you have not urinated within 8 hours after discharge, please contact your surgeon on call. Report the following to your surgeon:  · Excessive pain, swelling, redness or odor of or around the surgical area  · Temperature over 100.5  · Nausea and vomiting lasting longer than 4 hours or if unable to take medications  · Any signs of decreased circulation or nerve impairment to extremity: change in color, persistent  numbness, tingling, coldness or increase pain. *  Please give a list of your current medications to your Primary Care Provider. *  Please update this list whenever your medications are discontinued, doses are      changed, or new medications (including over-the-counter products) are added. *  Please carry medication information at all times in case of emergency situations. These are general instructions for a healthy lifestyle:    No smoking/ No tobacco products/ Avoid exposure to second hand smoke    Surgeon General's Warning:  Quitting smoking now greatly reduces serious risk to your health.     Obesity, smoking, and sedentary lifestyle greatly increases your risk for illness    A healthy diet, regular physical exercise & weight monitoring are important for maintaining a healthy lifestyle    You may be retaining fluid if you have a history of heart failure or if you experience any of the following symptoms:  Weight gain of 3 pounds or more overnight or 5 pounds in a week, increased swelling in our hands or feet or shortness of breath while lying flat in bed. Please call your doctor as soon as you notice any of these symptoms; do not wait until your next office visit. Recognize signs and symptoms of STROKE:    F-face looks uneven    A-arms unable to move or move unevenly    S-speech slurred or non-existent    T-time-call 911 as soon as signs and symptoms begin-DO NOT go       Back to bed or wait to see if you get better-TIME IS BRAIN. Warning Signs of HEART ATTACK     Call 911 if you have these symptoms:   Chest discomfort. Most heart attacks involve discomfort in the center of the chest that lasts more than a few minutes, or that goes away and comes back. It can feel like uncomfortable pressure, squeezing, fullness, or pain.  Discomfort in other areas of the upper body. Symptoms can include pain or discomfort in one or both arms, the back, neck, jaw, or stomach.  Shortness of breath with or without chest discomfort.  Other signs may include breaking out in a cold sweat, nausea, or lightheadedness. Don't wait more than five minutes to call 911 - MINUTES MATTER! Fast action can save your life. Calling 911 is almost always the fastest way to get lifesaving treatment. Emergency Medical Services staff can begin treatment when they arrive -- up to an hour sooner than if someone gets to the hospital by car. The discharge information has been reviewed with the patient. The patient verbalized understanding. Discharge medications reviewed with the patient and appropriate educational materials and side effects teaching were provided. [employer] Activation    Thank you for requesting access to [employer]. Please follow the instructions below to securely access and download your online medical record.  [employer]ila allows you to send messages to your doctor, view your test results, renew your prescriptions, schedule appointments, and more. How Do I Sign Up? 1. In your internet browser, go to [email or web address]  2. Click on the First Time User? Click Here link in the Sign In box. You will be redirect to the New Member Sign Up page. 3. Enter your [employer] Access Code exactly as it appears below. You will not need to use this code after youve completed the sign-up process. If you do not sign up before the expiration date, you must request a new code. [employer] Access Code: [email or web address]  Expires: 2017  3:51 PM (This is the date your [employer] access code will )    4. Enter the last four digits of your Social Security Number (xxxx) and Date of Birth (mm/dd/yyyy) as indicated and click Submit. You will be taken to the next sign-up page. 5. Create a [employer] ID. This will be your [employer] login ID and cannot be changed, so think of one that is secure and easy to remember. 6. Create a [employer] password. You can change your password at any time. 7. Enter your Password Reset Question and Answer. This can be used at a later time if you forget your password. 8. Enter your e-mail address. You will receive e-mail notification when new information is available in [address] 9. Click Sign Up. You can now view and download portions of your medical record. 10. Click the Download Summary menu link to download a portable copy of your medical information. Additional Information    If you have questions, please visit the Frequently Asked Questions section of the [employer] website at [email or web address] Remember, [employer] is NOT to be used for urgent needs. For medical emergencies, dial 911. ADDITIONAL CARE RECOMMENDATIONS:   1. Take medications as prescribed. 2. Keep appointment(s) as recommended/scheduled. 3. Encourage free water intake to improve hypernatremia.      DIET: Cardiac Diet    Oral Nutritional Supplements: Ensure Enlive with breakfast and dinner    ACTIVITY: PT/OT Eval and Treat    WOUND CARE: routine left IJ central line care    EQUIPMENT needed: as per PT/OT       Gastroenteritis: Care Instructions  Your Care Instructions  Gastroenteritis is an illness that may cause nausea, vomiting, and diarrhea. It is sometimes called \"stomach flu. \" It can be caused by bacteria or a virus. You will probably begin to feel better in 1 to 2 days. In the meantime, get plenty of rest and make sure you do not become dehydrated. Dehydration occurs when your body loses too much fluid. Follow-up care is a key part of your treatment and safety. Be sure to make and go to all appointments, and call your doctor if you are having problems. Its also a good idea to know your test results and keep a list of the medicines you take. How can you care for yourself at home? · If your doctor prescribed antibiotics, take them as directed. Do not stop taking them just because you feel better. You need to take the full course of antibiotics. · Drink plenty of fluids to prevent dehydration, enough so that your urine is light yellow or clear like water. Choose water and other caffeine-free clear liquids until you feel better. If you have kidney, heart, or liver disease and have to limit fluids, talk with your doctor before you increase your fluid intake. · Drink fluids slowly, in frequent, small amounts, because drinking too much too fast can cause vomiting. · Begin eating mild foods, such as dry toast, yogurt, applesauce, bananas, and rice. Avoid spicy, hot, or high-fat foods, and do not drink alcohol or caffeine for a day or two. Do not drink milk or eat ice cream until you are feeling better. How to prevent gastroenteritis  · Keep hot foods hot and cold foods cold. · Do not eat meats, dressings, salads, or other foods that have been kept at room temperature for more than 2 hours. · Use a thermometer to check your refrigerator.  It should be between 34°F and 40°F.  · Defrost meats in the refrigerator or microwave, not on the kitchen counter. · Keep your hands and your kitchen clean. Wash your hands, cutting boards, and countertops with hot soapy water frequently. · Cook meat until it is well done. · Do not eat raw eggs or uncooked sauces made with raw eggs. · Do not take chances. If food looks or tastes spoiled, throw it out. When should you call for help? Call 911 anytime you think you may need emergency care. For example, call if:  · You vomit blood or what looks like coffee grounds. · You passed out (lost consciousness). · You pass maroon or very bloody stools. Call your doctor now or seek immediate medical care if:  · You have severe belly pain. · You have signs of needing more fluids. You have sunken eyes, a dry mouth, and pass only a little dark urine. · You feel like you are going to faint. · You have increased belly pain that does not go away in 1 to 2 days. · You have new or increased nausea, or you are vomiting. · You have a new or higher fever. · Your stools are black and tarlike or have streaks of blood. Watch closely for changes in your health, and be sure to contact your doctor if:  · You are dizzy or lightheaded. · You urinate less than usual, or your urine is dark yellow or brown. · You do not feel better with each day that goes by. Where can you learn more? Go to http://petey-rona.info/. Enter N142 in the search box to learn more about \"Gastroenteritis: Care Instructions. \"  Current as of: May 24, 2016  Content Version: 11.2  © 5412-8640 [employer]. Care instructions adapted under license by [employer] (which disclaims liability or warranty for this information). If you have questions about a medical condition or this instruction, always ask your healthcare professional. [address] any warranty or liability for your use of this information.          Sepsis: Care Instructions  Your Care Instructions  Sepsis is an infection that has spread throughout your body. It is a life-threatening condition and often causes extremely low blood pressure. This can lead to problems with many different organs. The cause of sepsis is not always clear, but it can happen as part of a long-term or sudden illness. Sometimes even a mild illness can lead to sepsis. Follow-up care is a key part of your treatment and safety. Be sure to make and go to all appointments, and call your doctor if you are having problems. Its also a good idea to know your test results and keep a list of the medicines you take. How can you care for yourself at home? · If your doctor prescribed antibiotics, take them as directed. Do not stop taking them just because you feel better. You need to take the full course of antibiotics. · Drink plenty of fluids, enough so that your urine is light yellow or clear like water. Choose water or caffeine-free clear liquids until you feel better. If you have kidney, heart, or liver disease and have to limit fluids, talk with your doctor before you increase your fluid intake. You can try rehydration drinks, such as Gatorade or Powerade. · Do not drink alcohol. · Eat a healthy diet. Include fruits, vegetables, and whole grains in your diet every day. · Walking is an easy way to get exercise. Gradually increase the amount you walk every day. Make sure your doctor knows that you are starting an exercise program.  · Do not smoke or use other tobacco products. If you need help quitting, talk to your doctor about stop-smoking programs and medicines. These can increase your chances of quitting for good. When should you call for help? Call 911 anytime you think you may need emergency care. For example, call if:  · You passed out (lost consciousness). Call your doctor now or seek immediate medical care if:  · You have a fever or chills. · You have cool, pale, or clammy skin.   · You are dizzy or lightheaded, or you feel like you may faint.  · You have any new symptoms, such as a cough, pain in one part of your body, or urinary problems. Watch closely for changes in your health, and be sure to contact your doctor if:  · You do not get better as expected. Where can you learn more? Go to http://petey-rona.info/. Enter B960 in the search box to learn more about \"Sepsis: Care Instructions. \"  Current as of: May 27, 2016  Content Version: 11.2  © 6117-1880 [employer]. Care instructions adapted under license by [employer] (which disclaims liability or warranty for this information). If you have questions about a medical condition or this instruction, always ask your healthcare professional. [address] any warranty or liability for your use of this information.

## 2017-05-25 ENCOUNTER — PATIENT OUTREACH (OUTPATIENT)
Dept: FAMILY MEDICINE CLINIC | Age: 72
End: 2017-05-25

## 2017-05-25 NOTE — PROGRESS NOTES
Hospital Discharge JANICE ATWOOD Follow-Up    Patient listed on 5/24/17 [facility]) Discharge Report. Patient hospitalized @ [address] Kaci Ruelas 5/14/17-5/24/17. RRAT score: 20 (Moderate)    Diagnoses:  Septic shock (POA) with E. coli bacteremia, toxic encephalopathy  Troponin elevation  Partial small bowel obstruction (POA)  Hypokalemia (POA), hypophosphatemia, hypomagnesemia  Anion gap metabolic acidosis (POA)  Acute kidney injury (POA)  Abnormal LFTs, hyperbilirubinemia (POA)  Acute gastroenteritis (POA)   Hypothyroidism    Rheumatoid arthritis  Anemia   Oral candidiasis   Hypernatremia     Consults:  Cardiology  Gastroenterology  Infectious Diseases  Nephrology    Procedures:  5/15 left IJ central line placement  5/15 CT abd/pelvis wo contrast  5/15 CT chest wo contrast  5/15 TTE  5/17 US abd limited  5/19 CT abd/pelvis w contrast  5/22 NM stress test      Discharge Instructions/Plans:  - Disposition- [facility] [facility]F/Rehab  - Routine central line care  - Antibiotic: Ceftriaxone 2 gm daily IV  - Lab each Monday: CBC/diff/platelets, CMP  - Lab each Thursday: CBC/diff/platelets, BUN, Creatinine  - Fax lab results to Dr. Radha Holm @ [phone].  - Call Dr. Radha Holm @ [phone] for fever >100.5, infection at PICC site, diarrhea, WBC > 15 or < 3, cr > 1.8  - Duration of therapy: last day of therapy should be May 30, 2017. Please call Dr. Radha Holm @ [phone] before stopping therapy. - Cardio- continue ASA, BB, start Statin  - Diet- Cardiac  - Oral Nutritional Supplements: Ensure Enlive with breakfast and dinner  - Encourage free water intake to improve hypernatremia. - ACTIVITY: PT/OT Eval and Treat  - PCP f/u - within 1 week after SNF/Rehab discharge  - Cardio f/u - in 1 month with DR Radha Nunez  - Nephrology f/u- as needed with Dr Raiza Miles  - ID f/u- as instructed     NN post hospital interactive contact done by telephone within 2 business days of discharge     12:23 PM- call to [facility].  Call transferred to [facility]. Spoke with nurse Blane Ayala. Pt ID verified with 2 identifiers, name and . NN introduction. Reason for call stated.     SNF nurse reported pt up & ambulating with walker. Long LOS @ facility not anticipated. Confirmed pt has left IJ catheter. \"Order for daily wound care & flushing with NS & Heparin\". Provider overseeing pt's Primary Care while @ SNF is Dr Juan Ramon Sharma. Med reconciliation done with [facility] nurse Donta Feliciano. Will need to request clarification order for Imuran. Nurse Donta Feliciano reported SNF order is for 50 mg daily. NN advised discharge med list order states \"50 mg tab, take 150 mg daily\". Per SNF nurse Aimee Gray also has order for Tylenol 325 mg, 2 tabs q6h prn for pain or elevated temp\". Red flags- reviewed and SNF nurse verbalized when ID provider , Dr Misty Triana, is to be contacted for elevated temp & abnormal labs. Support System:  Son (lives in [place])    ACP:  On file      Barriers- unknown @ this time      Discharge Instructions- reviewed with SNF nurse. SNF nurse verbalized understanding. Plan:  - Next outreach to SNF- ~ 7-10 days for update on discharge status  - Schedule 1 week PCP f/u after SNF discharge  - Schedule 1 month Cardio f/u with Dr Zoila Koroma    NN contact # given to call as needed. Goals:  Goals      Attends follow-up appointments as directed.  Prevent complications post hospitalization.  Supportive resources in place to maintain patient in the community (ie. Home Health, DME equipment, refer to, medication assistant plan, etc.)         Sepsis Transition of Care Note     Discharge vital signs:   BP- 150/84, Pulse- 73, Resp- 18, Temp-98.4 °F (36.9 °C), SpO2- 99%    Source of Infection:  concern raised for intra-abdominal source for infection     Antibiotic prescribed at discharge: CefTRIAXone 2 gram by IntraVENous route every twenty-four (24) hours.   Length of remaining treatment: 5 days    Infectious Disease Consult during admission: yes Scheduled follow up appointment with PCP: no- pt currently in SNF    Sepsis pt education to be reviewed when pt discharged from SNF.

## 2017-06-26 ENCOUNTER — OFFICE VISIT (OUTPATIENT)
Dept: FAMILY MEDICINE CLINIC | Age: 72
End: 2017-06-26

## 2017-06-26 VITALS
BODY MASS INDEX: 33.12 KG/M2 | RESPIRATION RATE: 18 BRPM | SYSTOLIC BLOOD PRESSURE: 111 MMHG | DIASTOLIC BLOOD PRESSURE: 65 MMHG | HEIGHT: 64 IN | HEART RATE: 66 BPM | WEIGHT: 194 LBS | TEMPERATURE: 96.9 F | OXYGEN SATURATION: 99 %

## 2017-06-26 DIAGNOSIS — K21.9 GASTROESOPHAGEAL REFLUX DISEASE WITHOUT ESOPHAGITIS: ICD-10-CM

## 2017-06-26 DIAGNOSIS — R73.03 PREDIABETES: ICD-10-CM

## 2017-06-26 DIAGNOSIS — R53.83 FATIGUE, UNSPECIFIED TYPE: ICD-10-CM

## 2017-06-26 DIAGNOSIS — I24.8 DEMAND ISCHEMIA (HCC): ICD-10-CM

## 2017-06-26 DIAGNOSIS — E78.5 HYPERLIPIDEMIA, UNSPECIFIED HYPERLIPIDEMIA TYPE: ICD-10-CM

## 2017-06-26 DIAGNOSIS — E03.9 HYPOTHYROIDISM, UNSPECIFIED TYPE: ICD-10-CM

## 2017-06-26 DIAGNOSIS — R11.10 NON-INTRACTABLE VOMITING, PRESENCE OF NAUSEA NOT SPECIFIED, UNSPECIFIED VOMITING TYPE: Primary | ICD-10-CM

## 2017-06-26 DIAGNOSIS — K59.09 CHRONIC CONSTIPATION: ICD-10-CM

## 2017-06-26 DIAGNOSIS — E55.9 VITAMIN D DEFICIENCY: ICD-10-CM

## 2017-06-26 DIAGNOSIS — R73.09 INCREASED GLUCOSE LEVEL: ICD-10-CM

## 2017-06-26 DIAGNOSIS — I10 ESSENTIAL HYPERTENSION: ICD-10-CM

## 2017-06-26 DIAGNOSIS — M06.9 RHEUMATOID ARTHRITIS, INVOLVING UNSPECIFIED SITE, UNSPECIFIED RHEUMATOID FACTOR PRESENCE: ICD-10-CM

## 2017-06-26 PROBLEM — R78.81 E COLI BACTEREMIA: Status: RESOLVED | Noted: 2017-05-24 | Resolved: 2017-06-26

## 2017-06-26 PROBLEM — R65.21 SEPTIC SHOCK (HCC): Status: RESOLVED | Noted: 2017-05-15 | Resolved: 2017-06-26

## 2017-06-26 PROBLEM — B96.20 E COLI BACTEREMIA: Status: RESOLVED | Noted: 2017-05-24 | Resolved: 2017-06-26

## 2017-06-26 PROBLEM — K56.600 PARTIAL SMALL BOWEL OBSTRUCTION (HCC): Status: RESOLVED | Noted: 2017-05-15 | Resolved: 2017-06-26

## 2017-06-26 PROBLEM — A41.9 SEPTIC SHOCK (HCC): Status: RESOLVED | Noted: 2017-05-15 | Resolved: 2017-06-26

## 2017-06-26 PROBLEM — B37.0 ORAL CANDIDIASIS: Status: RESOLVED | Noted: 2017-05-24 | Resolved: 2017-06-26

## 2017-06-26 RX ORDER — ATORVASTATIN CALCIUM 20 MG/1
20 TABLET, FILM COATED ORAL
Qty: 90 TAB | Refills: 3 | Status: ON HOLD | OUTPATIENT
Start: 2017-06-26 | End: 2018-07-12

## 2017-06-26 RX ORDER — POTASSIUM CHLORIDE 20MEQ/15ML
20 LIQUID (ML) ORAL DAILY
COMMUNITY
End: 2017-06-26 | Stop reason: ALTCHOICE

## 2017-06-26 RX ORDER — CARVEDILOL 3.12 MG/1
3.12 TABLET ORAL 2 TIMES DAILY
Qty: 180 TAB | Refills: 2 | Status: SHIPPED | OUTPATIENT
Start: 2017-06-26 | End: 2018-03-14 | Stop reason: SDUPTHER

## 2017-06-26 RX ORDER — PANTOPRAZOLE SODIUM 40 MG/1
40 TABLET, DELAYED RELEASE ORAL DAILY
Qty: 90 TAB | Refills: 3 | Status: SHIPPED | OUTPATIENT
Start: 2017-06-26 | End: 2018-05-15 | Stop reason: SDUPTHER

## 2017-06-26 NOTE — MR AVS SNAPSHOT
Visit Information Date & Time Provider Department Dept. Phone Encounter #  
 6/26/2017 10:00 AM Andres Henriquez MD [facility] [phone] [id number] Follow-up Instructions Return in about 3 months (around 9/26/2017) for Kev PM CHP. Upcoming Health Maintenance Date Due  
 MEDICARE YEARLY EXAM 1/22/2016 GLAUCOMA SCREENING Q2Y 9/24/2017* BREAST CANCER SCRN MAMMOGRAM 9/26/2017* INFLUENZA AGE 9 TO ADULT 8/1/2017 COLONOSCOPY 8/1/2021 DTaP/Tdap/Td series (2 - Td) 11/13/2023 *Topic was postponed. The date shown is not the original due date. Allergies as of 6/26/2017  Review Complete On: 6/26/2017 By: Andres Henriquez MD  
  
 Severity Noted Reaction Type Reactions Pcn [Penicillins] High 02/09/2010    Swelling Has tolerated Cefepime Tramadol High 02/09/2010    Nausea and Vomiting SEVERE If taken w/o food Proventil [Albuterol Sulfate] Medium 03/13/2014   Systemic Hives Ace Inhibitors  12/29/2013    Angioedema Albuterol  07/10/2015    Swelling  
 swelling Ambien [Zolpidem]  02/09/2010    Other (comments) Nightmares and memory disturbance Ciprofloxacin  08/17/2012    Other (comments) Sore throat and trouble swallowing Lisinopril  12/30/2013    Swelling Oxaprozin  05/14/2015    Nausea and Vomiting  
 severe stomach upset Sulfadiazine  05/14/2015    Swelling  
 swelling Trazodone  02/09/2010    Other (comments) Vivid dreams and felt disoriented Current Immunizations  Reviewed on 12/26/2015 Name Date Influenza High Dose Vaccine PF 9/21/2016  2:13 PM, 10/12/2015, 10/7/2014 Influenza Vaccine 10/15/2013 Influenza Vaccine Split 10/17/2012, 1/5/2012, 12/6/2010  1:39 PM  
 Influenza Vaccine Whole 12/1/2008 Pneumococcal Conjugate (PCV-13) 7/22/2015 Pneumococcal Vaccine (Unspecified Type) 10/17/2012, 11/3/2006 TD Vaccine 11/10/2003 Tdap 11/13/2013 Not reviewed this visit You Were Diagnosed With   
  
 Codes Comments Non-intractable vomiting, presence of nausea not specified, unspecified vomiting type    -  Primary ICD-10-CM: R11.10 ICD-9-CM: 787.03 Fatigue, unspecified type     ICD-10-CM: R53.83 ICD-9-CM: 780.79 Rheumatoid arthritis, involving unspecified site, unspecified rheumatoid factor presence ([facility].)     ICD-10-CM: M06.9 ICD-9-CM: 714.0 Essential hypertension     ICD-10-CM: I10 
ICD-9-CM: 401.9 Hypothyroidism, unspecified type     ICD-10-CM: E03.9 ICD-9-CM: 315. 9 Vitamin D deficiency     ICD-10-CM: E55.9 ICD-9-CM: 268.9 Prediabetes     ICD-10-CM: R73.03 
ICD-9-CM: 790.29 Chronic constipation     ICD-10-CM: K59.09 
ICD-9-CM: 564.00 Gastroesophageal reflux disease without esophagitis     ICD-10-CM: K21.9 ICD-9-CM: 530.81 Hyperlipidemia, unspecified hyperlipidemia type     ICD-10-CM: E78.5 ICD-9-CM: 272.4 Demand ischemia ([facility] 75.)     ICD-10-CM: I24.8 ICD-9-CM: 411.89 Vitals BP Pulse Temp Resp Height(growth percentile) Weight(growth percentile) 111/65 (BP 1 Location: Left arm, BP Patient Position: Sitting) 66 96.9 °F (36.1 °C) (Oral) 18 5' 4\" (1.626 m) 194 lb (88 kg) SpO2 BMI OB Status Smoking Status 99% 33.3 kg/m2 Postmenopausal Never Smoker Vitals History BMI and BSA Data Body Mass Index Body Surface Area  
 33.3 kg/m 2 1.99 m 2 Preferred Pharmacy Pharmacy Name Phone [facility], [address] [facility] [address] [phone] Your Updated Medication List  
  
   
This list is accurate as of: 6/26/17 10:59 AM.  Always use your most recent med list.  
  
  
  
  
 aspirin 81 mg tablet Take 81 mg by mouth daily. atorvastatin 20 mg tablet Commonly known as:  LIPITOR Take 1 Tab by mouth nightly. azaTHIOprine 50 mg tablet Commonly known as:  [employer] Take 150 mg by mouth daily. baclofen 10 mg tablet Commonly known as:  LIORESAL  
 Take 0.5 Tabs by mouth three (3) times daily. Take one half tablet every 8 hr.prn for muscle spasm CALCIUM 600 + D 600-125 mg-unit Tab Generic drug:  calcium-cholecalciferol (d3) Take  by mouth. carvedilol 3.125 mg tablet Commonly known as:  Tilda Short Take 1 Tab by mouth two (2) times a day.  
  
 gabapentin 100 mg capsule Commonly known as:  NEURONTIN Take 1 Cap by mouth two (2) times a day. hydroCHLOROthiazide 25 mg tablet Commonly known as:  HYDRODIURIL Take 1 Tab by mouth daily. 1 tablet oe time daily for edema ,HTN  
  
 ipratropium 0.02 % nebulizer solution Commonly known as:  ATROVENT  
2.5 mL by Nebulization route every six (6) hours as needed. KLOR-CON M20 20 mEq tablet Generic drug:  potassium chloride TAKE 1 TABLET DAILY  
  
 levothyroxine 50 mcg tablet Commonly known as:  SYNTHROID  
TAKE 1 TABLET DAILY  
  
 montelukast 10 mg tablet Commonly known as:  SINGULAIR Take 1 Tab by mouth daily. multivitamin tablet Commonly known as:  ONE A DAY Take 1 Tab by mouth daily. nystatin topical cream  
Commonly known as:  MYCOSTATIN Apply  to affected area two (2) times a day. pantoprazole 40 mg tablet Commonly known as:  PROTONIX Take 1 Tab by mouth daily. Indications: gastroenteritis VITAMIN B COMPLEX PO Take 1 Tab by mouth daily. 1 tab, PO, daily, 0 Refills VITAMIN D3 1,000 unit tablet Generic drug:  cholecalciferol Take 1,000 Units by mouth two (2) times a day. vitamin E 400 unit capsule Commonly known as:  [address] Take 400 Units by mouth two (2) times a day. Prescriptions Sent to Pharmacy Refills  
 atorvastatin (LIPITOR) 20 mg tablet 3 Sig: Take 1 Tab by mouth nightly. Class: Normal  
 Pharmacy: [address] [facility], [address] [facility] Ph #: [phone]  Route: Oral  
 pantoprazole (PROTONIX) 40 mg tablet 3  
 Sig: Take 1 Tab by mouth daily. Indications: gastroenteritis Class: Normal  
 Pharmacy: [address], [address] [facility] Ph #: [phone] Route: Oral  
 carvedilol (COREG) 3.125 mg tablet 2 Sig: Take 1 Tab by mouth two (2) times a day. Class: Normal  
 Pharmacy: [address], [address] [facility] Ph #: [phone] Route: Oral  
  
We Performed the Following LIPID PANEL [87745 CPT(R)] METABOLIC PANEL, BASIC [10128 CPT(R)] T4, FREE E5727473 CPT(R)] TSH 3RD GENERATION [26776 CPT(R)] VITAMIN D, 25 HYDROXY [id number] CPT(R)] Follow-up Instructions Return in about 3 months (around 9/26/2017) for Kev PM CHP. Introducing [facility] & HEALTH SERVICES! Brody Demarco introduces Secure-24 patient portal. Now you can access parts of your medical record, email your doctor's office, and request medication refills online. 1. In your internet browser, go to [email or web address] 2. Click on the First Time User? Click Here link in the Sign In box. You will see the New Member Sign Up page. 3. Enter your Secure-24 Access Code exactly as it appears below. You will not need to use this code after youve completed the sign-up process. If you do not sign up before the expiration date, you must request a new code. · Secure-24 Access Code: [email or web address] Expires: 8/22/2017  3:51 PM 
 
4. Enter the last four digits of your Social Security Number (xxxx) and Date of Birth (mm/dd/yyyy) as indicated and click Submit. You will be taken to the next sign-up page. 5. Create a Nexalogyt ID. This will be your Secure-24 login ID and cannot be changed, so think of one that is secure and easy to remember. 6. Create a Secure-24 password. You can change your password at any time. 7. Enter your Password Reset Question and Answer. This can be used at a later time if you forget your password. 8. Enter your e-mail address. You will receive e-mail notification when new information is available in [address] 9. Click Sign Up. You can now view and download portions of your medical record. 10. Click the Download Summary menu link to download a portable copy of your medical information. If you have questions, please visit the Frequently Asked Questions section of the [employer] website. Remember, [employer] is NOT to be used for urgent needs. For medical emergencies, dial 911. Now available from your iPhone and Android! Please provide this summary of care documentation to your next provider. Your primary care clinician is listed as [phone] If you have any questions after today's visit, please call [phone].

## 2017-06-26 NOTE — LETTER
6/27/2017 4:49 PM 
 
Ms. Home Roca [address] [facility] [id number] Dear Home Roca: 
 
Please find your most recent results below. Resulted Orders METABOLIC PANEL, BASIC Result Value Ref Range Glucose 121 (H) 65 - 99 mg/dL BUN 9 8 - 27 mg/dL Creatinine 0.73 0.57 - 1.00 mg/dL GFR est non-AA 82 >59 mL/min/1.73 GFR est AA 94 >59 mL/min/1.73  
 BUN/Creatinine ratio 12 12 - 28 Sodium 133 (L) 134 - 144 mmol/L Potassium 3.8 3.5 - 5.2 mmol/L Chloride 93 (L) 96 - 106 mmol/L  
 CO2 22 18 - 29 mmol/L Calcium 8.9 8.7 - 10.3 mg/dL Narrative Performed at:  [address]  [id number] : Eden Hopper MD, Phone:  [phone] VITAMIN D, 25 HYDROXY Result Value Ref Range VITAMIN D, 25-HYDROXY 76.0 30.0 - 100.0 ng/mL Comment:  
   Vitamin D deficiency has been defined by the [address] [place] [address] practice guideline as a 
level of serum 25-OH vitamin D less than 20 ng/mL (1,2). The Endocrine Society went on to further define vitamin D 
insufficiency as a level between 21 and 29 ng/mL (2). 1. IOM ([place] of Medicine). 2010. Dietary reference 
   intakes for calcium and D. [facility]: The 
   EXENDIS. 2. Dale MF, Kirk NC, Jason GONZALEZ, et al. 
   Evaluation, treatment, and prevention of vitamin D 
   deficiency: an Endocrine Society clinical practice 
   guideline. JCEM. 2011 Jul; 96(7):1911-30. Narrative Performed at:  [address]  [id number] : Eden Hopper MD, Phone:  [phone] TSH 3RD GENERATION Result Value Ref Range TSH 1.060 0.450 - 4.500 uIU/mL Narrative Performed at:  [address]  [id number] : Eden Hopper MD, Phone:  [phone] T4, FREE Result Value Ref Range T4, Free 1.37 0.82 - 1.77 ng/dL Narrative Performed at:  [address]  [id number] : Marshal Finch MD, Phone:  [phone] LIPID PANEL Result Value Ref Range Cholesterol, total 97 (L) 100 - 199 mg/dL Triglyceride 86 0 - 149 mg/dL HDL Cholesterol 37 (L) >39 mg/dL VLDL, calculated 17 5 - 40 mg/dL LDL, calculated 43 0 - 99 mg/dL Narrative Performed at:  [address]  [id number] : Marshal Finch MD, Phone:  [phone] CVD REPORT Result Value Ref Range INTERPRETATION Note Comment:  
   Supplement report is available. Narrative Performed at:  [address] [place] [address]  [id number] : Drew Lara PhD, Phone:  [phone] Melisa Brock MD

## 2017-06-26 NOTE — PROGRESS NOTES
Pt states doing better. Has to make rheum appt. Back on higher dose Imuran at home and back on prednisone at home and back on the pain pill at home. Sees Dr. Ritesh Jaime for chronic opiate med. Has appt next month. States vomited in sleep past Saturday. Has to set up appt with GI for endoscopy.

## 2017-06-27 LAB
25(OH)D3+25(OH)D2 SERPL-MCNC: 76 NG/ML (ref 30–100)
BUN SERPL-MCNC: 9 MG/DL (ref 8–27)
BUN/CREAT SERPL: 12 (ref 12–28)
CALCIUM SERPL-MCNC: 8.9 MG/DL (ref 8.7–10.3)
CHLORIDE SERPL-SCNC: 93 MMOL/L (ref 96–106)
CHOLEST SERPL-MCNC: 97 MG/DL (ref 100–199)
CO2 SERPL-SCNC: 22 MMOL/L (ref 18–29)
CREAT SERPL-MCNC: 0.73 MG/DL (ref 0.57–1)
GLUCOSE SERPL-MCNC: 121 MG/DL (ref 65–99)
HDLC SERPL-MCNC: 37 MG/DL
INTERPRETATION, 910389: NORMAL
LDLC SERPL CALC-MCNC: 43 MG/DL (ref 0–99)
POTASSIUM SERPL-SCNC: 3.8 MMOL/L (ref 3.5–5.2)
SODIUM SERPL-SCNC: 133 MMOL/L (ref 134–144)
T4 FREE SERPL-MCNC: 1.37 NG/DL (ref 0.82–1.77)
TRIGL SERPL-MCNC: 86 MG/DL (ref 0–149)
TSH SERPL DL<=0.005 MIU/L-ACNC: 1.06 UIU/ML (ref 0.45–4.5)
VLDLC SERPL CALC-MCNC: 17 MG/DL (ref 5–40)

## 2017-06-27 NOTE — PROGRESS NOTES
Low NaCl. Liberalize salt in diet. K+ level O.K. On current dose of med. Inc BS. Add A1c. Very low TC. Rec decrease Lipitor dose to 1/2 tab.   Rest O.K.

## 2017-06-28 NOTE — PROGRESS NOTES
Labs discussed with patient. She was not fasting when the lab was done. [email or web address] was not able to add the A1c to her labs.

## 2017-07-12 DIAGNOSIS — I10 ESSENTIAL HYPERTENSION WITH GOAL BLOOD PRESSURE LESS THAN 130/85: ICD-10-CM

## 2017-07-12 RX ORDER — HYDROCHLOROTHIAZIDE 25 MG/1
25 TABLET ORAL DAILY
Qty: 90 TAB | Refills: 1 | Status: SHIPPED | OUTPATIENT
Start: 2017-07-12 | End: 2017-12-11 | Stop reason: SDUPTHER

## 2017-07-31 PROBLEM — K31.84 GASTROPARESIS: Status: ACTIVE | Noted: 2017-07-31

## 2017-08-02 ENCOUNTER — OFFICE VISIT (OUTPATIENT)
Dept: FAMILY MEDICINE CLINIC | Age: 72
End: 2017-08-02

## 2017-08-02 VITALS
DIASTOLIC BLOOD PRESSURE: 75 MMHG | HEIGHT: 64 IN | SYSTOLIC BLOOD PRESSURE: 110 MMHG | HEART RATE: 75 BPM | RESPIRATION RATE: 18 BRPM | BODY MASS INDEX: 30.56 KG/M2 | OXYGEN SATURATION: 98 % | WEIGHT: 179 LBS | TEMPERATURE: 96.8 F

## 2017-08-02 DIAGNOSIS — J40 BRONCHITIS: Primary | ICD-10-CM

## 2017-08-02 RX ORDER — NYSTATIN 100000 U/G
CREAM TOPICAL 2 TIMES DAILY
Qty: 30 G | Refills: 0 | Status: SHIPPED | OUTPATIENT
Start: 2017-08-02 | End: 2018-11-08 | Stop reason: SDUPTHER

## 2017-08-02 RX ORDER — AZITHROMYCIN 250 MG/1
TABLET, FILM COATED ORAL
Qty: 6 TAB | Refills: 0 | Status: SHIPPED | OUTPATIENT
Start: 2017-08-02 | End: 2017-08-07

## 2017-08-02 RX ORDER — OXYCODONE HYDROCHLORIDE 5 MG/1
TABLET ORAL
Refills: 0 | Status: ON HOLD | COMMUNITY
Start: 2017-07-10 | End: 2017-12-04

## 2017-08-02 RX ORDER — PREDNISONE 5 MG/1
TABLET ORAL
COMMUNITY
Start: 2017-07-30 | End: 2018-01-12 | Stop reason: DRUGHIGH

## 2017-08-02 RX ORDER — NEBULIZER AND COMPRESSOR
1 EACH MISCELLANEOUS
Qty: 1 EACH | Refills: 0 | Status: SHIPPED | OUTPATIENT
Start: 2017-08-02 | End: 2018-07-09

## 2017-08-02 NOTE — MR AVS SNAPSHOT
Visit Information Date & Time Provider Department Dept. Phone Encounter #  
 8/2/2017 10:40 AM Simon Sampson MD [facility] [id number] Follow-up Instructions Return in about 4 weeks (around 8/30/2017) for Kev MWV with NP. Your Appointments 9/26/2017  1:20 PM  
COMPLETE 40 with Simon Sampson MD  
[address]) Appt Note: [facility]  
 [address] 
[address] [facility] [address]  
[phone]  
  
   
 [address] [facility] [address] Upcoming Health Maintenance Date Due  
 MEDICARE YEARLY EXAM 1/22/2016 INFLUENZA AGE 9 TO ADULT 9/11/2017* BREAST CANCER SCRN MAMMOGRAM 9/26/2017* GLAUCOMA SCREENING Q2Y 5/13/2018 COLONOSCOPY 8/1/2021 DTaP/Tdap/Td series (2 - Td) 11/13/2023 *Topic was postponed. The date shown is not the original due date. Allergies as of 8/2/2017  Review Complete On: 8/2/2017 By: Mando Milner Severity Noted Reaction Type Reactions Pcn [Penicillins] High 02/09/2010    Swelling Has tolerated Cefepime Tramadol High 02/09/2010    Nausea and Vomiting SEVERE If taken w/o food Proventil [Albuterol Sulfate] Medium 03/13/2014   Systemic Hives Ace Inhibitors  12/29/2013    Angioedema Albuterol  07/10/2015    Swelling  
 swelling Ambien [Zolpidem]  02/09/2010    Other (comments) Nightmares and memory disturbance Ciprofloxacin  08/17/2012    Other (comments) Sore throat and trouble swallowing Lisinopril  12/30/2013    Swelling Oxaprozin  05/14/2015    Nausea and Vomiting  
 severe stomach upset Sulfadiazine  05/14/2015    Swelling  
 swelling Trazodone  02/09/2010    Other (comments) Vivid dreams and felt disoriented Current Immunizations  Reviewed on 12/26/2015 Name Date Influenza High Dose Vaccine PF 9/21/2016  2:13 PM, 10/12/2015, 10/7/2014 Influenza Vaccine 10/15/2013 Influenza Vaccine Split 10/17/2012, 1/5/2012, 12/6/2010  1:39 PM  
 Influenza Vaccine Whole 12/1/2008 Pneumococcal Conjugate (PCV-13) 7/22/2015 TD Vaccine 11/10/2003 Tdap 11/13/2013 ZZZ-RETIRED (DO NOT USE) Pneumococcal Vaccine (Unspecified Type) 10/17/2012, 11/3/2006 Not reviewed this visit You Were Diagnosed With   
  
 Codes Comments Bronchitis    -  Primary ICD-10-CM: H81 ICD-9-CM: 593 Vitals BP Pulse Temp Resp Height(growth percentile) Weight(growth percentile) 110/75 (BP 1 Location: Left arm, BP Patient Position: Sitting) 75 96.8 °F (36 °C) (Oral) 18 5' 4\" (1.626 m) 179 lb (81.2 kg) SpO2 BMI OB Status Smoking Status 98% 30.73 kg/m2 Postmenopausal Never Smoker Vitals History BMI and BSA Data Body Mass Index Body Surface Area 30.73 kg/m 2 1.91 m 2 Preferred Pharmacy Pharmacy Name Phone [facility], [address] [facility] [address] [phone] Your Updated Medication List  
  
   
This list is accurate as of: 8/2/17 11:39 AM.  Always use your most recent med list.  
  
  
  
  
 aspirin 81 mg tablet Take 81 mg by mouth daily. atorvastatin 20 mg tablet Commonly known as:  LIPITOR Take 1 Tab by mouth nightly. azaTHIOprine 50 mg tablet Commonly known as:  [employer] Take 150 mg by mouth daily. azithromycin 250 mg tablet Commonly known as:  Sean Moros Take 2 tablets today, then take 1 tablet daily  
  
 baclofen 10 mg tablet Commonly known as:  LIORESAL Take 0.5 Tabs by mouth three (3) times daily. Take one half tablet every 8 hr.prn for muscle spasm CALCIUM 600 + D 600-125 mg-unit Tab Generic drug:  calcium-cholecalciferol (d3) Take  by mouth. carvedilol 3.125 mg tablet Commonly known as:  Julius Gordillo Take 1 Tab by mouth two (2) times a day.  
  
 gabapentin 100 mg capsule Commonly known as:  NEURONTIN Take 1 Cap by mouth two (2) times a day. hydroCHLOROthiazide 25 mg tablet Commonly known as:  HYDRODIURIL Take 1 Tab by mouth daily. 1 tablet oe time daily for edema ,HTN  
  
 ipratropium 0.02 % nebulizer solution Commonly known as:  ATROVENT  
2.5 mL by Nebulization route every six (6) hours as needed. KLOR-CON M20 20 mEq tablet Generic drug:  potassium chloride TAKE 1 TABLET DAILY  
  
 levothyroxine 50 mcg tablet Commonly known as:  SYNTHROID  
TAKE 1 TABLET DAILY  
  
 montelukast 10 mg tablet Commonly known as:  SINGULAIR Take 1 Tab by mouth daily. multivitamin tablet Commonly known as:  ONE A DAY Take 1 Tab by mouth daily. Nebulizer & Compressor machine 1 Each by Does Not Apply route every six (6) hours as needed. Indications: COUGH  
  
 nystatin topical cream  
Commonly known as:  MYCOSTATIN Apply  to affected area two (2) times a day. oxyCODONE IR 5 mg immediate release tablet Commonly known as:  ROXICODONE  
TAKE 1 TABLET BY MOUTH TWICE A DAY pantoprazole 40 mg tablet Commonly known as:  PROTONIX Take 1 Tab by mouth daily. Indications: gastroenteritis  
  
 predniSONE 5 mg tablet Commonly known as:  DELTASONE  
  
 VITAMIN B COMPLEX PO Take 1 Tab by mouth daily. 1 tab, PO, daily, 0 Refills VITAMIN D3 1,000 unit tablet Generic drug:  cholecalciferol Take 1,000 Units by mouth two (2) times a day. vitamin E 400 unit capsule Commonly known as:  [address] Take 400 Units by mouth two (2) times a day. Prescriptions Sent to Pharmacy Refills  
 nystatin (MYCOSTATIN) topical cream 0 Sig: Apply  to affected area two (2) times a day. Class: Normal  
 Pharmacy: [address] [facility] Ph #: [phone] Route: Topical  
 Nebulizer & Compressor machine 0 Si Each by Does Not Apply route every six (6) hours as needed. Indications: COUGH  Class: Normal  
 Pharmacy: [address], [address] [facility] Ph #: [phone] Route: Does Not Apply  
 azithromycin (ZITHROMAX) 250 mg tablet 0 Sig: Take 2 tablets today, then take 1 tablet daily Class: Normal  
 Pharmacy: [address] , [address] [facility] Ph #: [phone] Follow-up Instructions Return in about 4 weeks (around 8/30/2017) for Kev MWV with NP. Introducing [facility] & HEALTH SERVICES! [employer] introduces IDbyME patient portal. Now you can access parts of your medical record, email your doctor's office, and request medication refills online. 1. In your internet browser, go to [email or web address] 2. Click on the First Time User? Click Here link in the Sign In box. You will see the New Member Sign Up page. 3. Enter your IDbyME Access Code exactly as it appears below. You will not need to use this code after youve completed the sign-up process. If you do not sign up before the expiration date, you must request a new code. · IDbyME Access Code: [email or web address] Expires: 8/22/2017  3:51 PM 
 
4. Enter the last four digits of your Social Security Number (xxxx) and Date of Birth (mm/dd/yyyy) as indicated and click Submit. You will be taken to the next sign-up page. 5. Create a IDbyME ID. This will be your IDbyME login ID and cannot be changed, so think of one that is secure and easy to remember. 6. Create a IDbyME password. You can change your password at any time. 7. Enter your Password Reset Question and Answer. This can be used at a later time if you forget your password. 8. Enter your e-mail address. You will receive e-mail notification when new information is available in [address] 9. Click Sign Up. You can now view and download portions of your medical record. 10. Click the Download Summary menu link to download a portable copy of your medical information. If you have questions, please visit the Frequently Asked Questions section of the [employer] website. Remember, [employer] is NOT to be used for urgent needs. For medical emergencies, dial 911. Now available from your iPhone and Android! Please provide this summary of care documentation to your next provider. Your primary care clinician is listed as [phone] If you have any questions after today's visit, please call [phone].

## 2017-08-02 NOTE — PROGRESS NOTES
Patient's identity verified with two patient identifiers (name and date of birth). 1. Have you been to the ER, urgent care clinic since your last visit? Hospitalized since your last visit? Yes, see below    2. Have you seen or consulted any other health care providers outside of the [address] [place] [address] since your last visit? Include any pap smears or colon screening. Yes, see below. Chief Complaint   Patient presents with    Cough     x1 wk, mucous production, yellow. Worse at night. Some throat soreness, headaches, and nasal congestion as well- mild. Given Atrovent Rx from GI physician, but no nebulizer- pended, would like to go to [facility].  GI Problem     Seen by GI for EGD, 7/28/17. Has discharge papers with her today. Not fasting.     Health Maintenance Due   Topic Date Due    MEDICARE YEARLY EXAM   Needs to schedule 01/22/2016

## 2017-08-02 NOTE — PROGRESS NOTES
Yl mucus since last Friday. Denies SOB, fever. Visit Vitals    /75 (BP 1 Location: Left arm, BP Patient Position: Sitting)    Pulse 75    Temp 96.8 °F (36 °C) (Oral)    Resp 18    Ht 5' 4\" (1.626 m)    Wt 179 lb (81.2 kg)    SpO2 98%    BMI 30.73 kg/m2       Patient alert and cooperative. Coarse cough. Lungs with few scattered rhonchi, no rales. Assessment:  1. Bronchitis. Plan:  1. Z-Fran.  2. Script for nebulizer sent to [facility] home delivery. 3. Has Atrovent solution from recent rehab stay. 4. To ER if shortness of breath, fever. 5. Phone call follow up in a week how doing. 6. Recheck here otherwise prn.

## 2017-08-03 ENCOUNTER — PATIENT OUTREACH (OUTPATIENT)
Dept: INTERNAL MEDICINE CLINIC | Age: 72
End: 2017-08-03

## 2017-08-14 RX ORDER — VERAPAMIL HYDROCHLORIDE 120 MG/1
TABLET, FILM COATED, EXTENDED RELEASE ORAL
Qty: 90 TAB | Refills: 2 | Status: SHIPPED | OUTPATIENT
Start: 2017-08-14 | End: 2018-07-09

## 2017-08-14 NOTE — TELEPHONE ENCOUNTER
She is on the schedule for follow up the end of September--blood pressure at recent visit this month was 110/75  Annual labs were done the end of June

## 2017-09-12 ENCOUNTER — HOSPITAL ENCOUNTER (OUTPATIENT)
Dept: NUCLEAR MEDICINE | Age: 72
Discharge: HOME | End: 2017-09-12
Attending: INTERNAL MEDICINE
Payer: MEDICARE

## 2017-09-12 DIAGNOSIS — K31.84 GASTROPARESIS: ICD-10-CM

## 2017-09-12 PROCEDURE — 78264 GASTRIC EMPTYING IMG STUDY: CPT

## 2017-09-20 RX ORDER — MONTELUKAST SODIUM 10 MG/1
TABLET ORAL
Qty: 90 TAB | Refills: 3 | Status: SHIPPED | OUTPATIENT
Start: 2017-09-20

## 2017-10-18 ENCOUNTER — OFFICE VISIT (OUTPATIENT)
Dept: FAMILY MEDICINE CLINIC | Age: 72
End: 2017-10-18

## 2017-10-18 VITALS
OXYGEN SATURATION: 98 % | TEMPERATURE: 97.4 F | HEIGHT: 64 IN | BODY MASS INDEX: 30.9 KG/M2 | DIASTOLIC BLOOD PRESSURE: 71 MMHG | SYSTOLIC BLOOD PRESSURE: 118 MMHG | WEIGHT: 181 LBS | HEART RATE: 70 BPM | RESPIRATION RATE: 18 BRPM

## 2017-10-18 DIAGNOSIS — R73.03 PREDIABETES: ICD-10-CM

## 2017-10-18 DIAGNOSIS — K59.09 CHRONIC CONSTIPATION: ICD-10-CM

## 2017-10-18 DIAGNOSIS — Z00.00 PHYSICAL EXAM: Primary | ICD-10-CM

## 2017-10-18 DIAGNOSIS — M06.9 RHEUMATOID ARTHRITIS, INVOLVING UNSPECIFIED SITE, UNSPECIFIED RHEUMATOID FACTOR PRESENCE: ICD-10-CM

## 2017-10-18 DIAGNOSIS — I10 ESSENTIAL HYPERTENSION: ICD-10-CM

## 2017-10-18 DIAGNOSIS — G89.29 CHRONIC MIDLINE LOW BACK PAIN WITHOUT SCIATICA: ICD-10-CM

## 2017-10-18 DIAGNOSIS — K31.84 GASTROPARESIS: ICD-10-CM

## 2017-10-18 DIAGNOSIS — R73.09 INCREASED GLUCOSE LEVEL: ICD-10-CM

## 2017-10-18 DIAGNOSIS — E87.6 HYPOKALEMIA: ICD-10-CM

## 2017-10-18 DIAGNOSIS — I24.8 DEMAND ISCHEMIA (HCC): ICD-10-CM

## 2017-10-18 DIAGNOSIS — E03.9 HYPOTHYROIDISM, UNSPECIFIED TYPE: ICD-10-CM

## 2017-10-18 DIAGNOSIS — Z23 ENCOUNTER FOR IMMUNIZATION: ICD-10-CM

## 2017-10-18 DIAGNOSIS — G47.00 INSOMNIA, UNSPECIFIED TYPE: ICD-10-CM

## 2017-10-18 DIAGNOSIS — E78.5 HYPERLIPIDEMIA, UNSPECIFIED HYPERLIPIDEMIA TYPE: ICD-10-CM

## 2017-10-18 DIAGNOSIS — E55.9 VITAMIN D DEFICIENCY: ICD-10-CM

## 2017-10-18 DIAGNOSIS — M54.50 CHRONIC MIDLINE LOW BACK PAIN WITHOUT SCIATICA: ICD-10-CM

## 2017-10-18 DIAGNOSIS — K21.9 GASTROESOPHAGEAL REFLUX DISEASE WITHOUT ESOPHAGITIS: ICD-10-CM

## 2017-10-18 DIAGNOSIS — R26.9 ABNORMAL GAIT: ICD-10-CM

## 2017-10-18 DIAGNOSIS — Z11.59 NEED FOR HEPATITIS C SCREENING TEST: ICD-10-CM

## 2017-10-18 RX ORDER — POTASSIUM CHLORIDE 20MEQ/15ML
20 LIQUID (ML) ORAL DAILY
Qty: 480 ML | Refills: 1 | Status: SHIPPED | COMMUNITY
Start: 2017-10-18 | End: 2017-11-16 | Stop reason: SDUPTHER

## 2017-10-18 NOTE — PROGRESS NOTES
HISTORY OF PRESENT ILLNESS  Cyn Hayden is a 67 y.o. female. HPI   Here for [facility]. States dx with hepatitis in 62's. Wants checked for hep C. Unable to swallow K+ tabs past month. Chokes and gags. Recently dx with gastroparesis. Sees GI tomorrow for knot in stomach. Goes q 6 weeks. Eye doctor > 1 yr. Dentist > 4 yrs. Taking meds for bladder incont with benefit. Colonoscopy '11.  10 yr repeat. Had Shingles. Mammo > 1 yr. Sees pain doctor monthly. No rheum > 1 yr. 3 ER viisits past yr. One was mini CVA. 2 episodes with abdm pain. One hosp past yr at [facility]. Patient Active Problem List   Diagnosis Code    RA (rheumatoid arthritis) ([facility]) M06.9    Essential hypertension I10    Postmenopausal Z78.0    Hypothyroidism E03.9    Allergic rhinitis J30.9    Tinea manus B35.2    Chronic constipation K59.09    Vitamin D deficiency E55.9    Abnormal gait R26.9    Prediabetes R73.03    Insomnia G47.00    Gastroesophageal reflux disease without esophagitis K21.9    Chronic midline low back pain without sciatica M54.5, G89.29    ACP (advance care planning) Z71.89    Demand ischemia ([facility]) I24.8    Hyperlipidemia E78.5    Gastroparesis K31.84     Current Outpatient Prescriptions   Medication Sig Dispense Refill    montelukast (SINGULAIR) 10 mg tablet TAKE 1 TABLET DAILY 90 Tab 3    verapamil ER (CALAN-SR) 120 mg tablet TAKE 1 TABLET NIGHTLY 90 Tab 2    oxyCODONE IR (ROXICODONE) 5 mg immediate release tablet TAKE 1 TABLET BY MOUTH TWICE A DAY  0    predniSONE (DELTASONE) 5 mg tablet       nystatin (MYCOSTATIN) topical cream Apply  to affected area two (2) times a day. 30 g 0    Nebulizer & Compressor machine 1 Each by Does Not Apply route every six (6) hours as needed. Indications: COUGH 1 Each 0    hydroCHLOROthiazide (HYDRODIURIL) 25 mg tablet Take 1 Tab by mouth daily. 1 tablet oe time daily for edema ,HTN 90 Tab 1    atorvastatin (LIPITOR) 20 mg tablet Take 1 Tab by mouth nightly. 90 Tab 3    pantoprazole (PROTONIX) 40 mg tablet Take 1 Tab by mouth daily. Indications: gastroenteritis 90 Tab 3    carvedilol (COREG) 3.125 mg tablet Take 1 Tab by mouth two (2) times a day. 180 Tab 2    ipratropium (ATROVENT) 0.02 % nebulizer solution 2.5 mL by Nebulization route every six (6) hours as needed. 100 mL 0    KLOR-CON M20 20 mEq tablet TAKE 1 TABLET DAILY 90 Tab 2    levothyroxine (SYNTHROID) 50 mcg tablet TAKE 1 TABLET DAILY 90 Tab 2    cholecalciferol (VITAMIN D3) 1,000 unit tablet Take 1,000 Units by mouth two (2) times a day.  calcium-cholecalciferol, d3, (CALCIUM 600 + D) 600-125 mg-unit tab Take  by mouth.  multivitamin (ONE A DAY) tablet Take 1 Tab by mouth daily.  baclofen (LIORESAL) 10 mg tablet Take 0.5 Tabs by mouth three (3) times daily. Take one half tablet every 8 hr.prn for muscle spasm (Patient taking differently: Take 10 mg by mouth two (2) times a day. As needed-muscle spasms. ) 30 Tab 0    gabapentin (NEURONTIN) 100 mg capsule Take 1 Cap by mouth two (2) times a day. 60 Cap 0    azaTHIOprine (IMURAN) 50 mg tablet Take 150 mg by mouth daily.  VITAMIN B COMPLEX PO Take 1 Tab by mouth daily. 1 tab, PO, daily, 0 Refills      aspirin 81 mg tablet Take 81 mg by mouth daily.  vitamin E (AQUA GEMS) 400 unit capsule Take 400 Units by mouth two (2) times a day.        Allergies   Allergen Reactions    Pcn [Penicillins] Swelling     Has tolerated Cefepime    Tramadol Nausea and Vomiting     SEVERE If taken w/o food    Proventil [Albuterol Sulfate] Hives    Ace Inhibitors Angioedema    Albuterol Swelling     swelling    Ambien [Zolpidem] Other (comments)     Nightmares and memory disturbance    Ciprofloxacin Other (comments)     Sore throat and trouble swallowing    Lisinopril Swelling    Oxaprozin Nausea and Vomiting     severe stomach upset    Sulfadiazine Swelling     swelling    Trazodone Other (comments)     Vivid dreams and felt disoriented Past Medical History:   Diagnosis Date    Abdominal pain 6/18/14    note from [facility] directive discussed with patient 07/22/2015    Arthritis     dr Gary buck        2/6/17 f/u note    Bacteremia due to Klebsiella pneumoniae 2/2/2016    [facility] note from Dr Elliott Brown, Infect Disease    Chronic pain     low back pain/arthritis      [facility] note9/13/17    Contact dermatitis and other eczema, due to unspecified cause     hyperpigmented macules/seb k    Elevated LFTs     per info [facility] Dr Jacy Plata at [facility] on report    Endocrine disease     hypothyroid    GERD (gastroesophageal reflux disease)     chelsy burns    [facility] attack 05/2017    [facility]    HSV infection     Hypertension     Hypothyroid 10/2012    Insomnia     Melasma 3/3/15    [facility]    Nausea & vomiting 05/04/2017    report [facility]  7/28/17 EGD showed delayed emptying    Pain management counseling, encounter for 05/05/2016    sees Dr Jie Perez 5/2/17 f/u    Rhinitis, allergic nonseasonal     Screening for glaucoma 04/11/2016    Urge incontinence of urine 03/21/2017 [facility]    [facility] eval note Aditi Marshall 4/17/17 urodymanics study//5/9/17 f/u note    Well woman exam (no gynecological exam) 07/29/2016    zedler's note rec'd     Past Surgical History:   Procedure Laterality Date    COLONOSCOPY  8/1/11    dr Rolando Riley 10 year repeat    HX CHOLECYSTECTOMY      HX ENDOSCOPY  2014    [address]    [place]OSCOPY  2017    [place]    HX ENDOSCOPY  07/28/2017    showed delayed emptying of stomach contents--gastroparesis to be r/o    HX OTHER SURGICAL  04/17/2017    complex urodynamics study report [facility]    HX TUBAL LIGATION       Family History   Problem Relation Age of Onset    Hypertension Mother     Heart Disease Mother     No Known Problems Father     Cancer Brother      prostate    Heart Disease Brother      blocked valves  Heart Disease Brother     Asthma Sister     Diabetes Sister     Hypertension Sister      Social History   Substance Use Topics    Smoking status: Never Smoker    Smokeless tobacco: Never Used    Alcohol use No      Lab Results  Component Value Date/Time   WBC 9.6 05/24/2017 03:09 AM   HGB 8.6 05/24/2017 03:09 AM   Hemoglobin (POC) 11.9 12/03/2010 01:40 AM   HCT 25.7 05/24/2017 03:09 AM   Hematocrit (POC) 35 12/03/2010 01:40 AM   PLATELET 891 79/77/5852 03:09 AM   MCV 91.5 05/24/2017 03:09 AM     Lab Results  Component Value Date/Time   Hemoglobin A1c 6.0 12/23/2015 03:00 AM   Hemoglobin A1c 5.8 12/23/2015 01:45 AM   Hemoglobin A1c, External 6.3 07/07/2015   Glucose 121 06/26/2017 01:47 PM   Glucose (POC) 98 05/19/2017 11:52 AM   LDL, calculated 43 06/26/2017 01:47 PM   Creatinine (POC) 0.9 03/01/2013 12:37 PM   Creatinine 0.73 06/26/2017 01:47 PM      Lab Results  Component Value Date/Time   Cholesterol, total 97 06/26/2017 01:47 PM   HDL Cholesterol 37 06/26/2017 01:47 PM   LDL, calculated 43 06/26/2017 01:47 PM   Triglyceride 86 06/26/2017 01:47 PM   CHOL/HDL Ratio 4.5 12/23/2015 01:45 AM   Lab Results  Component Value Date/Time   ALT (SGPT) 17 05/24/2017 03:09 AM   AST (SGOT) 15 05/24/2017 03:09 AM   Alk.  phosphatase 79 05/24/2017 03:09 AM   Bilirubin, direct 0.5 05/18/2017 04:30 AM   Bilirubin, total 0.8 05/24/2017 03:09 AM   Albumin 2.2 05/24/2017 03:09 AM   Protein, total 5.5 05/24/2017 03:09 AM   INR 1.3 12/23/2015 01:45 AM   Prothrombin time 13.4 12/23/2015 01:45 AM   PLATELET 552 42/94/2069 03:09 AM       Lab Results  Component Value Date/Time   GFR est non-AA 82 06/26/2017 01:47 PM   GFRNA, POC >60 03/01/2013 12:37 PM   GFR est AA 94 06/26/2017 01:47 PM   GFRAA, POC >60 03/01/2013 12:37 PM   Creatinine 0.73 06/26/2017 01:47 PM   Creatinine (POC) 0.9 03/01/2013 12:37 PM   BUN 9 06/26/2017 01:47 PM   BUN (POC) 17 12/03/2010 01:40 AM   Sodium 133 06/26/2017 01:47 PM   Sodium (POC) 133 12/03/2010 01:40 AM   Potassium 3.8 06/26/2017 01:47 PM   Potassium (POC) 2.7 12/03/2010 01:40 AM   Chloride 93 06/26/2017 01:47 PM   Chloride (POC) 100 12/03/2010 01:40 AM   CO2 22 06/26/2017 01:47 PM   Magnesium 1.8 05/24/2017 03:09 AM   Phosphorus 2.6 05/24/2017 03:09 AM   Lab Results  Component Value Date/Time   TSH 1.060 06/26/2017 01:47 PM   T4, Free 1.37 06/26/2017 01:47 PM      Lab Results   Component Value Date/Time    Glucose 121 06/26/2017 01:47 PM    Glucose (POC) 98 05/19/2017 11:52 AM         Review of Systems   Constitutional: Positive for weight loss. HENT: Negative. Eyes: Negative. Respiratory: Negative. Cardiovascular: Negative. Gastrointestinal: Positive for abdominal pain, constipation, heartburn, nausea and vomiting. Genitourinary: Positive for frequency. Musculoskeletal: Positive for back pain and joint pain. Skin: Negative. Neurological: Negative. Endo/Heme/Allergies: Positive for environmental allergies. Bruises/bleeds easily. Psychiatric/Behavioral: Negative. Physical Exam   Vitals:    10/18/17 1103   BP: 118/71   Pulse: 70   Resp: 18   Temp: 97.4 °F (36.3 °C)   TempSrc: Oral   SpO2: 98%   Weight: 181 lb (82.1 kg)   Height: 5' 4\" (1.626 m)       General    alert, cooperative, no distress, appears stated age   Head    Normocephalic, without obvious abnormality, atraumatic   Eyes    conjunctivae/corneas clear. PERRL. Fundi discs pale. Ears    Canals and TMs clear   Nose Passages patent. Mucosa normal. No drainage or sinus tenderness. Throat Lips, mucosa, and tongue normal. Teeth full upper, partial lower but doesn't wear. and gums normal.  Post pharynx neg. Neck supple, nontender, no adenopathy, thyroid: not enlarged, no masses/nodules, no carotid bruits   Back     symmetric, no curvature.   No CVA tenderness   Lungs     clear to auscultation bilaterally   Breasts    Declined   Heart    Regular rate and rhythm, with no murmur, click, rub or gallop   Abdomen   Soft, non-tender. Bowel sounds normal. No masses,  No organomegaly   Genitalia and Rectal  Deferred. Per GYN. Extremities   extremities RA changes, atraumatic, no cyanosis. 1-2+ edema ankles. Pulses   1-2+ and symmetric, absent pedals. Skin No rashes or lesions       Neurologic Unable Romberg, heel, toe, Tandem. Uses walker. DTRs 2+ symmetric       ASSESSMENT and PLAN    ICD-10-CM ICD-9-CM    1. Physical exam Z00.00 V70.9    2. Need for hepatitis C screening test Z11.59 V73.89 HEPATITIS C AB   3. Encounter for immunization Z23 V03.89 INFLUENZA VIRUS VACCINE, HIGH DOSE SEASONAL, PRESERVATIVE FREE      ND IMMUNIZ ADMIN,1 SINGLE/COMB VAC/TOXOID      CANCELED: ADMIN INFLUENZA VIRUS VAC   4. Increased glucose level R73.09 790.29 HEMOGLOBIN A1C WITH EAG   5. Rheumatoid arthritis, involving unspecified site, unspecified rheumatoid factor presence (HCC) M06.9 714.0    6. Demand ischemia (HCC) I24.8 411.89    7. Essential hypertension I10 401.9    8. Hypothyroidism, unspecified type E03.9 244.9    9. Chronic constipation K59.09 564.00    10. Vitamin D deficiency E55.9 268.9    11. Prediabetes R73.03 790.29    12. Gastroesophageal reflux disease without esophagitis K21.9 530.81    13. Chronic midline low back pain without sciatica M54.5 724.2     G89.29 338.29    14. Insomnia, unspecified type G47.00 780.52    15. Hyperlipidemia, unspecified hyperlipidemia type E78.5 272.4    16. Gastroparesis K31.84 536.3    17. Abnormal gait R26.9 781.2      Follow-up Disposition:  Return in about 6 months (around 4/18/2018) for Annual labs.

## 2017-10-18 NOTE — MR AVS SNAPSHOT
Visit Information Date & Time Provider Department Dept. Phone Encounter #  
 10/18/2017 11:00 AM Theora Alpers, MD [facility] [phone] [id number] Upcoming Health Maintenance Date Due  
 BREAST CANCER SCRN MAMMOGRAM 11/18/2017* GLAUCOMA SCREENING Q2Y 5/13/2018 MEDICARE YEARLY EXAM 10/19/2018 COLONOSCOPY 8/1/2021 DTaP/Tdap/Td series (2 - Td) 11/13/2023 *Topic was postponed. The date shown is not the original due date. Allergies as of 10/18/2017  Review Complete On: 10/18/2017 By: Theora Alpers, MD  
  
 Severity Noted Reaction Type Reactions Pcn [Penicillins] High 02/09/2010    Swelling Has tolerated Cefepime Tramadol High 02/09/2010    Nausea and Vomiting SEVERE If taken w/o food Proventil [Albuterol Sulfate] Medium 03/13/2014   Systemic Hives Ace Inhibitors  12/29/2013    Angioedema Albuterol  07/10/2015    Swelling  
 swelling Ambien [Zolpidem]  02/09/2010    Other (comments) Nightmares and memory disturbance Ciprofloxacin  08/17/2012    Other (comments) Sore throat and trouble swallowing Lisinopril  12/30/2013    Swelling Oxaprozin  05/14/2015    Nausea and Vomiting  
 severe stomach upset Sulfadiazine  05/14/2015    Swelling  
 swelling Trazodone  02/09/2010    Other (comments) Vivid dreams and felt disoriented Current Immunizations  Reviewed on 12/26/2015 Name Date Influenza High Dose Vaccine PF 10/18/2017, 9/21/2016  2:13 PM, 10/12/2015, 10/7/2014 Influenza Vaccine 10/15/2013 Influenza Vaccine Split 10/17/2012, 1/5/2012, 12/6/2010  1:39 PM  
 Influenza Vaccine Whole 12/1/2008 Pneumococcal Conjugate (PCV-13) 7/22/2015 TD Vaccine 11/10/2003 Tdap 11/13/2013 ZZZ-RETIRED (DO NOT USE) Pneumococcal Vaccine (Unspecified Type) 10/17/2012, 11/3/2006 Not reviewed this visit You Were Diagnosed With   
  
 Codes Comments Physical exam    -  Primary ICD-10-CM: Z00.00 ICD-9-CM: V70.9 Need for hepatitis C screening test     ICD-10-CM: Z11.59 
ICD-9-CM: V73.89 Encounter for immunization     ICD-10-CM: G64 ICD-9-CM: V03.89 Increased glucose level     ICD-10-CM: R73.09 
ICD-9-CM: 790.29 Rheumatoid arthritis, involving unspecified site, unspecified rheumatoid factor presence ([facility]     ICD-10-CM: M06.9 ICD-9-CM: 714.0 Demand ischemia ([facility]     ICD-10-CM: I24.8 ICD-9-CM: 411.89 Essential hypertension     ICD-10-CM: I10 
ICD-9-CM: 401.9 Hypothyroidism, unspecified type     ICD-10-CM: E03.9 ICD-9-CM: 372. 9 Chronic constipation     ICD-10-CM: K59.09 
ICD-9-CM: 564.00 Vitamin D deficiency     ICD-10-CM: E55.9 ICD-9-CM: 268.9 Prediabetes     ICD-10-CM: R73.03 
ICD-9-CM: 790.29 Gastroesophageal reflux disease without esophagitis     ICD-10-CM: K21.9 ICD-9-CM: 530.81 Chronic midline low back pain without sciatica     ICD-10-CM: M54.5, G89.29 ICD-9-CM: 724.2, 338.29 Insomnia, unspecified type     ICD-10-CM: G47.00 ICD-9-CM: 780.52 Hyperlipidemia, unspecified hyperlipidemia type     ICD-10-CM: E78.5 ICD-9-CM: 272.4 Gastroparesis     ICD-10-CM: K31.84 ICD-9-CM: 536.3 Abnormal gait     ICD-10-CM: R26.9 ICD-9-CM: 640. 2 Vitals BP Pulse Temp Resp Height(growth percentile) Weight(growth percentile) 118/71 70 97.4 °F (36.3 °C) (Oral) 18 5' 4\" (1.626 m) 181 lb (82.1 kg) SpO2 BMI OB Status Smoking Status 98% 31.07 kg/m2 Postmenopausal Never Smoker BMI and BSA Data Body Mass Index Body Surface Area 31.07 kg/m 2 1.93 m 2 Preferred Pharmacy Pharmacy Name [facility] [facility] [address] [phone] Your Updated Medication List  
  
   
This list is accurate as of: 10/18/17 11:52 AM.  Always use your most recent med list.  
  
  
  
  
 aspirin 81 mg tablet Take 81 mg by mouth daily. atorvastatin 20 mg tablet Commonly known as:  LIPITOR Take 1 Tab by mouth nightly. azaTHIOprine 50 mg tablet Commonly known as:  [employer] Take 150 mg by mouth daily. baclofen 10 mg tablet Commonly known as:  LIORESAL Take 0.5 Tabs by mouth three (3) times daily. Take one half tablet every 8 hr.prn for muscle spasm CALCIUM 600 + D 600-125 mg-unit Tab Generic drug:  calcium-cholecalciferol (d3) Take  by mouth. carvedilol 3.125 mg tablet Commonly known as:  Vergia Terry Take 1 Tab by mouth two (2) times a day.  
  
 gabapentin 100 mg capsule Commonly known as:  NEURONTIN Take 1 Cap by mouth two (2) times a day. hydroCHLOROthiazide 25 mg tablet Commonly known as:  HYDRODIURIL Take 1 Tab by mouth daily. 1 tablet oe time daily for edema ,HTN  
  
 ipratropium 0.02 % Soln Commonly known as:  ATROVENT  
2.5 mL by Nebulization route every six (6) hours as needed. KLOR-CON M20 20 mEq tablet Generic drug:  potassium chloride TAKE 1 TABLET DAILY  
  
 levothyroxine 50 mcg tablet Commonly known as:  SYNTHROID  
TAKE 1 TABLET DAILY  
  
 montelukast 10 mg tablet Commonly known as:  SINGULAIR  
TAKE 1 TABLET DAILY  
  
 multivitamin tablet Commonly known as:  ONE A DAY Take 1 Tab by mouth daily. Nebulizer & Compressor machine 1 Each by Does Not Apply route every six (6) hours as needed. Indications: COUGH  
  
 nystatin topical cream  
Commonly known as:  MYCOSTATIN Apply  to affected area two (2) times a day. oxyCODONE IR 5 mg immediate release tablet Commonly known as:  ROXICODONE  
TAKE 1 TABLET BY MOUTH TWICE A DAY pantoprazole 40 mg tablet Commonly known as:  PROTONIX Take 1 Tab by mouth daily. Indications: gastroenteritis  
  
 predniSONE 5 mg tablet Commonly known as:  DELTASONE  
  
 verapamil  mg tablet Commonly known as:  CALAN-SR  
TAKE 1 TABLET NIGHTLY  
  
 VITAMIN B COMPLEX PO Take 1 Tab by mouth daily. 1 tab, PO, daily, 0 Refills VITAMIN D3 1,000 unit tablet Generic drug:  cholecalciferol Take 1,000 Units by mouth two (2) times a day. vitamin E 400 unit capsule Commonly known as:  [address] Take 400 Units by mouth two (2) times a day. We Performed the Following HEMOGLOBIN A1C WITH EAG [69328 CPT(R)] HEPATITIS C AB [27595 CPT(R)] INFLUENZA VIRUS VACCINE, HIGH DOSE SEASONAL, PRESERVATIVE FREE [12824 CPT(R)] MO IMMUNIZ ADMIN,1 SINGLE/COMB VAC/TOXOID [id number] CPT(R)] Introducing [facility]! [employer] introduces [employer] patient portal. Now you can access parts of your medical record, email your doctor's office, and request medication refills online. 1. In your internet browser, go to [email or web address] 2. Click on the First Time User? Click Here link in the Sign In box. You will see the New Member Sign Up page. 3. Enter your [employer] Access Code exactly as it appears below. You will not need to use this code after youve completed the sign-up process. If you do not sign up before the expiration date, you must request a new code. · [employer] Access Code: [email or web address] Expires: 11/22/2017  9:45 AM 
 
4. Enter the last four digits of your Social Security Number (xxxx) and Date of Birth (mm/dd/yyyy) as indicated and click Submit. You will be taken to the next sign-up page. 5. Create a Spr[employer]kin ID. This will be your [employer] login ID and cannot be changed, so think of one that is secure and easy to remember. 6. Create a [employer] password. You can change your password at any time. 7. Enter your Password Reset Question and Answer. This can be used at a later time if you forget your password. 8. Enter your e-mail address. You will receive e-mail notification when new information is available in [address] 9. Click Sign Up. You can now view and download portions of your medical record.  
10. Click the Download Summary menu link to download a portable copy of your medical information. If you have questions, please visit the Frequently Asked Questions section of the [employer] website. Remember, [employer] is NOT to be used for urgent needs. For medical emergencies, dial 911. Now available from your iPhone and Android! Please provide this summary of care documentation to your next provider. Your primary care clinician is listed as [phone] If you have any questions after today's visit, please call [phone].

## 2017-10-18 NOTE — PROGRESS NOTES
Chief Complaint   Patient presents with    Complete Physical    Immunization/Injection     1. Have you been to the ER, urgent care clinic since your last visit? Hospitalized since your last visit? No    2. Have you seen or consulted any other health care providers outside of the [address] [place] [address] since your last visit? Include any pap smears or colon screening. Yes When: [facility] (OCT [facility] 17[facility] Dr.Abou-Assi Kalyn Betancourtal is a 67 y.o. female who presents for routine immunizations. She denies any symptoms , reactions or allergies that would exclude them from being immunized today. Risks and adverse reactions were discussed and the VIS was given to them. All questions were addressed. She was observed for 15 min post injection. There were no reactions observed.     Cherelle Mater

## 2017-10-19 LAB
EST. AVERAGE GLUCOSE BLD GHB EST-MCNC: 114 MG/DL
HBA1C MFR BLD: 5.6 % (ref 4.8–5.6)
HCV AB S/CO SERPL IA: <0.1 S/CO RATIO (ref 0–0.9)

## 2017-10-23 NOTE — PROGRESS NOTES
Voice mail message left for patient to return call. No PHI left on message. Upon call back patient to be notified per Dr. Marisa Lanza recommendation: Negative Hepatitis C and normal average blood sugar.

## 2017-10-23 NOTE — PROGRESS NOTES
Spoke with patient using 2 identifiers, name and . Patient notified per  Recommendation:Negative Hepatitis C and normal average blood sugar. Patient verbalized understanding.

## 2017-11-16 DIAGNOSIS — E87.6 HYPOKALEMIA: ICD-10-CM

## 2017-11-16 NOTE — TELEPHONE ENCOUNTER
Patient wants to know if 90 day supply of her liquid potassium medication can be called into her pharmacy. Says her insurance will be changing in January and she may go from having no copay for her medications to having to pay. Her contact # is [phone].

## 2017-11-16 NOTE — TELEPHONE ENCOUNTER
Requested Prescriptions     Pending Prescriptions Disp Refills    potassium chloride (KAON 10%) 20 mEq/15 mL solution 1440 mL 1     Sig: Take 15 mL by mouth daily.      Last Refill:10/18/17    Last Office Visit: 10/18/17 Physical    Upcoming Appointment: none    Last Labs:6/26/17

## 2017-11-18 RX ORDER — POTASSIUM CHLORIDE 20MEQ/15ML
20 LIQUID (ML) ORAL DAILY
Qty: 1440 ML | Refills: 1 | Status: SHIPPED | OUTPATIENT
Start: 2017-11-18 | End: 2017-12-11 | Stop reason: SDUPTHER

## 2017-11-27 ENCOUNTER — HOSPITAL ENCOUNTER (INPATIENT)
Age: 72
LOS: 6 days | Discharge: HOME HEALTH | DRG: 444 | End: 2017-12-04
Attending: EMERGENCY MEDICINE | Admitting: FAMILY MEDICINE
Payer: MEDICARE

## 2017-11-27 ENCOUNTER — APPOINTMENT (OUTPATIENT)
Dept: CT IMAGING | Age: 72
DRG: 444 | End: 2017-11-27
Attending: EMERGENCY MEDICINE
Payer: MEDICARE

## 2017-11-27 ENCOUNTER — APPOINTMENT (OUTPATIENT)
Dept: GENERAL RADIOLOGY | Age: 72
DRG: 444 | End: 2017-11-27
Attending: STUDENT IN AN ORGANIZED HEALTH CARE EDUCATION/TRAINING PROGRAM
Payer: MEDICARE

## 2017-11-27 ENCOUNTER — HOSPITAL ENCOUNTER (EMERGENCY)
Age: 72
Discharge: HOME | DRG: 444 | End: 2017-11-27
Attending: STUDENT IN AN ORGANIZED HEALTH CARE EDUCATION/TRAINING PROGRAM | Admitting: STUDENT IN AN ORGANIZED HEALTH CARE EDUCATION/TRAINING PROGRAM
Payer: MEDICARE

## 2017-11-27 VITALS
TEMPERATURE: 98.5 F | RESPIRATION RATE: 16 BRPM | SYSTOLIC BLOOD PRESSURE: 122 MMHG | WEIGHT: 180 LBS | HEIGHT: 64 IN | OXYGEN SATURATION: 98 % | DIASTOLIC BLOOD PRESSURE: 78 MMHG | BODY MASS INDEX: 30.73 KG/M2 | HEART RATE: 73 BPM

## 2017-11-27 DIAGNOSIS — B34.9 VIRAL SYNDROME: Primary | ICD-10-CM

## 2017-11-27 DIAGNOSIS — R78.81 POSITIVE BLOOD CULTURES: Primary | ICD-10-CM

## 2017-11-27 DIAGNOSIS — E87.6 HYPOKALEMIA: ICD-10-CM

## 2017-11-27 LAB
ALBUMIN SERPL-MCNC: 2.8 G/DL (ref 3.5–5)
ALBUMIN/GLOB SERPL: 0.6 {RATIO} (ref 1.1–2.2)
ALP SERPL-CCNC: 152 U/L (ref 45–117)
ALT SERPL-CCNC: 58 U/L (ref 12–78)
ANION GAP SERPL CALC-SCNC: 10 MMOL/L (ref 5–15)
APPEARANCE UR: CLEAR
APTT PPP: 30.5 SEC (ref 22.1–32.5)
AST SERPL-CCNC: 58 U/L (ref 15–37)
ATRIAL RATE: 84 BPM
BACTERIA URNS QL MICRO: NEGATIVE /HPF
BASOPHILS # BLD: 0 K/UL (ref 0–0.1)
BASOPHILS # BLD: 0 K/UL (ref 0–0.1)
BASOPHILS NFR BLD: 0 % (ref 0–1)
BASOPHILS NFR BLD: 0 % (ref 0–1)
BILIRUB SERPL-MCNC: 1.5 MG/DL (ref 0.2–1)
BILIRUB UR QL: NEGATIVE
BUN SERPL-MCNC: 8 MG/DL (ref 6–20)
BUN/CREAT SERPL: 14 (ref 12–20)
CALCIUM SERPL-MCNC: 7.8 MG/DL (ref 8.5–10.1)
CALCULATED P AXIS, ECG09: 1 DEGREES
CALCULATED R AXIS, ECG10: -8 DEGREES
CALCULATED T AXIS, ECG11: -2 DEGREES
CHLORIDE SERPL-SCNC: 102 MMOL/L (ref 97–108)
CO2 SERPL-SCNC: 25 MMOL/L (ref 21–32)
COLOR UR: NORMAL
CREAT SERPL-MCNC: 0.59 MG/DL (ref 0.55–1.02)
DIAGNOSIS, 93000: NORMAL
EOSINOPHIL # BLD: 0.2 K/UL (ref 0–0.4)
EOSINOPHIL # BLD: 0.3 K/UL (ref 0–0.4)
EOSINOPHIL NFR BLD: 2 % (ref 0–7)
EOSINOPHIL NFR BLD: 4 % (ref 0–7)
EPITH CASTS URNS QL MICRO: NORMAL /LPF
ERYTHROCYTE [DISTWIDTH] IN BLOOD BY AUTOMATED COUNT: 14.7 % (ref 11.5–14.5)
ERYTHROCYTE [DISTWIDTH] IN BLOOD BY AUTOMATED COUNT: 14.8 % (ref 11.5–14.5)
FLUAV AG NPH QL IA: NEGATIVE
FLUBV AG NOSE QL IA: NEGATIVE
GLOBULIN SER CALC-MCNC: 4.9 G/DL (ref 2–4)
GLUCOSE BLD STRIP.AUTO-MCNC: 138 MG/DL (ref 65–100)
GLUCOSE SERPL-MCNC: 126 MG/DL (ref 65–100)
GLUCOSE UR STRIP.AUTO-MCNC: NEGATIVE MG/DL
HCT VFR BLD AUTO: 29.7 % (ref 35–47)
HCT VFR BLD AUTO: 29.9 % (ref 35–47)
HGB BLD-MCNC: 10 G/DL (ref 11.5–16)
HGB BLD-MCNC: 10.1 G/DL (ref 11.5–16)
HGB UR QL STRIP: NEGATIVE
INR BLD: 1.1 (ref 0.9–1.2)
INR PPP: 1.2 (ref 0.9–1.1)
KETONES UR QL STRIP.AUTO: NEGATIVE MG/DL
LACTATE SERPL-SCNC: 1.1 MMOL/L (ref 0.4–2)
LACTATE SERPL-SCNC: 1.7 MMOL/L (ref 0.4–2)
LEUKOCYTE ESTERASE UR QL STRIP.AUTO: NEGATIVE
LYMPHOCYTES # BLD: 0.8 K/UL (ref 0.8–3.5)
LYMPHOCYTES # BLD: 1.6 K/UL (ref 0.8–3.5)
LYMPHOCYTES NFR BLD: 16 % (ref 12–49)
LYMPHOCYTES NFR BLD: 9 % (ref 12–49)
MCH RBC QN AUTO: 31.3 PG (ref 26–34)
MCH RBC QN AUTO: 31.3 PG (ref 26–34)
MCHC RBC AUTO-ENTMCNC: 33.7 G/DL (ref 30–36.5)
MCHC RBC AUTO-ENTMCNC: 33.8 G/DL (ref 30–36.5)
MCV RBC AUTO: 92.6 FL (ref 80–99)
MCV RBC AUTO: 92.8 FL (ref 80–99)
MONOCYTES # BLD: 0.8 K/UL (ref 0–1)
MONOCYTES # BLD: 1.1 K/UL (ref 0–1)
MONOCYTES NFR BLD: 11 % (ref 5–13)
MONOCYTES NFR BLD: 8 % (ref 5–13)
NEUTS SEG # BLD: 6.7 K/UL (ref 1.8–8)
NEUTS SEG # BLD: 7.4 K/UL (ref 1.8–8)
NEUTS SEG NFR BLD: 69 % (ref 32–75)
NEUTS SEG NFR BLD: 81 % (ref 32–75)
NITRITE UR QL STRIP.AUTO: NEGATIVE
P-R INTERVAL, ECG05: 160 MS
PH UR STRIP: 7 [PH] (ref 5–8)
PLATELET # BLD AUTO: 259 K/UL (ref 150–400)
PLATELET # BLD AUTO: 285 K/UL (ref 150–400)
POTASSIUM SERPL-SCNC: 2.9 MMOL/L (ref 3.5–5.1)
PROT SERPL-MCNC: 7.7 G/DL (ref 6.4–8.2)
PROT UR STRIP-MCNC: NEGATIVE MG/DL
PROTHROMBIN TIME: 12 SEC (ref 9–11.1)
Q-T INTERVAL, ECG07: 378 MS
QRS DURATION, ECG06: 86 MS
QTC CALCULATION (BEZET), ECG08: 446 MS
RBC # BLD AUTO: 3.2 M/UL (ref 3.8–5.2)
RBC # BLD AUTO: 3.23 M/UL (ref 3.8–5.2)
RBC #/AREA URNS HPF: NORMAL /HPF (ref 0–5)
SERVICE CMNT-IMP: ABNORMAL
SODIUM SERPL-SCNC: 137 MMOL/L (ref 136–145)
SP GR UR REFRACTOMETRY: 1.01 (ref 1–1.03)
THERAPEUTIC RANGE,PTTT: NORMAL SECS (ref 58–77)
TROPONIN I SERPL-MCNC: <0.04 NG/ML
UR CULT HOLD, URHOLD: NORMAL
UROBILINOGEN UR QL STRIP.AUTO: 1 EU/DL (ref 0.2–1)
VENTRICULAR RATE, ECG03: 84 BPM
WBC # BLD AUTO: 9.1 K/UL (ref 3.6–11)
WBC # BLD AUTO: 9.7 K/UL (ref 3.6–11)
WBC URNS QL MICRO: NORMAL /HPF (ref 0–4)

## 2017-11-27 PROCEDURE — 80053 COMPREHEN METABOLIC PANEL: CPT | Performed by: PHYSICIAN ASSISTANT

## 2017-11-27 PROCEDURE — 83605 ASSAY OF LACTIC ACID: CPT | Performed by: PHYSICIAN ASSISTANT

## 2017-11-27 PROCEDURE — 99282 EMERGENCY DEPT VISIT SF MDM: CPT

## 2017-11-27 RX ORDER — ACETAMINOPHEN 500 MG
1000 TABLET ORAL ONCE
Status: COMPLETED | OUTPATIENT
Start: 2017-11-27 | End: 2017-11-27

## 2017-11-27 RX ORDER — POTASSIUM CHLORIDE 750 MG/1
20 TABLET, FILM COATED, EXTENDED RELEASE ORAL DAILY
Qty: 6 TAB | Refills: 0 | Status: SHIPPED | OUTPATIENT
Start: 2017-11-27 | End: 2017-12-04

## 2017-11-27 RX ORDER — SODIUM CHLORIDE 0.9 % (FLUSH) 0.9 %
10 SYRINGE (ML) INJECTION
Status: COMPLETED | OUTPATIENT
Start: 2017-11-27 | End: 2017-11-27

## 2017-11-27 RX ORDER — POTASSIUM CHLORIDE 750 MG/1
40 TABLET, FILM COATED, EXTENDED RELEASE ORAL
Status: COMPLETED | OUTPATIENT
Start: 2017-11-27 | End: 2017-11-27

## 2017-11-27 RX ADMIN — Medication 10 ML: at 03:33

## 2017-11-27 RX ADMIN — SODIUM CHLORIDE 100 ML: 900 INJECTION, SOLUTION INTRAVENOUS at 03:33

## 2017-11-27 RX ADMIN — IOPAMIDOL 100 ML: 755 INJECTION, SOLUTION INTRAVENOUS at 03:33

## 2017-11-27 RX ADMIN — POTASSIUM CHLORIDE 40 MEQ: 750 TABLET, FILM COATED, EXTENDED RELEASE ORAL at 04:09

## 2017-11-27 RX ADMIN — ACETAMINOPHEN 1000 MG: 500 TABLET, FILM COATED ORAL at 03:13

## 2017-11-27 NOTE — IP AVS SNAPSHOT
2700 [facility] [address] [place] [address] 
[phone] Patient: Sharon Briones MRN: [id number] About your hospitalization You were admitted on:  November 28, 2017 You last received care in the:  [facility] 07 1605 You were discharged on:  December 4, 2017 Why you were hospitalized Your primary diagnosis was:  Gram-Negative Bacteremia Things You Need To Do (next 8 weeks) Follow up with Alex Sagastume MD  
follow up in 3 months Phone:  [phone] Where:  217 [facility], [facility] 98, [address] [place] [address] Follow up with Dilma Calix MD  
[facility] Follow Up Appointment:   
  
Phone:  [phone] Where:  [address], [address], [address] [place] [address] Follow up with Olga Munoz 227 in 1 day(s) [address] [facility] for IV ABX education/care and lab work. Please call [phone] if you do not hear from them by 11AM the day after discharge. Phone:  [phone] Where:  [address], [facility] 7 [address] Follow up with Catrina de souza [facility] Where:  [facility] Address: [address] [place] [address] Phone: [phone] Discharge Orders None A check brisa indicates which time of day the medication should be taken. My Medications TAKE these medications as instructed Instructions Each Dose to Equal  
 Morning Noon Evening Bedtime  
 aspirin 81 mg tablet Your last dose was: Your next dose is: Take 81 mg by mouth daily. 81 mg  
    
   
   
   
  
 atorvastatin 20 mg tablet Commonly known as:  LIPITOR Your last dose was: Your next dose is: Take 1 Tab by mouth nightly. 20 mg  
    
   
   
   
  
 azaTHIOprine 50 mg tablet Commonly known as:  [employer] Your last dose was: Your next dose is: Take 150 mg by mouth daily. 150 mg  
    
   
   
   
  
 baclofen 10 mg tablet Commonly known as:  LIORESAL Your last dose was: Your next dose is: Take 0.5 Tabs by mouth three (3) times daily. Take one half tablet every 8 hr.prn for muscle spasm 5 mg CALCIUM 600 + D 600-125 mg-unit Tab Generic drug:  calcium-cholecalciferol (d3) Your last dose was: Your next dose is: Take  by mouth. carvedilol 3.125 mg tablet Commonly known as:  Macel Immanuel Your last dose was: Your next dose is: Take 1 Tab by mouth two (2) times a day. 3.125 mg  
    
   
   
   
  
 cefTRIAXone 2 gram 2 g, ADDaptor 1 Device IVPB Your last dose was: Your next dose is:    
   
   
 2 g by IntraVENous route every twenty-four (24) hours for 7 days. 2 g  
    
   
   
   
  
 docusate sodium 100 mg capsule Commonly known as:  Janay Morales Your last dose was: Your next dose is: Take 1 Cap by mouth two (2) times a day for 90 days. 100 mg  
    
   
   
   
  
 gabapentin 100 mg capsule Commonly known as:  NEURONTIN Your last dose was: Your next dose is: Take 1 Cap by mouth two (2) times a day. 100 mg  
    
   
   
   
  
 hydroCHLOROthiazide 25 mg tablet Commonly known as:  HYDRODIURIL Your last dose was: Your next dose is: Take 1 Tab by mouth daily. 1 tablet oe time daily for edema ,HTN  
 25 mg  
    
   
   
   
  
 ipratropium 0.02 % Soln Commonly known as:  ATROVENT Your last dose was: Your next dose is:    
   
   
 2.5 mL by Nebulization route every six (6) hours as needed. 0.5 mg  
    
   
   
   
  
 levothyroxine 50 mcg tablet Commonly known as:  SYNTHROID Your last dose was: Your next dose is: TAKE 1 TABLET DAILY  
     
   
   
   
  
 montelukast 10 mg tablet Commonly known as:  SINGULAIR Your last dose was: Your next dose is: TAKE 1 TABLET DAILY  
     
   
   
   
  
 multivitamin tablet Commonly known as:  ONE A DAY Your last dose was: Your next dose is: Take 1 Tab by mouth daily. 1 Tab Nebulizer & Compressor machine Your last dose was: Your next dose is:    
   
   
 1 Each by Does Not Apply route every six (6) hours as needed. Indications: COUGH  
 1 Each  
    
   
   
   
  
 nystatin topical cream  
Commonly known as:  MYCOSTATIN Your last dose was: Your next dose is:    
   
   
 Apply  to affected area two (2) times a day. oxyCODONE IR 5 mg immediate release tablet Commonly known as:  Francesveta Ly Your last dose was: Your next dose is: Take 1 Tab by mouth two (2) times a day for 7 days. Max Daily Amount: 10 mg.  
 5 mg  
    
   
   
   
  
 pantoprazole 40 mg tablet Commonly known as:  PROTONIX Your last dose was: Your next dose is: Take 1 Tab by mouth daily. Indications: gastroenteritis 40 mg  
    
   
   
   
  
 potassium chloride 20 mEq/15 mL solution Commonly known as:  KAON 10% Your last dose was: Your next dose is: Take 15 mL by mouth daily. 20 mEq  
    
   
   
   
  
 predniSONE 5 mg tablet Commonly known as:  Gunnar Lever Your last dose was: Your next dose is:    
   
   
      
   
   
   
  
 verapamil  mg tablet Commonly known as:  CALAN-SR Your last dose was: Your next dose is: TAKE 1 TABLET NIGHTLY  
     
   
   
   
  
 VITAMIN B COMPLEX PO Your last dose was: Your next dose is: Take 1 Tab by mouth daily. 1 tab, PO, daily, 0 Refills 1 Tab VITAMIN D3 1,000 unit tablet Generic drug:  cholecalciferol Your last dose was: Your next dose is: Take 1,000 Units by mouth two (2) times a day. 1000 Units  
    
   
   
   
  
 vitamin E 400 unit capsule Commonly known as:  [address] Your last dose was: Your next dose is: Take 400 Units by mouth two (2) times a day. 400 Units Where to Get Your Medications Information on where to get these meds will be given to you by the nurse or doctor. ! Ask your nurse or doctor about these medications  
  cefTRIAXone 2 gram 2 g, ADDaptor 1 Device IVPB  
 docusate sodium 100 mg capsule  
 oxyCODONE IR 5 mg immediate release tablet Discharge Instructions Discharge Instructions PATIENT ID: Ryanne Burger MRN: [id number] YOB: 1945 DATE OF ADMISSION: 11/27/2017 10:26 PM   
DATE OF DISCHARGE: 12/4/2017 PRIMARY CARE PROVIDER: Domingo Weir MD  
 
ATTENDING PHYSICIAN: Abdi Valladares MD 
DISCHARGING PROVIDER: Immanuel Zapata NP To contact this individual call [phone] and ask the  to page. If unavailable ask to be transferred the Adult Hospitalist Department. DISCHARGE DIAGNOSES: E coli Bacteremia CONSULTATIONS: IP CONSULT TO HOSPITALIST 
IP CONSULT TO INFECTIOUS DISEASES 
IP CONSULT TO GASTROENTEROLOGY PROCEDURES/SURGERIES: Procedure(s): ENDOSCOPIC RETROGRADE CHOLANGIOPANCREATOGRAPHY 
ENDOSCOPIC SPHINCTEROTOMY 
ENDOSCOPY WITH PROSTHESIS OR STENT PLACEMENT PENDING TEST RESULTS:  
At the time of discharge the following test results are still pending: none FOLLOW UP APPOINTMENTS:  
Follow-up Information Follow up With Details Comments Contact Info Treasure Jaimes MD  follow up in 3 months [address] [place] [address] [phone] [place] [address] 
[phone]  Domingo Weir MD  follow up post hospitalization [address] [place] [address] 
 [address] [employer] [place] [address] 
[phone] ADDITIONAL CARE RECOMMENDATIONS: 
Ceftriaxone 2 gram, IV until December 11 th to treat your blood infection. The infusion service will be in touch with Dr. Penny Raza (infectious disease) You will need a repeat ERCP in 3-6 months for stent removal with Dr. Marguarite Mortimer DIET: Low Fat ACTIVITY: As tolerated WOUND CARE: routine PICC care EQUIPMENT needed: none DISCHARGE MEDICATIONS: 
 See Medication Reconciliation Form · It is important that you take the medication exactly as they are prescribed. · Keep your medication in the bottles provided by the pharmacist and keep a list of the medication names, dosages, and times to be taken in your wallet. · Do not take other medications without consulting your doctor. NOTIFY YOUR PHYSICIAN FOR ANY OF THE FOLLOWING:  
Fever over 101 degrees for 24 hours. Chest pain, shortness of breath, fever, chills, nausea, vomiting, diarrhea, change in mentation, falling, weakness, bleeding. Severe pain or pain not relieved by medications. Or, any other signs or symptoms that you may have questions about. DISPOSITION: 
 xx Home With: 
 OT  PT xx HH  RN  
  
 SNF/Inpatient Rehab/LTAC Independent/assisted living Hospice Other: CDMP Checked:  
Yes xx PROBLEM LIST Updated: 
Yes xx Signed:  
Ceci Trivedi NP 
12/4/2017 
10:06 AM 
 
 
 
  
  
  
Blow[employer]ch Announcement We are excited to announce that we are making your provider's discharge notes available to you in Blow[employer]ch. You will see these notes when they are completed and signed by the physician that discharged you from your recent hospital stay. If you have any questions or concerns about any information you see in Blowtorch, please call the Health Information Department where you were seen or reach out to your Primary Care Provider for more information about your plan of care. Introducing \[facility]\"" & HEALTH SERVICES! Anupama Briones introduces [employer] patient portal. Now you can access parts of your medical record, email your doctor's office, and request medication refills online. 1. In your internet browser, go to [email or web address] "[email or web address][email or web address] 2. Click on the First Time User? Click Here link in the Sign In box. You will see the New Member Sign Up page. 3. Enter your [employer] Access Code exactly as it appears below. You will not need to use this code after youve completed the sign-up process. If you do not sign up before the expiration date, you must request a new code. · [employer] Access Code: [email or web address] Expires: 2/25/2018  3:09 AM 
 
4. Enter the last four digits of your Social Security Number (xxxx) and Date of Birth (mm/dd/yyyy) as indicated and click Submit. You will be taken to the next sign-up page. 5. Create a [employer] ID. This will be your [employer] login ID and cannot be changed, so think of one that is secure and easy to remember. 6. Create a [employer] password. You can change your password at any time. 7. Enter your Password Reset Question and Answer. This can be used at a later time if you forget your password. 8. Enter your e-mail address. You will receive e-mail notification when new information is available in [address] 9. Click Sign Up. You can now view and download portions of your medical record. 10. Click the Download Summary menu link to download a portable copy of your medical information. If you have questions, please visit the Frequently Asked Questions section of the [employer] website. Remember, [employer] is NOT to be used for urgent needs. For medical emergencies, dial 911. Now available from your iPhone and Android! Providers Seen During Your Hospitalization Provider Specialty Primary office phone Tye Marcus MD Emergency Medicine [phone] Akanksha Martin MD Family Practice [phone] Rocio Coelho MD Internal Medicine [phone] Sneha Tam MD Internal Medicine [phone] Your Primary Care Physician (PCP) Primary Care Physician Office Phone Office Fax Yandy Souza [phone] You are allergic to the following Allergen Reactions Pcn (Penicillins) Swelling Has tolerated Cefepime Tramadol Nausea and Vomiting SEVERE If taken w/o food Proventil (Albuterol Sulfate) Hives Ace Inhibitors Angioedema Albuterol Swelling  
 swelling Ambien (Zolpidem) Other (comments) Nightmares and memory disturbance Ciprofloxacin Other (comments) Sore throat and trouble swallowing Lisinopril Swelling Oxaprozin Nausea and Vomiting  
 severe stomach upset Sulfadiazine Swelling  
 swelling Trazodone Other (comments) Vivid dreams and felt disoriented Recent Documentation OB Status Smoking Status Postmenopausal Never Smoker Emergency Contacts Name Discharge Info Relation Home Work Mobile Sandeep Mtz DISCHARGE CAREGIVER [3] Child [2] [phone]    
 Sandeep LEWIS  Child [2] [phone] Patient Belongings The following personal items are in your possession at time of discharge: 
  Dental Appliances: None  Visual Aid: With patient, Glasses      Home Medications: None   Jewelry: None  Clothing: At bedside    Other Valuables: Eyeglasses, Cell Phone, Jamal Rater, With patient Please provide this summary of care documentation to your next provider. Signatures-by signing, you are acknowledging that this After Visit Summary has been reviewed with you and you have received a copy. Patient Signature:  ____________________________________________________________ Date:  ____________________________________________________________  
  
[facility]  Provider Signature: ____________________________________________________________ Date:  ____________________________________________________________

## 2017-11-27 NOTE — DISCHARGE INSTRUCTIONS
Viral Infections: Care Instructions  Your Care Instructions    You don't feel well, but it's not clear what's causing it. You may have a viral infection. Viruses cause many illnesses, such as the common cold, influenza, fever, rashes, and the diarrhea, nausea, and vomiting that are often called \"stomach flu. \" You may wonder if antibiotic medicines could make you feel better. But antibiotics only treat infections caused by bacteria. They don't work on viruses. The good news is that viral infections usually aren't serious. Most will go away in a few days without medical treatment. In the meantime, there are a few things you can do to make yourself more comfortable. Follow-up care is a key part of your treatment and safety. Be sure to make and go to all appointments, and call your doctor if you are having problems. It's also a good idea to know your test results and keep a list of the medicines you take. How can you care for yourself at home? · Get plenty of rest if you feel tired. · Take an over-the-counter pain medicine if needed, such as acetaminophen (Tylenol), ibuprofen (Advil, Motrin), or naproxen (Aleve). Read and follow all instructions on the label. · Be careful when taking over-the-counter cold or flu medicines and Tylenol at the same time. Many of these medicines have acetaminophen, which is Tylenol. Read the labels to make sure that you are not taking more than the recommended dose. Too much acetaminophen (Tylenol) can be harmful. · Drink plenty of fluids, enough so that your urine is light yellow or clear like water. If you have kidney, heart, or liver disease and have to limit fluids, talk with your doctor before you increase the amount of fluids you drink. · Stay home from work, school, and other public places while you have a fever. When should you call for help? Call 911 anytime you think you may need emergency care. For example, call if:  ? · You have severe trouble breathing.    ? · You passed out (lost consciousness). ?Call your doctor now or seek immediate medical care if:  ? · You seem to be getting much sicker. ? · You have a new or higher fever. ? · You have blood in your stools. ? · You have new belly pain, or your pain gets worse. ? · You have a new rash. ? Watch closely for changes in your health, and be sure to contact your doctor if:  ? · You start to get better and then get worse. ? · You do not get better as expected. Where can you learn more? Go to http://petey-rona.info/. Enter Q774 in the search box to learn more about \"Viral Infections: Care Instructions. \"  Current as of: March 3, 2017  Content Version: 11.4  © 9498-6808 [employer]. Care instructions adapted under license by [employer] (which disclaims liability or warranty for this information). If you have questions about a medical condition or this instruction, always ask your healthcare professional. [place] [address] any warranty or liability for your use of this information. Hypokalemia: Care Instructions  Your Care Instructions    Hypokalemia (say \"bz-nk-vhi-DOC-ligia-uh\") is a low level of potassium. The heart, muscles, kidneys, and nervous system all need potassium to work well. This problem has many different causes. Kidney problems, diet, and medicines like diuretics and laxatives can cause it. So can vomiting or diarrhea. In some cases, cancer is the cause. Your doctor may do tests to find the cause of your low potassium levels. You may need medicines to bring your potassium levels back to normal. You may also need regular blood tests to check your potassium. If you have very low potassium, you may need intravenous (IV) medicines. You also may need tests to check the electrical activity of your heart. Heart problems caused by low potassium levels can be very serious. Follow-up care is a key part of your treatment and safety.  Be sure to make and go to all appointments, and call your doctor if you are having problems. It's also a good idea to know your test results and keep a list of the medicines you take. How can you care for yourself at home? · If your doctor recommends it, eat foods that have a lot of potassium. These include fresh fruits, juices, and vegetables. They also include nuts, beans, and milk. · Be safe with medicines. If your doctor prescribes medicines or potassium supplements, take them exactly as directed. Call your doctor if you have any problems with your medicines. · Get your potassium levels tested as often as your doctor tells you. When should you call for help? Call 911 anytime you think you may need emergency care. For example, call if:  ? · You feel like your heart is missing beats. Heart problems caused by low potassium can cause death. ? · You passed out (lost consciousness). ? · You have a seizure. ?Call your doctor now or seek immediate medical care if:  ? · You feel weak or unusually tired. ? · You have severe arm or leg cramps. ? · You have tingling or numbness. ? · You feel sick to your stomach, or you vomit. ? · You have belly cramps. ? · You feel bloated or constipated. ? · You have to urinate a lot. ? · You feel very thirsty most of the time. ? · You are dizzy or lightheaded, or you feel like you may faint. ? · You feel depressed, or you lose touch with reality. ? Watch closely for changes in your health, and be sure to contact your doctor if:  ? · You do not get better as expected. Where can you learn more? Go to http://petey-rona.info/. Enter G358 in the search box to learn more about \"Hypokalemia: Care Instructions. \"  Current as of: May 12, 2017  Content Version: 11.4  © 8009-4973 [employer]. Care instructions adapted under license by [employer] (which disclaims liability or warranty for this information).  If you have questions about a medical condition or this instruction, always ask your healthcare professional. [place] [address] any warranty or liability for your use of this information.

## 2017-11-27 NOTE — ED NOTES
Spoke with patient's son,Dale on the phone. He expressed concern about patient's ability to walk up the stairs into her apartment. Made son aware that transport will assist patient into house. Denies any other concerns at this time.

## 2017-11-27 NOTE — ED NOTES
Assumed care of patient from [address]. Report included SBAR, ED summary, MAR, recent results.

## 2017-11-27 NOTE — ED TRIAGE NOTES
Triage note: Pt arrives via EMS with AMS and bilateral leg weakness that started approx 9 hours ago coming from [place]. Per EMS, her son noted confusion and inability to get out of the car d/t leg weakness. Pt AO x 4 on arrival with purposeful movements in all extremities.

## 2017-11-27 NOTE — ED PROVIDER NOTES
HPI Comments: 67 y.o. female with past medical history significant for MI, HTN, Arthritis, Chronic pain who presents to ED via EMS with chief complaint of confusion and weakness. Per son, he was driving the patient back to [place] from [address] [place] [address] Dr Fuentes around 0000 (2 hours pta) when he noticed that she appeared confused \"not making much sense\" accompanied by tremoring and weak. Pt was unable to ambulate from vehcile secondary to b/l leg weakness. He further notes that her speech was \"off\" at which time she was brought to ED. Pt also notes that she was not feeling well on Thursday s/p eating Thanksgiving dinner. She was nauseous and had loose stools. Pt notes the following day symptoms had mildly improved up until eating milk with cream of wheat. She notes hx of similar symptoms. Pt denies dysuria, hematuria, urgency, numbness, facial asymmetry, headache, neck pain, back pain, rhinorrhea. There are no other acute medical concerns at this time. PCP: Mallika Webb MD    Note written by Alice Becerra, as dictated by Nicolasa Montelongo MD 2:54 AM    The history is provided by the patient. No  was used.         Past Medical History:   Diagnosis Date    Abdominal pain 6/18/14    note from Suad Goldsmith directive discussed with patient 07/22/2015    Arthritis     dr Poornima buck        2/6/17 f/u note    Bacteremia due to Klebsiella pneumoniae 2/2/2016    OV note from Dr Han Hennessy, Infect Disease    Chronic pain     low back pain/arthritis      [facility]  note9/13/17    Contact dermatitis and other eczema, due to unspecified cause     hyperpigmented macules/seb k    Elevated LFTs     per [facility] Fr[facility]z [facility] on report    Endocrine disease     hypothyroid    GERD (gastroesophageal reflux disease)     chelsy stokesrell    [facility] 05/2017    [facility]    HSV infection     Hypertension     Hypothyroid 10/2012    Insomnia     Melasma 3/3/15    [facility]    Nausea & vomiting 05/04/2017    report [facility]  7/28/17 EGD showed delayed emptying    Pain management counseling, encounter for 05/05/2016    sees Dr Johnny Cadet 5/2/17 f/u    Rhinitis, allergic nonseasonal     Screening for glaucoma 04/11/2016    Urge incontinence of urine 03/21/2017 initial [facility]    [facility] initial eval note Shauna Bottoms 4/17/17 urodymanics study//5/9/17 f/u note    Well woman exam (no gynecological exam) 07/29/2016    zedler's note rec'd       Past Surgical History:   Procedure Laterality Date    COLONOSCOPY  8/1/11    dr Dockery Dose 10 year repeat    HX CHOLECYSTECTOMY      [address]X [address]OSCOPY  [address]    [address]    [place]  2017    [place]    HX ENDOSCOPY  07/28/2017    showed delayed emptying of stomach contents--gastroparesis to be r/o    HX OTHER SURGICAL  04/17/2017    complex [facility]rodynamics [facility]    HX TUBAL LIGATION           Family History:   Problem Relation Age of Onset    Hypertension Mother     Heart Disease Mother     No Known Problems Father     Cancer Brother      prostate    Heart Disease Brother      blocked valves    Heart Disease Brother     Asthma Sister     Diabetes Sister     Hypertension Sister        Social History     Social History    Marital status:      Spouse name: N/A    Number of children: N/A    Years of education: N/A     Occupational History    Not on file. Social History Main Topics    Smoking status: Never Smoker    Smokeless tobacco: Never Used    Alcohol use No    Drug use: No    Sexual activity: Not Currently     Other Topics Concern    Not on file     Social History Narrative         ALLERGIES: Pcn [penicillins]; Tramadol; Proventil [albuterol sulfate]; Ace inhibitors; Albuterol; Ambien [zolpidem]; Ciprofloxacin; Lisinopril; Oxaprozin; Sulfadiazine; and Trazodone    Review of Systems   Constitutional: Negative for chills and fever. HENT: Negative for sore throat. Respiratory: Negative for cough and shortness of breath. Cardiovascular: Negative for chest pain. Gastrointestinal: Negative for abdominal pain and vomiting. Genitourinary: Negative for dysuria. Musculoskeletal: Negative for back pain. Skin: Negative for rash. Neurological: Positive for weakness. Negative for syncope and headaches. Psychiatric/Behavioral: Positive for confusion. All other systems reviewed and are negative. Vitals:    11/27/17 0227   BP: 140/79   Pulse: 88   Resp: 16   Temp: (!) 100.8 °F (38.2 °C)   SpO2: 98%   Weight: 81.6 kg (180 lb)   Height: 5' 4\" (1.626 m)            Physical Exam   Constitutional: She is oriented to person, place, and time. She appears well-developed. No distress. HENT:   Head: Normocephalic and atraumatic. Eyes: Conjunctivae and EOM are normal. Pupils are equal, round, and reactive to light. Neck: Normal range of motion. Neck supple. Cardiovascular: Normal rate, regular rhythm and normal heart sounds. No murmur heard. Pulmonary/Chest: Effort normal and breath sounds normal. No respiratory distress. Abdominal: Soft. Bowel sounds are normal. She exhibits no distension. There is no rebound. A hernia is present. Hernia confirmed positive in the ventral area (mildly tender but reducible). Musculoskeletal: Normal range of motion. She exhibits no edema. Neurological: She is alert and oriented to person, place, and time. She has normal strength. No cranial nerve deficit or sensory deficit. She exhibits normal muscle tone. Coordination and gait normal. GCS eye subscore is 4. GCS verbal subscore is 5. GCS motor subscore is 6. Skin: Skin is warm and dry. No rash noted. Psychiatric: She has a normal mood and affect. Her behavior is normal.   Nursing note and vitals reviewed.    Note written by Alice Soto, as dictated by Kike Robbins MD 2:57 AM    MDM  Number of Diagnoses or Management Options  Hypokalemia:   Viral syndrome:   Diagnosis management comments: NIHSS is 0. ED Course       Procedures    CONSULT NOTE:  2:43 AM Sondra Bearden MD spoke with Dr. Jordan Said, Consult for Teleneurology. Discussed available diagnostic tests and clinical findings. Dr. Jordan Said recommends to call back if any findings on CT but agrees without any focal deficit or acute findings she is not a candidate for tPA. ED EKG interpretation 0258:  Rhythm: normal sinus rhythm with sinus arrhythmia; Rate (approx.): 84; Axis: normal; ST/T wave: normal; No STEMI. Note written by Alice Duenas, as dictated by Sondra Bearden MD 3:02 AM    The patient is resting comfortably and feels better, is alert and in no distress. She is up walking without difficulty. Code Stroke called on arrival, however, this was canceled as there was no clinical evidence of acute CVA on H&P. On re-examination the patient does not appear toxic and has no meningeal signs, and there is no intractable vomiting, no respiratory distress and no apparent pain. Based on the history, exam, diagnostic testing (if any) and reassessment, the patient has no signs of meningitis, significant pneumonia, pyelonephritis, cellulitis, sepsis or other acute serious bacterial infections, or other significant pathology to warrant further testing, continued ED treatment, admission or specialist evaluation. The patient's vital signs have been stable. The patient's condition is stable and is appropriate for discharge. The patient or caregiver will pursue further outpatient evaluation with the primary care physician or other designated or consulting physician as indicated in the discharge instructions.

## 2017-11-27 NOTE — ED NOTES
[facility] here to  patient  I have reviewed discharge instructions with the patient. The patient verbalized understanding. VSS, respirations unlabored and in no acute distress. Ambulated to stretcher with a steady gait.

## 2017-11-27 NOTE — ED NOTES
Positive culture results in 2 of 4 bottles for gram negative rods.  Results given to Josiah, [address].

## 2017-11-28 PROBLEM — R78.81 GRAM-NEGATIVE BACTEREMIA: Status: ACTIVE | Noted: 2017-11-28

## 2017-11-28 LAB
ALBUMIN SERPL-MCNC: 2.4 G/DL (ref 3.5–5)
ALBUMIN SERPL-MCNC: 2.8 G/DL (ref 3.5–5)
ALBUMIN/GLOB SERPL: 0.5 {RATIO} (ref 1.1–2.2)
ALBUMIN/GLOB SERPL: 0.6 {RATIO} (ref 1.1–2.2)
ALP SERPL-CCNC: 134 U/L (ref 45–117)
ALP SERPL-CCNC: 138 U/L (ref 45–117)
ALT SERPL-CCNC: 46 U/L (ref 12–78)
ALT SERPL-CCNC: 53 U/L (ref 12–78)
ANION GAP SERPL CALC-SCNC: 7 MMOL/L (ref 5–15)
ANION GAP SERPL CALC-SCNC: 7 MMOL/L (ref 5–15)
AST SERPL-CCNC: 40 U/L (ref 15–37)
AST SERPL-CCNC: 41 U/L (ref 15–37)
BASOPHILS # BLD: 0 K/UL (ref 0–0.1)
BASOPHILS NFR BLD: 0 % (ref 0–1)
BILIRUB SERPL-MCNC: 0.9 MG/DL (ref 0.2–1)
BILIRUB SERPL-MCNC: 0.9 MG/DL (ref 0.2–1)
BUN SERPL-MCNC: 6 MG/DL (ref 6–20)
BUN SERPL-MCNC: 7 MG/DL (ref 6–20)
BUN/CREAT SERPL: 13 (ref 12–20)
BUN/CREAT SERPL: 14 (ref 12–20)
CALCIUM SERPL-MCNC: 8.7 MG/DL (ref 8.5–10.1)
CALCIUM SERPL-MCNC: 9 MG/DL (ref 8.5–10.1)
CHLORIDE SERPL-SCNC: 104 MMOL/L (ref 97–108)
CHLORIDE SERPL-SCNC: 105 MMOL/L (ref 97–108)
CK MB CFR SERPL CALC: 0.8 % (ref 0–2.5)
CK MB SERPL-MCNC: 2.7 NG/ML (ref 5–25)
CK SERPL-CCNC: 351 U/L (ref 26–192)
CO2 SERPL-SCNC: 28 MMOL/L (ref 21–32)
CO2 SERPL-SCNC: 28 MMOL/L (ref 21–32)
CREAT SERPL-MCNC: 0.45 MG/DL (ref 0.55–1.02)
CREAT SERPL-MCNC: 0.49 MG/DL (ref 0.55–1.02)
EOSINOPHIL # BLD: 0.5 K/UL (ref 0–0.4)
EOSINOPHIL NFR BLD: 5 % (ref 0–7)
ERYTHROCYTE [DISTWIDTH] IN BLOOD BY AUTOMATED COUNT: 14.9 % (ref 11.5–14.5)
GLOBULIN SER CALC-MCNC: 4.5 G/DL (ref 2–4)
GLOBULIN SER CALC-MCNC: 5 G/DL (ref 2–4)
GLUCOSE BLD STRIP.AUTO-MCNC: 138 MG/DL (ref 65–100)
GLUCOSE SERPL-MCNC: 94 MG/DL (ref 65–100)
GLUCOSE SERPL-MCNC: 98 MG/DL (ref 65–100)
HCT VFR BLD AUTO: 27.6 % (ref 35–47)
HGB BLD-MCNC: 9.2 G/DL (ref 11.5–16)
LYMPHOCYTES # BLD: 1.6 K/UL (ref 0.8–3.5)
LYMPHOCYTES NFR BLD: 19 % (ref 12–49)
MCH RBC QN AUTO: 31 PG (ref 26–34)
MCHC RBC AUTO-ENTMCNC: 33.3 G/DL (ref 30–36.5)
MCV RBC AUTO: 92.9 FL (ref 80–99)
MONOCYTES # BLD: 0.9 K/UL (ref 0–1)
MONOCYTES NFR BLD: 11 % (ref 5–13)
NEUTS SEG # BLD: 5.5 K/UL (ref 1.8–8)
NEUTS SEG NFR BLD: 65 % (ref 32–75)
PLATELET # BLD AUTO: 242 K/UL (ref 150–400)
POTASSIUM SERPL-SCNC: 3 MMOL/L (ref 3.5–5.1)
POTASSIUM SERPL-SCNC: 3.3 MMOL/L (ref 3.5–5.1)
PROT SERPL-MCNC: 6.9 G/DL (ref 6.4–8.2)
PROT SERPL-MCNC: 7.8 G/DL (ref 6.4–8.2)
RBC # BLD AUTO: 2.97 M/UL (ref 3.8–5.2)
SERVICE CMNT-IMP: ABNORMAL
SODIUM SERPL-SCNC: 139 MMOL/L (ref 136–145)
SODIUM SERPL-SCNC: 140 MMOL/L (ref 136–145)
TROPONIN I SERPL-MCNC: <0.04 NG/ML
WBC # BLD AUTO: 8.4 K/UL (ref 3.6–11)

## 2017-11-28 PROCEDURE — 36415 COLL VENOUS BLD VENIPUNCTURE: CPT | Performed by: FAMILY MEDICINE

## 2017-11-28 PROCEDURE — 74011250637 HC RX REV CODE- 250/637: Performed by: FAMILY MEDICINE

## 2017-11-28 PROCEDURE — 74011000258 HC RX REV CODE- 258: Performed by: FAMILY MEDICINE

## 2017-11-28 PROCEDURE — 87086 URINE CULTURE/COLONY COUNT: CPT | Performed by: NURSE PRACTITIONER

## 2017-11-28 PROCEDURE — 74011000258 HC RX REV CODE- 258: Performed by: PHYSICIAN ASSISTANT

## 2017-11-28 PROCEDURE — 74011250636 HC RX REV CODE- 250/636: Performed by: PHYSICIAN ASSISTANT

## 2017-11-28 PROCEDURE — 96365 THER/PROPH/DIAG IV INF INIT: CPT

## 2017-11-28 PROCEDURE — 84484 ASSAY OF TROPONIN QUANT: CPT | Performed by: FAMILY MEDICINE

## 2017-11-28 PROCEDURE — 74011250637 HC RX REV CODE- 250/637: Performed by: NURSE PRACTITIONER

## 2017-11-28 PROCEDURE — 82550 ASSAY OF CK (CPK): CPT | Performed by: FAMILY MEDICINE

## 2017-11-28 PROCEDURE — 65270000029 HC RM PRIVATE

## 2017-11-28 PROCEDURE — 87186 SC STD MICRODIL/AGAR DIL: CPT | Performed by: NURSE PRACTITIONER

## 2017-11-28 PROCEDURE — 85025 COMPLETE CBC W/AUTO DIFF WBC: CPT | Performed by: FAMILY MEDICINE

## 2017-11-28 PROCEDURE — 74011250636 HC RX REV CODE- 250/636: Performed by: FAMILY MEDICINE

## 2017-11-28 PROCEDURE — 80053 COMPREHEN METABOLIC PANEL: CPT | Performed by: FAMILY MEDICINE

## 2017-11-28 PROCEDURE — 87077 CULTURE AEROBIC IDENTIFY: CPT | Performed by: NURSE PRACTITIONER

## 2017-11-28 PROCEDURE — 82962 GLUCOSE BLOOD TEST: CPT

## 2017-11-28 RX ORDER — SODIUM CHLORIDE 0.9 % (FLUSH) 0.9 %
5-10 SYRINGE (ML) INJECTION EVERY 8 HOURS
Status: DISCONTINUED | OUTPATIENT
Start: 2017-11-28 | End: 2017-12-04 | Stop reason: HOSPADM

## 2017-11-28 RX ORDER — FERROUS SULFATE, DRIED 160(50) MG
1 TABLET, EXTENDED RELEASE ORAL DAILY
Status: DISCONTINUED | OUTPATIENT
Start: 2017-11-28 | End: 2017-12-04 | Stop reason: HOSPADM

## 2017-11-28 RX ORDER — PANTOPRAZOLE SODIUM 40 MG/1
40 TABLET, DELAYED RELEASE ORAL DAILY
Status: DISCONTINUED | OUTPATIENT
Start: 2017-11-28 | End: 2017-12-04 | Stop reason: HOSPADM

## 2017-11-28 RX ORDER — BACLOFEN 10 MG/1
5 TABLET ORAL 3 TIMES DAILY
Status: DISCONTINUED | OUTPATIENT
Start: 2017-11-28 | End: 2017-12-04 | Stop reason: HOSPADM

## 2017-11-28 RX ORDER — AZATHIOPRINE 50 MG/1
150 TABLET ORAL DAILY
Status: DISCONTINUED | OUTPATIENT
Start: 2017-11-28 | End: 2017-12-04 | Stop reason: HOSPADM

## 2017-11-28 RX ORDER — LEVOTHYROXINE SODIUM 50 UG/1
50 TABLET ORAL
Status: DISCONTINUED | OUTPATIENT
Start: 2017-11-28 | End: 2017-12-04 | Stop reason: HOSPADM

## 2017-11-28 RX ORDER — GABAPENTIN 100 MG/1
100 CAPSULE ORAL 2 TIMES DAILY
Status: DISCONTINUED | OUTPATIENT
Start: 2017-11-28 | End: 2017-12-04 | Stop reason: HOSPADM

## 2017-11-28 RX ORDER — CARVEDILOL 3.12 MG/1
3.12 TABLET ORAL 2 TIMES DAILY
Status: DISCONTINUED | OUTPATIENT
Start: 2017-11-28 | End: 2017-12-04 | Stop reason: HOSPADM

## 2017-11-28 RX ORDER — DOCUSATE SODIUM 100 MG/1
100 CAPSULE, LIQUID FILLED ORAL 2 TIMES DAILY
Status: DISCONTINUED | OUTPATIENT
Start: 2017-11-28 | End: 2017-12-04 | Stop reason: HOSPADM

## 2017-11-28 RX ORDER — SENNOSIDES 8.6 MG/1
1 TABLET ORAL
Status: DISCONTINUED | OUTPATIENT
Start: 2017-11-28 | End: 2017-12-04 | Stop reason: HOSPADM

## 2017-11-28 RX ORDER — POTASSIUM CHLORIDE 20MEQ/15ML
20 LIQUID (ML) ORAL
Status: DISCONTINUED | OUTPATIENT
Start: 2017-11-29 | End: 2017-11-29

## 2017-11-28 RX ORDER — POTASSIUM CHLORIDE 20MEQ/15ML
40 LIQUID (ML) ORAL
Status: COMPLETED | OUTPATIENT
Start: 2017-11-28 | End: 2017-11-28

## 2017-11-28 RX ORDER — GUAIFENESIN 100 MG/5ML
81 LIQUID (ML) ORAL DAILY
Status: DISCONTINUED | OUTPATIENT
Start: 2017-11-28 | End: 2017-12-04 | Stop reason: HOSPADM

## 2017-11-28 RX ORDER — VERAPAMIL HYDROCHLORIDE 120 MG/1
120 TABLET, FILM COATED, EXTENDED RELEASE ORAL
Status: DISCONTINUED | OUTPATIENT
Start: 2017-11-28 | End: 2017-12-04 | Stop reason: HOSPADM

## 2017-11-28 RX ORDER — ATORVASTATIN CALCIUM 20 MG/1
20 TABLET, FILM COATED ORAL
Status: DISCONTINUED | OUTPATIENT
Start: 2017-11-28 | End: 2017-12-04 | Stop reason: HOSPADM

## 2017-11-28 RX ORDER — MONTELUKAST SODIUM 10 MG/1
10 TABLET ORAL
Status: DISCONTINUED | OUTPATIENT
Start: 2017-11-28 | End: 2017-12-04 | Stop reason: HOSPADM

## 2017-11-28 RX ORDER — SODIUM CHLORIDE 0.9 % (FLUSH) 0.9 %
5-10 SYRINGE (ML) INJECTION AS NEEDED
Status: DISCONTINUED | OUTPATIENT
Start: 2017-11-28 | End: 2017-12-04 | Stop reason: HOSPADM

## 2017-11-28 RX ADMIN — MEROPENEM 1 G: 1 INJECTION, POWDER, FOR SOLUTION INTRAVENOUS at 18:49

## 2017-11-28 RX ADMIN — SENNOSIDES 8.6 MG: 8.6 TABLET, FILM COATED ORAL at 21:52

## 2017-11-28 RX ADMIN — DOCUSATE SODIUM 100 MG: 100 CAPSULE, LIQUID FILLED ORAL at 18:49

## 2017-11-28 RX ADMIN — Medication 10 ML: at 21:58

## 2017-11-28 RX ADMIN — GABAPENTIN 100 MG: 100 CAPSULE ORAL at 18:49

## 2017-11-28 RX ADMIN — POTASSIUM CHLORIDE 40 MEQ: 20 SOLUTION ORAL at 15:31

## 2017-11-28 RX ADMIN — CARVEDILOL 3.12 MG: 3.12 TABLET, FILM COATED ORAL at 09:51

## 2017-11-28 RX ADMIN — ASPIRIN 81 MG 81 MG: 81 TABLET ORAL at 09:51

## 2017-11-28 RX ADMIN — LEVOTHYROXINE SODIUM 50 MCG: 50 TABLET ORAL at 07:27

## 2017-11-28 RX ADMIN — Medication 10 ML: at 07:13

## 2017-11-28 RX ADMIN — CARVEDILOL 3.12 MG: 3.12 TABLET, FILM COATED ORAL at 18:50

## 2017-11-28 RX ADMIN — CALCIUM CARBONATE-VITAMIN D TAB 500 MG-200 UNIT 1 TABLET: 500-200 TAB at 09:51

## 2017-11-28 RX ADMIN — ATORVASTATIN CALCIUM 20 MG: 20 TABLET, FILM COATED ORAL at 21:52

## 2017-11-28 RX ADMIN — BACLOFEN 5 MG: 10 TABLET ORAL at 21:52

## 2017-11-28 RX ADMIN — MEROPENEM 1 G: 1 INJECTION, POWDER, FOR SOLUTION INTRAVENOUS at 02:28

## 2017-11-28 RX ADMIN — GABAPENTIN 100 MG: 100 CAPSULE ORAL at 09:50

## 2017-11-28 RX ADMIN — SODIUM CHLORIDE 1000 ML: 900 INJECTION, SOLUTION INTRAVENOUS at 02:28

## 2017-11-28 RX ADMIN — BACLOFEN 5 MG: 10 TABLET ORAL at 09:51

## 2017-11-28 RX ADMIN — MEROPENEM 1 G: 1 INJECTION, POWDER, FOR SOLUTION INTRAVENOUS at 09:46

## 2017-11-28 RX ADMIN — AZATHIOPRINE 150 MG: 50 TABLET ORAL at 09:56

## 2017-11-28 RX ADMIN — BACLOFEN 5 MG: 10 TABLET ORAL at 15:32

## 2017-11-28 RX ADMIN — Medication 10 ML: at 15:34

## 2017-11-28 RX ADMIN — PANTOPRAZOLE SODIUM 40 MG: 40 TABLET, DELAYED RELEASE ORAL at 09:51

## 2017-11-28 NOTE — ED NOTES
2350: Assumed care of pt, pt resting in stretcher on the monitor x2. Pt wheeled to restroom  0200: Pt wheeled to restroom and remains steady with 1 assist  0300: Pt provided with peanut butter crackers  0330: Pt wheeled to restroom, no further needs expressed at this time  0420: Carmella at bedside and stated pt could go to medical bed  0500: Pt wheeled to restroom and provided water and cranberry juice  0550: Report attempted, RN to call this RN back  4798: Patient left department with for transportation to inpatient bed. Patient's VS at the time of transfer were /74, 100 on RA, denies pain at this time, 98.1 orally, HR 64 on the monitor. Patient was alert and oriented x 4 and denies further needs at time of transfer. TRANSFER - OUT REPORT:    Verbal report given to [facility] RN(name) on Jie Billingsley  being transferred to [facility](unit) for routine progression of care       Report consisted of patients Situation, Background, Assessment and   Recommendations(SBAR). Information from the following report(s) SBAR, Kardex, ED Summary, [facility] and Recent Results was reviewed with the receiving nurse. Opportunity for questions and clarification was provided.

## 2017-11-28 NOTE — PROGRESS NOTES
Bedside shift change report given to Deborah Oliveira RN (oncoming nurse) by [facility], RN (offgoing nurse). Report included the following information SBAR, Kardex, Intake/Output, MAR and Recent Results.

## 2017-11-28 NOTE — ED NOTES
Called and left message for Yolanda Wood and patient about positive cultures and the need for patient to return to the ER.

## 2017-11-28 NOTE — PROGRESS NOTES
1514:  Patient concerned about seeing MD.  States she hasn't seen anyone yet and she would like an update of her plan of care. 1520:  [facility], NP on the floor and will be in to see the patient.

## 2017-11-28 NOTE — H&P
[address]   [address] [place] [employer] [address], [address]   HISTORY AND PHYSICAL       Name:  Anna Croft   MR#:  [id number]   :  1945   Account #:  [de-identified]        Date of Adm:  2017       CHIEF COMPLAINT: The patient does not provide. HISTORY OF PRESENT ILLNESS: A 34-year-old [facility] [place]   female with past medical history of abdominal pain, arthritis, recurrent   bacteremia, chronic pain, elevated liver function tests, hypothyroidism,   GERD, myocardial infarction, herpes simplex virus infection,   hypertension, insomnia, melasma, chronic pain, urinary incontinence,   presented to the emergency department from home with reported   positive blood cultures. The patient initially was a limited historian. However, she states \"why do I keep getting this infection. \" She notes   that she initially had been diagnosed with infection (bacteremia) in    and has had recurrent similar presentations since that time. Initially, the patient presented to the emergency department yesterday   with confusion and fever. She underwent a workup, eventually reoriented and she was discharged from the ER. However, blood culture results have shown evidence of gram-negative   rods in 3/4 bottles. The patient was called by the ER to come back to   the emergency room for admission. At current, the patient does not   complain of dizziness, lightheadedness, focal weakness, numbness,   paresthesias, slurred speech, facial droop, headache, neck pain, back   pain, chest pain, shortness of breath, cough, congestion, abdominal   pain, nausea, vomiting, diarrhea, melena, dysuria, hematuria, calf pain,   swelling, edema, fever, chills, rash, or other constitutional symptoms   other than those mentioned. On arrival in the emergency department,   initial recorded vital signs were blood pressure 151/82, heart rate 68,   respiratory 18, saturation 99% on room air.  Per review of chart record,   again the blood cultures showed gram negative rods growing in 4   bottles. ID and sensitivity results are pending. ALLERGIES: THE PATIENT HAS MULTIPLE DRUG ALLERGIES   INCLUDING ALLERGIES TO.   1. PENICILLIN. 2. CIPROFLOXACIN. The ED requested the patient be admitted to the hospitalist service for   continued evaluation and treatment. After further review   and consideration DRUG ALLERGY HISTORY, the patient was started   on meropenum for IV antibiotic coverage. PAST MEDICAL HISTORY:   1. Abdominal pain. 2. Arthritis. 3. Bacteremia due to Klebsiella pneumonia. 4. Chronic back pain. 5. Elevated liver function tests. 6. Hypothyroidism. 7. GERD. 8. Myocardial infarction. 9. HSV (herpes simplex virus) infection. 10. Hypertension. 11. Hypothyroidism. 12. Insomnia. 13. Melasma. 14. Rhinitis. 15. Urinary urge incontinence. PAST SURGICAL HISTORY: Status post cholecystectomy,   endoscopy, colonoscopy. MEDICATIONS:   1. Aspirin 81 mg p.o. daily. 2. Atorvastatin 20 mg p.o. at bedtime. 3. Azathioprine 150 mg p.o. daily. 4. Baclofen 10 mg half tablet p.o. t.i.d.   5. Calcium 600 with vitamin D 1 tablet p.o. daily. 6. Coreg 3.125 mg p.o. b.i.d.   7. Vitamin D3, 1000 units p.o. b.i.d.   8. Gabapentin 100 mg p.o. b.i.d.   9. Hydrochlorothiazide 25 mg p.o. daily. 10. Ipratropium 0.02% nebulizer solution, 2.5 mL nebulized q.6h. p.r.n. 11. Levothyroxine 50 mcg p.o. daily. 12. Singulair 10 mg p.o. daily. 13. Multivitamin 1 tablet p.o. daily. 14. Nystatin topical cream applied to affected areas b.i.d.   15. Oxycodone IR 5 mg p.o. b.i.d.   16. Pantoprazole 40 mg p.o. daily. 17. Potassium chloride 20 mEq/15 mL p.o. daily. 18. Potassium chloride 10 mEq 2 tablets p.o. daily. 19. Prednisone 5 mg p.o. daily. 20. Verapamil 120 mg p.o. at bedtime. 21. Vitamin B complex 1 tablet p.o. daily. 22. Vitamin E 400 units p.o. b.i.d. ALLERGIES:   1. PENICILLIN. 2. TRAMADOL.    3. PROVENTIL. 4. ACE INHIBITORS. 5. ALBUTEROL. 6. AMBIEN. 7. CIPROFLOXACIN. 8. SULFADIAZINE. 9. OXAPROZIN SULFADIAZINE. 10. TRAZADONE.     SOCIAL HISTORY: Negative for smoking, alcohol, illicit drugs. . FAMILY HISTORY: Hypertension in mother and sister. Heart disease   in brother, mother. Diabetes in sister. Asthma in sister. Prostate cancer   in brother. REVIEW OF SYSTEMS   All systems reviewed. Pertinent positives are as in the HPI, otherwise   negative. PHYSICAL EXAMINATION   VITAL SIGNS: Temperature 98.1 degrees Fahrenheit, blood pressure   119/74, heart rate 59, respiratory rate 18, O2 saturation 100% on room   air. Recorded weight 180 pounds (81.6 kg), recorded height of 5 feet 4   inches tall. GENERAL: Obese, elderly appearing female in no acute respiratory   distress. PSYCHIATRIC: The patient is awake, alert, oriented x3. NEUROLOGIC: GCS of 15. Moves extremities x4. Sensation is grossly   intact without slurred speech, facial droop. HEENT: Normocephalic and atraumatic. PERRLA. EOMs intact. Sclerae are anicteric, conjunctivae clear. Nares are patent. Oropharynx   clear. Tongue is midline, nonedematous. NECK: Supple, without lymphadenopathy, JVD, carotid bruits,   thyromegaly. LYMPH: Negative for cervical or supraclavicular adenopathy. RESPIRATORY: Lungs are clear to auscultation bilaterally. CARDIOVASCULAR: Heart regular rate and rhythm, normal S1, S2,   without murmurs, rubs or gallops. GI: Abdomen soft, mild generalized tenderness to palpation without   rebound, guarding. There is a palpable supraumbilical mass   (nonpulsatile). No auscultated abdominal bruits. BACK: No CVA tenderness. No step-off deformity. MUSCULOSKELETAL: No acute palpable bone deformity. Negative   calf tenderness. Limited range of motion upper and lower extremities   with generalized weakness. VASCULAR: 2+ radial, 1+ dorsalis pedis pulses, without cyanosis or   clubbing. Nonpitting edema, bilateral lower extremities. SKIN: Warm and dry. LABORATORY DATA: Reviewed and noted. Chest x-ray, portable,   which showed no acute cardiopulmonary process. A 12-lead EKG   shows sinus arrhythmia at 84 beats a minute. CT of the pelvis with   contrast showed central bilirubin prominence with fat-containing   umbilical hernia and uterine fibroids. CT columnar of the head without   contrast 11/27/2017 showed no acute intracranial process. IMPRESSION AND PLAN:   1. Gram-negative bacteremia. Admit the patient to medical floor. The   patient will be on IV fluid hydration and Continue with IV antibiotics with   Meropenem. The patient notably tolerated initial dosing without any   allergic reaction, may continue on the same. Check blood cultures for   ID and sensitivity report. May adjust antibiotics accordingly. 2. Hypokalemia. Repeat potassium level and provide potassium   replacement accordingly. 3. Anemia. Repeat hemoglobin and hematocrit. 4. Elevated liver function tests. Repeat comprehensive metabolic   panel. 5. Hypothyroidism. Continue levothyroxine. 6. Peripheral neuropathy. Continue Neurontin. 7. Gastroesophageal reflux disease. Continue Protonix. 8. Arthritis. The patient to continue on Azathioprine. 10. Hyperlipidemia. Continue Lipitor. Venous thromboembolism   prophylaxis. Sequential compression devices, bilateral lower   extremities. KARINE Alaniz MD      MP / ARTHUR   D:  11/28/2017   06:25   T:  11/28/2017   11:38   Job #:  [id number]

## 2017-11-28 NOTE — ED PROVIDER NOTES
HPI Comments: 66 yo female with hx of HTN, GERD, hypothyroid, abd pain, elevated LFTs, N/V and MI here for evaluation of positive blood cultures. Pt seen yesterday with confusion and fever. Had negative workup and was A&O x 3 at discharge. Pt blood cultures grew Gram Negative Rods in 3 of 4. Pt denies any symptoms at this time; feels well and states \"I was doing laundry when they called\". Denies CP, SOB, abd pain, flank pain, urinary symptoms. Non smoker. Patient is a 67 y.o. female presenting with follow-up. The history is provided by the patient.    Follow-up          Past Medical History:   Diagnosis Date    Abdominal pain 6/18/14    note from Karlene Kumar directive discussed with patient 07/22/2015    Arthritis     dr Jose Elias buck        2/6/17 f/u note    Bacteremia due to Klebsiella pneumoniae 2/2/2016    OV note from Dr Nancy Green, Infect Disease    Chronic pain     low back pain/arthritis      [facility] note9/13/17    Contact dermatitis and other eczema, due to unspecified cause     hyperpigmented macules/seb k    Elevated LFTs     [facility] on report    Endocrine disease     hypothyroid    GERD (gastroesophageal reflux disease)     chelsy burns    [facility]    [facility]    HSV infection     Hypertension     Hypothyroid 10/2012    Insomnia     Melasma 3/3/15    [facility]    Nausea & vomiting 05/04/2017    report [facility]  7/28/17 EGD showed delayed emptying    Pain management counseling, encounter for 05/05/2016    sees Dr Kasandra Jamil 5/2/17 f/u    Rhinitis, allergic nonseasonal     Screening for glaucoma 04/11/2016    Urge incontinence of urine 03/21/2017 initial [facility]    [facility] initial eval note Leda Gomez 4/17/17 urodymanics study//5/9/17 f/u note    Well woman exam (no gynecological exam) 07/29/2016    zedler's note rec'd       Past Surgical History: Procedure Laterality Date    COLONOSCOPY  8/1/11    dr Josefa Waddell 10 year repeat    HX CHOLECYSTECTOMY      HX ENDOSCOPY  [address]    [address]    [place]    [place]    HX ENDOSCOPY  07/28/2017    showed delayed emptying of stomach contents--gastroparesis to be r/o    HX OTHER SURGICAL  04/17/2017    complex [facility]rodynamics [facility]    HX TUBAL LIGATION           Family History:   Problem Relation Age of Onset    Hypertension Mother     Heart Disease Mother     No Known Problems Father     Cancer Brother      prostate    Heart Disease Brother      blocked valves    Heart Disease Brother     Asthma Sister     Diabetes Sister     Hypertension Sister        Social History     Social History    Marital status:      Spouse name: N/A    Number of children: N/A    Years of education: N/A     Occupational History    Not on file. Social History Main Topics    Smoking status: Never Smoker    Smokeless tobacco: Never Used    Alcohol use No    Drug use: No    Sexual activity: Not Currently     Other Topics Concern    Not on file     Social History Narrative         ALLERGIES: Pcn [penicillins]; Tramadol; Proventil [albuterol sulfate]; Ace inhibitors; Albuterol; Ambien [zolpidem]; Ciprofloxacin; Lisinopril; Oxaprozin; Sulfadiazine; and Trazodone    Review of Systems   Constitutional: Negative for activity change. HENT: Negative for facial swelling. Eyes: Negative for discharge. Respiratory: Negative for cough. Cardiovascular: Negative for leg swelling. Gastrointestinal: Negative for abdominal distention. Skin: Negative for color change. Neurological: Negative for seizures and syncope. Psychiatric/Behavioral: Negative for behavioral problems. Vitals:    11/27/17 2144   BP: 151/82   Pulse: 68   Resp: 18   Temp: 98.1 °F (36.7 °C)   SpO2: 99%            Physical Exam   Constitutional: She is oriented to person, place, and time.  She appears well-developed and well-nourished. No distress. HENT:   Head: Normocephalic and atraumatic. Right Ear: External ear normal.   Left Ear: External ear normal.   Nose: Nose normal.   Mouth/Throat: Oropharynx is clear and moist.   Eyes: Conjunctivae and EOM are normal. Pupils are equal, round, and reactive to light. Right eye exhibits no discharge. Left eye exhibits no discharge. Neck: Normal range of motion. Neck supple. Cardiovascular: Normal rate, regular rhythm, normal heart sounds and intact distal pulses. Pulmonary/Chest: Effort normal and breath sounds normal.   Abdominal: Soft. Bowel sounds are normal. She exhibits no distension. There is no tenderness. There is no rebound and no guarding. Reducible ventral hernia    Musculoskeletal: Normal range of motion. She exhibits no edema or tenderness. Neurological: She is alert and oriented to person, place, and time. No cranial nerve deficit. Coordination normal.   Skin: Skin is warm and dry. No rash noted. Psychiatric: She has a normal mood and affect. Her behavior is normal. Judgment and thought content normal.   Nursing note and vitals reviewed. MDM  Number of Diagnoses or Management Options  Positive blood cultures:      Amount and/or Complexity of Data Reviewed  Clinical lab tests: reviewed and ordered  Tests in the radiology section of CPT®: reviewed  Decide to obtain previous medical records or to obtain history from someone other than the patient: yes  Review and summarize past medical records: yes  Discuss the patient with other providers: yes      ED Course       Procedures    Discussed case with attending Physician Allen Arteaga. Agrees with care and plan. FOSTER Johnson    Spoke to Hospitalist; will admit to follow up cultures. FOSTER Johnson     Due to allergies consulted pharmacy for antibiotic choice.  FOSTER Johnson

## 2017-11-28 NOTE — PROGRESS NOTES
The patient was contacted regarding positive blood cultures and returned to the ED and was admitted.  -adrian

## 2017-11-28 NOTE — ED TRIAGE NOTES
Triage: PAtient arrives ambulatory from home with after receiving call that she had positive blood cultures. Has no complaints.  VSS

## 2017-11-28 NOTE — PROGRESS NOTES
PMH of HTN, GERD, hypothyroid, abd pain, elevated LFTs, N/V and MI here for evaluation of positive blood cultures. Pt seen yesterday with confusion and fever. Had negative workup and was A&O x 3 at discharge. Pt blood cultures grew Gram Negative Rods in 3 of 4. Patient was called to return to ER due to positive cultures.   Yamile Hernández RN CRM

## 2017-11-28 NOTE — ED NOTES
Patient son Marisela Loera returned call and I advised him that he needs to bring his mother back to the ER. He states that he is in [place] and His mother is in [place]. He will call and talk to her and then call us back. Dr. Parish Vega is aware of patient.

## 2017-11-29 ENCOUNTER — APPOINTMENT (OUTPATIENT)
Dept: MRI IMAGING | Age: 72
DRG: 444 | End: 2017-11-29
Attending: INTERNAL MEDICINE
Payer: MEDICARE

## 2017-11-29 LAB
ANION GAP SERPL CALC-SCNC: 7 MMOL/L (ref 5–15)
BACTERIA SPEC CULT: ABNORMAL
BACTERIA SPEC CULT: ABNORMAL
BUN SERPL-MCNC: 8 MG/DL (ref 6–20)
BUN/CREAT SERPL: 17 (ref 12–20)
CALCIUM SERPL-MCNC: 8.9 MG/DL (ref 8.5–10.1)
CHLORIDE SERPL-SCNC: 110 MMOL/L (ref 97–108)
CO2 SERPL-SCNC: 26 MMOL/L (ref 21–32)
CREAT SERPL-MCNC: 0.47 MG/DL (ref 0.55–1.02)
GLUCOSE SERPL-MCNC: 117 MG/DL (ref 65–100)
MAGNESIUM SERPL-MCNC: 2.1 MG/DL (ref 1.6–2.4)
POTASSIUM SERPL-SCNC: 4.1 MMOL/L (ref 3.5–5.1)
SERVICE CMNT-IMP: ABNORMAL
SODIUM SERPL-SCNC: 143 MMOL/L (ref 136–145)

## 2017-11-29 PROCEDURE — 74011250637 HC RX REV CODE- 250/637: Performed by: NURSE PRACTITIONER

## 2017-11-29 PROCEDURE — 74011250637 HC RX REV CODE- 250/637: Performed by: INTERNAL MEDICINE

## 2017-11-29 PROCEDURE — 74011000258 HC RX REV CODE- 258: Performed by: FAMILY MEDICINE

## 2017-11-29 PROCEDURE — 80048 BASIC METABOLIC PNL TOTAL CA: CPT | Performed by: NURSE PRACTITIONER

## 2017-11-29 PROCEDURE — 74011250637 HC RX REV CODE- 250/637: Performed by: FAMILY MEDICINE

## 2017-11-29 PROCEDURE — 65270000029 HC RM PRIVATE

## 2017-11-29 PROCEDURE — 74011250636 HC RX REV CODE- 250/636: Performed by: FAMILY MEDICINE

## 2017-11-29 PROCEDURE — 74011250637 HC RX REV CODE- 250/637: Performed by: HOSPITALIST

## 2017-11-29 PROCEDURE — A9577 INJ MULTIHANCE: HCPCS | Performed by: HOSPITALIST

## 2017-11-29 PROCEDURE — 74011250636 HC RX REV CODE- 250/636: Performed by: INTERNAL MEDICINE

## 2017-11-29 PROCEDURE — 74011000258 HC RX REV CODE- 258: Performed by: HOSPITALIST

## 2017-11-29 PROCEDURE — 74011250636 HC RX REV CODE- 250/636: Performed by: HOSPITALIST

## 2017-11-29 PROCEDURE — 74011000258 HC RX REV CODE- 258: Performed by: INTERNAL MEDICINE

## 2017-11-29 PROCEDURE — 36415 COLL VENOUS BLD VENIPUNCTURE: CPT | Performed by: NURSE PRACTITIONER

## 2017-11-29 PROCEDURE — 77030021566 MRI ABD W MRCP W WO CONT

## 2017-11-29 PROCEDURE — 83735 ASSAY OF MAGNESIUM: CPT | Performed by: NURSE PRACTITIONER

## 2017-11-29 RX ORDER — POTASSIUM CHLORIDE 20MEQ/15ML
20 LIQUID (ML) ORAL
Status: COMPLETED | OUTPATIENT
Start: 2017-11-29 | End: 2017-11-29

## 2017-11-29 RX ORDER — ACETAMINOPHEN 325 MG/1
650 TABLET ORAL
Status: DISCONTINUED | OUTPATIENT
Start: 2017-11-29 | End: 2017-12-04 | Stop reason: HOSPADM

## 2017-11-29 RX ADMIN — MONTELUKAST SODIUM 10 MG: 10 TABLET, FILM COATED ORAL at 21:59

## 2017-11-29 RX ADMIN — SODIUM CHLORIDE 100 ML: 900 INJECTION, SOLUTION INTRAVENOUS at 21:37

## 2017-11-29 RX ADMIN — POTASSIUM CHLORIDE 20 MEQ: 20 SOLUTION ORAL at 02:42

## 2017-11-29 RX ADMIN — AZATHIOPRINE 150 MG: 50 TABLET ORAL at 09:13

## 2017-11-29 RX ADMIN — DOCUSATE SODIUM 100 MG: 100 CAPSULE, LIQUID FILLED ORAL at 17:41

## 2017-11-29 RX ADMIN — MEROPENEM 1 G: 1 INJECTION, POWDER, FOR SOLUTION INTRAVENOUS at 02:41

## 2017-11-29 RX ADMIN — SENNOSIDES 8.6 MG: 8.6 TABLET, FILM COATED ORAL at 21:59

## 2017-11-29 RX ADMIN — Medication 10 ML: at 05:52

## 2017-11-29 RX ADMIN — Medication 10 ML: at 13:21

## 2017-11-29 RX ADMIN — CARVEDILOL 3.12 MG: 3.12 TABLET, FILM COATED ORAL at 09:12

## 2017-11-29 RX ADMIN — DOCUSATE SODIUM 100 MG: 100 CAPSULE, LIQUID FILLED ORAL at 09:12

## 2017-11-29 RX ADMIN — LEVOTHYROXINE SODIUM 50 MCG: 50 TABLET ORAL at 08:02

## 2017-11-29 RX ADMIN — Medication 10 ML: at 21:59

## 2017-11-29 RX ADMIN — BACLOFEN 5 MG: 10 TABLET ORAL at 09:12

## 2017-11-29 RX ADMIN — ACETAMINOPHEN 650 MG: 325 TABLET, FILM COATED ORAL at 17:49

## 2017-11-29 RX ADMIN — CEFTRIAXONE 2 G: 2 INJECTION, POWDER, FOR SOLUTION INTRAMUSCULAR; INTRAVENOUS at 13:18

## 2017-11-29 RX ADMIN — GABAPENTIN 100 MG: 100 CAPSULE ORAL at 09:12

## 2017-11-29 RX ADMIN — CALCIUM CARBONATE-VITAMIN D TAB 500 MG-200 UNIT 1 TABLET: 500-200 TAB at 09:12

## 2017-11-29 RX ADMIN — ASPIRIN 81 MG 81 MG: 81 TABLET ORAL at 09:12

## 2017-11-29 RX ADMIN — GADOBENATE DIMEGLUMINE 16 ML: 529 INJECTION, SOLUTION INTRAVENOUS at 21:37

## 2017-11-29 RX ADMIN — BACLOFEN 5 MG: 10 TABLET ORAL at 21:59

## 2017-11-29 RX ADMIN — ATORVASTATIN CALCIUM 20 MG: 20 TABLET, FILM COATED ORAL at 21:59

## 2017-11-29 RX ADMIN — GABAPENTIN 100 MG: 100 CAPSULE ORAL at 17:41

## 2017-11-29 RX ADMIN — CARVEDILOL 3.12 MG: 3.12 TABLET, FILM COATED ORAL at 17:41

## 2017-11-29 RX ADMIN — PANTOPRAZOLE SODIUM 40 MG: 40 TABLET, DELAYED RELEASE ORAL at 09:12

## 2017-11-29 RX ADMIN — MEROPENEM 1 G: 1 INJECTION, POWDER, FOR SOLUTION INTRAVENOUS at 09:16

## 2017-11-29 RX ADMIN — BACLOFEN 5 MG: 10 TABLET ORAL at 15:06

## 2017-11-29 NOTE — PROGRESS NOTES
Bedside shift change report given to Sourav RN (oncoming nurse) by Savilla Rubinstein, RN (offgoing nurse). Report included the following information SBAR, Intake/Output and MAR.

## 2017-11-29 NOTE — PROGRESS NOTES
Patient seen and examined    IMPRESSION    1. Recurrent gram negative bacteremia - now e coli     2. History of dilated biliary ducts seen on MRCP     3. History of l4-l5 discitis - negative aspirate     4. Rheumatoid arthritis     5. Hypothyroidism     PLAN    1. Change merrem to ceftriaxone     2. She has had recurrent gram negative bacteremia. I suspect this could be related to her biliary issues from 2015. I will repeat her mrcp.

## 2017-11-29 NOTE — PROGRESS NOTES
Documented on 11.29.17    Spiritual Care Partner Volunteer visited patient in room 500/02 on 11.28.17. Documented by: : Rev. Alvarez Grant.  Radha Gerardo; [facility], to contact [phone] [address] call: [phone]

## 2017-11-29 NOTE — PROGRESS NOTES
Hospitalist Progress Note  Kandy Gillis, NP  Answering service: [phone] OR [phone] from in house phone  Cell: [phone]   Date of Service:  2017  NAME:  Valerie Amezcua  :  1945  MRN:  [id number]    Admission Summary:   Pt initially came to ED with son after he noted she was confused, not clear and her speech was \"off\" it was also noted that she was not feeling well on Thursday after eating Thanksgiving dinner:  nauseous and had loose stools. Pt denies Loose stools - says they were soft due to colace. She was seen and treated in the ED and discharged yesterday. She was subsequently called later on in the day due to positive blood cultures and asked to return to the hospital.    Interval history / Subjective:      Pt up in chair, no new complaints - says she feels well. Eating and had soft BM earlier today. Assessment & Plan:     Gram-negative bacteremia:   - has hx of recurrent bacteremia and previously followed by ID  - pt has allergy to penicillin, Cipro and Sulfa  - started Meropenem in ED, has been changed to ceftrixone  - Blood cultures from 0600 : e coli in 4/4 bottles, sensitive to ceftriaxone  - Repeat blood cultures from  with no growth x 2 days   - urine culture still pending  - ID following. Plans for MRCP.  Consult GI.  - CT abdomen negative for acute process      Hypokalemia: Resolved, continue usual home dose     Anemia, likely chronic: Monitor H/H     Elevated liver function tests: Repeat comprehensive metabolic panel in am     Hx Hypothyroidism: Continue levothyroxine.      Hx Peripheral neuropathy: Continue Neurontin.      Hx GERD: Continue Protonix.      Hx Arthritis: continue Azathioprine.      Hx Hyperlipidemia: Continue Lipitor    Code status: Full  DVT prophylaxis: SCDs  Care Plan discussed with: patient,  and attending MD  Disposition: home when able, may need IV abx     Hospital Problems Date Reviewed: 11/28/2017          Codes Class Noted POA    * (Principal)Gram-negative bacteremia ICD-10-CM: R78.81  ICD-9-CM: 790.7, 041.85  11/28/2017 Unknown            Review of Systems:   Denies HA. No chest pain or pressure. No respiratory complaints. + BM today. Has abdominal tenderness but she reports this is normal due to hernia. Vital Signs:    Last 24hrs VS reviewed since prior progress note. Most recent are:  Visit Vitals    /75 (BP 1 Location: Right arm, BP Patient Position: At rest)    Pulse 62    Temp 98.2 °F (36.8 °C)    Resp 18    SpO2 98%     No intake or output data in the 24 hours ending 11/29/17 1424     Physical Examination:         Constitutional:  No acute distress, cooperative, pleasant    ENT:  Oral mucous membranes moist, oropharynx benign. Resp:  CTA bilaterally. No wheezing. No accessory muscle use, RA   CV:  Regular rhythm, normal rate, no murmurs    GI:  Soft, non distended, tender around hernia. normoactive bowel sounds + BM    Musculoskeletal:  No edema, warm, 2+ pulses throughout    Neurologic:  Moves all extremities.   AAOx3, CN II-XII reviewed     Data Review:   Review and/or order of clinical lab test  Review and/or order of tests in the radiology section of CPT  Review and/or order of tests in the medicine section of CPT    Labs:     Recent Labs      11/28/17   0435  11/27/17 2236   WBC  8.4  9.7   HGB  9.2*  10.1*   HCT  27.6*  29.9*   PLT  242  285     Recent Labs      11/29/17   0553  11/28/17 0227 11/27/17 2236   NA  143  140  139   K  4.1  3.0*  3.3*   CL  110*  105  104   CO2  26  28  28   BUN  8  6  7   CREA  0.47*  0.45*  0.49*   GLU  117*  94  98   CA  8.9  8.7  9.0   MG  2.1   --    --      Recent Labs      11/28/17 0227 11/27/17 2236 11/27/17 0232   SGOT  40*  41*  58*   ALT  46  53  58   AP  134*  138*  152*   TBILI  0.9  0.9  1.5*   TP  6.9  7.8  7.7   ALB  2.4*  2.8*  2.8*   GLOB  4.5*  5.0*  4.9*     Recent Labs      11/27/17   0243 11/27/17   0241   INR  1.2*  1.1   PTP  12.0*   --    APTT  30.5   --       No results for input(s): FE, TIBC, PSAT, FERR in the last 72 hours. No results found for: FOL, RBCF   No results for input(s): PH, PCO2, PO2 in the last 72 hours.   Recent Labs      11/28/17 0227 11/27/17 0243   CPK  351*   --    CKNDX  0.8   --    TROIQ  <0.04  <0.04     Lab Results   Component Value Date/Time    Cholesterol, total 97 06/26/2017 01:47 PM    HDL Cholesterol 37 06/26/2017 01:47 PM    LDL, calculated 43 06/26/2017 01:47 PM    Triglyceride 86 06/26/2017 01:47 PM    CHOL/HDL Ratio 4.5 12/23/2015 01:45 AM     Lab Results   Component Value Date/Time    Glucose (POC) 138 11/28/2017 09:21 PM    Glucose (POC) 138 11/27/2017 02:39 AM    Glucose (POC) 98 05/19/2017 11:52 AM    Glucose (POC) 125 12/30/2015 11:28 AM    Glucose (POC) 101 12/23/2015 12:08 AM     Lab Results   Component Value Date/Time    Color YELLOW/STRAW 11/27/2017 02:32 AM    Appearance CLEAR 11/27/2017 02:32 AM    Specific gravity 1.012 11/27/2017 02:32 AM    Specific gravity 1.005 12/23/2015 01:45 AM    pH (UA) 7.0 11/27/2017 02:32 AM    Protein NEGATIVE  11/27/2017 02:32 AM    Glucose NEGATIVE  11/27/2017 02:32 AM    Ketone NEGATIVE  11/27/2017 02:32 AM    Bilirubin NEGATIVE  11/27/2017 02:32 AM    Urobilinogen 1.0 11/27/2017 02:32 AM    Nitrites NEGATIVE  11/27/2017 02:32 AM    Leukocyte Esterase NEGATIVE  11/27/2017 02:32 AM    Epithelial cells FEW 11/27/2017 02:32 AM    Bacteria NEGATIVE  11/27/2017 02:32 AM    WBC 0-4 11/27/2017 02:32 AM    RBC 0-5 11/27/2017 02:32 AM     Medications Reviewed:     Current Facility-Administered Medications   Medication Dose Route Frequency    cefTRIAXone (ROCEPHIN) 2 g in 0.9% sodium chloride (MBP/ADV) 50 mL  2 g IntraVENous Q24H    sodium chloride (NS) flush 5-10 mL  5-10 mL IntraVENous Q8H    sodium chloride (NS) flush 5-10 mL  5-10 mL IntraVENous PRN    aspirin chewable tablet 81 mg  81 mg Oral DAILY    atorvastatin (LIPITOR) tablet 20 mg  20 mg Oral QHS    azaTHIOprine (IMURAN) tablet 150 mg  150 mg Oral DAILY    baclofen (LIORESAL) tablet 5 mg  5 mg Oral TID    calcium-vitamin D (OS-KATIE) 500 mg-200 unit tablet  1 Tab Oral DAILY    carvedilol (COREG) tablet 3.125 mg  3.125 mg Oral BID    gabapentin (NEURONTIN) capsule 100 mg  100 mg Oral BID    levothyroxine (SYNTHROID) tablet 50 mcg  50 mcg Oral ACB    montelukast (SINGULAIR) tablet 10 mg  10 mg Oral QHS    pantoprazole (PROTONIX) tablet 40 mg  40 mg Oral DAILY    verapamil ER (CALAN-SR) tablet 120 mg  120 mg Oral DAILY WITH BREAKFAST    docusate sodium (COLACE) capsule 100 mg  100 mg Oral BID    senna (SENOKOT) tablet 8.6 mg  1 Tab Oral QHS   ______________________________________________________________________  EXPECTED LENGTH OF STAY: 2d 21h  ACTUAL LENGTH OF STAY:          1               Kristina Goldstein NP

## 2017-11-29 NOTE — CONSULTS
[facility] [address]   [address] [facility]       Name:  Star Murillo   MR#:  [id number]   :  1945   Account #:  [de-identified]    Date of Consultation:  2017   Date of Adm:  2017       REQUESTING PHYSICIAN: Dr. Mayela Macias. REASON FOR CONSULTATION: Recurrent gram-negative   bacteremia. HISTORY OF PRESENT ILLNESS: The patient is a 65-year-old   woman who is known to me from previous admissions. I have seen her   for 3 episodes of gram-negative bacteremia, 2 with Klebsiella and 1   with E coli. In , she had an MRCP done of the liver, which was   abnormal. GI saw her at that time, but thought that she did not really   have true cholangitis. I had seen her for L4-L5 diskitis as well and she   had received more than 12 weeks of intravenous therapy with   normalization of her inflammation markers. An aspirate of the disk did   not grow any bacteria. I had last seen her in May of this year for the E   coli bacteremia. At that time, I treated her with ceftriaxone for 14 days. After Thanksgi, she began feeling unwell. She developed some   abdominal cramps and she had severe pain in her back. [facility]   was called and she was brought to the hospital on . Workup at   that time was unrevealing and she was sent home. Her blood cultures   turned positive for a gram-negative bacteria and she was asked to   come back to the hospital. She tells me that her back pain has never   really gone away, but is well controlled with her pain medications. She   has no diarrhea, no vomiting, no dysuria, no dyspnea, no cough, no   rash. No headache. She does have a slight sore throat. She does not   have any other joint pains. Her cultures have now been finalized as a   pansensitive as a very sensitive E coli and we are being asked to see   him in consult. ALLERGIES:   1. PENICILLIN, BUT SHE CAN TOLERATE CEPHALOSPORINS AS   WELL AS MERREM.    2. SHE IS ALSO ALLERGIC TO TRAMADOL. 3. PROVENTIL. 4. ACE INHIBITORS. 5. ALBUTEROL. 6. AMBIEN. 7. CIPROFLOXACIN. 8. LISINOPRIL. 9. OXAPROZIN. 10. SULFADIAZINE. 11. TRAZODONE. CURRENT MEDICATIONS:   1. Aspirin. 2. Lipitor. 3. Imuran. 4. Lioresal.   5. Calcium/Vitamin D.   6. Coreg. 7. Merrem. 8. Colace. 9. Neurontin. 10. Synthroid. 11. Singulair. 12. Protonix. 13. Senokot. 14. Verapamil. REVIEW OF SYSTEMS: Aside from the things mentioned previously,   the rest of review of systems is negative. PAST MEDICAL HISTORY:   1. Hypertension. 2. Gastroesophageal reflux disease. 3. Rhinitis. 4. Diskitis L4-L5.   5. Eczema. 6. Insomnia. 7. Rheumatoid arthritis, on prednisone. 8. Hypothyroidism. 9. Asthma. 10. History of dilated biliary ducts. 11. History of Klebsiella bacteremia. 12. History of E coli bacteremia. PAST SURGICAL HISTORY:   1. Cholecystectomy. 2. Tubal ligation. 3. Colonoscopy. FAMILY HISTORY: Remarkable for heart disease in her father,   mother, brother and sister. SOCIAL HISTORY: She does not smoke tobacco, drink alcohol or   engage in recreational drug use. PHYSICAL EXAMINATION   GENERAL: She is not in respiratory distress. VITAL SIGNS: Temperature 98.2, pulse 62, blood pressure 135/75,   saturating at 98%. HEENT: She has pink conjunctivae, anicteric sclerae. NECK: No JVD. No cervical lymphadenopathy. External ears are   normal.   LUNGS: Clear to auscultation. No rales, wheezes or rhonchi. HEART: No murmurs, rubs, or clicks. ABDOMEN: Soft, nontender. No guarding, no rebound. GENITOURINARY: No CVA tenderness. MUSCULOSKELETAL: Knees are not warm and are not tender. PSYCHIATRIC: No disturbance in thought. Mood and affect are   appropriate. INTEGUMENT: I did not notice any rash. NEUROLOGIC: She has full use of her extraocular muscles. Tongue   midline. No facial asymmetry. Muscle strength is 5/5.     LABORATORY DATA: White blood cell count 8.4, hemoglobin 9.2,   platelet 441. Urinalysis shows 0-4 white blood cells. Creatinine 0.47,   AST 40, ALT 46, alkaline phosphatase 134, total bilirubin 0.9. CT of the abdomen shows a central biliary prominence and no   destructive bony lesions. MRI in 2015 shows dilated bile ducts. IMPRESSION:   1. Recurrent gram-negative bacteremia, now with Escherichia coli. 2. History of dilated bile ducts seen on MRCP in 2015. 3. History of L4-L5 diskitis with negative aspirate and she has   completed 12 weeks of treatment. 4. Rheumatoid arthritis. 5. Hypothyroidism. RECOMMENDATIONS: I would change Merrem to ceftriaxone. She   has tolerated ceftriaxone in the past. She has had recurrent gram-  negative bacteremia. I suspect this could be related to her biliary   issues from 2015. I will repeat her MRCP, especially since on CT there   is biliary prominence. Thank you for this consult.         MD Kalyee Munoz / Dinesh.Jessa   D:  11/29/2017   11:31   T:  11/29/2017   14:16   Job #:  [id number]

## 2017-11-30 ENCOUNTER — ANESTHESIA (OUTPATIENT)
Dept: ENDOSCOPY | Age: 72
DRG: 444 | End: 2017-11-30
Payer: MEDICARE

## 2017-11-30 ENCOUNTER — ANESTHESIA EVENT (OUTPATIENT)
Dept: ENDOSCOPY | Age: 72
DRG: 444 | End: 2017-11-30
Payer: MEDICARE

## 2017-11-30 LAB
ALBUMIN SERPL-MCNC: 2.3 G/DL (ref 3.5–5)
ALBUMIN/GLOB SERPL: 0.6 {RATIO} (ref 1.1–2.2)
ALP SERPL-CCNC: 108 U/L (ref 45–117)
ALT SERPL-CCNC: 28 U/L (ref 12–78)
ANION GAP SERPL CALC-SCNC: 8 MMOL/L (ref 5–15)
AST SERPL-CCNC: 18 U/L (ref 15–37)
BILIRUB SERPL-MCNC: 0.5 MG/DL (ref 0.2–1)
BUN SERPL-MCNC: 8 MG/DL (ref 6–20)
BUN/CREAT SERPL: 16 (ref 12–20)
CALCIUM SERPL-MCNC: 8.5 MG/DL (ref 8.5–10.1)
CHLORIDE SERPL-SCNC: 112 MMOL/L (ref 97–108)
CO2 SERPL-SCNC: 25 MMOL/L (ref 21–32)
CREAT SERPL-MCNC: 0.51 MG/DL (ref 0.55–1.02)
ERYTHROCYTE [DISTWIDTH] IN BLOOD BY AUTOMATED COUNT: 15.4 % (ref 11.5–14.5)
GLOBULIN SER CALC-MCNC: 4.1 G/DL (ref 2–4)
GLUCOSE SERPL-MCNC: 98 MG/DL (ref 65–100)
HCT VFR BLD AUTO: 27.7 % (ref 35–47)
HGB BLD-MCNC: 9.2 G/DL (ref 11.5–16)
MCH RBC QN AUTO: 31.2 PG (ref 26–34)
MCHC RBC AUTO-ENTMCNC: 33.2 G/DL (ref 30–36.5)
MCV RBC AUTO: 93.9 FL (ref 80–99)
PLATELET # BLD AUTO: 289 K/UL (ref 150–400)
POTASSIUM SERPL-SCNC: 3.7 MMOL/L (ref 3.5–5.1)
PROT SERPL-MCNC: 6.4 G/DL (ref 6.4–8.2)
RBC # BLD AUTO: 2.95 M/UL (ref 3.8–5.2)
SODIUM SERPL-SCNC: 145 MMOL/L (ref 136–145)
WBC # BLD AUTO: 4.5 K/UL (ref 3.6–11)

## 2017-11-30 PROCEDURE — 74011000250 HC RX REV CODE- 250

## 2017-11-30 PROCEDURE — C1874 STENT, COATED/COV W/DEL SYS: HCPCS | Performed by: INTERNAL MEDICINE

## 2017-11-30 PROCEDURE — 74011250636 HC RX REV CODE- 250/636: Performed by: FAMILY MEDICINE

## 2017-11-30 PROCEDURE — 77030026438 HC STYL ET INTUB CARD -A: Performed by: NURSE ANESTHETIST, CERTIFIED REGISTERED

## 2017-11-30 PROCEDURE — 85027 COMPLETE CBC AUTOMATED: CPT | Performed by: NURSE PRACTITIONER

## 2017-11-30 PROCEDURE — 74011250636 HC RX REV CODE- 250/636

## 2017-11-30 PROCEDURE — 76060000033 HC ANESTHESIA 1 TO 1.5 HR: Performed by: INTERNAL MEDICINE

## 2017-11-30 PROCEDURE — 74011250637 HC RX REV CODE- 250/637: Performed by: FAMILY MEDICINE

## 2017-11-30 PROCEDURE — 77030008684 HC TU ET CUF COVD -B: Performed by: NURSE ANESTHETIST, CERTIFIED REGISTERED

## 2017-11-30 PROCEDURE — 65270000029 HC RM PRIVATE

## 2017-11-30 PROCEDURE — 88112 CYTOPATH CELL ENHANCE TECH: CPT | Performed by: FAMILY MEDICINE

## 2017-11-30 PROCEDURE — 77030007288 HC DEV LOK BILI BSC -A: Performed by: INTERNAL MEDICINE

## 2017-11-30 PROCEDURE — 74011250637 HC RX REV CODE- 250/637: Performed by: HOSPITALIST

## 2017-11-30 PROCEDURE — 77030036933 HC BRSH RX CYTO WREGD BSC -C: Performed by: INTERNAL MEDICINE

## 2017-11-30 PROCEDURE — 36415 COLL VENOUS BLD VENIPUNCTURE: CPT | Performed by: NURSE PRACTITIONER

## 2017-11-30 PROCEDURE — 77030012596 HC SPHNTOM BILI BSC -E: Performed by: INTERNAL MEDICINE

## 2017-11-30 PROCEDURE — 76040000008: Performed by: INTERNAL MEDICINE

## 2017-11-30 PROCEDURE — 0F798DZ DILATION OF COMMON BILE DUCT WITH INTRALUMINAL DEVICE, VIA NATURAL OR ARTIFICIAL OPENING ENDOSCOPIC: ICD-10-PCS | Performed by: INTERNAL MEDICINE

## 2017-11-30 PROCEDURE — 74011250636 HC RX REV CODE- 250/636: Performed by: INTERNAL MEDICINE

## 2017-11-30 PROCEDURE — 74011250637 HC RX REV CODE- 250/637: Performed by: NURSE PRACTITIONER

## 2017-11-30 PROCEDURE — 80053 COMPREHEN METABOLIC PANEL: CPT | Performed by: NURSE PRACTITIONER

## 2017-11-30 PROCEDURE — 74011250637 HC RX REV CODE- 250/637: Performed by: INTERNAL MEDICINE

## 2017-11-30 PROCEDURE — 74011000258 HC RX REV CODE- 258: Performed by: INTERNAL MEDICINE

## 2017-11-30 DEVICE — STENT SYSTEM RMV
Type: IMPLANTABLE DEVICE | Site: BILE DUCT | Status: FUNCTIONAL
Brand: WALLFLEX BILIARY

## 2017-11-30 RX ORDER — SODIUM CHLORIDE 9 MG/ML
INJECTION, SOLUTION INTRAVENOUS
Status: DISCONTINUED | OUTPATIENT
Start: 2017-11-30 | End: 2017-11-30 | Stop reason: HOSPADM

## 2017-11-30 RX ORDER — SUCCINYLCHOLINE CHLORIDE 20 MG/ML
INJECTION INTRAMUSCULAR; INTRAVENOUS AS NEEDED
Status: DISCONTINUED | OUTPATIENT
Start: 2017-11-30 | End: 2017-11-30 | Stop reason: HOSPADM

## 2017-11-30 RX ORDER — SODIUM CHLORIDE 0.9 % (FLUSH) 0.9 %
5-10 SYRINGE (ML) INJECTION AS NEEDED
Status: ACTIVE | OUTPATIENT
Start: 2017-11-30 | End: 2017-11-30

## 2017-11-30 RX ORDER — LIDOCAINE HYDROCHLORIDE 20 MG/ML
INJECTION, SOLUTION EPIDURAL; INFILTRATION; INTRACAUDAL; PERINEURAL AS NEEDED
Status: DISCONTINUED | OUTPATIENT
Start: 2017-11-30 | End: 2017-11-30 | Stop reason: HOSPADM

## 2017-11-30 RX ORDER — FLUMAZENIL 0.1 MG/ML
0.2 INJECTION INTRAVENOUS
Status: DISCONTINUED | OUTPATIENT
Start: 2017-11-30 | End: 2017-11-30 | Stop reason: HOSPADM

## 2017-11-30 RX ORDER — ATROPINE SULFATE 0.1 MG/ML
0.5 INJECTION INTRAVENOUS
Status: DISCONTINUED | OUTPATIENT
Start: 2017-11-30 | End: 2017-11-30 | Stop reason: HOSPADM

## 2017-11-30 RX ORDER — MIDAZOLAM HYDROCHLORIDE 1 MG/ML
.25-1 INJECTION, SOLUTION INTRAMUSCULAR; INTRAVENOUS
Status: DISCONTINUED | OUTPATIENT
Start: 2017-11-30 | End: 2017-11-30 | Stop reason: HOSPADM

## 2017-11-30 RX ORDER — SODIUM CHLORIDE 9 MG/ML
100 INJECTION, SOLUTION INTRAVENOUS CONTINUOUS
Status: DISPENSED | OUTPATIENT
Start: 2017-11-30 | End: 2017-11-30

## 2017-11-30 RX ORDER — EPINEPHRINE 0.1 MG/ML
1 INJECTION INTRACARDIAC; INTRAVENOUS
Status: DISCONTINUED | OUTPATIENT
Start: 2017-11-30 | End: 2017-11-30 | Stop reason: HOSPADM

## 2017-11-30 RX ORDER — NALOXONE HYDROCHLORIDE 0.4 MG/ML
0.4 INJECTION, SOLUTION INTRAMUSCULAR; INTRAVENOUS; SUBCUTANEOUS
Status: DISCONTINUED | OUTPATIENT
Start: 2017-11-30 | End: 2017-11-30 | Stop reason: HOSPADM

## 2017-11-30 RX ORDER — DEXTROMETHORPHAN/PSEUDOEPHED 2.5-7.5/.8
1.2 DROPS ORAL
Status: DISCONTINUED | OUTPATIENT
Start: 2017-11-30 | End: 2017-11-30 | Stop reason: HOSPADM

## 2017-11-30 RX ORDER — ROCURONIUM BROMIDE 10 MG/ML
INJECTION, SOLUTION INTRAVENOUS AS NEEDED
Status: DISCONTINUED | OUTPATIENT
Start: 2017-11-30 | End: 2017-11-30 | Stop reason: HOSPADM

## 2017-11-30 RX ORDER — FENTANYL CITRATE 50 UG/ML
200 INJECTION, SOLUTION INTRAMUSCULAR; INTRAVENOUS
Status: DISCONTINUED | OUTPATIENT
Start: 2017-11-30 | End: 2017-11-30 | Stop reason: HOSPADM

## 2017-11-30 RX ORDER — PHENYLEPHRINE HCL IN 0.9% NACL 0.4MG/10ML
SYRINGE (ML) INTRAVENOUS AS NEEDED
Status: DISCONTINUED | OUTPATIENT
Start: 2017-11-30 | End: 2017-11-30 | Stop reason: HOSPADM

## 2017-11-30 RX ORDER — SODIUM CHLORIDE 0.9 % (FLUSH) 0.9 %
5-10 SYRINGE (ML) INJECTION EVERY 8 HOURS
Status: COMPLETED | OUTPATIENT
Start: 2017-11-30 | End: 2017-11-30

## 2017-11-30 RX ORDER — PROPOFOL 10 MG/ML
INJECTION, EMULSION INTRAVENOUS AS NEEDED
Status: DISCONTINUED | OUTPATIENT
Start: 2017-11-30 | End: 2017-11-30 | Stop reason: HOSPADM

## 2017-11-30 RX ADMIN — SUCCINYLCHOLINE CHLORIDE 140 MG: 20 INJECTION INTRAMUSCULAR; INTRAVENOUS at 16:47

## 2017-11-30 RX ADMIN — Medication 10 ML: at 16:00

## 2017-11-30 RX ADMIN — LIDOCAINE HYDROCHLORIDE 60 MG: 20 INJECTION, SOLUTION EPIDURAL; INFILTRATION; INTRACAUDAL; PERINEURAL at 16:46

## 2017-11-30 RX ADMIN — LEVOTHYROXINE SODIUM 50 MCG: 50 TABLET ORAL at 07:09

## 2017-11-30 RX ADMIN — Medication 10 ML: at 21:14

## 2017-11-30 RX ADMIN — GABAPENTIN 100 MG: 100 CAPSULE ORAL at 09:34

## 2017-11-30 RX ADMIN — DOCUSATE SODIUM 100 MG: 100 CAPSULE, LIQUID FILLED ORAL at 18:36

## 2017-11-30 RX ADMIN — GABAPENTIN 100 MG: 100 CAPSULE ORAL at 18:36

## 2017-11-30 RX ADMIN — ASPIRIN 81 MG 81 MG: 81 TABLET ORAL at 09:34

## 2017-11-30 RX ADMIN — BACLOFEN 5 MG: 10 TABLET ORAL at 09:33

## 2017-11-30 RX ADMIN — INDOMETHACIN 100 MG: 50 SUPPOSITORY RECTAL at 17:45

## 2017-11-30 RX ADMIN — AZATHIOPRINE 150 MG: 50 TABLET ORAL at 09:32

## 2017-11-30 RX ADMIN — CALCIUM CARBONATE-VITAMIN D TAB 500 MG-200 UNIT 1 TABLET: 500-200 TAB at 09:33

## 2017-11-30 RX ADMIN — BACLOFEN 5 MG: 10 TABLET ORAL at 15:39

## 2017-11-30 RX ADMIN — Medication 80 MCG: at 17:18

## 2017-11-30 RX ADMIN — Medication 10 ML: at 05:11

## 2017-11-30 RX ADMIN — PROPOFOL 150 MG: 10 INJECTION, EMULSION INTRAVENOUS at 16:47

## 2017-11-30 RX ADMIN — DOCUSATE SODIUM 100 MG: 100 CAPSULE, LIQUID FILLED ORAL at 09:33

## 2017-11-30 RX ADMIN — CEFTRIAXONE 2 G: 2 INJECTION, POWDER, FOR SOLUTION INTRAMUSCULAR; INTRAVENOUS at 11:52

## 2017-11-30 RX ADMIN — Medication 10 ML: at 15:11

## 2017-11-30 RX ADMIN — SENNOSIDES 8.6 MG: 8.6 TABLET, FILM COATED ORAL at 21:15

## 2017-11-30 RX ADMIN — MONTELUKAST SODIUM 10 MG: 10 TABLET, FILM COATED ORAL at 21:15

## 2017-11-30 RX ADMIN — BACLOFEN 5 MG: 10 TABLET ORAL at 21:15

## 2017-11-30 RX ADMIN — PROPOFOL 50 MG: 10 INJECTION, EMULSION INTRAVENOUS at 16:53

## 2017-11-30 RX ADMIN — ROCURONIUM BROMIDE 5 MG: 10 INJECTION, SOLUTION INTRAVENOUS at 16:47

## 2017-11-30 RX ADMIN — PANTOPRAZOLE SODIUM 40 MG: 40 TABLET, DELAYED RELEASE ORAL at 09:33

## 2017-11-30 RX ADMIN — ACETAMINOPHEN 650 MG: 325 TABLET, FILM COATED ORAL at 05:11

## 2017-11-30 RX ADMIN — ATORVASTATIN CALCIUM 20 MG: 20 TABLET, FILM COATED ORAL at 21:15

## 2017-11-30 RX ADMIN — CARVEDILOL 3.12 MG: 3.12 TABLET, FILM COATED ORAL at 09:32

## 2017-11-30 RX ADMIN — SODIUM CHLORIDE: 9 INJECTION, SOLUTION INTRAVENOUS at 17:31

## 2017-11-30 RX ADMIN — SODIUM CHLORIDE: 9 INJECTION, SOLUTION INTRAVENOUS at 16:47

## 2017-11-30 NOTE — PROCEDURES
[address] [facility] [address], [address] [facility]       NAME:  Michael Miranda   :   1945   MRN:   [id number]       Procedure Type:   ERCPwith biliary sphincterotomy, biliary stent placement     Indications: PSC, bile duct stricture and stones, cholangitis  Pre-operative Diagnosis: see indication above  Post-operative Diagnosis:  See findings below  : Daniel Benitez MD    Referring Provider:     Kendall Gatica MD      Sedation:  General anesthesia, indocin 100 mg supp pr, IV merrem    Procedure Details:  After informed consent was obtained with all risks and benefits of procedure explained, the patient was taken to the fluoroscopy suite and placed in the prone position. Upon sequential sedation as per above, the Olympus duodenoscope [id number]   was inserted via the mouthpeice and carefully advanced to the second portion of the duodenum. The quality of visualization was excellent. The duodenoscope was withdrawn into a short position.       Findings:   Esophagus:normal  Stomach: normal   Duodenum/jejunum: normal    Ampulla:-normal      I was able to cannulate the CBD without difficulty over guided wire, by using sphincterotome ( dreamtome, RX)  Cholangiogram: -IT showed around 2 cm distal CBD tight stricture, all the way down to ampulla, small stones above it in the CBD, rest of biliary tree was not dilated and had beading appearance consistent with PSC    1 cm biliary sphincterotomy was performed in standard fashion, then I performed brushing of the distal CBD stricture  I did not attempt removal of stones because of the stricture  I successfully placed fully covered metallic stent ( wallflex, 10x60 mm, [employer]) in the CBD and across the stricture, dark bile came out after fully deployment of stent but no pus seen  Pancreatogram:not performed  I performed all immediate radiologic interpretation during this procedure    Specimen Removed:  as above    Complications: None. EBL:  None.       Impression: see findings    Recommendations:    Clear liquids  CBC, CMP, LIPASE IN AM    Follow up on the cytology from the biliary stricture  Will need repeat ERCP in 3 to 6 months for stent removal    Signed By: Molly Cota MD     11/30/2017  5:35 PM

## 2017-11-30 NOTE — PROGRESS NOTES
Bedside shift change report given to KARLY Leal (oncoming nurse) by Rosa Merlos RN (offgoing nurse). Report included the following information SBAR, Kardex, Intake/Output and Recent Results.

## 2017-11-30 NOTE — PROGRESS NOTES
Notified of Jose Elias Britton in urine. Discussed with ID - pt had no urinary symptoms or complaints. Do not treat at this time.

## 2017-11-30 NOTE — ROUTINE PROCESS
Alexey Mode  1945  [id number]    Situation:  Verbal report received from: [facility]  Procedure: Procedure(s):  ENDOSCOPIC RETROGRADE CHOLANGIOPANCREATOGRAPHY    Background:    Preoperative diagnosis: Cholangitis  Postoperative diagnosis: * No post-op diagnosis entered *    :  Dr. Miki Barrientos): Endoscopy Technician-1: Leslie forest A Jose Elias  Endoscopy RN-1: Mamadou Reyes RN    Specimens: * No specimens in log *  H. Pylori  no    Assessment:  Intra-procedure medications       Anesthesia gave intra-procedure sedation and medications, see anesthesia flow sheet yes    Intravenous fluids: NS@ KVO     Vital signs stable     Abdominal assessment: round and soft     Recommendation:  Discharge patient per MD order.     Family or Friend   Permission to share finding with family or friend yes

## 2017-11-30 NOTE — PROGRESS NOTES
ID Progress Note  2017    Subjective:     Afebrile. No dyspnea, abdominal pain, headache or sore throat. Objective:     Vitals:   Visit Vitals    /78 (BP 1 Location: Right arm, BP Patient Position: At rest)    Pulse 73    Temp 98.4 °F (36.9 °C)    Resp 18    SpO2 100%        Tmax:  Temp (24hrs), Av.4 °F (36.9 °C), Min:98.4 °F (36.9 °C), Max:98.5 °F (36.9 °C)      Exam:    Not in distress  Eyes: pink conjunctivae, anicteric sclerae  Lungs: clear to auscultation, no rales, wheezes or rhonchi   Heart: s1, s2, no murmurs, rubs or clicks    Abdomen: soft, nontender, no guarding or rebound  Extremities: knees not warm or tender     Labs:   Lab Results   Component Value Date/Time    WBC 4.5 2017 05:00 AM    Hemoglobin (POC) 11.9 2010 01:40 AM    HGB 9.2 2017 05:00 AM    Hematocrit (POC) 35 2010 01:40 AM    HCT 27.7 2017 05:00 AM    PLATELET 794 9841 05:00 AM    MCV 93.9 2017 05:00 AM     Lab Results   Component Value Date/Time    Sodium 145 2017 05:00 AM    Potassium 3.7 2017 05:00 AM    Chloride 112 2017 05:00 AM    CO2 25 2017 05:00 AM    Anion gap 8 2017 05:00 AM    Glucose 98 2017 05:00 AM    BUN 8 2017 05:00 AM    Creatinine 0.51 2017 05:00 AM    BUN/Creatinine ratio 16 2017 05:00 AM    GFR est AA >60 2017 05:00 AM    GFR est non-AA >60 2017 05:00 AM    Calcium 8.5 2017 05:00 AM    Bilirubin, total 0.5 2017 05:00 AM    AST (SGOT) 18 2017 05:00 AM    Alk. phosphatase 108 2017 05:00 AM    Protein, total 6.4 2017 05:00 AM    Albumin 2.3 2017 05:00 AM    Globulin 4.1 2017 05:00 AM    A-G Ratio 0.6 2017 05:00 AM    ALT (SGPT) 28 2017 05:00 AM             Assessment:     1. Recurrent gram-negative bacteremia, now with Escherichia coli. 2. Biliary stricture   3.  History of L4-L5 diskitis with negative aspirate and she has   completed 12 weeks of treatment. 4. Rheumatoid arthritis. 5. Hypothyroidism. Recommendations:     GNR bacteremia likely biliary in origin. Hopefully with the stent placed, she will not have bacteremia anymore. She will need IV abx on discharge as she cannot tolerate flouroquinolones. No need to treat bacteria in urine as she does not have any symptoms.      Stefani Euceda MD

## 2017-11-30 NOTE — PROGRESS NOTES
Pt was seen and examined, full consult to follow  Reviewed MRCP with radiologist, = distal CBD stricture with stones  Plan for ERCP today at 4 pm, discussed risks and benefits, pt is agreeable

## 2017-11-30 NOTE — ROUTINE PROCESS
Bedside and Verbal shift change report given to Melisa Sarabia RN (oncoming nurse) by Tremaine Gilbert RN (offgoing nurse). Report included the following information SBAR.

## 2017-11-30 NOTE — PROGRESS NOTES
Micaela Samples  1945  [id number]    Situation:    Scheduled Procedure: Procedure(s):  ENDOSCOPIC RETROGRADE CHOLANGIOPANCREATOGRAPHY  Verbal report received from: Myriam Valero RN  Preoperative diagnosis: Cholangitis  Gram negative bacteremia +culture results    Background:    Procedure: Procedure(s):  ENDOSCOPIC RETROGRADE CHOLANGIOPANCREATOGRAPHY  Physician performing procedure; Dr. Vicente Dominique RN    NPO Status/Last PO Intake:  NPO since breakfast    Pregnancy Test:Not applicable If yes, result: none    Is the patient taking Blood Thinners: YES If yes, list: aspirin and last taken this AM  Is the patient diabetic:no   Does the patient have a Pacemaker/Defibrillator in place? no  Does the patient need antibiotics before/during/after procedure: no   Does the patient have SCD in place:no   Is patient on CONTACT precautions:no      Assessment:  Are the vital signs stable prior to patient coming to ENDO?  yes  Is the patient alert/oriented and able to sign consent for the procedures:yes  Does the patient have a patient IV in place?  yes     Recommendation:  Family or Friend present no     Permission to share finding with Family or Friend n/a

## 2017-11-30 NOTE — PROGRESS NOTES
Problem: Falls - Risk of  Goal: *Absence of Falls  Document Kristie Fall Risk and appropriate interventions in the flowsheet.    Outcome: Progressing Towards Goal  Fall Risk Interventions:  Mobility Interventions: Patient to call before getting OOB         Medication Interventions: Patient to call before getting OOB, Teach patient to arise slowly    Elimination Interventions: Call light in reach

## 2017-11-30 NOTE — PROGRESS NOTES
Hospitalist Progress Note  Lonell Old, NP  Answering service: [phone] OR [phone] from in house phone  Cell: [employer]inda [employer]   Date of Service:  2017  NAME:  Анна Wilson  :  1945  MRN:  [id number]    Admission Summary:   Pt initially came to ED with son after he noted she was confused, not clear and her speech was \"off\" it was also noted that she was not feeling well on Thursday after eating Thanksgiving dinner:  nauseous and had loose stools. Pt denies Loose stools - says they were soft due to colace. She was seen and treated in the ED and discharged yesterday. She was subsequently called later on in the day due to positive blood cultures and asked to return to the hospital.    Interval history / Subjective:      Pt up in chair, no new complaints. Very cheerful and was able to tell me plan for today including details of her ERCP. Assessment & Plan:     Gram-negative bacteremia:   - has hx of recurrent bacteremia and previously followed by ID  - pt has allergy to penicillin, Cipro and Sulfa  - started Meropenem in ED, has been changed to ceftrixone  - Blood cultures from 0600 : e coli in 4/4 bottles, sensitive to ceftriaxone  - Repeat blood cultures from  with no growth x 3 days   - urine culture still pending  - ID following  - CT abdomen negative for acute process   - MRCP showing CBD stricture and stones. For ERCP today     Hypokalemia: Resolved, continue usual home dose     Anemia, likely chronic: Monitor H/H     Elevated liver function tests: Resolved     Hx Hypothyroidism: Continue levothyroxine.      Hx Peripheral neuropathy: Continue Neurontin.      Hx GERD: Continue Protonix.      Hx Arthritis: continue Azathioprine.      Hx Hyperlipidemia: Continue Lipitor    Code status: Full  DVT prophylaxis: SCDs  Care Plan discussed with: patient, ID.  Left message for bryanna Schmidt ([phone])  Disposition: home when able, may need IV abx     Hospital Problems  Date Reviewed: 11/28/2017          Codes Class Noted POA    * (Principal)Gram-negative bacteremia ICD-10-CM: R78.81  ICD-9-CM: 790.7, 041.85  11/28/2017 Unknown            Review of Systems:   Denies HA. No chest pain. No respiratory complaints. Has abdominal tenderness but she reports this is normal due to hernia. Vital Signs:    Last 24hrs VS reviewed since prior progress note. Most recent are:  Visit Vitals    /78 (BP 1 Location: Right arm, BP Patient Position: At rest)    Pulse 73    Temp 98.4 °F (36.9 °C)    Resp 18    SpO2 100%       Intake/Output Summary (Last 24 hours) at 11/30/17 1012  Last data filed at 11/30/17 6893   Gross per 24 hour   Intake              240 ml   Output                0 ml   Net              240 ml      Physical Examination:         Constitutional:  No acute distress, cooperative, pleasant    ENT:  Oral mucous membranes moist, oropharynx benign. Resp:  CTA bilaterally. No wheezing. No accessory muscle use, RA   CV:  Regular rhythm, normal rate, no murmurs    GI:  Soft, non distended, tender around hernia. normoactive bowel sounds + BM    Musculoskeletal:  No edema, warm, 2+ pulses throughout    Neurologic:  Moves all extremities.   AAOx3     Data Review:   Review and/or order of clinical lab test  Review and/or order of tests in the radiology section of CPT  Review and/or order of tests in the medicine section of CPT    Labs:     Recent Labs      11/30/17   0500  11/28/17   0435   WBC  4.5  8.4   HGB  9.2*  9.2*   HCT  27.7*  27.6*   PLT  289  242     Recent Labs      11/30/17   0500  11/29/17   0553  11/28/17   0227   NA  145  143  140   K  3.7  4.1  3.0*   CL  112*  110*  105   CO2  25  26  28   BUN  8  8  6   CREA  0.51*  0.47*  0.45*   GLU  98  117*  94   CA  8.5  8.9  8.7   MG   --   2.1   --      Recent Labs      11/30/17   0500  11/28/17 0227 11/27/17 2236   SGOT  18  40*  41*   ALT  28  46  53   AP  108  134*  138* TBILI  0.5  0.9  0.9   TP  6.4  6.9  7.8   ALB  2.3*  2.4*  2.8*   GLOB  4.1*  4.5*  5.0*     No results for input(s): INR, PTP, APTT in the last 72 hours. No lab exists for component: INREXT, INREXT   No results for input(s): FE, TIBC, PSAT, FERR in the last 72 hours. No results found for: FOL, RBCF   No results for input(s): PH, PCO2, PO2 in the last 72 hours.   Recent Labs      11/28/17 0227   CPK  351*   CKNDX  0.8   TROIQ  <0.04     Lab Results   Component Value Date/Time    Cholesterol, total 97 06/26/2017 01:47 PM    HDL Cholesterol 37 06/26/2017 01:47 PM    LDL, calculated 43 06/26/2017 01:47 PM    Triglyceride 86 06/26/2017 01:47 PM    CHOL/HDL Ratio 4.5 12/23/2015 01:45 AM     Lab Results   Component Value Date/Time    Glucose (POC) 138 11/28/2017 09:21 PM    Glucose (POC) 138 11/27/2017 02:39 AM    Glucose (POC) 98 05/19/2017 11:52 AM    Glucose (POC) 125 12/30/2015 11:28 AM    Glucose (POC) 101 12/23/2015 12:08 AM     Lab Results   Component Value Date/Time    Color YELLOW/STRAW 11/27/2017 02:32 AM    Appearance CLEAR 11/27/2017 02:32 AM    Specific gravity 1.012 11/27/2017 02:32 AM    Specific gravity 1.005 12/23/2015 01:45 AM    pH (UA) 7.0 11/27/2017 02:32 AM    Protein NEGATIVE  11/27/2017 02:32 AM    Glucose NEGATIVE  11/27/2017 02:32 AM    Ketone NEGATIVE  11/27/2017 02:32 AM    Bilirubin NEGATIVE  11/27/2017 02:32 AM    Urobilinogen 1.0 11/27/2017 02:32 AM    Nitrites NEGATIVE  11/27/2017 02:32 AM    Leukocyte Esterase NEGATIVE  11/27/2017 02:32 AM    Epithelial cells FEW 11/27/2017 02:32 AM    Bacteria NEGATIVE  11/27/2017 02:32 AM    WBC 0-4 11/27/2017 02:32 AM    RBC 0-5 11/27/2017 02:32 AM     Medications Reviewed:     Current Facility-Administered Medications   Medication Dose Route Frequency    cefTRIAXone (ROCEPHIN) 2 g in 0.9% sodium chloride (MBP/ADV) 50 mL  2 g IntraVENous Q24H    acetaminophen (TYLENOL) tablet 650 mg  650 mg Oral Q6H PRN    sodium chloride (NS) flush 5-10 mL 5-10 mL IntraVENous Q8H    sodium chloride (NS) flush 5-10 mL  5-10 mL IntraVENous PRN    aspirin chewable tablet 81 mg  81 mg Oral DAILY    atorvastatin (LIPITOR) tablet 20 mg  20 mg Oral QHS    azaTHIOprine (IMURAN) tablet 150 mg  150 mg Oral DAILY    baclofen (LIORESAL) tablet 5 mg  5 mg Oral TID    calcium-vitamin D (OS-KATIE) 500 mg-200 unit tablet  1 Tab Oral DAILY    carvedilol (COREG) tablet 3.125 mg  3.125 mg Oral BID    gabapentin (NEURONTIN) capsule 100 mg  100 mg Oral BID    levothyroxine (SYNTHROID) tablet 50 mcg  50 mcg Oral ACB    montelukast (SINGULAIR) tablet 10 mg  10 mg Oral QHS    pantoprazole (PROTONIX) tablet 40 mg  40 mg Oral DAILY    verapamil ER (CALAN-SR) tablet 120 mg  120 mg Oral DAILY WITH BREAKFAST    docusate sodium (COLACE) capsule 100 mg  100 mg Oral BID    senna (SENOKOT) tablet 8.6 mg  1 Tab Oral QHS   ______________________________________________________________________  EXPECTED LENGTH OF STAY: 2d 21h  ACTUAL LENGTH OF STAY:          2               Shanti Faith NP

## 2017-11-30 NOTE — ANESTHESIA PREPROCEDURE EVALUATION
Anesthetic History   No history of anesthetic complications            Review of Systems / Medical History  Patient summary reviewed, nursing notes reviewed and pertinent labs reviewed    Pulmonary  Within defined limits                 Neuro/Psych   Within defined limits           Cardiovascular  Within defined limits  Hypertension          Past MI and CAD         GI/Hepatic/Renal  Within defined limits              Endo/Other  Within defined limits    Hypothyroidism  Obesity     Other Findings              Physical Exam    Airway  Mallampati: II  TM Distance: > 6 cm  Neck ROM: normal range of motion   Mouth opening: Normal     Cardiovascular  Regular rate and rhythm,  S1 and S2 normal,  no murmur, click, rub, or gallop             Dental  No notable dental hx       Pulmonary  Breath sounds clear to auscultation               Abdominal  GI exam deferred       Other Findings            Anesthetic Plan    ASA: 3  Anesthesia type: general          Induction: Intravenous  Anesthetic plan and risks discussed with: Patient

## 2017-11-30 NOTE — CDMP QUERY
Please clarify if this patient is being treated/managed for:    =>UTI  =>Asymptomatic bacteruria  =>Other Explanation of clinical findings  =>Unable to Determine (no explanation of clinical findings)    The medical record reflects the following clinical findings, treatment, and risk factors:    U/A: 0-4 WBC, negative bacteria, negative leuk esterase, negative nitrites  Urine Culture: > 100,000 colonies Enterococcus Faecalis Group D, 20,000 possible staph  Receiving: Rocephin 2 gm IV q 24 hours    Please clarify and document your clinical opinion in the progress notes and discharge summary including the definitive and/or presumptive diagnosis, (suspected or probable), related to the above clinical findings. Please include clinical findings supporting your diagnosis.     Mo Mcke[facility], [address] [facility]  [email or web address]

## 2017-11-30 NOTE — CONSULTS
[address] [address]        GASTROENTEROLOGY CONSULTATION NOTE  Will Halima Cruz  [phone] office  [phone] NP/PA in-hospital cell phone M-F until 4:30PM  After 5PM or on weekends, please call  for physician on call        NAME:  Jesica Holley   :   1945   MRN:   [id number]       Referring Physician: Anjelica Weeks NP    Consult Date: 2017 10:35 AM    Chief Complaint: bacteremia     History of Present Illness:  Patient is a 67 y.o. who is seen in consultation at the request of Anjelica Weeks NP for bacteremia - suspect GI source. Patient has a past medical history of abdominal pain, arthritis, recurrent bacteremia, chronic pain, elevated liver function tests, hypothyroidism, GERD, myocardial infarction, herpes simplex virus infection, hypertension, insomnia, melasma, and urinary incontinence who presented to the ED with reported positive blood cultures. Patient was diagnosed with bactermeia in  and has had recurrent similar presentations since that time. She was seen in the ED the day prior with blood cultures showing evidence of gram-negative rods in 3/4 bottles and returned to the hospital for admission on 17. Patient was placed on IV antibiotics. Infectious Disease was consulted. MRI abdomen/MRCP was completed showing common bile duct stricture and stones. We were consulted for evaluation given the MRCP findings. Patient has no complaints of nausea, reflux, vomiting, abdominal pain, or diarrhea. She reports some intermittent burping and flatulence. She has a bowel movement every few days with some associated straining. No melena or hematochezia. Patient notes that her bowel movements have been more regular since admission. Denies NSAID use. No anticoagulation. She takes aspirin 81 mg daily. Denies alcohol or tobacco use.      Patient notes that she has had an EGD in the past, however there are no records available for review. Colonoscopy was reportedly 3 years ago with a 10 year follow up. I have reviewed the emergency room note, hospital admission note, notes by all other clinicians who have seen the patient during this hospitalization to date. I have reviewed the problem list and the reason for this hospitalization. I have reviewed the allergies and the medications the patient was taking at home prior to this hospitalization.     PMH:  Past Medical History:   Diagnosis Date    Abdominal pain 6/18/14    note from [facility] directive discussed with patient 07/22/2015    Arthritis     dr Janna buck        2/6/17 f/u note    Bacteremia due to Klebsiella pneumoniae 2/2/2016    OV note from Dr Nando Rajput, Infect Disease    Chronic pain     low back pain/arthritis      [facility] note9/13/17    Contact dermatitis and other eczema, due to unspecified cause     hyperpigmented macules/seb k    Elevated LFTs     per info [facility] Stiven [facility]ala [facility] on report    Endocrine disease     hypothyroid    GERD (gastroesophageal reflux disease)     chelsy burns    [facility] 05/[facility]    [facility]    HSV infection     Hypertension     Hypothyroid 10/2012    Insomnia     Melasma 3/3/15    notes [facility]    Nausea & vomiting 05/04/2017    report [facility]  7/28/17 EGD showed delayed emptying    Pain management counseling, encounter for 05/05/2016    sees Dr Romero Cruz 5/2/17 f/u    Rhinitis, allergic nonseasonal     Screening for glaucoma 04/11/2016    Urge incontinence of urine 03/21/2017 initial [facility] consult    [facility] initial eval note Nadia Ray 4/17/17 urodymanics study//5/9/17 f/u note    Well woman exam (no gynecological exam) 07/29/2016    jefferyr's note rec'd       PSH:  Past Surgical History:   Procedure Laterality Date    COLONOSCOPY  8/1/11    dr Tish Oconnor 10 year repeat    HX CHOLECYSTECTOMY      [facility]  2014 [facility]    HX ENDOSCOPY  2017    [facility]    HX ENDOSCOPY  2017    showed delayed emptying of stomach contents--gastroparesis to be r/o    HX OTHER SURGICAL  2017    complex urodynamics study report from [facility]    HX TUBAL LIGATION         Allergies: Allergies   Allergen Reactions    Pcn [Penicillins] Swelling     Has tolerated Cefepime    Tramadol Nausea and Vomiting     SEVERE If taken w/o food    Proventil [Albuterol Sulfate] Hives    Ace Inhibitors Angioedema    Albuterol Swelling     swelling    Ambien [Zolpidem] Other (comments)     Nightmares and memory disturbance    Ciprofloxacin Other (comments)     Sore throat and trouble swallowing    Lisinopril Swelling    Oxaprozin Nausea and Vomiting     severe stomach upset    Sulfadiazine Swelling     swelling    Trazodone Other (comments)     Vivid dreams and felt disoriented       Home Medications:  Prior to Admission Medications   Prescriptions Last Dose Informant Patient Reported? Taking? Nebulizer & Compressor machine   No No   Si Each by Does Not Apply route every six (6) hours as needed. Indications: COUGH   VITAMIN B COMPLEX PO   Yes No   Sig: Take 1 Tab by mouth daily. 1 tab, PO, daily, 0 Refills   aspirin 81 mg tablet 2017 at Unknown time  Yes Yes   Sig: Take 81 mg by mouth daily. atorvastatin (LIPITOR) 20 mg tablet   No No   Sig: Take 1 Tab by mouth nightly. azaTHIOprine (IMURAN) 50 mg tablet   Yes No   Sig: Take 150 mg by mouth daily. baclofen (LIORESAL) 10 mg tablet   No No   Sig: Take 0.5 Tabs by mouth three (3) times daily. Take one half tablet every 8 hr.prn for muscle spasm   Patient taking differently: Take 10 mg by mouth two (2) times a day. As needed-muscle spasms. calcium-cholecalciferol, d3, (CALCIUM 600 + D) 600-125 mg-unit tab   Yes No   Sig: Take  by mouth. carvedilol (COREG) 3.125 mg tablet   No No   Sig: Take 1 Tab by mouth two (2) times a day.    cholecalciferol (VITAMIN D3) 1,000 unit tablet   Yes No   Sig: Take 1,000 Units by mouth two (2) times a day.   gabapentin (NEURONTIN) 100 mg capsule   No No   Sig: Take 1 Cap by mouth two (2) times a day. hydroCHLOROthiazide (HYDRODIURIL) 25 mg tablet   No No   Sig: Take 1 Tab by mouth daily. 1 tablet oe time daily for edema ,HTN   ipratropium (ATROVENT) 0.02 % nebulizer solution   No No   Si.5 mL by Nebulization route every six (6) hours as needed. levothyroxine (SYNTHROID) 50 mcg tablet   No No   Sig: TAKE 1 TABLET DAILY   montelukast (SINGULAIR) 10 mg tablet   No No   Sig: TAKE 1 TABLET DAILY   multivitamin (ONE A DAY) tablet   Yes No   Sig: Take 1 Tab by mouth daily. nystatin (MYCOSTATIN) topical cream   No No   Sig: Apply  to affected area two (2) times a day. oxyCODONE IR (ROXICODONE) 5 mg immediate release tablet   Yes No   Sig: TAKE 1 TABLET BY MOUTH TWICE A DAY   pantoprazole (PROTONIX) 40 mg tablet   No No   Sig: Take 1 Tab by mouth daily. Indications: gastroenteritis   potassium chloride (KAON 10%) 20 mEq/15 mL solution   No No   Sig: Take 15 mL by mouth daily. potassium chloride SR (KLOR-CON 10) 10 mEq tablet   No No   Sig: Take 2 Tabs by mouth daily for 3 days. predniSONE (DELTASONE) 5 mg tablet   Yes No   verapamil ER (CALAN-SR) 120 mg tablet Not Taking at Unknown time  No No   Sig: TAKE 1 TABLET NIGHTLY   vitamin E (AQUA GEMS) 400 unit capsule   Yes No   Sig: Take 400 Units by mouth two (2) times a day.       Facility-Administered Medications: None       Hospital Medications:  Current Facility-Administered Medications   Medication Dose Route Frequency    cefTRIAXone (ROCEPHIN) 2 g in 0.9% sodium chloride (MBP/ADV) 50 mL  2 g IntraVENous Q24H    acetaminophen (TYLENOL) tablet 650 mg  650 mg Oral Q6H PRN    sodium chloride (NS) flush 5-10 mL  5-10 mL IntraVENous Q8H    sodium chloride (NS) flush 5-10 mL  5-10 mL IntraVENous PRN    aspirin chewable tablet 81 mg  81 mg Oral DAILY    atorvastatin (LIPITOR) tablet 20 mg  20 mg Oral QHS  azaTHIOprine (IMURAN) tablet 150 mg  150 mg Oral DAILY    baclofen (LIORESAL) tablet 5 mg  5 mg Oral TID    calcium-vitamin D (OS-KATIE) 500 mg-200 unit tablet  1 Tab Oral DAILY    carvedilol (COREG) tablet 3.125 mg  3.125 mg Oral BID    gabapentin (NEURONTIN) capsule 100 mg  100 mg Oral BID    levothyroxine (SYNTHROID) tablet 50 mcg  50 mcg Oral ACB    montelukast (SINGULAIR) tablet 10 mg  10 mg Oral QHS    pantoprazole (PROTONIX) tablet 40 mg  40 mg Oral DAILY    verapamil ER (CALAN-SR) tablet 120 mg  120 mg Oral DAILY WITH BREAKFAST    docusate sodium (COLACE) capsule 100 mg  100 mg Oral BID    senna (SENOKOT) tablet 8.6 mg  1 Tab Oral QHS       Social History:  Social History   Substance Use Topics    Smoking status: Never Smoker    Smokeless tobacco: Never Used    Alcohol use No       Family History:  Family History   Problem Relation Age of Onset    Hypertension Mother     Heart Disease Mother     No Known Problems Father     Cancer Brother      prostate    Heart Disease Brother      blocked valves    Heart Disease Brother     Asthma Sister     Diabetes Sister     Hypertension Sister        Review of Systems:  Constitutional: negative fever, negative chills, negative weight loss  Eyes:   negative visual changes  ENT:   + sore throat, negative tongue or lip swelling  Respiratory:  negative cough, negative dyspnea  Cards:  negative for chest pain, palpitations, lower extremity edema  GI:   See HPI  :  negative for frequency, dysuria  Integument:  negative for rash and pruritus  Heme:  + easy bruising, negative gum/nose bleeding  Musculoskeletal: + back pain, negative muscle weakness  Neuro:    negative for headaches, dizziness, vertigo  Psych: negative for feelings of anxiety, depression     Objective:   Patient Vitals for the past 8 hrs:   BP Temp Pulse Resp SpO2   11/30/17 0823 125/78 98.4 °F (36.9 °C) 73 18 100 %   11/30/17 0707 119/69 - 69 - -     11/30 0701 - 11/30 1900  In: 240 [P.O.:240]  Out: -        EXAM:     CONST:  Pleasant lady sitting in the chair, no acute distress   NEURO:  alert and oriented x 3   HEENT: EOMI, no scleral icterus   LUNGS: clear to ausculation   CARD:  regular rate and rhythm, S1 S2   ABD:  soft, mild epigastric tenderness, no rebound, bowel sounds (+) all 4 quadrants, no masses, non distended   EXT:  No edema, warm   PSYCH: full, not anxious     Data Review     Recent Labs      11/30/17   0500  11/28/17   0435   WBC  4.5  8.4   HGB  9.2*  9.2*   HCT  27.7*  27.6*   PLT  289  242     Recent Labs      11/30/17   0500  11/29/17   0553   NA  145  143   K  3.7  4.1   CL  112*  110*   CO2  25  26   BUN  8  8   CREA  0.51*  0.47*   GLU  98  117*   CA  8.5  8.9     Recent Labs      11/30/17   0500  11/28/17   0227   SGOT  18  40*   AP  108  134*   TP  6.4  6.9   ALB  2.3*  2.4*   GLOB  4.1*  4.5*     No results for input(s): INR, PTP, APTT in the last 72 hours. No lab exists for component: INREXT      MRI ABDOMEN AND MRCP WITH AND WITHOUT CONTRAST. (11/29/17)     INDICATION: recurrent gram negative bacteremia with abnormal mrcp in 2015     COMPARISON: 11/27/2017 CT. MRI 8/26/2015     TECHNIQUE: Multisequence and multiplanar MRI of the abdomen was performed after  the administration of 16 mL of ProHance gadolinium IV contrast. Images were  obtained without contrast; and in late arterial, portal venous, and delayed  phases after the administration of contrast. Subtraction images were  reconstructed.     Heavily T2-weighted thick slab and thin slice images were obtained in the  oblique coronal plane through the biliary tree (MRCP).     FINDINGS:      MRCP: Mild, diffuse intrahepatic biliary ductal dilatation is noted. However,  the degree of intrahepatic biliary ductal dilatation is less than the 8/26/2015  exam. Again seen are numerous intrahepatic biliary strictures and dilatation. Mild beading of the intrahepatic ducts is also redemonstrated.  There is a  moderate/severe stricture involving the intrapancreatic portion of the common  bile duct. A mild/moderate stricture is seen at the level of the bifurcation  into the right and left hepatic ducts. Stones/debris are seen in the duct above  the level of the stricture extending all the way to the aashish hepatis. Stones  and debris also appear to extend into the cystic duct remnant. Normal course and  caliber of the pancreatic duct. LIVER: Focal area of T2 hyperintense signal seen in the right hepatic lobe in  segment 8 associated with focal biliary ductal dilatation and questionable focal  enhancement of the biliary tree. This could represent a focal infectious or  inflammatory process, including cholangitis. However, overall no peritoneal  enhancement is seen in the biliary tree, including the common bile duct to  suggest ascending cholangitis. No mass is seen in the liver. GALLBLADDER: Status post cholecystectomy. PANCREAS: Cystic lesion containing a thin septation redemonstrated in the  pancreas near the body/tail junction measuring 15 x 21 x 21 mm versus 11 x 11 x  14 mm on the prior exam. There is no associated enhancement or pancreatic ductal  dilatation. SPLEEN: Normal size spleen. Splenorenal shunt redemonstrated. ADRENALS: Normal.  KIDNEYS: Normal.  DISTAL ESOPHAGUS, STOMACH, AND VISUALIZED BOWEL: No acute findings, including no  obstruction. PERITONEUM: No free fluid and no abdominal lymphadenopathy. VISUALIZED LUNG BASES: Grossly clear within the sensitivity of MRI. BONES: No suspicious lesions.        IMPRESSION  IMPRESSION:   1. Persistent mild intrahepatic biliary ductal dilatation with intervening  structures. Overall, the degree of intrahepatic biliary ductal dilatation is  less than on the 8/2015 exam. 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  Moderate/severe stricture involving the intrapancreatic  portion of the common bile duct with upstream stone/debris in the duct likely  extending into the cystic duct remnant as well. ERCP may be of benefit.     2. Focal area of biliary ductal dilatation and enhancement in segment 8 could  represent focal inflammation/infection. Otherwise no definite evidence to  suggest ascending cholangitis.     3. Interval increase in size in cystic lesion in the pancreas. However, there  are no concerning imaging features, including no solid components or pancreatic  ductal dilatation. 1 year follow-up is recommended.     4. Other findings described above.      Assessment:   · Gram-negative bactermia: ID following. On IV antibiotics. Blood cultures (11/27) showed E. Coli in 4/4 bottles. Repeat blood cultures (11/27): no growth x 3 days. · Common bile duct stricture with stones on MRCP (11/29/17): MRCP reviewed with radiologist by Dr. Magdi Watts. LFTs have normalized. Patient Active Problem List   Diagnosis Code    RA (rheumatoid arthritis) ([facility] [address] M06.9    Essential hypertension I10    Postmenopausal Z78.0    Hypothyroidism E03.9    Allergic rhinitis J30.9    Chronic constipation K59.09    Vitamin D deficiency E55.9    Abnormal gait R26.9    Insomnia G47.00    Gastroesophageal reflux disease without esophagitis K21.9    Chronic midline low back pain without sciatica M54.5, G89.29    ACP (advance care planning) Z71.89    Demand ischemia (HCC) I24.8    Hyperlipidemia E78.5    Gastroparesis K31.84    Hypokalemia E87.6    Gram-negative bacteremia R78.81     Plan:   · NPO  · ERCP this afternoon with Dr. Magdi Watts at 4:00PM. Risks and benefits of the procedure were discussed with the patient. She is in agreement to proceed. · IV antibiotics per ID  · Thank you for allowing me to participate in care of [address] [place] [address]       Signed By: Antony Seip, PA     11/30/2017  10:35 AM       I have examined the patient.   I have reviewed the chart and agree with the documentation recorded by the NP, including the assessment, treatment plan, and disposition.         Teri Montiel MD

## 2017-12-01 LAB
ALBUMIN SERPL-MCNC: 2.7 G/DL (ref 3.5–5)
ALBUMIN/GLOB SERPL: 0.6 {RATIO} (ref 1.1–2.2)
ALP SERPL-CCNC: 117 U/L (ref 45–117)
ALT SERPL-CCNC: 28 U/L (ref 12–78)
ANION GAP SERPL CALC-SCNC: 7 MMOL/L (ref 5–15)
AST SERPL-CCNC: 24 U/L (ref 15–37)
BACTERIA SPEC CULT: ABNORMAL
BACTERIA SPEC CULT: ABNORMAL
BILIRUB SERPL-MCNC: 0.7 MG/DL (ref 0.2–1)
BUN SERPL-MCNC: 6 MG/DL (ref 6–20)
BUN/CREAT SERPL: 11 (ref 12–20)
CALCIUM SERPL-MCNC: 8.8 MG/DL (ref 8.5–10.1)
CC UR VC: ABNORMAL
CHLORIDE SERPL-SCNC: 110 MMOL/L (ref 97–108)
CO2 SERPL-SCNC: 24 MMOL/L (ref 21–32)
CREAT SERPL-MCNC: 0.57 MG/DL (ref 0.55–1.02)
ERYTHROCYTE [DISTWIDTH] IN BLOOD BY AUTOMATED COUNT: 14.9 % (ref 11.5–14.5)
GLOBULIN SER CALC-MCNC: 4.3 G/DL (ref 2–4)
GLUCOSE SERPL-MCNC: 135 MG/DL (ref 65–100)
HCT VFR BLD AUTO: 29.9 % (ref 35–47)
HGB BLD-MCNC: 9.9 G/DL (ref 11.5–16)
LIPASE SERPL-CCNC: >3000 U/L (ref 73–393)
MCH RBC QN AUTO: 31 PG (ref 26–34)
MCHC RBC AUTO-ENTMCNC: 33.1 G/DL (ref 30–36.5)
MCV RBC AUTO: 93.7 FL (ref 80–99)
PLATELET # BLD AUTO: 369 K/UL (ref 150–400)
POTASSIUM SERPL-SCNC: 4.2 MMOL/L (ref 3.5–5.1)
PROT SERPL-MCNC: 7 G/DL (ref 6.4–8.2)
RBC # BLD AUTO: 3.19 M/UL (ref 3.8–5.2)
SERVICE CMNT-IMP: ABNORMAL
SODIUM SERPL-SCNC: 141 MMOL/L (ref 136–145)
WBC # BLD AUTO: 7.5 K/UL (ref 3.6–11)

## 2017-12-01 PROCEDURE — 85027 COMPLETE CBC AUTOMATED: CPT | Performed by: INTERNAL MEDICINE

## 2017-12-01 PROCEDURE — 74011250636 HC RX REV CODE- 250/636: Performed by: NURSE PRACTITIONER

## 2017-12-01 PROCEDURE — 36415 COLL VENOUS BLD VENIPUNCTURE: CPT | Performed by: INTERNAL MEDICINE

## 2017-12-01 PROCEDURE — 83690 ASSAY OF LIPASE: CPT | Performed by: INTERNAL MEDICINE

## 2017-12-01 PROCEDURE — 74011250637 HC RX REV CODE- 250/637: Performed by: HOSPITALIST

## 2017-12-01 PROCEDURE — 86301 IMMUNOASSAY TUMOR CA 19-9: CPT | Performed by: INTERNAL MEDICINE

## 2017-12-01 PROCEDURE — 74011000258 HC RX REV CODE- 258: Performed by: INTERNAL MEDICINE

## 2017-12-01 PROCEDURE — 74011250636 HC RX REV CODE- 250/636: Performed by: INTERNAL MEDICINE

## 2017-12-01 PROCEDURE — 74011250636 HC RX REV CODE- 250/636: Performed by: FAMILY MEDICINE

## 2017-12-01 PROCEDURE — 74011250637 HC RX REV CODE- 250/637: Performed by: NURSE PRACTITIONER

## 2017-12-01 PROCEDURE — 65270000029 HC RM PRIVATE

## 2017-12-01 PROCEDURE — 80053 COMPREHEN METABOLIC PANEL: CPT | Performed by: INTERNAL MEDICINE

## 2017-12-01 PROCEDURE — 74011250637 HC RX REV CODE- 250/637: Performed by: FAMILY MEDICINE

## 2017-12-01 RX ORDER — HYDROMORPHONE HYDROCHLORIDE 2 MG/ML
1 INJECTION, SOLUTION INTRAMUSCULAR; INTRAVENOUS; SUBCUTANEOUS
Status: DISCONTINUED | OUTPATIENT
Start: 2017-12-01 | End: 2017-12-03

## 2017-12-01 RX ORDER — ONDANSETRON 2 MG/ML
4 INJECTION INTRAMUSCULAR; INTRAVENOUS
Status: DISCONTINUED | OUTPATIENT
Start: 2017-12-01 | End: 2017-12-04 | Stop reason: HOSPADM

## 2017-12-01 RX ORDER — SODIUM CHLORIDE, SODIUM LACTATE, POTASSIUM CHLORIDE, CALCIUM CHLORIDE 600; 310; 30; 20 MG/100ML; MG/100ML; MG/100ML; MG/100ML
150 INJECTION, SOLUTION INTRAVENOUS CONTINUOUS
Status: DISCONTINUED | OUTPATIENT
Start: 2017-12-01 | End: 2017-12-04 | Stop reason: HOSPADM

## 2017-12-01 RX ADMIN — AZATHIOPRINE 150 MG: 50 TABLET ORAL at 09:39

## 2017-12-01 RX ADMIN — MONTELUKAST SODIUM 10 MG: 10 TABLET, FILM COATED ORAL at 22:02

## 2017-12-01 RX ADMIN — DOCUSATE SODIUM 100 MG: 100 CAPSULE, LIQUID FILLED ORAL at 17:47

## 2017-12-01 RX ADMIN — BACLOFEN 5 MG: 10 TABLET ORAL at 22:03

## 2017-12-01 RX ADMIN — CEFTRIAXONE 2 G: 2 INJECTION, POWDER, FOR SOLUTION INTRAMUSCULAR; INTRAVENOUS at 11:53

## 2017-12-01 RX ADMIN — SODIUM CHLORIDE, SODIUM LACTATE, POTASSIUM CHLORIDE, AND CALCIUM CHLORIDE 150 ML/HR: 600; 310; 30; 20 INJECTION, SOLUTION INTRAVENOUS at 09:45

## 2017-12-01 RX ADMIN — GABAPENTIN 100 MG: 100 CAPSULE ORAL at 17:46

## 2017-12-01 RX ADMIN — CARVEDILOL 3.12 MG: 3.12 TABLET, FILM COATED ORAL at 09:27

## 2017-12-01 RX ADMIN — ATORVASTATIN CALCIUM 20 MG: 20 TABLET, FILM COATED ORAL at 22:02

## 2017-12-01 RX ADMIN — SENNOSIDES 8.6 MG: 8.6 TABLET, FILM COATED ORAL at 22:03

## 2017-12-01 RX ADMIN — HYDROMORPHONE HYDROCHLORIDE 1 MG: 2 INJECTION INTRAMUSCULAR; INTRAVENOUS; SUBCUTANEOUS at 16:02

## 2017-12-01 RX ADMIN — Medication 10 ML: at 22:03

## 2017-12-01 RX ADMIN — BACLOFEN 5 MG: 10 TABLET ORAL at 09:27

## 2017-12-01 RX ADMIN — PANTOPRAZOLE SODIUM 40 MG: 40 TABLET, DELAYED RELEASE ORAL at 09:27

## 2017-12-01 RX ADMIN — Medication 10 ML: at 06:56

## 2017-12-01 RX ADMIN — DOCUSATE SODIUM 100 MG: 100 CAPSULE, LIQUID FILLED ORAL at 09:27

## 2017-12-01 RX ADMIN — ONDANSETRON 4 MG: 2 INJECTION INTRAMUSCULAR; INTRAVENOUS at 03:57

## 2017-12-01 RX ADMIN — LEVOTHYROXINE SODIUM 50 MCG: 50 TABLET ORAL at 06:56

## 2017-12-01 RX ADMIN — SODIUM CHLORIDE, SODIUM LACTATE, POTASSIUM CHLORIDE, AND CALCIUM CHLORIDE 150 ML/HR: 600; 310; 30; 20 INJECTION, SOLUTION INTRAVENOUS at 17:45

## 2017-12-01 RX ADMIN — HYDROMORPHONE HYDROCHLORIDE 1 MG: 2 INJECTION INTRAMUSCULAR; INTRAVENOUS; SUBCUTANEOUS at 11:48

## 2017-12-01 RX ADMIN — ACETAMINOPHEN 650 MG: 325 TABLET, FILM COATED ORAL at 03:29

## 2017-12-01 RX ADMIN — HYDROMORPHONE HYDROCHLORIDE 1 MG: 2 INJECTION INTRAMUSCULAR; INTRAVENOUS; SUBCUTANEOUS at 20:11

## 2017-12-01 RX ADMIN — Medication 10 ML: at 03:57

## 2017-12-01 RX ADMIN — CALCIUM CARBONATE-VITAMIN D TAB 500 MG-200 UNIT 1 TABLET: 500-200 TAB at 09:27

## 2017-12-01 RX ADMIN — ONDANSETRON 4 MG: 2 INJECTION INTRAMUSCULAR; INTRAVENOUS at 09:41

## 2017-12-01 RX ADMIN — GABAPENTIN 100 MG: 100 CAPSULE ORAL at 09:27

## 2017-12-01 RX ADMIN — ASPIRIN 81 MG 81 MG: 81 TABLET ORAL at 09:27

## 2017-12-01 NOTE — ROUTINE PROCESS
Bedside and Verbal shift change report given to Tre Laughlin RN (oncoming nurse) by Wilma Dowling RN (offgoing nurse). Report included the following information SBAR.

## 2017-12-01 NOTE — PROGRESS NOTES
Hospitalist Progress Note  Shannon Mederos NP  Answering service: [phone] OR [phone] from in house phone  Cell: [phone]   Date of Service:  2017  NAME:  Micaela Brynat  :  1945  MRN:  [id number]    Admission Summary:   Pt initially came to ED with son after he noted she was confused, not clear and her speech was \"off\" it was also noted that she was not feeling well on Thursday after eating Thanksgiving dinner:  nauseous and had loose stools. Pt denies Loose stools - says they were soft due to colace. She was seen and treated in the ED and discharged yesterday. She was subsequently called later on in the day due to positive blood cultures and asked to return to the hospital.    Interval history / Subjective:      Pt in bed, not feeling well today. Has left sided and epigastric pain which radiates to her back. + Nausea and vomiting. Assessment & Plan:     Post ERCP Pancreatitis:  - Lipase >3000 accompanied by nausea and vomiting  - may have dilaudid for pain, zofran for nausea  - NPO for now    Gram-negative bacteremia:   - has hx of recurrent bacteremia and previously followed by ID  - pt has allergy to penicillin, Cipro and Sulfa  - started Meropenem in ED, has been changed to ceftrixone  - Blood cultures from 0600 : e coli in 4/4 bottles, sensitive to ceftriaxone  - Repeat blood cultures from  with no growth x 4 days   - urine culture back and has e faecalis growing. Discussed this with ID - do not treat as she is asymptomatic  - ID following and will place IV abx orders in case she can be discharged over the weekend. Will plan on PICC line just prior to discharge  - CT abdomen negative for acute process   - MRCP showing CBD stricture and stones.    - ERCP : showed ~ 2 cm distal CBD tight stricture, all the way down to ampulla, small stones above it in the CBD, rest of biliary tree was not dilated and had beading appearance consistent with PSC. Stent placed but stones were not removed due to the stricture.      Hypokalemia: Resolved, continue usual home dose     Anemia, likely chronic: Monitor H/H     Elevated liver function tests: Resolved     Hx Hypothyroidism: Continue levothyroxine.      Hx Peripheral neuropathy: Continue Neurontin.      Hx GERD: Continue Protonix.      Hx Arthritis: continue Azathioprine.      Hx Hyperlipidemia: Continue Lipitor    Code status: Full  DVT prophylaxis: SCDs  Care Plan discussed with: patient, ID, GI and   Attempted to call pts son Zeenat Castro ([phone]), but person answering reported wrong number (have used this number before to contact her son)  Disposition: home when able, will need IV abx     Hospital Problems  Date Reviewed: 11/28/2017          Codes Class Noted POA    * (Principal)Gram-negative bacteremia ICD-10-CM: R78.81  ICD-9-CM: 790.7, 041.85  11/28/2017 Unknown            Review of Systems:   Denies HA. No chest pain. No respiratory complaints. + epigastric pain that radiates to her back. + N/V earlier in am.     Vital Signs:    Last 24hrs VS reviewed since prior progress note. Most recent are:  Visit Vitals    /76    Pulse 61    Temp 98.5 °F (36.9 °C)    Resp 19    SpO2 98%       Intake/Output Summary (Last 24 hours) at 12/01/17 6219  Last data filed at 11/30/17 1838   Gross per 24 hour   Intake              740 ml   Output              300 ml   Net              440 ml      Physical Examination:         Constitutional:  No acute distress, cooperative, pleasant    ENT:  Oral mucous membranes moist, oropharynx benign. Resp:  CTA bilaterally. No wheezing. No accessory muscle use, RA   CV:  Regular rhythm, normal rate, no murmurs    GI:  Soft, non distended, epigastric pain with radiation. Tender around hernia. normoactive bowel sounds + BM    Musculoskeletal:  No edema, warm, 2+ pulses throughout    Neurologic:  Moves all extremities.   AAOx3     Data Review:   Review and/or order of clinical lab test  Review and/or order of tests in the radiology section of CPT  Review and/or order of tests in the medicine section of CPT    Labs:     Recent Labs      12/01/17   0338  11/30/17   0500   WBC  7.5  4.5   HGB  9.9*  9.2*   HCT  29.9*  27.7*   PLT  369  289     Recent Labs      12/01/17   0338  11/30/17   0500  11/29/17   0553   NA  141  145  143   K  4.2  3.7  4.1   CL  110*  112*  110*   CO2  24  25  26   BUN  6  8  8   CREA  0.57  0.51*  0.47*   GLU  135*  98  117*   CA  8.8  8.5  8.9   MG   --    --   2.1     Recent Labs      12/01/17   0338  11/30/17   0500   SGOT  24  18   ALT  28  28   AP  117  108   TBILI  0.7  0.5   TP  7.0  6.4   ALB  2.7*  2.3*   GLOB  4.3*  4.1*   LPSE  >3000*   --      No results for input(s): INR, PTP, APTT in the last 72 hours. No lab exists for component: INREXT, INREXT   No results for input(s): FE, TIBC, PSAT, FERR in the last 72 hours. No results found for: FOL, RBCF   No results for input(s): PH, PCO2, PO2 in the last 72 hours. No results for input(s): CPK, CKNDX, TROIQ in the last 72 hours.     No lab exists for component: CPKMB  Lab Results   Component Value Date/Time    Cholesterol, total 97 06/26/2017 01:47 PM    HDL Cholesterol 37 06/26/2017 01:47 PM    LDL, calculated 43 06/26/2017 01:47 PM    Triglyceride 86 06/26/2017 01:47 PM    CHOL/HDL Ratio 4.5 12/23/2015 01:45 AM     Lab Results   Component Value Date/Time    Glucose (POC) 138 11/28/2017 09:21 PM    Glucose (POC) 138 11/27/2017 02:39 AM    Glucose (POC) 98 05/19/2017 11:52 AM    Glucose (POC) 125 12/30/2015 11:28 AM    Glucose (POC) 101 12/23/2015 12:08 AM     Lab Results   Component Value Date/Time    Color YELLOW/STRAW 11/27/2017 02:32 AM    Appearance CLEAR 11/27/2017 02:32 AM    Specific gravity 1.012 11/27/2017 02:32 AM    Specific gravity 1.005 12/23/2015 01:45 AM    pH (UA) 7.0 11/27/2017 02:32 AM    Protein NEGATIVE  11/27/2017 02:32 AM    Glucose NEGATIVE 11/27/2017 02:32 AM    Ketone NEGATIVE  11/27/2017 02:32 AM    Bilirubin NEGATIVE  11/27/2017 02:32 AM    Urobilinogen 1.0 11/27/2017 02:32 AM    Nitrites NEGATIVE  11/27/2017 02:32 AM    Leukocyte Esterase NEGATIVE  11/27/2017 02:32 AM    Epithelial cells FEW 11/27/2017 02:32 AM    Bacteria NEGATIVE  11/27/2017 02:32 AM    WBC 0-4 11/27/2017 02:32 AM    RBC 0-5 11/27/2017 02:32 AM     Medications Reviewed:     Current Facility-Administered Medications   Medication Dose Route Frequency    ondansetron (ZOFRAN) injection 4 mg  4 mg IntraVENous Q6H PRN    lactated Ringers infusion  150 mL/hr IntraVENous CONTINUOUS    cefTRIAXone (ROCEPHIN) 2 g in 0.9% sodium chloride (MBP/ADV) 50 mL  2 g IntraVENous Q24H    acetaminophen (TYLENOL) tablet 650 mg  650 mg Oral Q6H PRN    sodium chloride (NS) flush 5-10 mL  5-10 mL IntraVENous Q8H    sodium chloride (NS) flush 5-10 mL  5-10 mL IntraVENous PRN    aspirin chewable tablet 81 mg  81 mg Oral DAILY    atorvastatin (LIPITOR) tablet 20 mg  20 mg Oral QHS    azaTHIOprine (IMURAN) tablet 150 mg  150 mg Oral DAILY    baclofen (LIORESAL) tablet 5 mg  5 mg Oral TID    calcium-vitamin D (OS-KATIE) 500 mg-200 unit tablet  1 Tab Oral DAILY    carvedilol (COREG) tablet 3.125 mg  3.125 mg Oral BID    gabapentin (NEURONTIN) capsule 100 mg  100 mg Oral BID    levothyroxine (SYNTHROID) tablet 50 mcg  50 mcg Oral ACB    montelukast (SINGULAIR) tablet 10 mg  10 mg Oral QHS    pantoprazole (PROTONIX) tablet 40 mg  40 mg Oral DAILY    verapamil ER (CALAN-SR) tablet 120 mg  120 mg Oral DAILY WITH BREAKFAST    docusate sodium (COLACE) capsule 100 mg  100 mg Oral BID    senna (SENOKOT) tablet 8.6 mg  1 Tab Oral QHS   ______________________________________________________________________  EXPECTED LENGTH OF STAY: 3d 4h  ACTUAL LENGTH OF STAY:          3               Fabiola Armstrong NP

## 2017-12-01 NOTE — PROGRESS NOTES
Bedside shift change report given to Jorge Dimas RN (oncoming nurse) by Didi Pacheco RN (offgoing nurse). Report included the following information SBAR, Intake/Output and MAR.

## 2017-12-01 NOTE — CDMP QUERY
Please clarify if this patient is being treated/managed for:    =>Acute, post ERCP pancreatitis  =>Other Explanation of clinical findings  =>Unable to Determine (no explanation of clinical findings)    The medical record reflects the following clinical findings, treatment, and risk factors:    66 yo female admitted with GNR bacteremia, found to have cholangitis with CBD stones. S/P ERCP with stenting of CBD. C/O anterior abd pain, cramping, 4/10, along with nausea. Requiring treatment with Tylenol and Zofran. Labs show serum Lipase > 3000. Please clarify and document your clinical opinion in the progress notes and discharge summary including the definitive and/or presumptive diagnosis, (suspected or probable), related to the above clinical findings. Please include clinical findings supporting your diagnosis. CRITERIA:  ----------------  Per the International Association of Pancreatology/American Pancreatic Association criteria (IAP/APA), two out of three of the following criteria must be met for the diagnosis of acute pancreatitis:    #1) Clinical (upper abdominal pain)  #2) Laboratory (serum amylase or lipase >3 upper limit of normal) Lipase upper limit of normal: 393. #3) Imaging (CT, MR, ultrasound) criteria.     Ananth Diaz, [facility], [address] [place] [address]  [email or web address]

## 2017-12-01 NOTE — ANESTHESIA POSTPROCEDURE EVALUATION
Post-Anesthesia Evaluation and Assessment    Patient: Valerie Amezcua MRN: [id number]  SSN: [id number]    YOB: 1945  Age: 67 y.o. Sex: female       Cardiovascular Function/Vital Signs  Visit Vitals    /76    Pulse 61    Temp 36.9 °C (98.5 °F)    Resp 19    SpO2 98%       Patient is status post general anesthesia for Procedure(s):  ENDOSCOPIC RETROGRADE CHOLANGIOPANCREATOGRAPHY  ENDOSCOPIC SPHINCTEROTOMY  ENDOSCOPY WITH PROSTHESIS OR STENT PLACEMENT. Nausea/Vomiting: None    Postoperative hydration reviewed and adequate. Pain:  Pain Scale 1: Numeric (0 - 10) (12/01/17 1149)  Pain Intensity 1: 7 (12/01/17 1149)   Managed    Neurological Status: At baseline    Mental Status and Level of Consciousness: Arousable    Pulmonary Status:   O2 Device: Room air (12/01/17 0748)   Adequate oxygenation and airway patent    Complications related to anesthesia: None    Post-anesthesia assessment completed.  No concerns    Signed By: Cris Pete MD     December 1, 2017

## 2017-12-01 NOTE — PROGRESS NOTES
[address]  [address], [address]       GI PROGRESS NOTE  Will Essie Alder  [phone] office  [phone] NP/PA in-hospital cell phone M-F until 4:30PM  After 5PM or on weekends, please call  for physician on call      NAME: Jie Billingsley   :  1945   MRN:  [id number]       Subjective:   Patient complains of intermittent epigastric abdominal pain with radiation to the back. There has been associated nausea and 1 episode of vomiting. No hematemesis. Objective:     VITALS:   Last 24hrs VS reviewed since prior progress note. Most recent are:  Visit Vitals    /76    Pulse 61    Temp 98.5 °F (36.9 °C)    Resp 19    SpO2 98%       PHYSICAL EXAM:  General: Cooperative, no acute distress    Neurologic:  Alert and oriented X 3. HEENT: EOMI, no scleral icterus   Lungs:  CTA bilaterally  Heart:  S1 S2, regular rate and rhythm  Abdomen: Soft, non-distended, epigastric and LUQ tenderness. +Bowel sounds  Extremities: No edema  Psych:   Good insight. Not anxious or agitated. Lab Data Reviewed:     Recent Results (from the past 24 hour(s))   LIPASE    Collection Time: 17  3:38 AM   Result Value Ref Range    Lipase >3000 (H) 73 - 653 U/L   METABOLIC PANEL, COMPREHENSIVE    Collection Time: 17  3:38 AM   Result Value Ref Range    Sodium 141 136 - 145 mmol/L    Potassium 4.2 3.5 - 5.1 mmol/L    Chloride 110 (H) 97 - 108 mmol/L    CO2 24 21 - 32 mmol/L    Anion gap 7 5 - 15 mmol/L    Glucose 135 (H) 65 - 100 mg/dL    BUN 6 6 - 20 MG/DL    Creatinine 0.57 0.55 - 1.02 MG/DL    BUN/Creatinine ratio 11 (L) 12 - 20      GFR est AA >60 >60 ml/min/1.73m2    GFR est non-AA >60 >60 ml/min/1.73m2    Calcium 8.8 8.5 - 10.1 MG/DL    Bilirubin, total 0.7 0.2 - 1.0 MG/DL    ALT (SGPT) 28 12 - 78 U/L    AST (SGOT) 24 15 - 37 U/L    Alk.  phosphatase 117 45 - 117 U/L    Protein, total 7.0 6.4 - 8.2 g/dL    Albumin 2.7 (L) 3.5 - 5.0 g/dL    Globulin 4.3 (H) 2.0 - 4.0 g/dL    A-G Ratio 0.6 (L) 1.1 - 2.2     CBC W/O DIFF    Collection Time: 12/01/17  3:38 AM   Result Value Ref Range    WBC 7.5 3.6 - 11.0 K/uL    RBC 3.19 (L) 3.80 - 5.20 M/uL    HGB 9.9 (L) 11.5 - 16.0 g/dL    HCT 29.9 (L) 35.0 - 47.0 %    MCV 93.7 80.0 - 99.0 FL    MCH 31.0 26.0 - 34.0 PG    MCHC 33.1 30.0 - 36.5 g/dL    RDW 14.9 (H) 11.5 - 14.5 %    PLATELET 825 605 - 477 K/uL         Assessment:   · Post ERCP pancreatitis: epigastric abdominal pain, nausea, lipase > 3000. · Common bile duct stricture with stones on MRCP (11/29/17): status post ERCP with biliary sphincterotomy and biliary stent placement (11/30): 2 cm distal CBD tight stricture, small stones, remainder of biliary tree was not dilated and had beading appearance consistent with PSC. · Gram-negative bacteremia likely biliary in origin: ID following. On IV antibiotics. Blood cultures (11/27) showed E. Coli in 4/4 bottles. Repeat blood cultures (11/27): no growth x 4 days. Patient Active Problem List   Diagnosis Code    RA (rheumatoid arthritis) ([facility] [address] M06.9    Essential hypertension I10    Postmenopausal Z78.0    Hypothyroidism E03.9    Allergic rhinitis J30.9    Chronic constipation K59.09    Vitamin D deficiency E55.9    Abnormal gait R26.9    Insomnia G47.00    Gastroesophageal reflux disease without esophagitis K21.9    Chronic midline low back pain without sciatica M54.5, G89.29    ACP (advance care planning) Z71.89    Demand ischemia (HCC) I24.8    Hyperlipidemia E78.5    Gastroparesis K31.84    Hypokalemia E87.6    Gram-negative bacteremia R78.81     Plan:   · NPO  · LR 150cc/hr  · Trend labs  · Follow CA 19-9  · Follow cytology from biliary stricture  · Will need repeat ERCP in 3-6 months for stent removal  · IV antibiotics per ID     Signed By: FOSTER Paige     12/1/2017  11:20 AM     I have examined the patient.   I have reviewed the chart and agree with the documentation recorded by the NP, including the assessment, treatment plan, and disposition.     Monitor lipase daily  Will follow    Treasure Jaimes MD

## 2017-12-01 NOTE — PROGRESS NOTES
Home IV Orders  1. Diagnosis:  E coli bacteremia   2. Routine PICC Care  3. Antibiotic:  Ceftriaxone 2 gm daily IV   4. Lab each Monday:   CBC/diff/platelets   CMP     5. Lab each Thursday:   CBC/diff/platelets   BUN   Creatinine    6. Fax lab to Dr. Favio Vasquez @ [phone].  7.  Call Dr. Favio Vasquez @ [phone] for fever >100.5, infection at PICC site, diarrhea, WBC > 15 or < 3, cr > 1.8  8. Duration of therapy: last day of therapy should be December 11, 2017   Please call Dr. Favio Vasquez @ [phone] before stopping therapy. 9.  Allergies: Allergies   Allergen Reactions    Pcn [Penicillins] Swelling     Has tolerated Cefepime    Tramadol Nausea and Vomiting     SEVERE If taken w/o food    Proventil [Albuterol Sulfate] Hives    Ace Inhibitors Angioedema    Albuterol Swelling     swelling    Ambien [Zolpidem] Other (comments)     Nightmares and memory disturbance    Ciprofloxacin Other (comments)     Sore throat and trouble swallowing    Lisinopril Swelling    Oxaprozin Nausea and Vomiting     severe stomach upset    Sulfadiazine Swelling     swelling    Trazodone Other (comments)     Vivid dreams and felt disoriented     11.  Make appointment in 2 weeks     Jacey Bowman MD

## 2017-12-01 NOTE — PROGRESS NOTES
ID Progress Note  2017    Subjective:     Afebrile. Developed epigastic pain this morning. Lipase > 3000. Objective:     Vitals:   Visit Vitals    /76    Pulse 61    Temp 98.5 °F (36.9 °C)    Resp 19    SpO2 98%        Tmax:  Temp (24hrs), Av.9 °F (31.1 °C), Min:36.5 °F (2.5 °C), Max:98.5 °F (36.9 °C)      Exam:    Not in distress  Lungs: clear to auscultation, no rales, wheezes or rhonchi   Heart: s1, s2, no murmurs, rubs or clicks    Abdomen: soft, but tender on epigastric area. Labs:   Lab Results   Component Value Date/Time    WBC 7.5 2017 03:38 AM    Hemoglobin (POC) 11.9 2010 01:40 AM    HGB 9.9 2017 03:38 AM    Hematocrit (POC) 35 2010 01:40 AM    HCT 29.9 2017 03:38 AM    PLATELET 128  03:38 AM    MCV 93.7 2017 03:38 AM     Lab Results   Component Value Date/Time    Sodium 141 2017 03:38 AM    Potassium 4.2 2017 03:38 AM    Chloride 110 2017 03:38 AM    CO2 24 2017 03:38 AM    Anion gap 7 2017 03:38 AM    Glucose 135 2017 03:38 AM    BUN 6 2017 03:38 AM    Creatinine 0.57 2017 03:38 AM    BUN/Creatinine ratio 11 2017 03:38 AM    GFR est AA >60 2017 03:38 AM    GFR est non-AA >60 2017 03:38 AM    Calcium 8.8 2017 03:38 AM    Bilirubin, total 0.7 2017 03:38 AM    AST (SGOT) 24 2017 03:38 AM    Alk. phosphatase 117 2017 03:38 AM    Protein, total 7.0 2017 03:38 AM    Albumin 2.7 2017 03:38 AM    Globulin 4.3 2017 03:38 AM    A-G Ratio 0.6 2017 03:38 AM    ALT (SGPT) 28 2017 03:38 AM             Assessment:     1. Recurrent gram-negative bacteremia, now with Escherichia coli. 2. Biliary stricture   3. History of L4-L5 diskitis with negative aspirate and she has   completed 12 weeks of treatment. 4. Rheumatoid arthritis. 5. Hypothyroidism.   6. Post ercp pancreatitis     Recommendations:     GNR bacteremia likely biliary in origin. Discussed with Dr Nandini Roe. Patient has a number of strictures and there is beading of the ducts. The stent may not likely prevent bacteremia. I will place her on suppressive therapy once her IV abx are done. No need to treat bacteria in urine as she does not have any symptoms. Team available over the weekend if needed.       Marky Julien MD

## 2017-12-01 NOTE — PROGRESS NOTES
Reviewed medical chart; met with the patient at the bedside. Patient lives alone and does not have support in the [facility]. She stated \"I haven't ever asked anyone here for help, so I don't want to start now. I took a taxi to the hospital and left a load of clothes in the washer. I do things for myself. \"  She uses a cane or rollator to ambulate. Her PCP is Dr. Rachna Rose and she gets her prescriptions at [facility]. She has two sons, both of whom live in [place]. Patient's son, Yoel ONEILL#[phone] can assist the patient on Sunday if she discharges that day. She explained that her other son is in a relationship with someone who \"is always strung out,\" so he is unavailable to assist the patient. Current Discharge Plan:  Home with IV ABX. Patient explained that she has had home health for IV ABX in the past and used [facility]. She prefers to use a different company at discharge; referral sent to [facility].  Requested that [employer] provide the pricing for Ceftriaxone 2 gm daily IV; awaiting a response. Coordinated Care with the hospitalist NP who plans to order a PICC or peripheral line to be placed tomorrow for possible discharge on Sunday. Care Management will continue to follow her disposition. SANTY Cedeno    Care Management Interventions  PCP Verified by CM:  Yes  Palliative Care Criteria Met (RRAT>21 & CHF Dx)?: No  Mode of Transport at Discharge:  (Patient will travel home via car.  )  Transition of Care Consult (CM Consult): Discharge Planning ([address] [place] [address] )  Lorna Signup: No  Discharge Durable Medical Equipment: No (Patient owns a cane and rollator.  )  Physical Therapy Consult: No  Occupational Therapy Consult: No  Speech Therapy Consult: No  Current Support Network: Lives Alone (Patient lives alone in a second floor apartment.  )  Confirm Follow Up Transport:  (Patient will travel home via car.  )  Plan discussed with Pt/Family/Caregiver: Yes  Freedom of Choice Offered: Yes  Discharge Location  Discharge Placement: Home with infusion therapy

## 2017-12-02 LAB
ANION GAP SERPL CALC-SCNC: 7 MMOL/L (ref 5–15)
BACTERIA SPEC CULT: NORMAL
BUN SERPL-MCNC: 5 MG/DL (ref 6–20)
BUN/CREAT SERPL: 10 (ref 12–20)
CALCIUM SERPL-MCNC: 8.5 MG/DL (ref 8.5–10.1)
CANCER AG19-9 SERPL-ACNC: 1 U/ML (ref 0–35)
CHLORIDE SERPL-SCNC: 108 MMOL/L (ref 97–108)
CO2 SERPL-SCNC: 25 MMOL/L (ref 21–32)
CREAT SERPL-MCNC: 0.5 MG/DL (ref 0.55–1.02)
GLUCOSE SERPL-MCNC: 87 MG/DL (ref 65–100)
LIPASE SERPL-CCNC: >3000 U/L (ref 73–393)
POTASSIUM SERPL-SCNC: 3.6 MMOL/L (ref 3.5–5.1)
SERVICE CMNT-IMP: NORMAL
SODIUM SERPL-SCNC: 140 MMOL/L (ref 136–145)

## 2017-12-02 PROCEDURE — 76937 US GUIDE VASCULAR ACCESS: CPT

## 2017-12-02 PROCEDURE — 36569 INSJ PICC 5 YR+ W/O IMAGING: CPT | Performed by: NURSE PRACTITIONER

## 2017-12-02 PROCEDURE — 80048 BASIC METABOLIC PNL TOTAL CA: CPT | Performed by: NURSE PRACTITIONER

## 2017-12-02 PROCEDURE — 02HV33Z INSERTION OF INFUSION DEVICE INTO SUPERIOR VENA CAVA, PERCUTANEOUS APPROACH: ICD-10-PCS | Performed by: FAMILY MEDICINE

## 2017-12-02 PROCEDURE — 77030018719 HC DRSG PTCH ANTIMIC J&J -A

## 2017-12-02 PROCEDURE — 65270000029 HC RM PRIVATE

## 2017-12-02 PROCEDURE — 74011250636 HC RX REV CODE- 250/636: Performed by: FAMILY MEDICINE

## 2017-12-02 PROCEDURE — 74011250637 HC RX REV CODE- 250/637: Performed by: FAMILY MEDICINE

## 2017-12-02 PROCEDURE — 77030020365 HC SOL INJ SOD CL 0.9% 50ML

## 2017-12-02 PROCEDURE — 36415 COLL VENOUS BLD VENIPUNCTURE: CPT | Performed by: NURSE PRACTITIONER

## 2017-12-02 PROCEDURE — 74011250637 HC RX REV CODE- 250/637: Performed by: NURSE PRACTITIONER

## 2017-12-02 PROCEDURE — 74011250636 HC RX REV CODE- 250/636: Performed by: NURSE PRACTITIONER

## 2017-12-02 PROCEDURE — C1751 CATH, INF, PER/CENT/MIDLINE: HCPCS

## 2017-12-02 PROCEDURE — 74011250636 HC RX REV CODE- 250/636: Performed by: INTERNAL MEDICINE

## 2017-12-02 PROCEDURE — 77030020847 HC STATLOK BARD -A

## 2017-12-02 PROCEDURE — 83690 ASSAY OF LIPASE: CPT | Performed by: NURSE PRACTITIONER

## 2017-12-02 PROCEDURE — 74011000258 HC RX REV CODE- 258: Performed by: INTERNAL MEDICINE

## 2017-12-02 RX ORDER — SODIUM CHLORIDE 0.9 % (FLUSH) 0.9 %
10 SYRINGE (ML) INJECTION EVERY 8 HOURS
Status: DISCONTINUED | OUTPATIENT
Start: 2017-12-02 | End: 2017-12-04 | Stop reason: HOSPADM

## 2017-12-02 RX ORDER — SODIUM CHLORIDE 0.9 % (FLUSH) 0.9 %
20 SYRINGE (ML) INJECTION AS NEEDED
Status: DISCONTINUED | OUTPATIENT
Start: 2017-12-02 | End: 2017-12-04 | Stop reason: HOSPADM

## 2017-12-02 RX ORDER — SODIUM CHLORIDE 0.9 % (FLUSH) 0.9 %
10 SYRINGE (ML) INJECTION AS NEEDED
Status: DISCONTINUED | OUTPATIENT
Start: 2017-12-02 | End: 2017-12-04 | Stop reason: HOSPADM

## 2017-12-02 RX ORDER — SODIUM CHLORIDE 0.9 % (FLUSH) 0.9 %
10 SYRINGE (ML) INJECTION EVERY 24 HOURS
Status: DISCONTINUED | OUTPATIENT
Start: 2017-12-02 | End: 2017-12-04 | Stop reason: HOSPADM

## 2017-12-02 RX ADMIN — BACLOFEN 5 MG: 10 TABLET ORAL at 16:12

## 2017-12-02 RX ADMIN — LEVOTHYROXINE SODIUM 50 MCG: 50 TABLET ORAL at 06:35

## 2017-12-02 RX ADMIN — ATORVASTATIN CALCIUM 20 MG: 20 TABLET, FILM COATED ORAL at 22:50

## 2017-12-02 RX ADMIN — GABAPENTIN 100 MG: 100 CAPSULE ORAL at 09:33

## 2017-12-02 RX ADMIN — CARVEDILOL 3.12 MG: 3.12 TABLET, FILM COATED ORAL at 17:29

## 2017-12-02 RX ADMIN — AZATHIOPRINE 150 MG: 50 TABLET ORAL at 10:26

## 2017-12-02 RX ADMIN — HYDROMORPHONE HYDROCHLORIDE 1 MG: 2 INJECTION INTRAMUSCULAR; INTRAVENOUS; SUBCUTANEOUS at 16:22

## 2017-12-02 RX ADMIN — HYDROMORPHONE HYDROCHLORIDE 1 MG: 2 INJECTION INTRAMUSCULAR; INTRAVENOUS; SUBCUTANEOUS at 00:19

## 2017-12-02 RX ADMIN — Medication 10 ML: at 10:28

## 2017-12-02 RX ADMIN — SODIUM CHLORIDE, SODIUM LACTATE, POTASSIUM CHLORIDE, AND CALCIUM CHLORIDE 150 ML/HR: 600; 310; 30; 20 INJECTION, SOLUTION INTRAVENOUS at 14:12

## 2017-12-02 RX ADMIN — Medication 10 ML: at 12:51

## 2017-12-02 RX ADMIN — HYDROMORPHONE HYDROCHLORIDE 1 MG: 2 INJECTION INTRAMUSCULAR; INTRAVENOUS; SUBCUTANEOUS at 20:48

## 2017-12-02 RX ADMIN — DOCUSATE SODIUM 100 MG: 100 CAPSULE, LIQUID FILLED ORAL at 17:29

## 2017-12-02 RX ADMIN — HYDROMORPHONE HYDROCHLORIDE 1 MG: 2 INJECTION INTRAMUSCULAR; INTRAVENOUS; SUBCUTANEOUS at 10:26

## 2017-12-02 RX ADMIN — CALCIUM CARBONATE-VITAMIN D TAB 500 MG-200 UNIT 1 TABLET: 500-200 TAB at 09:32

## 2017-12-02 RX ADMIN — ASPIRIN 81 MG 81 MG: 81 TABLET ORAL at 09:31

## 2017-12-02 RX ADMIN — CEFTRIAXONE 2 G: 2 INJECTION, POWDER, FOR SOLUTION INTRAMUSCULAR; INTRAVENOUS at 12:50

## 2017-12-02 RX ADMIN — CARVEDILOL 3.12 MG: 3.12 TABLET, FILM COATED ORAL at 09:33

## 2017-12-02 RX ADMIN — GABAPENTIN 100 MG: 100 CAPSULE ORAL at 17:29

## 2017-12-02 RX ADMIN — BACLOFEN 5 MG: 10 TABLET ORAL at 22:50

## 2017-12-02 RX ADMIN — SODIUM CHLORIDE, SODIUM LACTATE, POTASSIUM CHLORIDE, AND CALCIUM CHLORIDE 150 ML/HR: 600; 310; 30; 20 INJECTION, SOLUTION INTRAVENOUS at 06:35

## 2017-12-02 RX ADMIN — SODIUM CHLORIDE, SODIUM LACTATE, POTASSIUM CHLORIDE, AND CALCIUM CHLORIDE 150 ML/HR: 600; 310; 30; 20 INJECTION, SOLUTION INTRAVENOUS at 00:14

## 2017-12-02 RX ADMIN — MONTELUKAST SODIUM 10 MG: 10 TABLET, FILM COATED ORAL at 22:50

## 2017-12-02 RX ADMIN — PANTOPRAZOLE SODIUM 40 MG: 40 TABLET, DELAYED RELEASE ORAL at 09:32

## 2017-12-02 RX ADMIN — SODIUM CHLORIDE, SODIUM LACTATE, POTASSIUM CHLORIDE, AND CALCIUM CHLORIDE 150 ML/HR: 600; 310; 30; 20 INJECTION, SOLUTION INTRAVENOUS at 20:45

## 2017-12-02 RX ADMIN — HYDROMORPHONE HYDROCHLORIDE 1 MG: 2 INJECTION INTRAMUSCULAR; INTRAVENOUS; SUBCUTANEOUS at 05:47

## 2017-12-02 RX ADMIN — SENNOSIDES 8.6 MG: 8.6 TABLET, FILM COATED ORAL at 22:50

## 2017-12-02 RX ADMIN — Medication 10 ML: at 05:49

## 2017-12-02 RX ADMIN — DOCUSATE SODIUM 100 MG: 100 CAPSULE, LIQUID FILLED ORAL at 09:31

## 2017-12-02 RX ADMIN — BACLOFEN 5 MG: 10 TABLET ORAL at 09:32

## 2017-12-02 NOTE — PROCEDURES
PICC Placement Note    PRE-PROCEDURE VERIFICATION  Correct Procedure: yes  Correct Site:  yes  Temperature: Temp: 97.9 °F (36.6 °C), Temperature Source: Temp Source: Oral  Recent Labs      12/02/17   0432  12/01/17   0338   BUN  5*  6   CREA  0.50*  0.57   PLT   --   369   WBC   --   7.5     Allergies: Pcn [penicillins]; Tramadol; Proventil [albuterol sulfate]; Ace inhibitors; Albuterol; Ambien [zolpidem]; Ciprofloxacin; Lisinopril; Oxaprozin; Sulfadiazine; and Trazodone  Education materials, including PICC Booklet, for PICC Care given to patient: yes. See Patient Education activity for further details. PROCEDURE DETAIL  A single lumen PICC line was started for Home IV Therapy. The following documentation is in addition to the PICC properties in the lines/airways flowsheet :  Lot #: [id number]  Was xylocaine 1% used intradermally:  yes  Catheter Length: 36 (cm)  Vein Selection for PICC:right basilic  Central Line Bundle followed yes  Complication Related to Insertion: none    The placement was verified by ECG/Sapiens technology: The  tip location is on the right side and the tip is in the  superior vena cava. See ECG results for PICC tip placement. Report given to nurse Chuyita Cool. Line is okay to use.     Jessy Lomas RN

## 2017-12-02 NOTE — PROGRESS NOTES
Bedside shift change report given to KARLY Oh (oncoming nurse) by Alfonso Lao RN (offgoing nurse). Report included the following information SBAR, Kardex, Intake/Output, MAR and Recent Results.

## 2017-12-02 NOTE — PROGRESS NOTES
Bedside and Verbal shift change report given to Tony Barnes RN (oncoming nurse) by Denzel Villa RN (offgoing nurse). Report included the following information SBAR, Kardex, Intake/Output, MAR, Accordion, Recent Results and Med Rec Status.

## 2017-12-02 NOTE — PROGRESS NOTES
[address]  [address], [address]       GI PROGRESS NOTE        NAME: Thad Patel   :  1945   MRN:  [id number]       Subjective:   Patient complains of intermittent epigastric abdominal pain with radiation to the back. No hematemesis. Feeling better. Wants something PO. Objective:     VITALS:   Last 24hrs VS reviewed since prior progress note. Most recent are:  Visit Vitals    /69 (BP 1 Location: Left arm, BP Patient Position: At rest)    Pulse 74    Temp 98.1 °F (36.7 °C)    Resp 16    SpO2 100%       PHYSICAL EXAM:  General: Cooperative, no acute distress    Neurologic:  Alert and oriented X 3. HEENT: EOMI, no scleral icterus   Lungs:  CTA bilaterally  Heart:  S1 S2, regular rate and rhythm  Abdomen: Soft, non-distended, epigastric and LUQ tenderness. +Bowel sounds  Extremities: No edema  Psych:   Good insight. Not anxious or agitated. Lab Data Reviewed:     Recent Results (from the past 24 hour(s))   METABOLIC PANEL, BASIC    Collection Time: 17  4:32 AM   Result Value Ref Range    Sodium 140 136 - 145 mmol/L    Potassium 3.6 3.5 - 5.1 mmol/L    Chloride 108 97 - 108 mmol/L    CO2 25 21 - 32 mmol/L    Anion gap 7 5 - 15 mmol/L    Glucose 87 65 - 100 mg/dL    BUN 5 (L) 6 - 20 MG/DL    Creatinine 0.50 (L) 0.55 - 1.02 MG/DL    BUN/Creatinine ratio 10 (L) 12 - 20      GFR est AA >60 >60 ml/min/1.73m2    GFR est non-AA >60 >60 ml/min/1.73m2    Calcium 8.5 8.5 - 10.1 MG/DL   LIPASE    Collection Time: 17  4:32 AM   Result Value Ref Range    Lipase >3000 (H) 73 - 393 U/L         Assessment:   · Post ERCP pancreatitis: epigastric abdominal pain, nausea, lipase > 3000.    · Common bile duct stricture with stones on MRCP (17): status post ERCP with biliary sphincterotomy and biliary stent placement (): 2 cm distal CBD tight stricture, small stones, remainder of biliary tree was not dilated and had beading appearance consistent with PSC.  · Gram-negative bacteremia likely biliary in origin: ID following. On IV antibiotics. Blood cultures (11/27) showed E. Coli in 4/4 bottles. Repeat blood cultures (11/27): no growth x 4 days.      Patient Active Problem List   Diagnosis Code    RA (rheumatoid arthritis) ([facility] [address] M06.9    Essential hypertension I10    Postmenopausal Z78.0    Hypothyroidism E03.9    Allergic rhinitis J30.9    Chronic constipation K59.09    Vitamin D deficiency E55.9    Abnormal gait R26.9    Insomnia G47.00    Gastroesophageal reflux disease without esophagitis K21.9    Chronic midline low back pain without sciatica M54.5, G89.29    ACP (advance care planning) Z71.89    Demand ischemia (HCC) I24.8    Hyperlipidemia E78.5    Gastroparesis K31.84    Hypokalemia E87.6    Gram-negative bacteremia R78.81     Plan:   · Start CLD  · Trend labs  · Follow CA 19-9  · Follow cytology from biliary stricture  · Will need repeat ERCP in 3-6 months for stent removal  · IV antibiotics per ID     Signed By: Len Vallecillo MD     12/2/2017  11:20 AM

## 2017-12-02 NOTE — PROGRESS NOTES
0800 Bedside and Verbal shift change report given to Renato Green RN (oncoming nurse) by Tamara Swanson RN (offgoing nurse). Report included the following information SBAR, Kardex, Intake/Output, MAR, Accordion and Recent Results. 4377 Pt refused tylenol for pain, stating it made her sick to her stomach yesterday. Requests prn dilaudid instead. See MAR.    1600 Bedside and Verbal shift change report given to Mary Grace Monae (oncoming nurse) by Renato Green RN (offgoing nurse). Report included the following information SBAR, Kardex and MAR.

## 2017-12-02 NOTE — PROGRESS NOTES
Hospitalist Progress Note  Jorge Luis Shah NP  Answering service: [phone] OR [phone] from in house phone  Cell: [phone]   Date of Service:  2017  NAME:  Christen Ferrer  :  1945  MRN:  [id number]    Admission Summary:   Pt initially came to ED with son after he noted she was confused, not clear and her speech was \"off\" it was also noted that she was not feeling well on Thursday after eating Thanksgiving dinner:  nauseous and had loose stools. Pt denies Loose stools - says they were soft due to colace. She was seen and treated in the ED and discharged yesterday. She was subsequently called later on in the day due to positive blood cultures and asked to return to the hospital.    Interval history / Subjective:      Pt standing on side of bed, just finished up her morning wash up. Says she is feeling much better today and not having as much pain, though it is still noticeable. Assessment & Plan:     Post ERCP Pancreatitis:  - Lipase >3000 accompanied by nausea and vomiting .   - Lipase again >3000 today and still has some mild pain this am, tolerating ice chips  - may have dilaudid for pain, zofran for nausea  - diet advancement as per GI though think she could prob try some clears. Gram-negative bacteremia:   - has hx of recurrent bacteremia and previously followed by ID  - pt has allergy to penicillin, Cipro and Sulfa  - started Meropenem in ED, has been changed to ceftrixone  - Blood cultures from 0600 : e coli in 4/4 bottles, sensitive to ceftriaxone  - Repeat blood cultures from  with no growth (final)   - urine culture back and has e faecalis growing. Discussed this with ID - do not treat as she is asymptomatic  - ID following and placed IV abx orders in case she can be discharged over the weekend.  Will plan on PICC line (could do today or tomorrow)  - CT abdomen negative for acute process   - MRCP showing CBD stricture and stones. - ERCP 11/30: showed ~ 2 cm distal CBD tight stricture, all the way down to ampulla, small stones above it in the CBD, rest of biliary tree was not dilated and had beading appearance consistent with PSC. Stent placed but stones were not removed due to the stricture.      Hypokalemia: Resolved, continue usual home dose     Anemia, likely chronic: Monitor H/H     Elevated liver function tests: Resolved     Hx Hypothyroidism: Continue levothyroxine.      Hx Peripheral neuropathy: Continue Neurontin.      Hx GERD: Continue Protonix.      Hx Arthritis: continue Azathioprine.      Hx Hyperlipidemia: Continue Lipitor    Code status: Full  DVT prophylaxis: SCDs  Care Plan discussed with: patient  Son Nilton Schmidt ([phone]) - he is aware of plans, spoke with CM yesterday. Disposition: home when able, will need IV abx.  has already been consulted. Hospital Problems  Date Reviewed: 11/28/2017          Codes Class Noted POA    * (Principal)Gram-negative bacteremia ICD-10-CM: R78.81  ICD-9-CM: 790.7, 041.85  11/28/2017 Unknown            Review of Systems:   Denies HA. No chest pain. No respiratory complaints. + epigastric pain that radiates to her back but improved today. No Nausea/vomiting. Vital Signs:    Last 24hrs VS reviewed since prior progress note. Most recent are:  Visit Vitals    /69 (BP 1 Location: Left arm, BP Patient Position: At rest)    Pulse 74    Temp 98.1 °F (36.7 °C)    Resp 16    SpO2 100%     No intake or output data in the 24 hours ending 12/02/17 0648   Physical Examination:         Constitutional:  No acute distress, cooperative, pleasant    ENT:  Oral mucous membranes moist, oropharynx benign. Resp:  CTA bilaterally. No wheezing. No accessory muscle use, RA   CV:  Regular rhythm, normal rate, no murmurs    GI:  Soft, non distended, epigastric pain with radiation to back. Tender around hernia.  normoactive bowel sounds + BM 11/30 Musculoskeletal:  No edema, warm, 2+ pulses throughout    Neurologic:  Moves all extremities. AAOx3     Data Review:   Review and/or order of clinical lab test  Review and/or order of tests in the radiology section of CPT  Review and/or order of tests in the medicine section of CPT    Labs:     Recent Labs      12/01/17 0338 11/30/17   0500   WBC  7.5  4.5   HGB  9.9*  9.2*   HCT  29.9*  27.7*   PLT  369  289     Recent Labs      12/02/17 0432 12/01/17 0338 11/30/17   0500   NA  140  141  145   K  3.6  4.2  3.7   CL  108  110*  112*   CO2  25  24  25   BUN  5*  6  8   CREA  0.50*  0.57  0.51*   GLU  87  135*  98   CA  8.5  8.8  8.5     Recent Labs      12/02/17 0432 12/01/17 0338 11/30/17   0500   SGOT   --   24  18   ALT   --   28  28   AP   --   117  108   TBILI   --   0.7  0.5   TP   --   7.0  6.4   ALB   --   2.7*  2.3*   GLOB   --   4.3*  4.1*   LPSE  >3000*  >3000*   --      No results for input(s): INR, PTP, APTT in the last 72 hours. No lab exists for component: INREXT, INREXT   No results for input(s): FE, TIBC, PSAT, FERR in the last 72 hours. No results found for: FOL, RBCF   No results for input(s): PH, PCO2, PO2 in the last 72 hours. No results for input(s): CPK, CKNDX, TROIQ in the last 72 hours.     No lab exists for component: CPKMB  Lab Results   Component Value Date/Time    Cholesterol, total 97 06/26/2017 01:47 PM    HDL Cholesterol 37 06/26/2017 01:47 PM    LDL, calculated 43 06/26/2017 01:47 PM    Triglyceride 86 06/26/2017 01:47 PM    CHOL/HDL Ratio 4.5 12/23/2015 01:45 AM     Lab Results   Component Value Date/Time    Glucose (POC) 138 11/28/2017 09:21 PM    Glucose (POC) 138 11/27/2017 02:39 AM    Glucose (POC) 98 05/19/2017 11:52 AM    Glucose (POC) 125 12/30/2015 11:28 AM    Glucose (POC) 101 12/23/2015 12:08 AM     Lab Results   Component Value Date/Time    Color YELLOW/STRAW 11/27/2017 02:32 AM    Appearance CLEAR 11/27/2017 02:32 AM    Specific gravity 1.012 11/27/2017 02:32 AM    Specific gravity 1.005 12/23/2015 01:45 AM    pH (UA) 7.0 11/27/2017 02:32 AM    Protein NEGATIVE  11/27/2017 02:32 AM    Glucose NEGATIVE  11/27/2017 02:32 AM    Ketone NEGATIVE  11/27/2017 02:32 AM    Bilirubin NEGATIVE  11/27/2017 02:32 AM    Urobilinogen 1.0 11/27/2017 02:32 AM    Nitrites NEGATIVE  11/27/2017 02:32 AM    Leukocyte Esterase NEGATIVE  11/27/2017 02:32 AM    Epithelial cells FEW 11/27/2017 02:32 AM    Bacteria NEGATIVE  11/27/2017 02:32 AM    WBC 0-4 11/27/2017 02:32 AM    RBC 0-5 11/27/2017 02:32 AM     Medications Reviewed:     Current Facility-Administered Medications   Medication Dose Route Frequency    ondansetron (ZOFRAN) injection 4 mg  4 mg IntraVENous Q6H PRN    lactated Ringers infusion  150 mL/hr IntraVENous CONTINUOUS    HYDROmorphone (DILAUDID) injection 1 mg  1 mg IntraVENous Q3H PRN    cefTRIAXone (ROCEPHIN) 2 g in 0.9% sodium chloride (MBP/ADV) 50 mL  2 g IntraVENous Q24H    acetaminophen (TYLENOL) tablet 650 mg  650 mg Oral Q6H PRN    sodium chloride (NS) flush 5-10 mL  5-10 mL IntraVENous Q8H    sodium chloride (NS) flush 5-10 mL  5-10 mL IntraVENous PRN    aspirin chewable tablet 81 mg  81 mg Oral DAILY    atorvastatin (LIPITOR) tablet 20 mg  20 mg Oral QHS    azaTHIOprine (IMURAN) tablet 150 mg  150 mg Oral DAILY    baclofen (LIORESAL) tablet 5 mg  5 mg Oral TID    calcium-vitamin D (OS-KATIE) 500 mg-200 unit tablet  1 Tab Oral DAILY    carvedilol (COREG) tablet 3.125 mg  3.125 mg Oral BID    gabapentin (NEURONTIN) capsule 100 mg  100 mg Oral BID    levothyroxine (SYNTHROID) tablet 50 mcg  50 mcg Oral ACB    montelukast (SINGULAIR) tablet 10 mg  10 mg Oral QHS    pantoprazole (PROTONIX) tablet 40 mg  40 mg Oral DAILY    verapamil ER (CALAN-SR) tablet 120 mg  120 mg Oral DAILY WITH BREAKFAST    docusate sodium (COLACE) capsule 100 mg  100 mg Oral BID    senna (SENOKOT) tablet 8.6 mg  1 Tab Oral QHS ______________________________________________________________________  EXPECTED LENGTH OF STAY: 4d 9h  ACTUAL LENGTH OF STAY:          [address], NP

## 2017-12-03 LAB
ALBUMIN SERPL-MCNC: 2.3 G/DL (ref 3.5–5)
ALBUMIN/GLOB SERPL: 0.5 {RATIO} (ref 1.1–2.2)
ALP SERPL-CCNC: 104 U/L (ref 45–117)
ALT SERPL-CCNC: 22 U/L (ref 12–78)
ANION GAP SERPL CALC-SCNC: 4 MMOL/L (ref 5–15)
AST SERPL-CCNC: 18 U/L (ref 15–37)
BILIRUB SERPL-MCNC: 0.6 MG/DL (ref 0.2–1)
BUN SERPL-MCNC: 5 MG/DL (ref 6–20)
BUN/CREAT SERPL: 11 (ref 12–20)
CALCIUM SERPL-MCNC: 8.3 MG/DL (ref 8.5–10.1)
CHLORIDE SERPL-SCNC: 107 MMOL/L (ref 97–108)
CO2 SERPL-SCNC: 29 MMOL/L (ref 21–32)
CREAT SERPL-MCNC: 0.47 MG/DL (ref 0.55–1.02)
GLOBULIN SER CALC-MCNC: 4.2 G/DL (ref 2–4)
GLUCOSE SERPL-MCNC: 136 MG/DL (ref 65–100)
LIPASE SERPL-CCNC: >3000 U/L (ref 73–393)
POTASSIUM SERPL-SCNC: 3.3 MMOL/L (ref 3.5–5.1)
PROT SERPL-MCNC: 6.5 G/DL (ref 6.4–8.2)
SODIUM SERPL-SCNC: 140 MMOL/L (ref 136–145)

## 2017-12-03 PROCEDURE — 36415 COLL VENOUS BLD VENIPUNCTURE: CPT | Performed by: NURSE PRACTITIONER

## 2017-12-03 PROCEDURE — 74011250637 HC RX REV CODE- 250/637: Performed by: NURSE PRACTITIONER

## 2017-12-03 PROCEDURE — 74011250637 HC RX REV CODE- 250/637: Performed by: FAMILY MEDICINE

## 2017-12-03 PROCEDURE — 65270000029 HC RM PRIVATE

## 2017-12-03 PROCEDURE — 74011250637 HC RX REV CODE- 250/637: Performed by: INTERNAL MEDICINE

## 2017-12-03 PROCEDURE — 74011250636 HC RX REV CODE- 250/636: Performed by: NURSE PRACTITIONER

## 2017-12-03 PROCEDURE — 74011250636 HC RX REV CODE- 250/636: Performed by: FAMILY MEDICINE

## 2017-12-03 PROCEDURE — 74011000258 HC RX REV CODE- 258: Performed by: INTERNAL MEDICINE

## 2017-12-03 PROCEDURE — 83690 ASSAY OF LIPASE: CPT | Performed by: NURSE PRACTITIONER

## 2017-12-03 PROCEDURE — 74011250636 HC RX REV CODE- 250/636: Performed by: INTERNAL MEDICINE

## 2017-12-03 PROCEDURE — 80053 COMPREHEN METABOLIC PANEL: CPT | Performed by: NURSE PRACTITIONER

## 2017-12-03 RX ORDER — HYDROMORPHONE HYDROCHLORIDE 2 MG/1
1 TABLET ORAL
Status: DISCONTINUED | OUTPATIENT
Start: 2017-12-03 | End: 2017-12-04 | Stop reason: HOSPADM

## 2017-12-03 RX ORDER — POTASSIUM CHLORIDE 20MEQ/15ML
20 LIQUID (ML) ORAL DAILY
Status: DISCONTINUED | OUTPATIENT
Start: 2017-12-04 | End: 2017-12-04 | Stop reason: HOSPADM

## 2017-12-03 RX ORDER — POTASSIUM CHLORIDE 750 MG/1
40 TABLET, FILM COATED, EXTENDED RELEASE ORAL
Status: COMPLETED | OUTPATIENT
Start: 2017-12-03 | End: 2017-12-03

## 2017-12-03 RX ORDER — POTASSIUM CHLORIDE 750 MG/1
40 TABLET, FILM COATED, EXTENDED RELEASE ORAL ONCE
Status: COMPLETED | OUTPATIENT
Start: 2017-12-03 | End: 2017-12-03

## 2017-12-03 RX ADMIN — Medication 10 ML: at 06:44

## 2017-12-03 RX ADMIN — ASPIRIN 81 MG 81 MG: 81 TABLET ORAL at 10:52

## 2017-12-03 RX ADMIN — GABAPENTIN 100 MG: 100 CAPSULE ORAL at 18:19

## 2017-12-03 RX ADMIN — HYDROMORPHONE HYDROCHLORIDE 1 MG: 2 INJECTION INTRAMUSCULAR; INTRAVENOUS; SUBCUTANEOUS at 03:07

## 2017-12-03 RX ADMIN — SENNOSIDES 8.6 MG: 8.6 TABLET, FILM COATED ORAL at 21:26

## 2017-12-03 RX ADMIN — CARVEDILOL 3.12 MG: 3.12 TABLET, FILM COATED ORAL at 18:19

## 2017-12-03 RX ADMIN — GABAPENTIN 100 MG: 100 CAPSULE ORAL at 10:52

## 2017-12-03 RX ADMIN — Medication 10 ML: at 12:00

## 2017-12-03 RX ADMIN — CARVEDILOL 3.12 MG: 3.12 TABLET, FILM COATED ORAL at 10:52

## 2017-12-03 RX ADMIN — BACLOFEN 5 MG: 10 TABLET ORAL at 21:26

## 2017-12-03 RX ADMIN — Medication 10 ML: at 21:29

## 2017-12-03 RX ADMIN — DOCUSATE SODIUM 100 MG: 100 CAPSULE, LIQUID FILLED ORAL at 18:18

## 2017-12-03 RX ADMIN — SODIUM CHLORIDE, SODIUM LACTATE, POTASSIUM CHLORIDE, AND CALCIUM CHLORIDE 150 ML/HR: 600; 310; 30; 20 INJECTION, SOLUTION INTRAVENOUS at 10:51

## 2017-12-03 RX ADMIN — BACLOFEN 5 MG: 10 TABLET ORAL at 18:19

## 2017-12-03 RX ADMIN — SODIUM CHLORIDE, SODIUM LACTATE, POTASSIUM CHLORIDE, AND CALCIUM CHLORIDE 150 ML/HR: 600; 310; 30; 20 INJECTION, SOLUTION INTRAVENOUS at 18:18

## 2017-12-03 RX ADMIN — DOCUSATE SODIUM 100 MG: 100 CAPSULE, LIQUID FILLED ORAL at 10:51

## 2017-12-03 RX ADMIN — ATORVASTATIN CALCIUM 20 MG: 20 TABLET, FILM COATED ORAL at 21:26

## 2017-12-03 RX ADMIN — POTASSIUM CHLORIDE 40 MEQ: 750 TABLET, FILM COATED, EXTENDED RELEASE ORAL at 18:18

## 2017-12-03 RX ADMIN — CALCIUM CARBONATE-VITAMIN D TAB 500 MG-200 UNIT 1 TABLET: 500-200 TAB at 10:51

## 2017-12-03 RX ADMIN — SODIUM CHLORIDE, SODIUM LACTATE, POTASSIUM CHLORIDE, AND CALCIUM CHLORIDE 150 ML/HR: 600; 310; 30; 20 INJECTION, SOLUTION INTRAVENOUS at 02:55

## 2017-12-03 RX ADMIN — PANTOPRAZOLE SODIUM 40 MG: 40 TABLET, DELAYED RELEASE ORAL at 10:51

## 2017-12-03 RX ADMIN — AZATHIOPRINE 150 MG: 50 TABLET ORAL at 11:00

## 2017-12-03 RX ADMIN — POTASSIUM CHLORIDE 40 MEQ: 750 TABLET, FILM COATED, EXTENDED RELEASE ORAL at 07:33

## 2017-12-03 RX ADMIN — BACLOFEN 5 MG: 10 TABLET ORAL at 10:52

## 2017-12-03 RX ADMIN — LEVOTHYROXINE SODIUM 50 MCG: 50 TABLET ORAL at 06:44

## 2017-12-03 RX ADMIN — CEFTRIAXONE 2 G: 2 INJECTION, POWDER, FOR SOLUTION INTRAMUSCULAR; INTRAVENOUS at 14:39

## 2017-12-03 RX ADMIN — HYDROMORPHONE HYDROCHLORIDE 1 MG: 2 TABLET ORAL at 14:14

## 2017-12-03 RX ADMIN — MONTELUKAST SODIUM 10 MG: 10 TABLET, FILM COATED ORAL at 21:26

## 2017-12-03 RX ADMIN — HYDROMORPHONE HYDROCHLORIDE 1 MG: 2 TABLET ORAL at 21:37

## 2017-12-03 NOTE — PROGRESS NOTES
Hospitalist Progress Note  Marcela Velasquez NP  Answering service: [phone] OR [phone] from in house phone  Cell: [phone]   Date of Service:  12/3/2017  NAME:  Jose Antonio Cross  :  1945  MRN:  [id number]    Admission Summary:   Pt initially came to ED with son after he noted she was confused, not clear and her speech was \"off\" it was also noted that she was not feeling well on Thursday after eating Thanksgiving dinner:  nauseous and had loose stools. Pt denies Loose stools - says they were soft due to colace. She was seen and treated in the ED and discharged yesterday. She was subsequently called later on in the day due to positive blood cultures and asked to return to the hospital.    Interval history / Subjective:      Pt sitting on side of bed, had 100% of clear liquids diet and feeling very well. Discussed plan of care: plans to advance diet today and plan for discharge tomorrow. Assessment & Plan:     Post ERCP Pancreatitis:  - Lipase >3000 accompanied by nausea and vomiting .   - Lipase again >3000  and 12/3 still has some mild pain  but diet advanced by GI to clear lquids. Today, she is doing well and having very little to no pain. Advancing diet to GI lite. Following clinical course and not necessarily lipase levels. - may have dilaudid for pain, zofran for nausea  - will need GI follow up and repeat ERCP 3-6 months    Gram-negative bacteremia:   - has hx of recurrent bacteremia and previously followed by ID  - pt has allergy to penicillin, Cipro and Sulfa  - started Meropenem in ED, has been changed to ceftrixone  - Blood cultures from 0600 : e coli in 4/4 bottles, sensitive to ceftriaxone  - Repeat blood cultures from  with no growth (final)   - urine culture back and has e faecalis growing.  Discussed this with ID - do not treat as she is asymptomatic  - CT abdomen negative for acute process   - MRCP showing CBD stricture and stones. - ERCP 11/30: showed ~ 2 cm distal CBD tight stricture, all the way down to ampulla, small stones above it in the CBD, rest of biliary tree was not dilated and had beading appearance consistent with PSC. Stent placed but stones were not removed due to the stricture. - CA 19-9 result was 1 (ref range 0-35)  - ID following and placed IV abx 12/1 in case she could be discharged over the weekend. - PICC placed 12/2    Hypokalemia: Mildly low today, replaced  - continue usual home dose (20 mEq daily)     Anemia, likely chronic: Monitor H/H     Elevated liver function tests: Resolved     Hx Hypothyroidism: Continue levothyroxine.      Hx Peripheral neuropathy: Continue Neurontin.      Hx GERD: Continue Protonix.      Hx Arthritis: continue Azathioprine.      Hx Hyperlipidemia: Continue Lipitor    Code status: Full  DVT prophylaxis: SCDs  Care Plan discussed with: patient, GI  Son Yamil Camejo ([phone]) - discussed plan of care with son over the phone 12/3  Disposition: home when able, will need IV abx.  has already been consulted and looking into [facility] as provider     Hospital Problems  Date Reviewed: 11/28/2017          Codes Class Noted POA    * (Principal)Gram-negative bacteremia ICD-10-CM: R78.81  ICD-9-CM: 790.7, 041.85  11/28/2017 Unknown            Review of Systems:   Denies HA. No chest pain. No respiratory complaints. + epigastric pain that radiates to her back but improved today. No Nausea/vomiting. Vital Signs:    Last 24hrs VS reviewed since prior progress note.  Most recent are:  Visit Vitals    BP (!) 153/92    Pulse 70    Temp 98.7 °F (37.1 °C)    Resp 18    SpO2 100%       Intake/Output Summary (Last 24 hours) at 12/03/17 1133  Last data filed at 12/03/17 0735   Gross per 24 hour   Intake             2550 ml   Output             2900 ml   Net             -350 ml      Physical Examination:         Constitutional:  No acute distress, cooperative, pleasant    ENT:  Oral mucous membranes moist, oropharynx benign. Resp:  CTA bilaterally. No wheezing. No accessory muscle use, RA   CV:  Regular rhythm, normal rate, no murmurs    GI:  Soft, non distended, epigastric pain with radiation to back. Tender around hernia. normoactive bowel sounds + BM 11/30    Musculoskeletal:  No edema, warm, 2+ pulses throughout    Neurologic:  Moves all extremities. AAOx3     Data Review:   Review and/or order of clinical lab test  Review and/or order of tests in the radiology section of CPT  Review and/or order of tests in the medicine section of CPT    Labs:     Recent Labs      12/01/17 0338   WBC  7.5   HGB  9.9*   HCT  29.9*   PLT  369     Recent Labs      12/03/17 0259 12/02/17 0432 12/01/17 0338   NA  140  140  141   K  3.3*  3.6  4.2   CL  107  108  110*   CO2  29  25  24   BUN  5*  5*  6   CREA  0.47*  0.50*  0.57   GLU  136*  87  135*   CA  8.3*  8.5  8.8     Recent Labs      12/03/17 0259 12/02/17 0432 12/01/17 0338   SGOT  18   --   24   ALT  22   --   28   AP  104   --   117   TBILI  0.6   --   0.7   TP  6.5   --   7.0   ALB  2.3*   --   2.7*   GLOB  4.2*   --   4.3*   LPSE  >3000*  >3000*  >3000*     No results for input(s): INR, PTP, APTT in the last 72 hours. No lab exists for component: INREXT, INREXT   No results for input(s): FE, TIBC, PSAT, FERR in the last 72 hours. No results found for: FOL, RBCF   No results for input(s): PH, PCO2, PO2 in the last 72 hours. No results for input(s): CPK, CKNDX, TROIQ in the last 72 hours.     No lab exists for component: CPKMB  Lab Results   Component Value Date/Time    Cholesterol, total 97 06/26/2017 01:47 PM    HDL Cholesterol 37 06/26/2017 01:47 PM    LDL, calculated 43 06/26/2017 01:47 PM    Triglyceride 86 06/26/2017 01:47 PM    CHOL/HDL Ratio 4.5 12/23/2015 01:45 AM     Lab Results   Component Value Date/Time    Glucose (POC) 138 11/28/2017 09:21 PM    Glucose (POC) 138 11/27/2017 02:39 AM Glucose (POC) 98 05/19/2017 11:52 AM    Glucose (POC) 125 12/30/2015 11:28 AM    Glucose (POC) 101 12/23/2015 12:08 AM     Lab Results   Component Value Date/Time    Color YELLOW/STRAW 11/27/2017 02:32 AM    Appearance CLEAR 11/27/2017 02:32 AM    Specific gravity 1.012 11/27/2017 02:32 AM    Specific gravity 1.005 12/23/2015 01:45 AM    pH (UA) 7.0 11/27/2017 02:32 AM    Protein NEGATIVE  11/27/2017 02:32 AM    Glucose NEGATIVE  11/27/2017 02:32 AM    Ketone NEGATIVE  11/27/2017 02:32 AM    Bilirubin NEGATIVE  11/27/2017 02:32 AM    Urobilinogen 1.0 11/27/2017 02:32 AM    Nitrites NEGATIVE  11/27/2017 02:32 AM    Leukocyte Esterase NEGATIVE  11/27/2017 02:32 AM    Epithelial cells FEW 11/27/2017 02:32 AM    Bacteria NEGATIVE  11/27/2017 02:32 AM    WBC 0-4 11/27/2017 02:32 AM    RBC 0-5 11/27/2017 02:32 AM     Medications Reviewed:     Current Facility-Administered Medications   Medication Dose Route Frequency    sodium chloride (NS) flush 20 mL  20 mL InterCATHeter PRN    sodium chloride (NS) flush 10 mL  10 mL InterCATHeter Q24H    sodium chloride (NS) flush 10 mL  10 mL InterCATHeter PRN    sodium chloride (NS) flush 10 mL  10 mL InterCATHeter Q8H    alteplase (CATHFLO) 1 mg in sterile water (preservative free) 1 mL injection  1 mg InterCATHeter PRN    ondansetron (ZOFRAN) injection 4 mg  4 mg IntraVENous Q6H PRN    lactated Ringers infusion  150 mL/hr IntraVENous CONTINUOUS    HYDROmorphone (DILAUDID) injection 1 mg  1 mg IntraVENous Q3H PRN    cefTRIAXone (ROCEPHIN) 2 g in 0.9% sodium chloride (MBP/ADV) 50 mL  2 g IntraVENous Q24H    acetaminophen (TYLENOL) tablet 650 mg  650 mg Oral Q6H PRN    sodium chloride (NS) flush 5-10 mL  5-10 mL IntraVENous Q8H    sodium chloride (NS) flush 5-10 mL  5-10 mL IntraVENous PRN    aspirin chewable tablet 81 mg  81 mg Oral DAILY    atorvastatin (LIPITOR) tablet 20 mg  20 mg Oral QHS    azaTHIOprine (IMURAN) tablet 150 mg  150 mg Oral DAILY    baclofen (LIORESAL) tablet 5 mg  5 mg Oral TID    calcium-vitamin D (OS-KATIE) 500 mg-200 unit tablet  1 Tab Oral DAILY    carvedilol (COREG) tablet 3.125 mg  3.125 mg Oral BID    gabapentin (NEURONTIN) capsule 100 mg  100 mg Oral BID    levothyroxine (SYNTHROID) tablet 50 mcg  50 mcg Oral ACB    montelukast (SINGULAIR) tablet 10 mg  10 mg Oral QHS    pantoprazole (PROTONIX) tablet 40 mg  40 mg Oral DAILY    verapamil ER (CALAN-SR) tablet 120 mg  120 mg Oral DAILY WITH BREAKFAST    docusate sodium (COLACE) capsule 100 mg  100 mg Oral BID    senna (SENOKOT) tablet 8.6 mg  1 Tab Oral QHS   ______________________________________________________________________  EXPECTED LENGTH OF STAY: 4d 9h  ACTUAL LENGTH OF STAY:          5               Marcela Velasquez NP

## 2017-12-03 NOTE — PROGRESS NOTES
[address]  [address], [address]       GI PROGRESS NOTE        NAME: Nick Villeda   :  1945   MRN:  [id number]       Subjective:   Significant improvement in epigastric pain. No hematemesis. Feeling better. Wants to advance diet. Tolerating liquid diet. Objective:     VITALS:   Last 24hrs VS reviewed since prior progress note. Most recent are:  Visit Vitals    /82    Pulse 73    Temp 97.9 °F (36.6 °C)    Resp 16    SpO2 94%       PHYSICAL EXAM:  General: Cooperative, no acute distress    Neurologic:  Alert and oriented X 3. HEENT: EOMI, no scleral icterus   Lungs:  CTA bilaterally  Heart:  S1 S2, regular rate and rhythm  Abdomen: Soft, non-distended, mild epigastric pain which is improving. +Bowel sounds  Extremities: No edema  Psych:   Good insight. Not anxious or agitated. Lab Data Reviewed:     Recent Results (from the past 24 hour(s))   METABOLIC PANEL, COMPREHENSIVE    Collection Time: 17  2:59 AM   Result Value Ref Range    Sodium 140 136 - 145 mmol/L    Potassium 3.3 (L) 3.5 - 5.1 mmol/L    Chloride 107 97 - 108 mmol/L    CO2 29 21 - 32 mmol/L    Anion gap 4 (L) 5 - 15 mmol/L    Glucose 136 (H) 65 - 100 mg/dL    BUN 5 (L) 6 - 20 MG/DL    Creatinine 0.47 (L) 0.55 - 1.02 MG/DL    BUN/Creatinine ratio 11 (L) 12 - 20      GFR est AA >60 >60 ml/min/1.73m2    GFR est non-AA >60 >60 ml/min/1.73m2    Calcium 8.3 (L) 8.5 - 10.1 MG/DL    Bilirubin, total 0.6 0.2 - 1.0 MG/DL    ALT (SGPT) 22 12 - 78 U/L    AST (SGOT) 18 15 - 37 U/L    Alk.  phosphatase 104 45 - 117 U/L    Protein, total 6.5 6.4 - 8.2 g/dL    Albumin 2.3 (L) 3.5 - 5.0 g/dL    Globulin 4.2 (H) 2.0 - 4.0 g/dL    A-G Ratio 0.5 (L) 1.1 - 2.2     LIPASE    Collection Time: 17  2:59 AM   Result Value Ref Range    Lipase >3000 (H) 73 - 393 U/L         Assessment:   · Post ERCP pancreatitis: epigastric abdominal pain, nausea  · Common bile duct stricture with stones on MRCP (11/29/17): status post ERCP with biliary sphincterotomy and biliary stent placement (11/30): 2 cm distal CBD tight stricture, small stones, remainder of biliary tree was not dilated and had beading appearance consistent with PSC. · Gram-negative bacteremia likely biliary in origin: ID following. On IV antibiotics. Blood cultures (11/27) showed E. Coli in 4/4 bottles. Repeat blood cultures (11/27): no growth x 4 days. Patient Active Problem List   Diagnosis Code    RA (rheumatoid arthritis) ([facility] [address] M06.9    Essential hypertension I10    Postmenopausal Z78.0    Hypothyroidism E03.9    Allergic rhinitis J30.9    Chronic constipation K59.09    Vitamin D deficiency E55.9    Abnormal gait R26.9    Insomnia G47.00    Gastroesophageal reflux disease without esophagitis K21.9    Chronic midline low back pain without sciatica M54.5, G89.29    ACP (advance care planning) Z71.89    Demand ischemia (HCC) I24.8    Hyperlipidemia E78.5    Gastroparesis K31.84    Hypokalemia E87.6    Gram-negative bacteremia R78.81     Plan:   · Low fat GI lite diet started.   · Follow CA 19-9  · Will need repeat ERCP in 3-6 months for stent removal  · IV antibiotics per ID  · Based on clinical course, possible discharge tomorrow  · Dr. Faisal Smith to follow     Signed By: Abbey Maza MD     12/3/2017  11:20 AM

## 2017-12-03 NOTE — PROGRESS NOTES
Bedside shift change report given to Suresh Lantigua RN (oncoming nurse) by Mary Cristina RN (offgoing nurse). Report included the following information SBAR, Kardex, Intake/Output, MAR and Recent Results.

## 2017-12-04 ENCOUNTER — HOME HEALTH ADMISSION (OUTPATIENT)
Dept: HOME HEALTH SERVICES | Facility: HOME HEALTH | Age: 72
End: 2017-12-04
Payer: MEDICARE

## 2017-12-04 VITALS
OXYGEN SATURATION: 100 % | HEART RATE: 72 BPM | SYSTOLIC BLOOD PRESSURE: 146 MMHG | TEMPERATURE: 98.1 F | RESPIRATION RATE: 18 BRPM | DIASTOLIC BLOOD PRESSURE: 81 MMHG

## 2017-12-04 LAB
ANION GAP SERPL CALC-SCNC: 5 MMOL/L (ref 5–15)
BUN SERPL-MCNC: 4 MG/DL (ref 6–20)
BUN/CREAT SERPL: 9 (ref 12–20)
CALCIUM SERPL-MCNC: 8.6 MG/DL (ref 8.5–10.1)
CHLORIDE SERPL-SCNC: 109 MMOL/L (ref 97–108)
CO2 SERPL-SCNC: 28 MMOL/L (ref 21–32)
CREAT SERPL-MCNC: 0.44 MG/DL (ref 0.55–1.02)
GLUCOSE SERPL-MCNC: 88 MG/DL (ref 65–100)
LIPASE SERPL-CCNC: >3000 U/L (ref 73–393)
POTASSIUM SERPL-SCNC: 3.6 MMOL/L (ref 3.5–5.1)
SODIUM SERPL-SCNC: 142 MMOL/L (ref 136–145)

## 2017-12-04 PROCEDURE — 74011250636 HC RX REV CODE- 250/636: Performed by: FAMILY MEDICINE

## 2017-12-04 PROCEDURE — 74011250636 HC RX REV CODE- 250/636: Performed by: INTERNAL MEDICINE

## 2017-12-04 PROCEDURE — 74011250637 HC RX REV CODE- 250/637: Performed by: FAMILY MEDICINE

## 2017-12-04 PROCEDURE — 36415 COLL VENOUS BLD VENIPUNCTURE: CPT | Performed by: NURSE PRACTITIONER

## 2017-12-04 PROCEDURE — 74011250636 HC RX REV CODE- 250/636: Performed by: NURSE PRACTITIONER

## 2017-12-04 PROCEDURE — 80048 BASIC METABOLIC PNL TOTAL CA: CPT | Performed by: NURSE PRACTITIONER

## 2017-12-04 PROCEDURE — 74011000258 HC RX REV CODE- 258: Performed by: INTERNAL MEDICINE

## 2017-12-04 PROCEDURE — 74011250637 HC RX REV CODE- 250/637: Performed by: NURSE PRACTITIONER

## 2017-12-04 PROCEDURE — 83690 ASSAY OF LIPASE: CPT | Performed by: NURSE PRACTITIONER

## 2017-12-04 RX ORDER — DOCUSATE SODIUM 100 MG/1
100 CAPSULE, LIQUID FILLED ORAL 2 TIMES DAILY
Qty: 60 CAP | Refills: 2 | Status: SHIPPED | OUTPATIENT
Start: 2017-12-04 | End: 2018-03-04

## 2017-12-04 RX ORDER — OXYCODONE HYDROCHLORIDE 5 MG/1
5 TABLET ORAL 2 TIMES DAILY
Qty: 14 TAB | Refills: 0 | Status: SHIPPED | OUTPATIENT
Start: 2017-12-04 | End: 2017-12-11

## 2017-12-04 RX ADMIN — CALCIUM CARBONATE-VITAMIN D TAB 500 MG-200 UNIT 1 TABLET: 500-200 TAB at 10:12

## 2017-12-04 RX ADMIN — GABAPENTIN 100 MG: 100 CAPSULE ORAL at 10:13

## 2017-12-04 RX ADMIN — Medication 10 ML: at 07:08

## 2017-12-04 RX ADMIN — Medication 10 ML: at 07:09

## 2017-12-04 RX ADMIN — POTASSIUM CHLORIDE 20 MEQ: 20 SOLUTION ORAL at 10:15

## 2017-12-04 RX ADMIN — AZATHIOPRINE 150 MG: 50 TABLET ORAL at 10:19

## 2017-12-04 RX ADMIN — BACLOFEN 5 MG: 10 TABLET ORAL at 10:14

## 2017-12-04 RX ADMIN — Medication 10 ML: at 12:34

## 2017-12-04 RX ADMIN — ASPIRIN 81 MG 81 MG: 81 TABLET ORAL at 10:14

## 2017-12-04 RX ADMIN — PANTOPRAZOLE SODIUM 40 MG: 40 TABLET, DELAYED RELEASE ORAL at 10:13

## 2017-12-04 RX ADMIN — LEVOTHYROXINE SODIUM 50 MCG: 50 TABLET ORAL at 07:08

## 2017-12-04 RX ADMIN — CEFTRIAXONE 2 G: 2 INJECTION, POWDER, FOR SOLUTION INTRAMUSCULAR; INTRAVENOUS at 12:35

## 2017-12-04 RX ADMIN — SODIUM CHLORIDE, SODIUM LACTATE, POTASSIUM CHLORIDE, AND CALCIUM CHLORIDE 150 ML/HR: 600; 310; 30; 20 INJECTION, SOLUTION INTRAVENOUS at 08:21

## 2017-12-04 RX ADMIN — CARVEDILOL 3.12 MG: 3.12 TABLET, FILM COATED ORAL at 10:14

## 2017-12-04 RX ADMIN — DOCUSATE SODIUM 100 MG: 100 CAPSULE, LIQUID FILLED ORAL at 10:15

## 2017-12-04 RX ADMIN — SODIUM CHLORIDE, SODIUM LACTATE, POTASSIUM CHLORIDE, AND CALCIUM CHLORIDE 150 ML/HR: 600; 310; 30; 20 INJECTION, SOLUTION INTRAVENOUS at 01:08

## 2017-12-04 NOTE — DISCHARGE INSTRUCTIONS
Discharge Instructions       PATIENT ID: Jose Antonio Cross  MRN: [id number]   YOB: 1945    DATE OF ADMISSION: 11/27/2017 10:26 PM    DATE OF DISCHARGE: 12/4/2017    PRIMARY CARE PROVIDER: Cely Voss MD     ATTENDING PHYSICIAN: Urszula Davidson MD  DISCHARGING PROVIDER: Machelle Tam NP    To contact this individual call [phone] and ask the  to page. If unavailable ask to be transferred the Adult Hospitalist Department. DISCHARGE DIAGNOSES: E coli Bacteremia    CONSULTATIONS: IP CONSULT TO HOSPITALIST  IP CONSULT TO INFECTIOUS DISEASES  IP CONSULT TO GASTROENTEROLOGY    PROCEDURES/SURGERIES: Procedure(s):  ENDOSCOPIC RETROGRADE CHOLANGIOPANCREATOGRAPHY  ENDOSCOPIC SPHINCTEROTOMY  ENDOSCOPY WITH PROSTHESIS OR STENT PLACEMENT    PENDING TEST RESULTS:   At the time of discharge the following test results are still pending: none    FOLLOW UP APPOINTMENTS:   Follow-up Information     Follow up With Details Comments Contact Rafael Felix MD  follow up in 3 months [address] [place] [address]      Cely Voss MD  follow up post hospitalization [facility]  [address]  [phone]             ADDITIONAL CARE RECOMMENDATIONS:  Ceftriaxone 2 gram, IV until December 11 th to treat your blood infection. The infusion service will be in touch with Dr. Kandy Noyola (infectious disease)  You will need a repeat ERCP in 3-6 months for stent removal with Dr. Alfredo Escudero: Low Fat    ACTIVITY: As tolerated    WOUND CARE: routine PICC care    EQUIPMENT needed: none      DISCHARGE MEDICATIONS:   See Medication Reconciliation Form    · It is important that you take the medication exactly as they are prescribed. · Keep your medication in the bottles provided by the pharmacist and keep a list of the medication names, dosages, and times to be taken in your wallet.    · Do not take other medications without consulting your doctor. NOTIFY YOUR PHYSICIAN FOR ANY OF THE FOLLOWING:   Fever over 101 degrees for 24 hours. Chest pain, shortness of breath, fever, chills, nausea, vomiting, diarrhea, change in mentation, falling, weakness, bleeding. Severe pain or pain not relieved by medications. Or, any other signs or symptoms that you may have questions about.       DISPOSITION:   xx Home With:   OT  PT xx HH  RN       SNF/Inpatient Rehab/LTAC    Independent/assisted living    Hospice    Other:     CDMP Checked:   Yes xx     PROBLEM LIST Updated:  Yes xx       Signed:   Basil REA NP  12/4/2017  10:06 AM

## 2017-12-04 NOTE — PROGRESS NOTES
Bedside and Verbal shift change report given to [address] (oncoming nurse) by Nancy Salmeron (offgoing nurse). Report included the following information SBAR, Kardex, Procedure Summary, Intake/Output, MAR, Accordion and Recent Results.

## 2017-12-04 NOTE — PROGRESS NOTES
Patient will discharge home today with IV ABX. [facility]fusion Supp[facility] responded regarding the patient's insurance coverage. She has no deductible and has met $2319.00 of her $5,000 max out of pocket amount. [facility] has not yet determined the daily fee, but will notify the patient today. [facility] representative, Scottie Uribe - [facility]#[phone] will visit today to complete education. PICC orders and IV ABX orders were faxed to [facility]#[phone]. Home Health Skilled Nursing - [facility] was offered [facility] for IV ABX education and lab work. Referral was sent via [facility] and they can accept her into care. Care Management will continue to follow her disposition.    SANTY Blake

## 2017-12-04 NOTE — PROGRESS NOTES
Problem: Falls - Risk of  Goal: *Absence of Falls  Document Kristie Fall Risk and appropriate interventions in the flowsheet.    Outcome: Progressing Towards Goal  Fall Risk Interventions:  Mobility Interventions: Communicate number of staff needed for ambulation/transfer, Patient to call before getting OOB         Medication Interventions: Patient to call before getting OOB, Teach patient to arise slowly    Elimination Interventions: Call light in reach, Toilet paper/wipes in reach, Toileting schedule/hourly rounds

## 2017-12-04 NOTE — PROGRESS NOTES
[address]  [address], [address]       GI PROGRESS NOTE  Will Kamilah Garcia  [phone] office  [phone] NP/PA in-hospital cell phone M-F until 4:30PM  After 5PM or on weekends, please call  for physician on call      NAME: Jonna Herrera   :  1945   MRN:  [id number]       Subjective:   Patient is walking around the room and appears well. She has no complaints of nausea, vomiting, or abdominal pain. She has been tolerating a GI lite diet without difficulty. Objective:     VITALS:   Last 24hrs VS reviewed since prior progress note. Most recent are:  Visit Vitals    /78 (BP 1 Location: Right arm, BP Patient Position: At rest)    Pulse 61    Temp 98 °F (36.7 °C)    Resp 18    SpO2 98%       PHYSICAL EXAM:  General: Cooperative, no acute distress    Neurologic:  Alert and oriented X 3. HEENT: EOMI, no scleral icterus   Lungs:  CTA bilaterally. No wheezing  Heart:  S1 S2, regular rate and rhythm  Abdomen: Soft, non-distended, mild epigastric tenderness. +Bowel sounds  Extremities: No edema  Psych:   Good insight. Not anxious or agitated. Lab Data Reviewed:     Recent Results (from the past 24 hour(s))   METABOLIC PANEL, BASIC    Collection Time: 17  2:44 AM   Result Value Ref Range    Sodium 142 136 - 145 mmol/L    Potassium 3.6 3.5 - 5.1 mmol/L    Chloride 109 (H) 97 - 108 mmol/L    CO2 28 21 - 32 mmol/L    Anion gap 5 5 - 15 mmol/L    Glucose 88 65 - 100 mg/dL    BUN 4 (L) 6 - 20 MG/DL    Creatinine 0.44 (L) 0.55 - 1.02 MG/DL    BUN/Creatinine ratio 9 (L) 12 - 20      GFR est AA >60 >60 ml/min/1.73m2    GFR est non-AA >60 >60 ml/min/1.73m2    Calcium 8.6 8.5 - 10.1 MG/DL   LIPASE    Collection Time: 17  2:44 AM   Result Value Ref Range    Lipase >3000 (H) 73 - 393 U/L        Assessment:   · Post ERCP pancreatitis: significant improvement in symptoms. Lipase remains elevated.  LFTs normal. CA 19-9 normal.  · Common bile duct stricture with stones on MRCP (11/29/17): status post ERCP with biliary sphincterotomy and biliary stent placement (11/30): 2 cm distal CBD tight stricture, small stones, remainder of biliary tree was not dilated and had beading appearance consistent with PSC. · Gram-negative bacteremia likely biliary in origin: ID following. On IV antibiotics. Blood cultures (11/27) showed E. Coli in 4/4 bottles. Repeat blood cultures (11/27): negative. Patient Active Problem List   Diagnosis Code    RA (rheumatoid arthritis) ([facility] [address] M06.9    Essential hypertension I10    Postmenopausal Z78.0    Hypothyroidism E03.9    Allergic rhinitis J30.9    Chronic constipation K59.09    Vitamin D deficiency E55.9    Abnormal gait R26.9    Insomnia G47.00    Gastroesophageal reflux disease without esophagitis K21.9    Chronic midline low back pain without sciatica M54.5, G89.29    ACP (advance care planning) Z71.89    Demand ischemia (HCC) I24.8    Hyperlipidemia E78.5    Gastroparesis K31.84    Hypokalemia E87.6    Gram-negative bacteremia R78.81     Plan:   · Slowly advance diet as tolerated  · Will need repeat ERCP in 3-6 months for stent removal  · IV antibiotic per ID  · OK to discharge from GI perspective. Thank you. Signed By: FOSTER Goncalves     12/4/2017  9:46 AM     I have examined the patient. I have reviewed the chart and agree with the documentation recorded by the NP, including the assessment, treatment plan, and disposition.         Liam Lion MD

## 2017-12-04 NOTE — PROGRESS NOTES
Bedside shift change report given to Sandi (oncoming nurse) by Stephany Lane (offgoing nurse). Report included the following information SBAR, Kardex, Intake/Output, Recent Results and Cardiac Rhythm.

## 2017-12-04 NOTE — PROGRESS NOTES
ID Progress Note  2017    Subjective:     Afebrile. Feels good. Objective:     Vitals:   Visit Vitals    /80 (BP 1 Location: Left arm, BP Patient Position: At rest;Sitting)    Pulse 79    Temp 98 °F (36.7 °C)    Resp 16    SpO2 100%        Tmax:  Temp (24hrs), Av.3 °F (36.8 °C), Min:98 °F (36.7 °C), Max:98.8 °F (37.1 °C)      Exam:    Not in distress  Lungs: clear to auscultation, no rales, wheezes or rhonchi   Heart: s1, s2, no murmurs, rubs or clicks    Abdomen: soft, nontender    Labs:   Lab Results   Component Value Date/Time    WBC 7.5 2017 03:38 AM    Hemoglobin (POC) 11.9 2010 01:40 AM    HGB 9.9 2017 03:38 AM    Hematocrit (POC) 35 2010 01:40 AM    HCT 29.9 2017 03:38 AM    PLATELET 974  03:38 AM    MCV 93.7 2017 03:38 AM     Lab Results   Component Value Date/Time    Sodium 142 2017 02:44 AM    Potassium 3.6 2017 02:44 AM    Chloride 109 2017 02:44 AM    CO2 28 2017 02:44 AM    Anion gap 5 2017 02:44 AM    Glucose 88 2017 02:44 AM    BUN 4 2017 02:44 AM    Creatinine 0.44 2017 02:44 AM    BUN/Creatinine ratio 9 2017 02:44 AM    GFR est AA >60 2017 02:44 AM    GFR est non-AA >60 2017 02:44 AM    Calcium 8.6 2017 02:44 AM    Bilirubin, total 0.6 2017 02:59 AM    AST (SGOT) 18 2017 02:59 AM    Alk. phosphatase 104 2017 02:59 AM    Protein, total 6.5 2017 02:59 AM    Albumin 2.3 2017 02:59 AM    Globulin 4.2 2017 02:59 AM    A-G Ratio 0.5 2017 02:59 AM    ALT (SGPT) 22 2017 02:59 AM             Assessment:     1. Recurrent gram-negative bacteremia, now with Escherichia coli. 2. Biliary stricture   3. History of L4-L5 diskitis with negative aspirate and she has   completed 12 weeks of treatment. 4. Rheumatoid arthritis. 5. Hypothyroidism.   6. Post ercp pancreatitis     Recommendations:     GNR bacteremia likely biliary in origin. Discussed with Dr Arminda Ramirez. Patient has a number of strictures and there is beading of the ducts. The stent may not likely prevent bacteremia. I will place her on suppressive therapy once her IV abx are done. IV orders have been written. No need to treat bacteria in urine as she does not have any symptoms.         Susan Lea MD

## 2017-12-04 NOTE — PROGRESS NOTES
Discharge instructions and medication information discussed with pt. Opportunity for questions provided. Pt belongings packed and sent with pt. Pt transported via wheelchair by volunteer to discharge lot. Pt stable and in no acute distress at time of discharge.

## 2017-12-05 ENCOUNTER — HOME CARE VISIT (OUTPATIENT)
Dept: SCHEDULING | Facility: HOME HEALTH | Age: 72
End: 2017-12-05
Payer: MEDICARE

## 2017-12-05 ENCOUNTER — HOSPITAL ENCOUNTER (OUTPATIENT)
Dept: GENERAL RADIOLOGY | Age: 72
Discharge: HOME | End: 2017-12-05
Attending: INTERNAL MEDICINE

## 2017-12-05 VITALS
RESPIRATION RATE: 20 BRPM | TEMPERATURE: 98.8 F | OXYGEN SATURATION: 98 % | DIASTOLIC BLOOD PRESSURE: 78 MMHG | HEART RATE: 78 BPM | SYSTOLIC BLOOD PRESSURE: 130 MMHG

## 2017-12-05 PROCEDURE — 3331090001 HH PPS REVENUE CREDIT

## 2017-12-05 PROCEDURE — G0299 HHS/HOSPICE OF RN EA 15 MIN: HCPCS

## 2017-12-05 PROCEDURE — 3331090002 HH PPS REVENUE DEBIT

## 2017-12-05 PROCEDURE — 400013 HH SOC

## 2017-12-06 ENCOUNTER — HOME CARE VISIT (OUTPATIENT)
Dept: HOME HEALTH SERVICES | Facility: HOME HEALTH | Age: 72
End: 2017-12-06
Payer: MEDICARE

## 2017-12-06 PROCEDURE — 3331090002 HH PPS REVENUE DEBIT

## 2017-12-06 PROCEDURE — 3331090001 HH PPS REVENUE CREDIT

## 2017-12-07 ENCOUNTER — HOME CARE VISIT (OUTPATIENT)
Dept: SCHEDULING | Facility: HOME HEALTH | Age: 72
End: 2017-12-07
Payer: MEDICARE

## 2017-12-07 VITALS
RESPIRATION RATE: 16 BRPM | DIASTOLIC BLOOD PRESSURE: 70 MMHG | HEART RATE: 64 BPM | OXYGEN SATURATION: 98 % | TEMPERATURE: 97.7 F | SYSTOLIC BLOOD PRESSURE: 125 MMHG

## 2017-12-07 PROCEDURE — 3331090001 HH PPS REVENUE CREDIT

## 2017-12-07 PROCEDURE — 3331090002 HH PPS REVENUE DEBIT

## 2017-12-07 PROCEDURE — G0299 HHS/HOSPICE OF RN EA 15 MIN: HCPCS

## 2017-12-08 PROCEDURE — 3331090001 HH PPS REVENUE CREDIT

## 2017-12-08 PROCEDURE — 3331090002 HH PPS REVENUE DEBIT

## 2017-12-09 ENCOUNTER — HOME CARE VISIT (OUTPATIENT)
Dept: SCHEDULING | Facility: HOME HEALTH | Age: 72
End: 2017-12-09
Payer: MEDICARE

## 2017-12-09 PROCEDURE — 3331090002 HH PPS REVENUE DEBIT

## 2017-12-09 PROCEDURE — 3331090001 HH PPS REVENUE CREDIT

## 2017-12-10 PROCEDURE — 3331090002 HH PPS REVENUE DEBIT

## 2017-12-10 PROCEDURE — 3331090001 HH PPS REVENUE CREDIT

## 2017-12-11 ENCOUNTER — HOME CARE VISIT (OUTPATIENT)
Dept: SCHEDULING | Facility: HOME HEALTH | Age: 72
End: 2017-12-11
Payer: MEDICARE

## 2017-12-11 VITALS
DIASTOLIC BLOOD PRESSURE: 80 MMHG | SYSTOLIC BLOOD PRESSURE: 120 MMHG | TEMPERATURE: 97.9 F | OXYGEN SATURATION: 99 % | HEART RATE: 67 BPM | RESPIRATION RATE: 18 BRPM

## 2017-12-11 PROCEDURE — 3331090001 HH PPS REVENUE CREDIT

## 2017-12-11 PROCEDURE — G0300 HHS/HOSPICE OF LPN EA 15 MIN: HCPCS

## 2017-12-11 PROCEDURE — 3331090002 HH PPS REVENUE DEBIT

## 2017-12-12 PROCEDURE — 3331090001 HH PPS REVENUE CREDIT

## 2017-12-12 PROCEDURE — 3331090002 HH PPS REVENUE DEBIT

## 2017-12-13 ENCOUNTER — HOME CARE VISIT (OUTPATIENT)
Dept: SCHEDULING | Facility: HOME HEALTH | Age: 72
End: 2017-12-13
Payer: MEDICARE

## 2017-12-13 VITALS
TEMPERATURE: 97.4 F | SYSTOLIC BLOOD PRESSURE: 119 MMHG | HEART RATE: 67 BPM | RESPIRATION RATE: 18 BRPM | OXYGEN SATURATION: 97 % | DIASTOLIC BLOOD PRESSURE: 74 MMHG

## 2017-12-13 PROCEDURE — G0300 HHS/HOSPICE OF LPN EA 15 MIN: HCPCS

## 2017-12-13 PROCEDURE — 3331090002 HH PPS REVENUE DEBIT

## 2017-12-13 PROCEDURE — 3331090001 HH PPS REVENUE CREDIT

## 2017-12-14 PROCEDURE — 3331090001 HH PPS REVENUE CREDIT

## 2017-12-14 PROCEDURE — 3331090002 HH PPS REVENUE DEBIT

## 2017-12-15 PROCEDURE — 3331090002 HH PPS REVENUE DEBIT

## 2017-12-15 PROCEDURE — 3331090001 HH PPS REVENUE CREDIT

## 2017-12-16 PROCEDURE — 3331090001 HH PPS REVENUE CREDIT

## 2017-12-16 PROCEDURE — 3331090002 HH PPS REVENUE DEBIT

## 2017-12-17 PROCEDURE — 3331090001 HH PPS REVENUE CREDIT

## 2017-12-17 PROCEDURE — 3331090002 HH PPS REVENUE DEBIT

## 2017-12-18 PROCEDURE — 3331090001 HH PPS REVENUE CREDIT

## 2017-12-18 PROCEDURE — 3331090002 HH PPS REVENUE DEBIT

## 2017-12-19 ENCOUNTER — HOME CARE VISIT (OUTPATIENT)
Dept: SCHEDULING | Facility: HOME HEALTH | Age: 72
End: 2017-12-19
Payer: MEDICARE

## 2017-12-19 VITALS
OXYGEN SATURATION: 98 % | TEMPERATURE: 98.7 F | RESPIRATION RATE: 16 BRPM | SYSTOLIC BLOOD PRESSURE: 132 MMHG | DIASTOLIC BLOOD PRESSURE: 79 MMHG | HEART RATE: 66 BPM

## 2017-12-19 PROCEDURE — 3331090002 HH PPS REVENUE DEBIT

## 2017-12-19 PROCEDURE — 3331090001 HH PPS REVENUE CREDIT

## 2017-12-19 PROCEDURE — G0299 HHS/HOSPICE OF RN EA 15 MIN: HCPCS

## 2018-01-19 ENCOUNTER — HOSPITAL ENCOUNTER (OUTPATIENT)
Dept: GENERAL RADIOLOGY | Age: 73
Discharge: HOME | End: 2018-01-19
Attending: INTERNAL MEDICINE

## 2018-03-14 RX ORDER — CARVEDILOL 3.12 MG/1
3.12 TABLET ORAL 2 TIMES DAILY
Qty: 180 TAB | Refills: 0 | Status: SHIPPED | OUTPATIENT
Start: 2018-03-14 | End: 2018-06-01 | Stop reason: SDUPTHER

## 2018-03-14 NOTE — TELEPHONE ENCOUNTER
PCP: Sneha Valero MD    Last appt: 10/18/2017  No future appointments. Requested Prescriptions     Pending Prescriptions Disp Refills    carvedilol (COREG) 3.125 mg tablet 180 Tab 2     Sig: Take 1 Tab by mouth two (2) times a day.      Lab Results   Component Value Date/Time    Sodium 142 12/04/2017 02:44 AM    Potassium 3.6 12/04/2017 02:44 AM    Chloride 109 (H) 12/04/2017 02:44 AM    CO2 28 12/04/2017 02:44 AM    Anion gap 5 12/04/2017 02:44 AM    Glucose 88 12/04/2017 02:44 AM    BUN 4 (L) 12/04/2017 02:44 AM    Creatinine 0.44 (L) 12/04/2017 02:44 AM    BUN/Creatinine ratio 9 (L) 12/04/2017 02:44 AM    GFR est AA >60 12/04/2017 02:44 AM    GFR est non-AA >60 12/04/2017 02:44 AM    Calcium 8.6 12/04/2017 02:44 AM     Lab Results   Component Value Date/Time    Hemoglobin A1c 5.6 10/18/2017 12:00 PM    Hemoglobin A1c (POC) 6.0 03/16/2015 04:00 AM    Hemoglobin A1c, External 6.3 07/07/2015     Lab Results   Component Value Date/Time    Cholesterol, total 97 (L) 06/26/2017 01:47 PM    HDL Cholesterol 37 (L) 06/26/2017 01:47 PM    LDL, calculated 43 06/26/2017 01:47 PM    VLDL, calculated 17 06/26/2017 01:47 PM    Triglyceride 86 06/26/2017 01:47 PM    CHOL/HDL Ratio 4.5 12/23/2015 01:45 AM     Lab Results   Component Value Date/Time    TSH 1.060 06/26/2017 01:47 PM

## 2018-04-09 ENCOUNTER — TELEPHONE (OUTPATIENT)
Dept: FAMILY MEDICINE CLINIC | Age: 73
End: 2018-04-09

## 2018-04-09 NOTE — TELEPHONE ENCOUNTER
Patient called requesting a different medication to replace her potassium chloride. Patient stated that her insurance is not covering medication.

## 2018-04-11 DIAGNOSIS — E87.6 HYPOKALEMIA: Primary | ICD-10-CM

## 2018-04-11 RX ORDER — POTASSIUM CHLORIDE 20 MEQ/1
20 TABLET, EXTENDED RELEASE ORAL DAILY
Qty: 90 TAB | Refills: 0 | Status: SHIPPED | OUTPATIENT
Start: 2018-04-11 | End: 2018-07-31 | Stop reason: ALTCHOICE

## 2018-04-13 NOTE — TELEPHONE ENCOUNTER
Patient called message left per her request. Awaiting call back regarding what she would like provider to order r/t cost of potassium.

## 2018-04-13 NOTE — TELEPHONE ENCOUNTER
Patient called stating that she will give a call back after she goes to a couple upcoming appointments, hopefully these appointments will help her meet her deductible. Patient stated that she has potassium medication from older prescription that is not  and has been taking half a pill to get her by until she meets deductible.

## 2018-05-18 RX ORDER — PANTOPRAZOLE SODIUM 40 MG/1
TABLET, DELAYED RELEASE ORAL
Qty: 90 TAB | Refills: 0 | Status: SHIPPED | OUTPATIENT
Start: 2018-05-18 | End: 2018-08-08 | Stop reason: SDUPTHER

## 2018-07-07 ENCOUNTER — HOSPITAL ENCOUNTER (EMERGENCY)
Age: 73
Discharge: HOME | End: 2018-07-07
Attending: EMERGENCY MEDICINE
Payer: MEDICARE

## 2018-07-07 VITALS
RESPIRATION RATE: 18 BRPM | DIASTOLIC BLOOD PRESSURE: 60 MMHG | SYSTOLIC BLOOD PRESSURE: 139 MMHG | OXYGEN SATURATION: 98 % | HEART RATE: 82 BPM | TEMPERATURE: 98.1 F

## 2018-07-07 DIAGNOSIS — R22.0 SUBMANDIBULAR SWELLING: ICD-10-CM

## 2018-07-07 DIAGNOSIS — T78.40XA ALLERGIC REACTION, INITIAL ENCOUNTER: Primary | ICD-10-CM

## 2018-07-07 DIAGNOSIS — R22.1 SUBMANDIBULAR SWELLING: ICD-10-CM

## 2018-07-07 PROCEDURE — A9270 NON-COVERED ITEM OR SERVICE: HCPCS | Performed by: NURSE PRACTITIONER

## 2018-07-07 PROCEDURE — 74011636637 HC RX REV CODE- 636/637: Performed by: NURSE PRACTITIONER

## 2018-07-07 PROCEDURE — 99283 EMERGENCY DEPT VISIT LOW MDM: CPT

## 2018-07-07 RX ORDER — PREDNISONE 10 MG/1
TABLET ORAL
Qty: 19 TAB | Refills: 0 | Status: SHIPPED | OUTPATIENT
Start: 2018-07-07 | End: 2018-07-12

## 2018-07-07 RX ORDER — PREDNISONE 20 MG/1
40 TABLET ORAL ONCE
Status: COMPLETED | OUTPATIENT
Start: 2018-07-07 | End: 2018-07-07

## 2018-07-07 RX ADMIN — PREDNISONE 40 MG: 20 TABLET ORAL at 13:52

## 2018-07-07 NOTE — ED PROVIDER NOTES
HPI Comments: PT is a 76 y/o female with a pmh of RA (on low dose prednisone) who presents today with c/o itchy rash and right neck swelling. Pt was started on Clindamycin pre dental work about 7 days ago. 2 days ago she started to have a diffuse red, itchy rash all over. Last night she started to develop swelling under her right jaw and chills. NO difficulty swallowing. NO difficulty with speech or airway. Denies any acute mouth pain. Denies fevers. She took 2 benadryl with good results for the itching but rash continues. Pt does report that she completed her last 2 days of clindamycin while she had this rash. No other complaints. Patient is a 75 y.o. female presenting with allergic reaction. The history is provided by the patient. Allergic Reaction Pertinent negatives include no nausea, no vomiting and no shortness of breath. Past Medical History:  
Diagnosis Date  Abdominal pain 6/18/14  
 note from [facility]  Advance directive discussed with patient 07/22/2015  Arthritis   
 dr Tony buck        2/6/17 f/u note  Bacteremia due to Klebsiella pneumoniae 2/2/2016 OV note from Dr Cayla Bojorquez  Chronic pain   
 low back pain/arthritis      [facility] note9/13/17  Contact dermatitis and other eczema, due to unspecified cause   
 hyperpigmented macules/seb k  Elevated LFTs   
 per info from Dr Ema Gongora at [facility] on report  Endocrine disease   
 hypothyroid  GERD (gastroesophageal reflux disease)   
 chelsy burns  Heart attack ([facility] [address] 05/2017 [facility]  
 HSV infection  Hypertension  Hypothyroid 10/2012  Insomnia  Melasma 3/3/15  
 notes from [facility]  Nausea & vomiting 05/04/2017  
 report [facility]  7/28/17 EGD showed delayed emptying  Pain management counseling, encounter for 05/05/2016  
 sees Dr Kathy Ma 5/2/17 f/u  Rhinitis, allergic nonseasonal   
 Screening for glaucoma 04/11/2016  Urge incontinence of urine 03/21/2017 initial [facility] consult [facility] initial eval note [facility] 4/17/17 urodymanics study//5/9/17 f/u note  Well woman exam (no gynecological exam) 07/29/2016  
 [facility]'s note rec'd Past Surgical History:  
Procedure Laterality Date  COLONOSCOPY  8/1/11  
 dr Tish Oconnor 10 year repeat  HX CHOLECYSTECTOMY  HX ENDOSCOPY  2014 [address]  
 HX ENDOSCOPY  2017 [place]  
 HX ENDOSCOPY  07/28/2017  
 showed delayed emptying of stomach contents--gastroparesis to be r/o  
 HX OTHER SURGICAL  04/17/2017  
 complex urodynamics study report from [facility]  HX TUBAL LIGATION Family History:  
Problem Relation Age of Onset  Hypertension Mother  Heart Disease Mother  No Known Problems Father  Cancer Brother   
  prostate  Heart Disease Brother   
  blocked valves  Heart Disease Brother  Asthma Sister  Diabetes Sister  Hypertension Sister Social History Social History  Marital status:  Spouse name: N/A  
 Number of children: N/A  
 Years of education: N/A Occupational History  Not on file. Social History Main Topics  Smoking status: Never Smoker  Smokeless tobacco: Never Used  Alcohol use No  
 Drug use: No  
 Sexual activity: Not Currently Other Topics Concern  Not on file Social History Narrative ALLERGIES: Pcn [penicillins]; Tramadol; Proventil [albuterol sulfate]; Ace inhibitors; Albuterol; Ambien [zolpidem]; Ciprofloxacin; Lisinopril; Oxaprozin; Sulfadiazine; and Trazodone Review of Systems Constitutional: Negative for appetite change, chills and fever. Respiratory: Negative for cough, shortness of breath and wheezing. Cardiovascular: Negative for chest pain. Gastrointestinal: Negative for abdominal pain, nausea and vomiting. Skin: Positive for rash. Neurological: Negative for dizziness, weakness and numbness.   
All other systems reviewed and are negative. Vitals:  
 07/07/18 1305 BP: 131/68 Pulse: 80 Resp: 16 Temp: 98.1 °F (36.7 °C) SpO2: 100% Physical Exam  
Constitutional: She is oriented to person, place, and time. She appears well-developed and well-nourished. HENT:  
NO oral lesions Neck:  
Right submandibular swelling and tenderness to palpation. No mouth pain. Cardiovascular: Normal rate, regular rhythm and normal heart sounds. Pulmonary/Chest: Effort normal and breath sounds normal.  
Abdominal: Soft. Musculoskeletal: Normal range of motion. Neurological: She is alert and oriented to person, place, and time. Skin: Skin is warm. Dark, erythematous, rash with raised edges. Blanches. No weeping. Spares the palms. Diffuse to neck, chest, back, legs, perineum and larger plaques to the folds in bilateral elbows. Psychiatric: She has a normal mood and affect. Her behavior is normal. Judgment and thought content normal.  
Nursing note and vitals reviewed. [facility] 
 
 
ED Course 1:56 PM 
Pt presents with a skin rash that appears to be due to allergic reaction to clindamycin. NO airway complaints. Her itching is better after the Benadryl but persists. NO lesions in the mouth at this time. NO pain in mouth or throat. Right submandibular swelling appears to be an enlarged gland or blocked duct. . Less likely abscess. D/;w Dr. Rivka Johnson. At this time she does not have sloughing of her skin or oral lesions concerning for [employer] Syndrome. She is on low dose Prednisone 5 mg. Will increased this to 40mg and do a taper and have her continue the Benadryl. Will also have her try eucerin cream or vaseline to her elbows for moisture. Return precautions given to patient. Verbalized understanding and agrees with this plan. Procedures

## 2018-07-07 NOTE — DISCHARGE INSTRUCTIONS

## 2018-07-07 NOTE — ED TRIAGE NOTES
TRIAGE: Patient arrives ambulatory from home with full body hives. Patient reports she is so itchy she took 2 benadryl. Patient just had dental surgery and they put her on Clindamycin and on the last 2 doses (7 day course) she started with the reaction. She reports the Clindamycin in general made her feel bad but wanted to finish it.

## 2018-07-09 ENCOUNTER — APPOINTMENT (OUTPATIENT)
Dept: CT IMAGING | Age: 73
DRG: 565 | End: 2018-07-09
Attending: INTERNAL MEDICINE
Payer: MEDICARE

## 2018-07-09 ENCOUNTER — APPOINTMENT (OUTPATIENT)
Dept: ULTRASOUND IMAGING | Age: 73
DRG: 565 | End: 2018-07-09
Attending: INTERNAL MEDICINE
Payer: MEDICARE

## 2018-07-09 ENCOUNTER — APPOINTMENT (OUTPATIENT)
Dept: GENERAL RADIOLOGY | Age: 73
DRG: 565 | End: 2018-07-09
Attending: EMERGENCY MEDICINE
Payer: MEDICARE

## 2018-07-09 ENCOUNTER — HOSPITAL ENCOUNTER (INPATIENT)
Age: 73
LOS: 3 days | Discharge: NURSING, REHAB OR LONG-TERM CARE | DRG: 565 | End: 2018-07-12
Attending: EMERGENCY MEDICINE | Admitting: INTERNAL MEDICINE
Payer: MEDICARE

## 2018-07-09 DIAGNOSIS — R77.8 ELEVATED TROPONIN I LEVEL: ICD-10-CM
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
## 2018-07-09 DIAGNOSIS — T79.6XXA TRAUMATIC RHABDOMYOLYSIS, INITIAL ENCOUNTER (HCC): ICD-10-CM

## 2018-07-09 PROBLEM — M62.82 RHABDOMYOLYSIS: Status: ACTIVE | Noted: 2018-07-09

## 2018-07-09 PROBLEM — R74.01 ELEVATED AST (SGOT): Status: ACTIVE | Noted: 2018-07-09

## 2018-07-09 PROBLEM — R22.0 FACIAL MASS: Status: ACTIVE | Noted: 2018-07-09

## 2018-07-09 PROBLEM — G89.29 CHRONIC PAIN: Chronic | Status: ACTIVE | Noted: 2018-07-09

## 2018-07-09 PROBLEM — L29.8 PRURITIC ERYTHEMATOUS RASH: Status: ACTIVE | Noted: 2018-07-09

## 2018-07-09 PROBLEM — E80.6 HYPERBILIRUBINEMIA: Status: ACTIVE | Noted: 2018-07-09

## 2018-07-09 LAB
ALBUMIN SERPL-MCNC: 2.6 G/DL (ref 3.5–5)
ALBUMIN/GLOB SERPL: 0.6 {RATIO} (ref 1.1–2.2)
ALP SERPL-CCNC: 104 U/L (ref 45–117)
ALT SERPL-CCNC: 50 U/L (ref 12–78)
ANION GAP SERPL CALC-SCNC: 10 MMOL/L (ref 5–15)
APPEARANCE UR: CLEAR
AST SERPL-CCNC: 159 U/L (ref 15–37)
BACTERIA URNS QL MICRO: NEGATIVE /HPF
BASOPHILS # BLD: 0 K/UL (ref 0–0.1)
BASOPHILS NFR BLD: 0 % (ref 0–1)
BILIRUB SERPL-MCNC: 1.5 MG/DL (ref 0.2–1)
BILIRUB UR QL: NEGATIVE
BUN SERPL-MCNC: 9 MG/DL (ref 6–20)
BUN/CREAT SERPL: 10 (ref 12–20)
CALCIUM SERPL-MCNC: 8.3 MG/DL (ref 8.5–10.1)
CHLORIDE SERPL-SCNC: 102 MMOL/L (ref 97–108)
CK SERPL-CCNC: 4468 U/L (ref 26–192)
CO2 SERPL-SCNC: 25 MMOL/L (ref 21–32)
COLOR UR: ABNORMAL
CREAT SERPL-MCNC: 0.87 MG/DL (ref 0.55–1.02)
DIFFERENTIAL METHOD BLD: ABNORMAL
EOSINOPHIL # BLD: 0.4 K/UL (ref 0–0.4)
EOSINOPHIL NFR BLD: 4 % (ref 0–7)
EPITH CASTS URNS QL MICRO: ABNORMAL /LPF
ERYTHROCYTE [DISTWIDTH] IN BLOOD BY AUTOMATED COUNT: 15 % (ref 11.5–14.5)
GLOBULIN SER CALC-MCNC: 4.5 G/DL (ref 2–4)
GLUCOSE SERPL-MCNC: 93 MG/DL (ref 65–100)
GLUCOSE UR STRIP.AUTO-MCNC: NEGATIVE MG/DL
HCT VFR BLD AUTO: 33.1 % (ref 35–47)
HGB BLD-MCNC: 11 G/DL (ref 11.5–16)
HGB UR QL STRIP: ABNORMAL
HYALINE CASTS URNS QL MICRO: ABNORMAL /LPF (ref 0–5)
IMM GRANULOCYTES # BLD: 0 K/UL
IMM GRANULOCYTES NFR BLD AUTO: 0 %
KETONES UR QL STRIP.AUTO: NEGATIVE MG/DL
LEUKOCYTE ESTERASE UR QL STRIP.AUTO: ABNORMAL
LYMPHOCYTES # BLD: 0.7 K/UL (ref 0.8–3.5)
LYMPHOCYTES NFR BLD: 6 % (ref 12–49)
MCH RBC QN AUTO: 32.4 PG (ref 26–34)
MCHC RBC AUTO-ENTMCNC: 33.2 G/DL (ref 30–36.5)
MCV RBC AUTO: 97.4 FL (ref 80–99)
MONOCYTES # BLD: 0.2 K/UL (ref 0–1)
MONOCYTES NFR BLD: 2 % (ref 5–13)
NEUTS BAND NFR BLD MANUAL: 7 % (ref 0–6)
NEUTS SEG # BLD: 9.9 K/UL (ref 1.8–8)
NEUTS SEG NFR BLD: 81 % (ref 32–75)
NITRITE UR QL STRIP.AUTO: NEGATIVE
NRBC # BLD: 0 K/UL (ref 0–0.01)
NRBC BLD-RTO: 0 PER 100 WBC
PH UR STRIP: 7.5 [PH] (ref 5–8)
PLATELET # BLD AUTO: 161 K/UL (ref 150–400)
PMV BLD AUTO: 9.2 FL (ref 8.9–12.9)
POTASSIUM SERPL-SCNC: 3.9 MMOL/L (ref 3.5–5.1)
PROT SERPL-MCNC: 7.1 G/DL (ref 6.4–8.2)
PROT UR STRIP-MCNC: ABNORMAL MG/DL
RBC # BLD AUTO: 3.4 M/UL (ref 3.8–5.2)
RBC #/AREA URNS HPF: ABNORMAL /HPF (ref 0–5)
RBC MORPH BLD: ABNORMAL
SODIUM SERPL-SCNC: 137 MMOL/L (ref 136–145)
SP GR UR REFRACTOMETRY: 1.01 (ref 1–1.03)
TROPONIN I SERPL-MCNC: 0.06 NG/ML
UR CULT HOLD, URHOLD: NORMAL
UROBILINOGEN UR QL STRIP.AUTO: 1 EU/DL (ref 0.2–1)
WBC # BLD AUTO: 11.2 K/UL (ref 3.6–11)
WBC URNS QL MICRO: ABNORMAL /HPF (ref 0–4)

## 2018-07-09 PROCEDURE — 97161 PT EVAL LOW COMPLEX 20 MIN: CPT

## 2018-07-09 PROCEDURE — 65270000032 HC RM SEMIPRIVATE

## 2018-07-09 PROCEDURE — 74011636320 HC RX REV CODE- 636/320: Performed by: EMERGENCY MEDICINE

## 2018-07-09 PROCEDURE — 76705 ECHO EXAM OF ABDOMEN: CPT

## 2018-07-09 PROCEDURE — 81001 URINALYSIS AUTO W/SCOPE: CPT | Performed by: EMERGENCY MEDICINE

## 2018-07-09 PROCEDURE — 74011250636 HC RX REV CODE- 250/636: Performed by: EMERGENCY MEDICINE

## 2018-07-09 PROCEDURE — 80053 COMPREHEN METABOLIC PANEL: CPT | Performed by: EMERGENCY MEDICINE

## 2018-07-09 PROCEDURE — 93005 ELECTROCARDIOGRAM TRACING: CPT

## 2018-07-09 PROCEDURE — 74011000258 HC RX REV CODE- 258: Performed by: EMERGENCY MEDICINE

## 2018-07-09 PROCEDURE — 84484 ASSAY OF TROPONIN QUANT: CPT | Performed by: EMERGENCY MEDICINE

## 2018-07-09 PROCEDURE — 74011250636 HC RX REV CODE- 250/636: Performed by: INTERNAL MEDICINE

## 2018-07-09 PROCEDURE — 74011250637 HC RX REV CODE- 250/637: Performed by: INTERNAL MEDICINE

## 2018-07-09 PROCEDURE — 36415 COLL VENOUS BLD VENIPUNCTURE: CPT | Performed by: EMERGENCY MEDICINE

## 2018-07-09 PROCEDURE — 99285 EMERGENCY DEPT VISIT HI MDM: CPT

## 2018-07-09 PROCEDURE — 97530 THERAPEUTIC ACTIVITIES: CPT

## 2018-07-09 PROCEDURE — 71045 X-RAY EXAM CHEST 1 VIEW: CPT

## 2018-07-09 PROCEDURE — 97116 GAIT TRAINING THERAPY: CPT

## 2018-07-09 PROCEDURE — 72100 X-RAY EXAM L-S SPINE 2/3 VWS: CPT

## 2018-07-09 PROCEDURE — 74011000250 HC RX REV CODE- 250: Performed by: INTERNAL MEDICINE

## 2018-07-09 PROCEDURE — 70487 CT MAXILLOFACIAL W/DYE: CPT

## 2018-07-09 PROCEDURE — 72170 X-RAY EXAM OF PELVIS: CPT

## 2018-07-09 PROCEDURE — 85025 COMPLETE CBC W/AUTO DIFF WBC: CPT | Performed by: EMERGENCY MEDICINE

## 2018-07-09 PROCEDURE — 82550 ASSAY OF CK (CPK): CPT | Performed by: EMERGENCY MEDICINE

## 2018-07-09 RX ORDER — IPRATROPIUM BROMIDE 0.5 MG/2.5ML
0.5 SOLUTION RESPIRATORY (INHALATION)
Status: DISCONTINUED | OUTPATIENT
Start: 2018-07-09 | End: 2018-07-09

## 2018-07-09 RX ORDER — THERA TABS 400 MCG
1 TAB ORAL DAILY
Status: DISCONTINUED | OUTPATIENT
Start: 2018-07-10 | End: 2018-07-12 | Stop reason: HOSPADM

## 2018-07-09 RX ORDER — SODIUM CHLORIDE 9 MG/ML
100 INJECTION, SOLUTION INTRAVENOUS CONTINUOUS
Status: DISCONTINUED | OUTPATIENT
Start: 2018-07-09 | End: 2018-07-12 | Stop reason: HOSPADM

## 2018-07-09 RX ORDER — GABAPENTIN 100 MG/1
100 CAPSULE ORAL
Status: DISCONTINUED | OUTPATIENT
Start: 2018-07-09 | End: 2018-07-12 | Stop reason: HOSPADM

## 2018-07-09 RX ORDER — VERAPAMIL HYDROCHLORIDE 120 MG/1
120 TABLET, FILM COATED, EXTENDED RELEASE ORAL
Status: DISCONTINUED | OUTPATIENT
Start: 2018-07-10 | End: 2018-07-09

## 2018-07-09 RX ORDER — MONTELUKAST SODIUM 10 MG/1
10 TABLET ORAL
Status: DISCONTINUED | OUTPATIENT
Start: 2018-07-09 | End: 2018-07-12 | Stop reason: HOSPADM

## 2018-07-09 RX ORDER — BACLOFEN 10 MG/1
10 TABLET ORAL
COMMUNITY

## 2018-07-09 RX ORDER — ONDANSETRON 2 MG/ML
4 INJECTION INTRAMUSCULAR; INTRAVENOUS
Status: DISCONTINUED | OUTPATIENT
Start: 2018-07-09 | End: 2018-07-12 | Stop reason: HOSPADM

## 2018-07-09 RX ORDER — GABAPENTIN 100 MG/1
100 CAPSULE ORAL
COMMUNITY

## 2018-07-09 RX ORDER — MORPHINE SULFATE 1 MG/ML
2 INJECTION, SOLUTION EPIDURAL; INTRATHECAL; INTRAVENOUS
Status: DISCONTINUED | OUTPATIENT
Start: 2018-07-09 | End: 2018-07-12 | Stop reason: HOSPADM

## 2018-07-09 RX ORDER — GUAIFENESIN 100 MG/5ML
81 LIQUID (ML) ORAL DAILY
Status: DISCONTINUED | OUTPATIENT
Start: 2018-07-10 | End: 2018-07-12 | Stop reason: HOSPADM

## 2018-07-09 RX ORDER — AZATHIOPRINE 50 MG/1
150 TABLET ORAL DAILY
Status: DISCONTINUED | OUTPATIENT
Start: 2018-07-10 | End: 2018-07-12 | Stop reason: HOSPADM

## 2018-07-09 RX ORDER — PANTOPRAZOLE SODIUM 40 MG/1
40 TABLET, DELAYED RELEASE ORAL
Status: DISCONTINUED | OUTPATIENT
Start: 2018-07-10 | End: 2018-07-12 | Stop reason: HOSPADM

## 2018-07-09 RX ORDER — SODIUM CHLORIDE 0.9 % (FLUSH) 0.9 %
10 SYRINGE (ML) INJECTION
Status: COMPLETED | OUTPATIENT
Start: 2018-07-09 | End: 2018-07-09

## 2018-07-09 RX ORDER — MULTIVIT WITH MINERALS/HERBS
1 TABLET ORAL DAILY
Status: DISCONTINUED | OUTPATIENT
Start: 2018-07-10 | End: 2018-07-12 | Stop reason: HOSPADM

## 2018-07-09 RX ORDER — CARVEDILOL 3.12 MG/1
3.12 TABLET ORAL 2 TIMES DAILY WITH MEALS
Status: DISCONTINUED | OUTPATIENT
Start: 2018-07-09 | End: 2018-07-12 | Stop reason: HOSPADM

## 2018-07-09 RX ORDER — SODIUM CHLORIDE 0.9 % (FLUSH) 0.9 %
5-10 SYRINGE (ML) INJECTION AS NEEDED
Status: DISCONTINUED | OUTPATIENT
Start: 2018-07-09 | End: 2018-07-12 | Stop reason: HOSPADM

## 2018-07-09 RX ORDER — SODIUM CHLORIDE 0.9 % (FLUSH) 0.9 %
5-10 SYRINGE (ML) INJECTION EVERY 8 HOURS
Status: DISCONTINUED | OUTPATIENT
Start: 2018-07-09 | End: 2018-07-12 | Stop reason: HOSPADM

## 2018-07-09 RX ORDER — DIPHENHYDRAMINE HCL 25 MG
25 CAPSULE ORAL
Status: DISCONTINUED | OUTPATIENT
Start: 2018-07-09 | End: 2018-07-12 | Stop reason: HOSPADM

## 2018-07-09 RX ORDER — LEVOTHYROXINE SODIUM 50 UG/1
50 TABLET ORAL
Status: DISCONTINUED | OUTPATIENT
Start: 2018-07-10 | End: 2018-07-12 | Stop reason: HOSPADM

## 2018-07-09 RX ORDER — LORATADINE 10 MG/1
10 TABLET ORAL DAILY
Status: DISCONTINUED | OUTPATIENT
Start: 2018-07-10 | End: 2018-07-12 | Stop reason: HOSPADM

## 2018-07-09 RX ORDER — DOXYCYCLINE HYCLATE 100 MG
100 TABLET ORAL EVERY 12 HOURS
Status: DISCONTINUED | OUTPATIENT
Start: 2018-07-09 | End: 2018-07-12 | Stop reason: HOSPADM

## 2018-07-09 RX ORDER — MELATONIN
1000 2 TIMES DAILY
Status: DISCONTINUED | OUTPATIENT
Start: 2018-07-09 | End: 2018-07-12 | Stop reason: HOSPADM

## 2018-07-09 RX ADMIN — Medication 10 ML: at 14:17

## 2018-07-09 RX ADMIN — CARVEDILOL 3.12 MG: 3.12 TABLET, FILM COATED ORAL at 16:44

## 2018-07-09 RX ADMIN — GABAPENTIN 100 MG: 100 CAPSULE ORAL at 20:56

## 2018-07-09 RX ADMIN — SODIUM CHLORIDE 100 ML: 900 INJECTION, SOLUTION INTRAVENOUS at 14:17

## 2018-07-09 RX ADMIN — Medication 2 MG: at 19:40

## 2018-07-09 RX ADMIN — IOPAMIDOL 100 ML: 612 INJECTION, SOLUTION INTRAVENOUS at 14:17

## 2018-07-09 RX ADMIN — VITAMIN D, TAB 1000IU (100/BT) 1000 UNITS: 25 TAB at 16:45

## 2018-07-09 RX ADMIN — FAMOTIDINE 20 MG: 10 INJECTION, SOLUTION INTRAVENOUS at 20:55

## 2018-07-09 RX ADMIN — SODIUM CHLORIDE 100 ML/HR: 900 INJECTION, SOLUTION INTRAVENOUS at 16:44

## 2018-07-09 RX ADMIN — MONTELUKAST SODIUM 10 MG: 10 TABLET, FILM COATED ORAL at 20:56

## 2018-07-09 RX ADMIN — DOXYCYCLINE HYCLATE 100 MG: 100 TABLET, COATED ORAL at 20:56

## 2018-07-09 RX ADMIN — METHYLPREDNISOLONE SODIUM SUCCINATE 60 MG: 125 INJECTION, POWDER, FOR SOLUTION INTRAMUSCULAR; INTRAVENOUS at 16:46

## 2018-07-09 RX ADMIN — METHYLPREDNISOLONE SODIUM SUCCINATE 60 MG: 125 INJECTION, POWDER, FOR SOLUTION INTRAMUSCULAR; INTRAVENOUS at 20:56

## 2018-07-09 RX ADMIN — SODIUM CHLORIDE 1000 ML: 900 INJECTION, SOLUTION INTRAVENOUS at 11:54

## 2018-07-09 RX ADMIN — DIPHENHYDRAMINE HYDROCHLORIDE 25 MG: 25 CAPSULE ORAL at 20:56

## 2018-07-09 NOTE — ED TRIAGE NOTES
Pt arrives from home via EMS after a GLF. Pt was states that \"i was bending down to get my shoes because I had to go to [facility] to  a prescription for this allergic reaction to a abx. \" Pt had the dental procedure done on June 26 and was placed on abx. Pt had reaction and was told not to take anymore. Pt denies any dizziness, LOC, SOB. VSS, upon arrival to ER.    1500: pt remains alert and oriented x4. NSR on the cardiac monitor. 99% on RA. VSS. Pt denies any pain at this time, VSS all upon transfer to inpatient unit. 1513: TRANSFER - OUT REPORT:    Verbal report given to Shawanda Michel RN (name) on [facility] Samples  being transferred to  (unit) for routine progression of care       Report consisted of patients Situation, Background, Assessment and   Recommendations(SBAR). Information from the following report(s) SBAR, Kardex, ED Summary, MAR, Recent Results and Cardiac Rhythm NSR was reviewed with the receiving nurse. Lines:   Peripheral IV 07/09/18 Right Antecubital (Active)   Site Assessment Clean, dry, & intact 7/9/2018  9:42 AM   Phlebitis Assessment 0 7/9/2018  9:42 AM   Infiltration Assessment 0 7/9/2018  9:42 AM   Dressing Status Clean, dry, & intact 7/9/2018  9:42 AM   Dressing Type Tape;Transparent 7/9/2018  9:42 AM   Hub Color/Line Status Pink;Capped;Flushed;Patent 7/9/2018  9:42 AM   Action Taken Blood drawn 7/9/2018  9:42 AM        Opportunity for questions and clarification was provided.       Patient transported with:  Hospital transport

## 2018-07-09 NOTE — PROGRESS NOTES
TRANSFER - IN REPORT:    Verbal report received from Luciana Rutherford RN(name) on Zoya Henry  being received from ED(unit) for routine progression of care      Report consisted of patients Situation, Background, Assessment and   Recommendations(SBAR). Information from the following report(s) SBAR was reviewed with the receiving nurse. Opportunity for questions and clarification was provided. Assessment completed upon patients arrival to unit and care assumed.

## 2018-07-09 NOTE — ED PROVIDER NOTES
HPI Comments: 75 y.o. female with extensive past medical history, please see list, significant for MI, hypothyroidism, elevated LFTs, insomnia and chronic pain who presents via EMS from home with chief complaint of evaluation post-GLF. Patient lost her balance reaching for her walking shoes 2 days ago (7/7/18), subsequently fell and remained on the floor until this morning. She reportedly called her sons who were unable to assist her due to other obligations. It is unclear why patient waited to call EMS until this morning. Patient c/o lower back pain and right jaw pain. She attributes diffuse hives over upper and lower extremities, trunk and neck to medication reaction to abx. She was evidentially evaluated for the same (see below) in the ED and prescribed 40 mg prednisone which she was unable to fill. Patient was apparently planning to go [facility] on the day of fall to obtain prescription. Patient lives alone. There are no other acute medical concerns at this time. Old Chart Review:  Patient evaluated in the ED on 7/7/18 with allergic reaction to clindamycin. Patient discharged with prescription 40 mg prednisone. Social hx: Never tobacco smoker; Denies EtOH use; Denies illicit drug use; Lives at home alone  PCP: Derick Polanco MD  GI: Robbin Pulido MD    Note written by Alice Loaiza, as dictated by Galina Elaine MD 10:00 AM         The history is provided by the patient and the EMS personnel. No  was used.         Past Medical History:   Diagnosis Date    Abdominal pain 6/18/14    note from Sam Tejeda directive discussed with patient 07/22/2015    Arthritis     dr Bartlett Sites wise        2/6/17 f/u note    Bacteremia due to Klebsiella pneumoniae 2/2/2016    OV note from Dr Sammy Radford, Infect Disease    Chronic pain     low back pain/arthritis      [facility]  note9/13/17    Contact dermatitis and other eczema, due to unspecified cause     hyperpigmented macules/seb k    Elevated LFTs     per info from [facility] on report    Endocrine disease     hypothyroid    GERD (gastroesophageal reflux disease)     chelsy burns    Heart attack ([address] [facility]    HSV infection     Hypertension     Hypothyroid 10/2012    Insomnia     Melasma 3/3/15    [facility]    Nausea & vomiting 05/04/2017    report [facility]  7/28/17 EGD showed delayed emptying    Pain management counseling, encounter for 05/05/2016    sees Dr Estrada Officer 5/2/17 f/u    Rhinitis, allergic nonseasonal     Screening for glaucoma 04/11/2016    Urge incontinence of urine 03/21/2017 [facility]    [facility] initial eval note Yaquelin Hobbs 4/17/17 urodymanics study//5/9/17 f/u note    Well woman exam (no gynecological exam) 07/29/2016    zedler's note rec'd       Past Surgical History:   Procedure Laterality Date    COLONOSCOPY  8/1/11    dr [facility] 10 year repeat    HX CHOLECYSTECTOMY      [address]COPY  2014    [address]    [place]    HX ENDOSCOPY  07/28/2017    showed delayed emptying of stomach contents--gastroparesis to be r/o    HX OTHER SURGICAL  04/17/2017    complex [facility]rodynamics study report from [facility]    HX TUBAL LIGATION           Family History:   Problem Relation Age of Onset    Hypertension Mother     Heart Disease Mother     No Known Problems Father     Cancer Brother      prostate    Heart Disease Brother      blocked valves    Heart Disease Brother     Asthma Sister     Diabetes Sister     Hypertension Sister        Social History     Social History    Marital status:      Spouse name: N/A    Number of children: N/A    Years of education: N/A     Occupational History    Not on file.      Social History Main Topics    Smoking status: Never Smoker    Smokeless tobacco: Never Used    Alcohol use No    Drug use: No    Sexual activity: Not Currently     Other Topics Concern    Not on file     Social History Narrative         ALLERGIES: Pcn [penicillins]; Tramadol; Proventil [albuterol sulfate]; Ace inhibitors; Albuterol; Ambien [zolpidem]; Ciprofloxacin; Clindamycin; Lisinopril; Oxaprozin; Sulfadiazine; and Trazodone    Review of Systems   Constitutional: Negative for chills, diaphoresis and fever. HENT: Negative for congestion, postnasal drip, rhinorrhea and sore throat. +right jaw pain   Eyes: Negative for photophobia, discharge, redness and visual disturbance. Respiratory: Negative for cough, chest tightness, shortness of breath and wheezing. Cardiovascular: Negative for chest pain, palpitations and leg swelling. Gastrointestinal: Negative for abdominal distention, abdominal pain, blood in stool, constipation, diarrhea, nausea and vomiting. Genitourinary: Negative for difficulty urinating, dysuria, frequency, hematuria and urgency. Musculoskeletal: Positive for back pain (lower). Negative for arthralgias, joint swelling and myalgias. Skin: Negative for color change and rash. Neurological: Negative for dizziness, speech difficulty, weakness, light-headedness, numbness and headaches. Psychiatric/Behavioral: Negative for confusion. The patient is not nervous/anxious. All other systems reviewed and are negative. Vitals:    07/09/18 0920   BP: 130/65   Pulse: 66   Resp: 18   Temp: 98 °F (36.7 °C)   SpO2: 100%   Weight: 86.2 kg (190 lb)   Height: 5' 5\" (1.651 m)            Physical Exam   Constitutional: She is oriented to person, place, and time. She appears well-developed. No distress. Elderly, frail. HENT:   Head: Normocephalic and atraumatic. Right Ear: External ear normal.   Left Ear: External ear normal.   Nose: Nose normal.   Mouth/Throat: Oropharynx is clear and moist.   Eyes: Conjunctivae and EOM are normal. Pupils are equal, round, and reactive to light. No scleral icterus. Neck: Normal range of motion. Neck supple.  No JVD present. No tracheal deviation present. No thyromegaly present. Cardiovascular: Normal rate, regular rhythm and normal heart sounds. Exam reveals no gallop and no friction rub. No murmur heard. Pulmonary/Chest: Effort normal and breath sounds normal. No respiratory distress. She has no wheezes. She has no rales. She exhibits no tenderness. Abdominal: Soft. Bowel sounds are normal. She exhibits no distension and no mass. There is no tenderness. There is no rebound and no guarding. Musculoskeletal: Normal range of motion. She exhibits no edema or tenderness. Lymphadenopathy:     She has no cervical adenopathy. Neurological: She is alert and oriented to person, place, and time. She has normal strength. She displays no atrophy and no tremor. No cranial nerve deficit. She exhibits normal muscle tone. Coordination and gait normal.   Skin: Skin is warm and dry. She is not diaphoretic. There is erythema. Diffuse areas of erythema over bilateral arms, bilateral legs and trunk. Psychiatric: She has a normal mood and affect. Her behavior is normal. Judgment and thought content normal.   Nursing note and vitals reviewed. Note written by Alice Hitchcock, as dictated by Kara Fabry, MD 10:00 AM    MDM  Number of Diagnoses or Management Options  Elevated troponin I level:   Fall, initial encounter:   Traumatic rhabdomyolysis, initial encounter [facility]):   Diagnosis management comments: Chava Swift  Impression: 21-year-old female arriving to the emergency department after she fell at home and has been laying on the ground for at least a day and a half. As noted in the history of present illness she called several of her family members were unable to provide any assistance and today finally called EMS. She was recently here for an acute allergic reaction was given a prescription for prednisone which she was not able to fill.     Differential includes dehydration, electrolyte abnormalities, anemia, consider rhabdomyolysis. She is currently still having her acute allergic reaction with noted erythema to her skin. We'll give boost of Solu-Medrol. Patient will likely be admitted to the hospital.        ED Course       Procedures    Chest X-ray: Impression: Unchanged cardiomegaly. Pelvic X-ray: Impression: No acute abnormality. L-Spine X-ray: Impression: Multilevel spondylosis as above. 11:42 AM  CPK elevated at 4468  Troponin elevated at 0.06 - change from baseline    Will admit patient to hospitalist service pending CBC. Patient evaluated by senior services PT. She is ambulatory around the unit with rollator, steady gait. ED EKG Interpretation:  Rhythm: normal sinus rhythm and regular . Rate (approx.): 78; ST/T wave: No acute ST/T wave change. Note written by Alice Moore, as dictated by Adam Lao MD 11:55 AM    12:55 PM  Elevated CPK, troponin. Discussed available lab and imaging results with patient. She verbalizes understanding and agrees with care plan. Will admit patient to hospitalist service for further evaluation and treatment of traumatic rhabdomyolysis s/p GLF, elevated troponin. CONSULT NOTE:  12:59 PM Adam Lao MD spoke with Richard York NP, consult for Hospitalist, via [facility] Text. Discussed available diagnostic tests and clinical findings. Dr. Eduardo Meléndez will evaluate the patient for possible admission. Patient is being admitted to the hospital.  The results of their tests and reasons for their admission have been discussed with them and/or available family. They convey agreement and understanding for the need to be admitted and for their admission diagnosis.

## 2018-07-09 NOTE — PROGRESS NOTES
Problem: Mobility Impaired (Adult and Pediatric)  Goal: *Acute Goals and Plan of Care (Insert Text)  Physical Therapy Goals  Initiated 7/9/2018  1. Patient will move from supine to sit and sit to supine  in bed with supervision/set-up within 7 day(s). 2.  Patient will transfer from bed to chair and chair to bed with supervision/set-up using the least restrictive device within 7 day(s). 3.  Patient will perform sit to stand with modified independence within 7 day(s). 4.  Patient will ambulate with modified independence for 150 feet with the least restrictive device within 7 day(s). physical Therapy EVALUATION  Patient: Wellington Lawrence (66 y.o. female)  Date: 7/9/2018  Primary Diagnosis: There are no admission diagnoses documented for this encounter. Precautions: Fall       ASSESSMENT :  Based on the objective data described below, the patient presents with c/o pain in the low back and left hip, diminished strength, impaired balance, and slowed overall mobility causing difficulty with functional mobility following admission for a GLF. Prior to admission this patient was living alone in an apartment and modified independent using a rollator within the community and quad cane in her apartment for functional mobility. Her fall occurred 7/7 and she remained on the floor until this am when she was finally able to get to a phone and contact EMS. She required mod-maximum assistance for bed mobility during evaluation and minimal assistance to safely ambulate. Gait quality was decreased compared to baseline with mild antalgia and slow maria del rosario. This patient lives in a 2nd floor apartment with 14 steps to enter and has decreased local support. Concern for her ability to return home at present and recommend discharge to an IP rehab setting d/t a high prior level of function. Patient will benefit from skilled intervention to address the above impairments.   Patients rehabilitation potential is considered to be Good  Factors which may influence rehabilitation potential include:   [x]         None noted  []         Mental ability/status  []         Medical condition  []         Home/family situation and support systems  []         Safety awareness  []         Pain tolerance/management  []         Other:      PLAN :  Recommendations and Planned Interventions:  [x]           Bed Mobility Training             [x]    Neuromuscular Re-Education  [x]           Transfer Training                   []    Orthotic/Prosthetic Training  [x]           Gait Training                         []    Modalities  [x]           Therapeutic Exercises           []    Edema Management/Control  [x]           Therapeutic Activities            []    Patient and Family Training/Education  []           Other (comment):    Frequency/Duration: Patient will be followed by physical therapy  4 times a week to address goals. Discharge Recommendations: Inpatient Rehab  Further Equipment Recommendations for Discharge: to be determined       SUBJECTIVE:   Patient stated Gisselle Delacruz went to rehab before and it wasn't a good experience.  (referring to a prior SNF stay)    OBJECTIVE DATA SUMMARY:   HISTORY:    Past Medical History:   Diagnosis Date    Abdominal pain 6/18/14    note from Quynh Wheat directive discussed with patient 07/22/2015    Arthritis     dr Brent buck        2/6/17 f/u note    Bacteremia due to Klebsiella pneumoniae 2/2/2016    OV note from Dr Elliott Brown, Infect Disease    Chronic pain     low back pain/arthritis      [facility]  note9/13/17    Contact dermatitis and other eczema, due to unspecified cause     hyperpigmented macules/seb k    Elevated LFTs     per [facility] J[facility] Pl[facility] on report    Endocrine disease     hypothyroid    GERD (gastroesophageal reflux disease)     chelsy burns    [facility] [address] [facility]    HSV infection     Hypertension     Hypothyroid 10/2012  Insomnia     Melasma 3/3/15    [facility]    Nausea & vomiting 05/04/2017    report [facility]  7/28/17 EGD showed delayed emptying    Pain management counseling, encounter for 05/05/2016    sees Dr Jacquelin Garcia 5/2/17 f/u    Rhinitis, allergic nonseasonal     Screening for glaucoma 04/11/2016    Urge incontinence of urine 03/21/2017 initial [facility] consult    [facility] initial eval note Silva Rader 4/17/17 urodymanics study//5/9/17 f/u note    Well woman exam (no gynecological exam) 07/29/2016    zedler's note rec'd     Past Surgical History:   Procedure Laterality Date    COLONOSCOPY  8/1/11    dr Katiana Fernández 10 year repeat    HX CHOLECYSTECTOMY      HX ENDOSCOPY  2014    [address]    [place]    HX ENDOSCOPY  07/28/2017    showed delayed emptying of stomach contents--gastroparesis to be r/o    HX OTHER SURGICAL  04/17/2017    complex [facility]rodynamics study report [facility]    HX TUBAL LIGATION       Prior Level of Function/Home Situation: modified independent with rollator  Personal factors and/or comorbidities impacting plan of care: RA, limited local support, 14 steps to enter her apartment    [address]: Apartment  # Steps to Enter: 14  Rails to Enter: Yes  One/Two Story Residence: One story  Living Alone: Yes  Support Systems: Family member(s) (not in area)  Current DME Used/Available at Home: [address] [facility] [address], [facility], [facility], rollator, [address] [facility] [address] [facility] chair, Safety frame [employer], Grab bars, Commode, bedside  Tub or Shower Type: Tub/Shower combination    EXAMINATION/PRESENTATION/DECISION MAKING:   Critical Behavior:  Neurologic State: Alert           Hearing:   Auditory  Auditory Impairment: None  Skin:  Back red and inflamed, BLEs spotted and covered with hives  Edema:   Range Of Motion:  AROM: Generally decreased, functional                       Strength:    Strength: Generally decreased, functional                    Tone & Sensation:   Tone: Normal Sensation: Intact               Coordination:       Functional Mobility:  Bed Mobility:  Rolling: Moderate assistance  Supine to Sit: Maximum assistance  Sit to Supine: Moderate assistance  Scooting: Minimum assistance  Transfers:  Sit to Stand: Minimum assistance; Additional time                          Balance:   Sitting: Intact  Standing: Impaired  Standing - Static: Fair  Standing - Dynamic : Fair  Ambulation/Gait Training:  Distance (ft): 65 Feet (ft)  Assistive Device: Gait belt;Walker, rollator  Ambulation - Level of Assistance: Minimal assistance     Gait Description (WDL): Exceptions to WDL  Gait Abnormalities: Decreased step clearance;Trunk sway increased; Shuffling gait        Base of Support: Widened  Stance: Right decreased  Speed/Eunice: Shuffled; Slow  Step Length: Right shortened;Left shortened        Functional Measure:  Tinetti test:    Sitting Balance: 1  Arises: 1  Attempts to Rise: 1  Immediate Standing Balance: 0  Standing Balance: 1  Nudged: 1  Eyes Closed: 0  Turn 360 Degrees - Continuous/Discontinuous: 1  Turn 360 Degrees - Steady/Unsteady: 0  Sitting Down: 1  Balance Score: 7  Indication of Gait: 1  R Step Length/Height: 1  L Step Length/Height: 1  R Foot Clearance: 1  L Foot Clearance: 1  Step Symmetry: 1  Step Continuity: 1  Path: 1  Trunk: 0  Walking Time: 0  Gait Score: 8  Total Score: 15       Tinetti Test and G-code impairment scale:  Percentage of Impairment CH    0%   CI    1-19% CJ    20-39% CK    40-59% CL    60-79% CM    80-99% CN     100%   Tinetti  Score 0-28 28 23-27 17-22 12-16 6-11 1-5 0       Tinetti Tool Score Risk of Falls  <19 = High Fall Risk  19-24 = Moderate Fall Risk  25-28 = Low Fall Risk  Tinetti ME. Performance-Oriented Assessment of Mobility Problems in Elderly Patients. Mack [address]; [id number].  (Scoring Description: PT Bulletin Feb. 10, 1993)    Older adults: Katie Vásquez et al, 2009; n = [address] elderly evaluated with ABC, JASON, ADL, and IADL)  · Mean JASON score for males aged 69-68 years = 26.21(3.40)  · Mean JASON score for females age 69-68 years = 25.16(4.30)  · Mean JASON score for males over 80 years = 23.29(6.02)  · Mean JASON score for females over 80 years = 17.20(8.32)         G codes: In compliance with CMSs Claims Based Outcome Reporting, the following G-code set was chosen for this patient based on their primary functional limitation being treated: The outcome measure chosen to determine the severity of the functional limitation was the Tinetti with a score of 15/28 which was correlated with the impairment scale. ? Mobility - Walking and Moving Around:     - CURRENT STATUS: CK - 40%-59% impaired, limited or restricted    - GOAL STATUS: CJ - 20%-39% impaired, limited or restricted    - D/C STATUS:  ---------------To be determined---------------      Physical Therapy Evaluation Charge Determination   History Examination Presentation Decision-Making   MEDIUM  Complexity : 1-2 comorbidities / personal factors will impact the outcome/ POC  MEDIUM Complexity : 3 Standardized tests and measures addressing body structure, function, activity limitation and / or participation in recreation  LOW Complexity : Stable, uncomplicated  LOW Complexity : FOTO score of       Based on the above components, the patient evaluation is determined to be of the following complexity level: LOW     Pain:  Pain Scale 1: Numeric (0 - 10)  Pain Intensity 1: 9  Pain Location 1: Back  Pain Orientation 1: Lower  Pain Description 1: Aching       After treatment:   []         Patient left in no apparent distress sitting up in chair  [x]         Patient left in no apparent distress in bed  [x]         Call bell left within reach  [x]         Nursing notified  []         Caregiver present  []         Bed alarm activated    COMMUNICATION/EDUCATION:   The patients plan of care was discussed with: Registered Nurse.   [x]         Fall prevention education was provided and the patient/caregiver indicated understanding. [x]         Patient/family have participated as able in goal setting and plan of care. [x]         Patient/family agree to work toward stated goals and plan of care. []         Patient understands intent and goals of therapy, but is neutral about his/her participation. []         Patient is unable to participate in goal setting and plan of care.     Thank you for this referral.  Yamile Blum, PT, DPT   Time Calculation: 40 mins

## 2018-07-09 NOTE — PROGRESS NOTES
Date of previous inpatient admission/ ED visit? Inpatient 11/27/17-12/4/17     What brought the patient back to ED? Patient presents to the ED w/ an allergic reaction and s/p fall (per patient on floor since 1400 on Sunday)    Did patient decline recommended services during last admission/ ED visit (if yes, what)? No -Services through [facility] / [facility] ([facility])    Has patient seen a provider since their last inpatient admission/ED visit (if yes, when)? Patient did not verbalize provider was not sure of PCP appointment however questions January 2018    CM Interventions:  From previous inpatient admission/ED visit: Assessment  From current inpatient admission/ED visit: Assessment    SSED/CM consult received and appreciated. EMR reviewed. History significant for MI, hypothyroidism, elevated LFTs, insomnia and chronic pain . Patient presents to the ED with chief complaint of evaluation for post-GLF. Met w/patient and introduced to role of CM - informed was bending down to  shoes when fall occurred.  seen in the ED on 7/7/18 secondary to allergic reaction from Clindamycin. Patient states in the process of getting dental implants. Patient lives home alone and has resided in her apartment since 2013. DME includes rollator and quad cane (does not use secondary to balance). Previous provider is Marcos Bucio per patient.  has 2 sons in [place] Aleksandra Showers and Dominga Roth \"Dequan\" Harris Mora). Describes Dominga Roth as the oldest and has other priorities. Per patient Mariana Lipoma is POA however was unable to drive to [place] secondary to being under Physician's Care and unable to drive. Informed this Sigrid Filler drove her to the grocery store - \"I needed groceries and items for liquid diet don't last long. \" CM asked bout son driving from [place] to take to [employer]. Patient verbalizes \" if my back wasn't hurting. I would walk to [facility] (round trip 5 miles. \" Per patient she uses the [facility] for MD appointments and errands. CM asked patient what services did she need at this time.  verbalizes she feels would be ok if took prednisone and returned back home. CM discussed recommendations for  Inpatient rehab and additional Senior Resources (including [facility] and [facility]). [facility] Referral sent via [employer]. Patient Alma Baltazar is in the process of locating AL in [place] closer to family. [facility] Medicare A and [employer] are insurance providers.  states [employer] is primary insurance. Case Management will continue to follow for transitional care needs. Care Management Interventions  PCP Verified by CM:  Yes  Last Visit to PCP: 01/09/18  Palliative Care Criteria Met (RRAT>21 & CHF Dx)?: No  Transition of Care Consult (CM Consult): Discharge Planning  MyChart Signup: No  Discharge Durable Medical Equipment: No  Health Maintenance Reviewed: Yes  Physical Therapy Consult: Yes  Occupational Therapy Consult: No  Speech Therapy Consult: No  Current Support Network: Lives Alone  Confirm Follow Up Transport:  (TBD)  Plan discussed with Pt/Family/Caregiver: Yes  Freedom of Choice Offered: Yes  [address] Provided?: No  Discharge Location  Discharge Placement:  (TBD)

## 2018-07-09 NOTE — H&P
History & Physical 
Alicia Sylvester MD, [facility] Date of admission: 7/9/2018 Patient name: Maricruz Pickard MRN: [id number] YOB: 1945 Age: 75 y.o. Primary care provider:  Franck Vallecillo MD  
Pain Management: Dr Taylor Nielson 
GI: Dr Raisa Oviedo Source of Information: patient, medical records Chief complaint: fall History of present illness Maricruz Pickard is a 75 y.o. female with CAD, h/o HSV infection, chronic pain, arthritis, GERD, HTN, hypothyroidism, and gastroparesis who was BIBEMS to the ED from home s/p GLF. Patient states she was trying to  her shoes from the floor yesterday about 2pm when she lost her balance and fell. She was on the floor till about 9am today as she was unable to move. She had an appointment today, called the office to let them know her situation, office called her son, and son called EMS. Patient had a dental procedure recently and had just completed her clindamycin 7/6pm. She developed hives on 7/7am, came to [facility], and was discharged with a script for prednisone. She was about to leave to go to [facility] today when she fell in her house. Patient denies any preceding symptoms; she walks about 5 miles roundtrip with her walker almost daily. Patient is also c/o of right facial swelling but denies difficulty swallowing. She has chronic abdominal and back pain. ED triage VS were temp 98F, HR 66, /65, RR 18, SpO2 100% on RA. In the ED, pt received 1L IVNS. Past Medical History:  
Diagnosis Date  Abdominal pain 6/18/14  
 note from Adam Whiteside  Advance directive discussed with patient 07/22/2015  Arthritis   
 dr Alva buck        2/6/17 f/u note  Bacteremia due to Klebsiella pneumoniae 2/2/2016 OV note from Dr Anish Montgomery Disease  Chronic pain   
 low back pain/arthritis      [facility]  note9/13/17  Contact dermatitis and other eczema, due to unspecified cause   
 hyperpigmented macules/seb k  Elevated LFTs   
 per info from Dr Jacy Plata [facility] on report  Endocrine disease   
 hypothyroid  GERD (gastroesophageal reflux disease)   
 chelsy burns  Heart attack ([facility] [address] 05/2017 [facility]  
 HSV infection  Hypertension  Hypothyroid 10/2012  Insomnia  Melasma 3/3/15  
 [facility]  Nausea & vomiting 05/04/2017  
 report [facility]  7/28/17 EGD showed delayed emptying  Pain management counseling, encounter for 05/05/2016  
 sees Dr Jie Perez 5/2/17 f/u  Rhinitis, allergic nonseasonal   
 Screening for glaucoma 04/11/2016  Urge incontinence of urine 03/21/2017 [facility] 4/17/17 urodymanics study//5/9/17 f/u note  Well woman exam (no gynecological exam) 07/29/2016  
 zedler's note rec'd Past Surgical History:  
Procedure Laterality Date  COLONOSCOPY  8/1/11  
 dr Rolando Riley 10 year repeat  HX CHOLECYSTECTOMY  HX ENDOSCOPY  2014 [address]  
 HX ENDOSCOPY  2017 [place]  
 HX ENDOSCOPY  07/28/2017  
 showed delayed emptying of stomach contents--gastroparesis to be r/o  
 HX OTHER SURGICAL  04/17/2017  
 complex urodynamics study report from [facility]  HX TUBAL LIGATION Prior to Admission medications Medication Sig Start Date End Date Taking? Authorizing Provider  
baclofen (LIORESAL) 10 mg tablet Take 10 mg by mouth nightly. Yes Historical Provider  
gabapentin (NEURONTIN) 100 mg capsule Take 100 mg by mouth nightly. Yes Historical Provider  
vit B Cmplx 3-FA-Vit C-Biotin (NEPHRO ROMAINE RX) 1- mg-mg-mcg tablet Take 1 Tab by mouth daily. Yes Historical Provider  
carvedilol (COREG) 3.125 mg tablet TAKE 1 TABLET TWICE A DAY 6/5/18  Yes Uri Louie MD  
pantoprazole (PROTONIX) 40 mg tablet TAKE 1 TABLET DAILY FOR    GASTROENTERITIS 5/18/18  Yes Tea Mccord. Michelle Rader MD  
potassium chloride (K-DUR, KLOR-CON) 20 mEq tablet Take 1 Tab by mouth daily. 4/11/18  Yes Ammy Orozco NP  
levothyroxine (SYNTHROID) 50 mcg tablet TAKE 1 TABLET DAILY 12/11/17  Yes Cely Voss MD  
hydroCHLOROthiazide (HYDRODIURIL) 25 mg tablet TAKE 1 TABLET DAILY FOR    EDEMA AND HYPERTENSION 12/11/17  Yes Cely Voss MD  
montelukast (SINGULAIR) 10 mg tablet TAKE 1 TABLET DAILY 9/20/17  Yes Cely Voss MD  
nystatin (MYCOSTATIN) topical cream Apply  to affected area two (2) times a day. 8/2/17  Yes Cely Voss MD  
atorvastatin (LIPITOR) 20 mg tablet Take 1 Tab by mouth nightly. 6/26/17  Yes Cely Voss MD  
cholecalciferol (VITAMIN D3) 1,000 unit tablet Take 1,000 Units by mouth two (2) times a day. Yes Historical Provider  
multivitamin (ONE A DAY) tablet Take 1 Tab by mouth daily. Yes Historical Provider  
azaTHIOprine (IMURAN) 50 mg tablet Take 150 mg by mouth daily. 6/16/15  Yes Historical Provider  
aspirin 81 mg tablet Take 81 mg by mouth daily. Yes Historical Provider  
vitamin E (AQUA GEMS) 400 unit capsule Take 400 Units by mouth two (2) times a day. Yes Historical Provider  
predniSONE (DELTASONE) 10 mg tablet Take 4 tablets daily x 2 days, then 3 tablets daily x 3 days, then 1 tablet daily x 2 days  Indications: allergic reaction 7/7/18   Lizet Cooney, NP Allergies Allergen Reactions  Pcn [Penicillins] Swelling Has tolerated Cefepime  Tramadol Nausea and Vomiting SEVERE If taken w/o food  Proventil [Albuterol Sulfate] Hives  Ace Inhibitors Angioedema  Albuterol Swelling  
  swelling  Ambien [Zolpidem] Other (comments) Nightmares and memory disturbance  Ciprofloxacin Other (comments) Sore throat and trouble swallowing  Clindamycin Hives  Lisinopril Swelling  Oxaprozin Nausea and Vomiting  
  severe stomach upset  Sulfadiazine Swelling  
  swelling  Trazodone Other (comments)   Vivid dreams and felt disoriented Family History Problem Relation Age of Onset  Hypertension Mother  Heart Disease Mother  No Known Problems Father  Cancer Brother   
  prostate  Heart Disease Brother   
  blocked valves  Heart Disease Brother  Asthma Sister  Diabetes Sister  Hypertension Sister Social history Patient resides 
x  Independently With family care Assisted living SNF Ambulates Independently With cane    
x  Assisted walker Alcohol history  
x  None Social  
  Chronic Smoking history 
x  None Former smoker Current smoker Denies illicit drug use. History Smoking Status  Never Smoker Smokeless Tobacco  
 Never Used Code status 
x  Full code DNR/DNI Code status discussed with the patient, and she requests Full Code status. Review of systems A comprehensive review of systems was negative except for that written in the History of Present Illness. Physical Examination Visit Vitals  /65 (BP 1 Location: Left arm, BP Patient Position: At rest)  Pulse 66  Temp 98 °F (36.7 °C)  Resp 18  Ht 5' 5\" (1.651 m)  Wt 86.2 kg (190 lb)  SpO2 100%  BMI 31.62 kg/m2 O2 Device: Room air Gen: awake, NAD, cooperative Head: right facial mass EENT: PERRL, EOMI, MMM, oropharynx benign, edentulous Neck: supple, no masses Lungs: CTAB, no w/r/r 
CVS: regular rhythm, normal rate, S1S2, no m/r/g appreciated, no peripheral edema, +pulses Abd: +BS, soft, NT, ND 
MSK: moves all extremities Neuro: AOx3, CN II-XII grossly intact, NFD, responds appropriately to questions and commands Skin: diffuse hives on face, body but not on palms or soles Data Review 24 Hour Results: 
Recent Results (from the past 24 hour(s)) CK Collection Time: 07/09/18  9:35 AM  
Result Value Ref Range CK 4468 (H) 26 - 484 U/L  
METABOLIC PANEL, COMPREHENSIVE  Collection Time: 07/09/18 9:35 AM  
Result Value Ref Range Sodium 137 136 - 145 mmol/L Potassium 3.9 3.5 - 5.1 mmol/L Chloride 102 97 - 108 mmol/L  
 CO2 25 21 - 32 mmol/L Anion gap 10 5 - 15 mmol/L Glucose 93 65 - 100 mg/dL BUN 9 6 - 20 MG/DL Creatinine 0.87 0.55 - 1.02 MG/DL  
 BUN/Creatinine ratio 10 (L) 12 - 20 GFR est AA >60 >60 ml/min/1.73m2 GFR est non-AA >60 >60 ml/min/1.73m2 Calcium 8.3 (L) 8.5 - 10.1 MG/DL Bilirubin, total 1.5 (H) 0.2 - 1.0 MG/DL  
 ALT (SGPT) 50 12 - 78 U/L  
 AST (SGOT) 159 (H) 15 - 37 U/L Alk. phosphatase 104 45 - 117 U/L Protein, total 7.1 6.4 - 8.2 g/dL Albumin 2.6 (L) 3.5 - 5.0 g/dL Globulin 4.5 (H) 2.0 - 4.0 g/dL A-G Ratio 0.6 (L) 1.1 - 2.2    
TROPONIN I Collection Time: 07/09/18  9:35 AM  
Result Value Ref Range Troponin-I, Qt. 0.06 (H) <0.05 ng/mL EKG, 12 LEAD, INITIAL Collection Time: 07/09/18 11:50 AM  
Result Value Ref Range Ventricular Rate 78 BPM  
 Atrial Rate 80 BPM  
 QRS Duration 82 ms Q-T Interval 390 ms QTC Calculation (Bezet) 444 ms Calculated R Axis 6 degrees Calculated T Axis 51 degrees Diagnosis Accelerated Junctional rhythm When compared with ECG of 27-NOV-2017 02:58, 
Previous ECG has undetermined rhythm, needs review URINALYSIS W/MICROSCOPIC Collection Time: 07/09/18 11:57 AM  
Result Value Ref Range Color DARK YELLOW Appearance CLEAR CLEAR Specific gravity 1.013 1.003 - 1.030    
 pH (UA) 7.5 5.0 - 8.0 Protein TRACE (A) NEG mg/dL Glucose NEGATIVE  NEG mg/dL Ketone NEGATIVE  NEG mg/dL Bilirubin NEGATIVE  NEG Blood SMALL (A) NEG Urobilinogen 1.0 0.2 - 1.0 EU/dL Nitrites NEGATIVE  NEG Leukocyte Esterase TRACE (A) NEG    
 WBC 0-4 0 - 4 /hpf  
 RBC 10-20 0 - 5 /hpf Epithelial cells FEW FEW /lpf Bacteria NEGATIVE  NEG /hpf Hyaline cast 0-2 0 - 5 /lpf  
CBC WITH AUTOMATED DIFF Collection Time: 07/09/18 11:57 AM  
Result Value Ref Range  WBC 11. 2 (H) 3.6 - 11.0 K/uL  
 RBC 3.40 (L) 3.80 - 5.20 M/uL  
 HGB 11.0 (L) 11.5 - 16.0 g/dL HCT 33.1 (L) 35.0 - 47.0 % MCV 97.4 80.0 - 99.0 FL  
 MCH 32.4 26.0 - 34.0 PG  
 MCHC 33.2 30.0 - 36.5 g/dL  
 RDW 15.0 (H) 11.5 - 14.5 % PLATELET 076 725 - 047 K/uL MPV 9.2 8.9 - 12.9 FL  
 NRBC 0.0 0  WBC ABSOLUTE NRBC 0.00 0.00 - 0.01 K/uL NEUTROPHILS 81 (H) 32 - 75 % BAND NEUTROPHILS 7 (H) 0 - 6 % LYMPHOCYTES 6 (L) 12 - 49 % MONOCYTES 2 (L) 5 - 13 % EOSINOPHILS 4 0 - 7 % BASOPHILS 0 0 - 1 % IMMATURE GRANULOCYTES 0 %  
 ABS. NEUTROPHILS 9.9 (H) 1.8 - 8.0 K/UL  
 ABS. LYMPHOCYTES 0.7 (L) 0.8 - 3.5 K/UL  
 ABS. MONOCYTES 0.2 0.0 - 1.0 K/UL  
 ABS. EOSINOPHILS 0.4 0.0 - 0.4 K/UL  
 ABS. BASOPHILS 0.0 0.0 - 0.1 K/UL  
 ABS. IMM. GRANS. 0.0 K/UL  
 DF MANUAL    
 RBC COMMENTS ANISOCYTOSIS 1+ 
    
 RBC COMMENTS OVALOCYTES PRESENT 
    
 RBC COMMENTS TARGET CELLS 
PRESENT Recent Labs  
   07/09/18 
 1157 WBC  11.2* HGB  11.0*  
HCT  33.1*  
PLT  161 Recent Labs  
   07/09/18 
 0935 NA  137  
K  3.9 CL  102 CO2  25 GLU  93 BUN  9  
CREA  0.87 CA  8.3* ALB  2.6* SGOT  159* ALT  50 Imaging CXR portable Findings: The lungs are well expanded. No focal consolidation, pleural 
effusion, or pneumothorax. The heart is mildly enlarged, but unchanged. The 
visualized osseous structures are unremarkable. Surgical clips are seen in the 
right upper quadrant Impression: 
Unchanged mild cardiomegaly XR pelvis FINDINGS: An AP view of the pelvis. The sacral iliac joints are intact. Mild 
degenerative changes are seen in the pubic symphysis. The hips are intact. No 
acute fracture or dislocation. Spondylosis is seen in the lower lumbar spine. 
  
IMPRESSION:  No acute abnormality. XR lumbar spine FINDINGS: AP, lateral and spot lateral views of the lumbar spine. A metallic 
biliary stent in the right upper quadrant.  Surgical clips are seen in the right 
upper quadrant. Alignment of the lumbar spine is anatomic. There is unchanged 
moderate disc space narrowing at L2-3 and L3-4. There is severe disc space there 
are L4-5 which has progressed. There is unchanged mild disc space narrowing at L5-S1. Vertebral body heights are maintained. No acute fracture or subluxation. IMPRESSION:  Multilevel spondylosis as above. Assessment and Plan Principal Problem: 
  Rhabdomyolysis s/p GLF (POA) - admit inpatient remote telemetry 
- continue IVF 
- trend CK 
- PT/OT evals 
- fall precautions Active Problems: 
Indeterminate troponin elevation and abnormal ECG without chest pain (POA) - troponin 0.06 
 
R facial mass (POA) - check CT maxillofacial 
- pain control Hives likely due to allergic reaction to clindamycin (POA) - start IV to po steroid taper, Benadryl prn, famotidine, loratidine Hypothyroidism - continue levothyroxine Hyperlipidemia - hold statin due to elevated AST Hyperbilirubinemia and elevated AST - pt has h/o abdominal issues and abnormal LFTs in the past 
- sees Dr Clark Bloodgood 
- check [address] [facility] Chronic pain - sees Dr Jeanette Locke, pain specialist 
 
Diet: cardiac Activity: ambulate with assistance DVT prophylaxis: SCDs Isolation precautions: none Consultations: none Anticipated disposition: TBD Signed by: Riky Munson MD  
 July 9, 2018 at 1:15 PM

## 2018-07-09 NOTE — IP AVS SNAPSHOT
[address] [facility] [address] [facility] [address] 
[phone] Patient: Katerin Matias MRN: [id number] ISK:[id number] A check brisa indicates which time of day the medication should be taken. My Medications START taking these medications Instructions Each Dose to Equal  
 Morning Noon Evening Bedtime  
 doxycycline 100 mg tablet Commonly known as:  VIBRA-TABS Your last dose was: Your next dose is: Take 1 Tab by mouth every twelve (12) hours for 4 days. 100 mg CHANGE how you take these medications Instructions Each Dose to Equal  
 Morning Noon Evening Bedtime  
 predniSONE 20 mg tablet Commonly known as:  Eliseo Mora What changed:   
- medication strength 
- how much to take 
- how to take this - when to take this 
- additional instructions Your last dose was: Your next dose is: Take 1 Tab by mouth daily (with breakfast) for 3 days. 20 mg CONTINUE taking these medications Instructions Each Dose to Equal  
 Morning Noon Evening Bedtime  
 aspirin 81 mg tablet Your last dose was: Your next dose is: Take 81 mg by mouth daily. 81 mg  
    
   
   
   
  
 atorvastatin 20 mg tablet Commonly known as:  LIPITOR Start taking on:  7/19/2018 Your last dose was: Your next dose is: Take 1 Tab by mouth nightly. 20 mg  
    
   
   
   
  
 azaTHIOprine 50 mg tablet Commonly known as:  [employer] Your last dose was: Your next dose is: Take 150 mg by mouth daily. 150 mg  
    
   
   
   
  
 baclofen 10 mg tablet Commonly known as:  LIORESAL Your last dose was: Your next dose is: Take 10 mg by mouth nightly. 10 mg  
    
   
   
   
  
 carvedilol 3.125 mg tablet Commonly known as:  Dana Buster Your last dose was: Your next dose is: TAKE 1 TABLET TWICE A DAY  
     
   
   
   
  
 gabapentin 100 mg capsule Commonly known as:  NEURONTIN Your last dose was: Your next dose is: Take 100 mg by mouth nightly. 100 mg  
    
   
   
   
  
 hydroCHLOROthiazide 25 mg tablet Commonly known as:  HYDRODIURIL Your last dose was: Your next dose is: TAKE 1 TABLET DAILY FOR    EDEMA AND HYPERTENSION  
     
   
   
   
  
 levothyroxine 50 mcg tablet Commonly known as:  SYNTHROID Your last dose was: Your next dose is: TAKE 1 TABLET DAILY  
     
   
   
   
  
 montelukast 10 mg tablet Commonly known as:  SINGULAIR Your last dose was: Your next dose is: TAKE 1 TABLET DAILY  
     
   
   
   
  
 multivitamin tablet Commonly known as:  ONE A DAY Your last dose was: Your next dose is: Take 1 Tab by mouth daily. 1 Tab  
    
   
   
   
  
 nystatin topical cream  
Commonly known as:  MYCOSTATIN Your last dose was: Your next dose is:    
   
   
 Apply  to affected area two (2) times a day. pantoprazole 40 mg tablet Commonly known as:  PROTONIX Your last dose was: Your next dose is: TAKE 1 TABLET DAILY FOR    GASTROENTERITIS  
     
   
   
   
  
 potassium chloride 20 mEq tablet Commonly known as:  K-DUR, KLOR-CON Your last dose was: Your next dose is: Take 1 Tab by mouth daily. 20 mEq  
    
   
   
   
  
 vit B Cmplx 3-FA-Vit C-Biotin 1- mg-mg-mcg tablet Commonly known as:  NEPHRO ROMAINE RX Your last dose was: Your next dose is: Take 1 Tab by mouth daily. 1 Tab VITAMIN D3 1,000 unit tablet Generic drug:  cholecalciferol Your last dose was: Your next dose is: Take 1,000 Units by mouth two (2) times a day. 1000 Units  
    
   
   
   
  
 vitamin E 400 unit capsule Commonly known as:  [address] Your last dose was: Your next dose is: Take 400 Units by mouth two (2) times a day. 400 Units STOP taking these medications   
 calcium-cholecalciferol (d3) 600-125 mg-unit Tab Where to Get Your Medications These medications were sent to [facility], [address] [facility]  [address] [address] Phone:  [phone]  
  atorvastatin 20 mg tablet  
 predniSONE 20 mg tablet Information on where to get these meds will be given to you by the nurse or doctor. ! Ask your nurse or doctor about these medications  
  doxycycline 100 mg tablet

## 2018-07-09 NOTE — PROGRESS NOTES
Admission Medication Reconciliation:    Comments/Recommendations: This medication history was obtained from patient ; she appears to be moderately good historian. An RX Query is available. Medications added: none  Medications deleted: Verapamil 120mg, Nystatin topical cream, Heparin 1u/mL, Prednisone 5mg, Atrovent 0.02%  Medications amended: changed baclofen to 10mg QHS (from TID PRN), changed Gabapentin to 100mg capsule QHS (from BID)    Last doses of medication: 7/8/18      Also of note: Pt is currently on a Prednisone taper.  Started on 7/7/18, and therefor would be on day 3 of taper (3 tablets daily for 2 days)    Significant PMH/Disease States:   Past Medical History:   Diagnosis Date    Abdominal pain 6/18/14    note from [facility] directive discussed with patient 07/22/2015    Arthritis     dr Saritha Venegas buck        2/6/17 f/u note    Bacteremia due to Klebsiella pneumoniae 2/2/2016    OV note from Dr Nahomi Land, Infect Disease    Chronic pain     low back pain/arthritis      [facility] note9/13/17    Contact dermatitis and other eczema, due to unspecified cause     hyperpigmented macules/seb k    Elevated LFTs     per info from Dr Caitlyn Salcedo [facility] on report    Endocrine disease     hypothyroid    GERD (gastroesophageal reflux disease)     chelsy burns    Heart attack ([facility] [address] 05/2017    [facility]    HSV infection     Hypertension     Hypothyroid 10/2012    Insomnia     Melasma 3/3/15    notes [facility]    Nausea & vomiting 05/04/2017    report [facility]  7/28/17 EGD showed delayed emptying    Pain management counseling, encounter for 05/05/2016    sees Dr Mart Aguilar 5/2/17 f/u    Rhinitis, allergic nonseasonal     Screening for glaucoma 04/11/2016    Urge incontinence of urine 03/21/2017 initial [facility]    [facility] initial eval note Peder Boards 4/17/17 urodymanics study//5/9/17 f/u note    Well woman exam (no gynecological exam) 07/29/2016    jonathan's note rec'd       Chief Complaint for this Admission:  Rhabdomyolysis, Fall    Allergies:  Pcn [penicillins]; Tramadol; Proventil [albuterol sulfate]; Ace inhibitors; Albuterol; Ambien [zolpidem]; Ciprofloxacin; Clindamycin; Lisinopril; Oxaprozin; Sulfadiazine; and Trazodone    Prior to Admission Medications:   Prior to Admission Medications   Prescriptions Last Dose Informant Patient Reported? Taking?   aspirin 81 mg tablet   Yes Yes   Sig: Take 81 mg by mouth daily. atorvastatin (LIPITOR) 20 mg tablet   No Yes   Sig: Take 1 Tab by mouth nightly. azaTHIOprine (IMURAN) 50 mg tablet   Yes Yes   Sig: Take 150 mg by mouth daily. baclofen (LIORESAL) 10 mg tablet   Yes Yes   Sig: Take 10 mg by mouth nightly. calcium-cholecalciferol, d3, 600-125 mg-unit tab   Yes Yes   Sig: Take 1 Tab by mouth daily. carvedilol (COREG) 3.125 mg tablet   No Yes   Sig: TAKE 1 TABLET TWICE A DAY   cholecalciferol (VITAMIN D3) 1,000 unit tablet   Yes Yes   Sig: Take 1,000 Units by mouth two (2) times a day.   gabapentin (NEURONTIN) 100 mg capsule   Yes Yes   Sig: Take 100 mg by mouth nightly. hydroCHLOROthiazide (HYDRODIURIL) 25 mg tablet   No Yes   Sig: TAKE 1 TABLET DAILY FOR    EDEMA AND HYPERTENSION   levothyroxine (SYNTHROID) 50 mcg tablet   No Yes   Sig: TAKE 1 TABLET DAILY   montelukast (SINGULAIR) 10 mg tablet   No Yes   Sig: TAKE 1 TABLET DAILY   multivitamin (ONE A DAY) tablet   Yes Yes   Sig: Take 1 Tab by mouth daily. nystatin (MYCOSTATIN) topical cream   No Yes   Sig: Apply  to affected area two (2) times a day. pantoprazole (PROTONIX) 40 mg tablet   No Yes   Sig: TAKE 1 TABLET DAILY FOR    GASTROENTERITIS   potassium chloride (K-DUR, KLOR-CON) 20 mEq tablet   No Yes   Sig: Take 1 Tab by mouth daily.    predniSONE (DELTASONE) 10 mg tablet   No No   Sig: Take 4 tablets daily x 2 days, then 3 tablets daily x 3 days, then 1 tablet daily x 2 days  Indications: allergic reaction vit B Cmplx 3-FA-Vit C-Biotin (NEPHRO ROMAINE RX) 1- mg-mg-mcg tablet   Yes Yes   Sig: Take 1 Tab by mouth daily. vitamin E (AQUA GEMS) 400 unit capsule   Yes Yes   Sig: Take 400 Units by mouth two (2) times a day. Facility-Administered Medications: None         Thank you for allowing me to participate in the care of this patient. Please contact the pharmacy () or the medication reconciliation pharmacy () with any questions.     Mo Berumen  PharmD Candidate [id number]

## 2018-07-09 NOTE — IP AVS SNAPSHOT
[age] y [address] 
[phone] Patient: Bobby Fonseca MRN: [id number] QUQ:[id number] About your hospitalization You were admitted on:  July 9, 2018 You last received care in the:  [facility] 2N MED SURG You were discharged on:  July 12, 2018 Why you were hospitalized Your primary diagnosis was:  Rhabdomyolysis Your diagnoses also included:  Pruritic Erythematous Rash, Facial Mass, Fall, Hyperbilirubinemia, Elevated Ast (Sgot), Hyperlipidemia, Hypothyroidism, Chronic Pain Follow-up Information Follow up With Details Comments Contact Info Ashok Lama 
[address] [employer] [place] [address] 
[phone] [address] [place] [address], [address]   [address] [facility] [place] [address] 
[phone] Discharge Orders None A check brisa indicates which time of day the medication should be taken. My Medications START taking these medications Instructions Each Dose to Equal  
 Morning Noon Evening Bedtime  
 doxycycline 100 mg tablet Commonly known as:  VIBRA-TABS Your last dose was: Your next dose is: Take 1 Tab by mouth every twelve (12) hours for 4 days. 100 mg CHANGE how you take these medications Instructions Each Dose to Equal  
 Morning Noon Evening Bedtime  
 predniSONE 20 mg tablet Commonly known as:  Gunnar See What changed:   
- medication strength 
- how much to take 
- how to take this - when to take this 
- additional instructions Your last dose was: Your next dose is: Take 1 Tab by mouth daily (with breakfast) for 3 days. 20 mg CONTINUE taking these medications Instructions Each Dose to Equal  
 Morning Noon Evening Bedtime  
 aspirin 81 mg tablet Your last dose was: Your next dose is: Take 81 mg by mouth daily. 81 mg  
    
   
   
   
  
 atorvastatin 20 mg tablet Commonly known as:  LIPITOR Start taking on:  7/19/2018 Your last dose was: Your next dose is: Take 1 Tab by mouth nightly. 20 mg  
    
   
   
   
  
 azaTHIOprine 50 mg tablet Commonly known as:  [employer] Peps[employer] Your last dose was: Your next dose is: Take 150 mg by mouth daily. 150 mg  
    
   
   
   
  
 baclofen 10 mg tablet Commonly known as:  LIORESAL Your last dose was: Your next dose is: Take 10 mg by mouth nightly. 10 mg  
    
   
   
   
  
 carvedilol 3.125 mg tablet Commonly known as:  Vergia Terry Your last dose was: Your next dose is: TAKE 1 TABLET TWICE A DAY  
     
   
   
   
  
 gabapentin 100 mg capsule Commonly known as:  NEURONTIN Your last dose was: Your next dose is: Take 100 mg by mouth nightly. 100 mg  
    
   
   
   
  
 hydroCHLOROthiazide 25 mg tablet Commonly known as:  HYDRODIURIL Your last dose was: Your next dose is: TAKE 1 TABLET DAILY FOR    EDEMA AND HYPERTENSION  
     
   
   
   
  
 levothyroxine 50 mcg tablet Commonly known as:  SYNTHROID Your last dose was: Your next dose is: TAKE 1 TABLET DAILY  
     
   
   
   
  
 montelukast 10 mg tablet Commonly known as:  SINGULAIR Your last dose was: Your next dose is: TAKE 1 TABLET DAILY  
     
   
   
   
  
 multivitamin tablet Commonly known as:  ONE A DAY Your last dose was: Your next dose is: Take 1 Tab by mouth daily. 1 Tab  
    
   
   
   
  
 nystatin topical cream  
Commonly known as:  MYCOSTATIN Your last dose was: Your next dose is:    
   
   
 Apply  to affected area two (2) times a day. pantoprazole 40 mg tablet Commonly known as:  PROTONIX Your last dose was: Your next dose is: TAKE 1 TABLET DAILY FOR    GASTROENTERITIS  
     
   
   
   
  
 potassium chloride 20 mEq tablet Commonly known as:  K-DUR KLOR-CON Your last dose was: Your next dose is: Take 1 Tab by mouth daily. 20 mEq  
    
   
   
   
  
 vit B Cmplx 3-FA-Vit C-Biotin 1- mg-mg-mcg tablet Commonly known as:  NEPHRO ROMAINE RX Your last dose was: Your next dose is: Take 1 Tab by mouth daily. 1 Tab VITAMIN D3 1,000 unit tablet Generic drug:  cholecalciferol Your last dose was: Your next dose is: Take 1,000 Units by mouth two (2) times a day. 1000 Units  
    
   
   
   
  
 vitamin E 400 unit capsule Commonly known as:  [address] Your last dose was: Your next dose is: Take 400 Units by mouth two (2) times a day. 400 Units STOP taking these medications   
 calcium-cholecalciferol (d3) 600-125 mg-unit Tab Where to Get Your Medications These medications were sent to [facility] [address] [facility] [facility]  [address] [address] Phone:  [phone]  
  atorvastatin 20 mg tablet  
 predniSONE 20 mg tablet Information on where to get these meds will be given to you by the nurse or doctor. ! Ask your nurse or doctor about these medications  
  doxycycline 100 mg tablet Discharge Instructions Discharge SNF/Rehab Instructions/LTAC  
 
 
PATIENT ID: Sushil Live MRN: [id number] YOB: 1945 DATE OF ADMISSION: 7/9/2018  9:06 AM   
DATE OF DISCHARGE: 7/12/2018 PRIMARY CARE PROVIDER: Mervat Dyson MD  
 
 
ATTENDING PHYSICIAN: Vlad Dee MD 
DISCHARGING PROVIDER: Vlad Dee MD    
 To contact this individual call [phone] and ask the  to page. If unavailable ask to be transferred the Adult Hospitalist Department. CONSULTATIONS: ED CONSULT TO SENIOR SERVICES CASE MANAGEMENT 
ED CONSULT TO Flori Carrington 
ED CONSULT TO SENIOR SERVICES PHYSICAL THERAPY IP CONSULT TO HOSPITALIST 
 
PROCEDURES/SURGERIES: * No surgery found * [address] COURSE:  
Admission Summary:  
Per HPI Peri Angel is a 75 y.o. female with CAD, h/o HSV infection, chronic pain, arthritis, GERD, HTN, hypothyroidism, and gastroparesis who was BIBEMS to the ED from home s/p GLF. Patient states she was trying to  her shoes from the floor yesterday about 2pm when she lost her balance and fell. She was on the floor till about 9am today as she was unable to move. She had an appointment today, called the office to let them know her situation, office called her son, and son called EMS. Patient had a dental procedure recently and had just completed her clindamycin 7/6pm. She developed hives on 7/7am, came to [facility] ED, and was discharged with a script for prednisone. She was about to leave to go to [facility] today when she fell in her house. Patient denies any preceding symptoms; she walks about 5 miles roundtrip with her walker almost daily. Patient is also c/o of right facial swelling but denies difficulty swallowing. She has chronic abdominal and back pain. \" DISCHARGE DIAGNOSES / PLAN:   
Rhabdomyolysis due to fall 
- CK trended down nicely with IV fluids. Encourage oral fluids. Ref. Range 7/9/2018 09:35 7/10/2018 01:50 7/10/2018 01:50 7/11/2018 00:34 7/12/2018 00:33 CK Latest Ref Range: 26 - 192 U/L 4468 (H) 2610 (H) 2594 (H) 1425 (H) 587 (H) R sialadenitis without drain able abscess. No purulence,pain . Afabrile. Need out patient follow up.complete doxycycline. She is allergy to pcn and clincamycin Hives likely due to allergic reaction to clindamycin (POA),no angioedema. Rash improving. Switch solumedrol to short course prednisone. Hypothyroidism - continue levothyroxine Hyperlipidemia Lipitor on hold due to elevated LFTs which are now improving. Checl LFTs on Monday and if normal,may resume Lipitor., 
  
Hyperbilirubinemia and elevated AST - pt has h/o abdominal issues and abnormal LFTs in the past 
- sees Dr Caleb Cespedes with normal liver and bile ducts. Gallbladder is absent. Chronic pain - sees Dr Grey Medina, pain specialist 
  
 
Please check BMP,LFTs and CK in 3-5 days. Resume Lipitor if LFTs are okay. PENDING TEST RESULTS:  
At the time of discharge the following test results are still pending: none FOLLOW UP APPOINTMENTS:   
Follow-up Information Follow up With Details Comments Contact Info Ashok Mon 
[address] [facility] [employer] [place] [id number] [phone] ADDITIONAL CARE RECOMMENDATIONS:  
 
DIET: Cardiac Diet ACTIVITY: Activity as tolerated,rehabilitation WOUND CARE: NA 
 
EQUIPMENT needed: TBD on discharge from rehab DISCHARGE MEDICATIONS: 
 See Medication Reconciliation Form NOTIFY YOUR PHYSICIAN FOR ANY OF THE FOLLOWING:  
Fever over 101 degrees for 24 hours. Chest pain, shortness of breath, fever, chills, nausea, vomiting, diarrhea, change in mentation, falling, weakness, bleeding. Severe pain or pain not relieved by medications. Or, any other signs or symptoms that you may have questions about. DISPOSITION: 
  Home With: 
 OT  PT  HH  RN  
  
x SNF/Inpatient Rehab/LTAC Independent/assisted living Hospice Other:  
 
 
PATIENT CONDITION AT DISCHARGE:  
 
Functional status Poor   
x Deconditioned Independent Cognition  
x  Lucid Forgetful Dementia Catheters/lines (plus indication) Wilkerson PICC   
 PEG   
x None Code status  
 x Full code  DNR   
 
 PHYSICAL EXAMINATION AT DISCHARGE: 
  
   
Constitutional:  No acute distress, cooperative, pleasant HEENT: Head is a traumatic, Un icteric sclera. Pink conjunctiva,no erythema or discharge. Oral mucous moist, oropharynx benign. Neck supple, Resp:  CTA bilaterally. No wheezing/rhonchi/rales. No accessory muscle use CV:  Regular rhythm, normal rate, no murmurs, gallops, rubs GI:  Soft, non distended, non tender. normoactive bowel sounds, no hepatosplenomegaly :  No CVA or suprapubic tenderness Skin  :  purplish macular rash on the thighs,trunks and upper extremities. Musculoskeletal:  No edema, warm, 2+ pulses throughout Neurologic:  AAOx3, CN II-XII reviewed. Moves all extremities. CHRONIC MEDICAL DIAGNOSES: 
Problem List as of 7/12/2018  Date Reviewed: 7/9/2018 Codes Class Noted - Resolved * (Principal)Rhabdomyolysis ICD-10-CM: T33.81 ICD-9-CM: 728.88  7/9/2018 - Present Pruritic erythematous rash ICD-10-CM: L29.8 ICD-9-CM: 698.8  7/9/2018 - Present Facial mass ICD-10-CM: R22.0 ICD-9-CM: 784.2  7/9/2018 - Present Fall ICD-10-CM: W19. Ann Lord ICD-9-CM: E888.9  7/9/2018 - Present Hyperbilirubinemia ICD-10-CM: E80.6 ICD-9-CM: 782.4  7/9/2018 - Present Elevated AST (SGOT) ICD-10-CM: R74.0 ICD-9-CM: 790.4  7/9/2018 - Present Chronic pain (Chronic) ICD-10-CM: D25.40 ICD-9-CM: 338.29  7/9/2018 - Present Overview Signed 7/9/2018  1:51 PM by Adam Trevino MD  
  low back pain/arthritis      [employer] note9/13/17 Gram-negative bacteremia ICD-10-CM: R78.81 ICD-9-CM: 790.7, 041.85  11/28/2017 - Present Hypokalemia ICD-10-CM: E87.6 ICD-9-CM: 276.8  10/18/2017 - Present Gastroparesis ICD-10-CM: K31.84 ICD-9-CM: 536.3  7/31/2017 - Present Hyperlipidemia ICD-10-CM: E78.5 ICD-9-CM: 272.4  6/26/2017 - Present Demand ischemia ([facility] ICD-10-CM: I24.8 ICD-9-CM: 411.89  5/15/2017 - Present ACP (advance care planning) ICD-10-CM: Z71.89 ICD-9-CM: V65.49  11/30/2016 - Present Overview Signed 11/30/2016 12:23 PM by Stephanie Lim Patient brought her ACP to visit. It was reviewed by me. Gastroesophageal reflux disease without esophagitis ICD-10-CM: K21.9 ICD-9-CM: 530.81  10/24/2016 - Present Chronic midline low back pain without sciatica ICD-10-CM: M54.5, G89.29 ICD-9-CM: 724.2, 338.29  10/24/2016 - Present Insomnia ICD-10-CM: G47.00 ICD-9-CM: 780.52  7/22/2015 - Present Abnormal gait ICD-10-CM: R26.9 ICD-9-CM: 781.2  5/14/2015 - Present Vitamin D deficiency ICD-10-CM: E55.9 ICD-9-CM: 268.9  3/16/2015 - Present Allergic rhinitis ICD-10-CM: J30.9 ICD-9-CM: 477.9  10/7/2014 - Present Chronic constipation ICD-10-CM: K59.09 
ICD-9-CM: 564.00  10/7/2014 - Present Hypothyroidism ICD-10-CM: E03.9 ICD-9-CM: 244.9  11/30/2012 - Present Postmenopausal ICD-10-CM: Z78.0 ICD-9-CM: V49.81  10/17/2012 - Present RA (rheumatoid arthritis) (HCC) ICD-10-CM: M06.9 ICD-9-CM: 714.0  2/9/2010 - Present Essential hypertension ICD-10-CM: I10 
ICD-9-CM: 401.9  2/9/2010 - Present RESOLVED: E coli bacteremia ICD-10-CM: R78.81 ICD-9-CM: 790.7, 041.49  5/24/2017 - 6/26/2017 RESOLVED: Oral candidiasis ICD-10-CM: B37.0 ICD-9-CM: 112.0  5/24/2017 - 6/26/2017 RESOLVED: Septic shock (HCC) ICD-10-CM: A41.9, R65.21 ICD-9-CM: 038.9, 785.52, 995.92  5/15/2017 - 6/26/2017 RESOLVED: Septic encephalopathy ICD-10-CM: G93.41 
ICD-9-CM: 348.31  5/15/2017 - 5/24/2017 RESOLVED: Partial small bowel obstruction ([address] ICD-10-CM: K56.600 ICD-9-CM: 560.9  5/15/2017 - 6/26/2017 RESOLVED: Hypokalemia ICD-10-CM: E87.6 ICD-9-CM: 276.8  5/15/2017 - 5/24/2017 RESOLVED: High anion gap metabolic acidosis JKO-04-KN: E87.2 ICD-9-CM: 276.2  5/15/2017 - 5/24/2017 RESOLVED: JEANINE (acute kidney injury) ([facility] ICD-10-CM: N17.9 ICD-9-CM: 584.9  5/15/2017 - 5/24/2017 RESOLVED: Abnormal LFTs ICD-10-CM: R94.5 ICD-9-CM: 790.6  5/15/2017 - 5/24/2017 RESOLVED: Acute gastroenteritis ICD-10-CM: K52.9 ICD-9-CM: 558.9  5/15/2017 - 5/24/2017 RESOLVED: Elevated BP ICD-10-CM: BVI1684 ICD-9-CM: Hudson Click  9/21/2016 - 10/24/2016 RESOLVED: Thalamic hemorrhage (HCC) ICD-10-CM: I61.0 ICD-9-CM: 647  12/29/2015 - 10/24/2016 RESOLVED: TIA (transient ischemic attack) ICD-10-CM: G45.9 ICD-9-CM: 435.9  12/23/2015 - 10/24/2016 RESOLVED: Chest pain ICD-10-CM: R07.9 ICD-9-CM: 786.50  12/22/2015 - 10/24/2016 RESOLVED: Discitis of lumbar region ICD-10-CM: M46.46 
ICD-9-CM: 722.93  12/9/2015 - 10/24/2016 RESOLVED: Left-sided low back pain with left-sided sciatica ICD-10-CM: M54.42 
ICD-9-CM: 724.3  12/9/2015 - 10/24/2016 RESOLVED: Back pain ICD-10-CM: M54.9 ICD-9-CM: 724.5  8/23/2015 - 10/24/2016 RESOLVED: Hypotension ICD-10-CM: I95.9 ICD-9-CM: 458.9  8/23/2015 - 9/21/2016 RESOLVED: Glossitis ICD-10-CM: K14.0 ICD-9-CM: 529.0  7/22/2015 - 10/24/2016 RESOLVED: Prediabetes ICD-10-CM: R73.03 
ICD-9-CM: 790.29  7/22/2015 - 10/19/2017 RESOLVED: Sore throat ICD-10-CM: J02.9 ICD-9-CM: 128  3/16/2015 - 7/22/2015 RESOLVED: Obesity, Class II, BMI 35-39.9 ICD-10-CM: E66.9 ICD-9-CM: 278.00  1/21/2015 - 10/24/2016 RESOLVED: Tinea manus ICD-10-CM: B35.2 ICD-9-CM: 110.2  10/7/2014 - 10/18/2017 RESOLVED: LLQ abdominal pain ICD-10-CM: R10.32 
ICD-9-CM: 789.04  10/7/2014 - 1/21/2015 RESOLVED: Obesity, unspecified ICD-10-CM: E66.9 ICD-9-CM: 278.00  12/10/2010 - 1/21/2015 Signed: Amaris Barnes MD 
7/12/2018 
9:55 AM 
 
  
 
  
  
  
MyChart Announcement  We are excited to announce that we are making your provider's discharge notes available to you in Advanced LEDs. You will see these notes when they are completed and signed by the physician that discharged you from your recent hospital stay. If you have any questions or concerns about any information you see in Advanced LEDs, please call the Health Information Department where you were seen or reach out to your Primary Care Provider for more information about your plan of care. Introducing [facility]! Janice Vallecillo introduces Advanced LEDs patient portal. Now you can access parts of your medical record, email your doctor's office, and request medication refills online. 1. In your internet browser, go to [email or web address] 2. Click on the First Time User? Click Here link in the Sign In box. You will see the New Member Sign Up page. 3. Enter your Advanced LEDs Access Code exactly as it appears below. You will not need to use this code after youve completed the sign-up process. If you do not sign up before the expiration date, you must request a new code. · Advanced LEDs Access Code: [email or web address] Expires: 10/5/2018  1:03 PM 
 
4. Enter the last four digits of your Social Security Number (xxxx) and Date of Birth (mm/dd/yyyy) as indicated and click Submit. You will be taken to the next sign-up page. 5. Create a Advanced LEDs ID. This will be your Advanced LEDs login ID and cannot be changed, so think of one that is secure and easy to remember. 6. Create a Advanced LEDs password. You can change your password at any time. 7. Enter your Password Reset Question and Answer. This can be used at a later time if you forget your password. 8. Enter your e-mail address. You will receive e-mail notification when new information is available in [address] 9. Click Sign Up. You can now view and download portions of your medical record. 10. Click the Download Summary menu link to download a portable copy of your medical information. If you have questions, please visit the Frequently Asked Questions section of the MyChart website. Remember, spotdock is NOT to be used for urgent needs. For medical emergencies, dial 911. Now available from your iPhone and Android! Introducing Red Evans As a Geraldine Braun patient, I wanted to make you aware of our electronic visit tool called Red Evans. Geraldine Aparna 24/7 allows you to connect within minutes with a medical provider 24 hours a day, seven days a week via a mobile device or tablet or logging into a secure website from your computer. You can access Red Evnas from anywhere in the [place]. A virtual visit might be right for you when you have a simple condition and feel like you just dont want to get out of bed, or cant get away from work for an appointment, when your regular Geraldine Braun provider is not available (evenings, weekends or holidays), or when youre out of town and need minor care. Electronic visits cost only $49 and if the Geraldine Braun 24/7 provider determines a prescription is needed to treat your condition, one can be electronically transmitted to a nearby pharmacy*. Please take a moment to enroll today if you have not already done so. The enrollment process is free and takes just a few minutes. To enroll, please download the Geraldine Tejindersveta 24/7 oscar to your tablet or phone, or visit [email or web address] to enroll on your computer. And, as an [address] [place] [address] patient with a [employer] account, the results of your visits will be scanned into your electronic medical record and your primary care provider will be able to view the scanned results. We urge you to continue to see your regular Geraldine Braun provider for your ongoing medical care.   And while your primary care provider may not be the one available when you seek a Red Evans virtual visit, the peace of mind you get from getting a real diagnosis real time can be priceless. For more information on Red Noahbeth, view our Frequently Asked Questions (FAQs) at [email or web address]. Sincerely, 
 
Payton Thompson MD 
Chief Medical Officer [employer] *:  certain medications cannot be prescribed via Red Evans Providers Seen During Your Hospitalization Provider Specialty Primary office phone Bucky Baird MD Emergency Medicine [phone] Lina Marte MD Internal Medicine [phone] Jaida Mcmahan MD Internal Medicine [phone] Your Primary Care Physician (PCP) Primary Care Physician Office Phone Office Fax Johnny Pineda [phone] You are allergic to the following Allergen Reactions Pcn (Penicillins) Swelling Has tolerated Cefepime Tramadol Nausea and Vomiting SEVERE If taken w/o food Proventil (Albuterol Sulfate) Hives Ace Inhibitors Angioedema Albuterol Swelling  
 swelling Ambien (Zolpidem) Other (comments) Nightmares and memory disturbance Chocolate (Cocoa) Diarrhea Ciprofloxacin Other (comments) Sore throat and trouble swallowing Clindamycin Hives Lisinopril Swelling Oxaprozin Nausea and Vomiting  
 severe stomach upset Sulfadiazine Swelling  
 swelling Trazodone Other (comments) Vivid dreams and felt disoriented Recent Documentation Height Weight Breastfeeding? BMI OB Status Smoking Status 1.651 m 86.2 kg No 31.62 kg/m2 Postmenopausal Never Smoker Emergency Contacts Name Discharge Info Relation Home Work Mobile Sandeep Mtz DISCHARGE CAREGIVER [3] Child [2] [phone] Patient Belongings  The following personal items are in your possession at time of discharge: 
  Dental Appliances: None  Visual Aid: Glasses      Home Medications: None Jewelry: None  Clothing: None    Other Valuables: None Please provide this summary of care documentation to your next provider. Signatures-by signing, you are acknowledging that this After Visit Summary has been reviewed with you and you have received a copy. Patient Signature:  ____________________________________________________________ Date:  ____________________________________________________________  
  
[facility]  Provider Signature:  ____________________________________________________________ Date:  ____________________________________________________________

## 2018-07-10 LAB
ALBUMIN SERPL-MCNC: 2.3 G/DL (ref 3.5–5)
ALBUMIN/GLOB SERPL: 0.6 {RATIO} (ref 1.1–2.2)
ALP SERPL-CCNC: 90 U/L (ref 45–117)
ALT SERPL-CCNC: 46 U/L (ref 12–78)
ANION GAP SERPL CALC-SCNC: 9 MMOL/L (ref 5–15)
AST SERPL-CCNC: 111 U/L (ref 15–37)
ATRIAL RATE: 80 BPM
BASOPHILS # BLD: 0 K/UL (ref 0–0.1)
BASOPHILS NFR BLD: 0 % (ref 0–1)
BILIRUB SERPL-MCNC: 0.8 MG/DL (ref 0.2–1)
BUN SERPL-MCNC: 9 MG/DL (ref 6–20)
BUN/CREAT SERPL: 12 (ref 12–20)
CALCIUM SERPL-MCNC: 7.8 MG/DL (ref 8.5–10.1)
CALCULATED R AXIS, ECG10: 6 DEGREES
CALCULATED T AXIS, ECG11: 51 DEGREES
CHLORIDE SERPL-SCNC: 106 MMOL/L (ref 97–108)
CK MB CFR SERPL CALC: 0.3 % (ref 0–2.5)
CK MB SERPL-MCNC: 8.6 NG/ML (ref 5–25)
CK SERPL-CCNC: 2594 U/L (ref 26–192)
CK SERPL-CCNC: 2610 U/L (ref 26–192)
CO2 SERPL-SCNC: 24 MMOL/L (ref 21–32)
CREAT SERPL-MCNC: 0.76 MG/DL (ref 0.55–1.02)
DIAGNOSIS, 93000: NORMAL
DIFFERENTIAL METHOD BLD: ABNORMAL
EOSINOPHIL # BLD: 0.1 K/UL (ref 0–0.4)
EOSINOPHIL NFR BLD: 1 % (ref 0–7)
ERYTHROCYTE [DISTWIDTH] IN BLOOD BY AUTOMATED COUNT: 14.6 % (ref 11.5–14.5)
GLOBULIN SER CALC-MCNC: 3.9 G/DL (ref 2–4)
GLUCOSE SERPL-MCNC: 223 MG/DL (ref 65–100)
HCT VFR BLD AUTO: 32.4 % (ref 35–47)
HGB BLD-MCNC: 11.1 G/DL (ref 11.5–16)
IMM GRANULOCYTES # BLD: 0 K/UL
IMM GRANULOCYTES NFR BLD AUTO: 0 %
LYMPHOCYTES # BLD: 0.5 K/UL (ref 0.8–3.5)
LYMPHOCYTES NFR BLD: 6 % (ref 12–49)
MCH RBC QN AUTO: 31.4 PG (ref 26–34)
MCHC RBC AUTO-ENTMCNC: 34.3 G/DL (ref 30–36.5)
MCV RBC AUTO: 91.8 FL (ref 80–99)
MONOCYTES # BLD: 0 K/UL (ref 0–1)
MONOCYTES NFR BLD: 0 % (ref 5–13)
NEUTS BAND NFR BLD MANUAL: 12 % (ref 0–6)
NEUTS SEG # BLD: 7.5 K/UL (ref 1.8–8)
NEUTS SEG NFR BLD: 81 % (ref 32–75)
NRBC # BLD: 0 K/UL (ref 0–0.01)
NRBC BLD-RTO: 0 PER 100 WBC
PLATELET # BLD AUTO: 188 K/UL (ref 150–400)
PMV BLD AUTO: 9.6 FL (ref 8.9–12.9)
POTASSIUM SERPL-SCNC: 3.5 MMOL/L (ref 3.5–5.1)
PROT SERPL-MCNC: 6.2 G/DL (ref 6.4–8.2)
Q-T INTERVAL, ECG07: 390 MS
QRS DURATION, ECG06: 82 MS
QTC CALCULATION (BEZET), ECG08: 444 MS
RBC # BLD AUTO: 3.53 M/UL (ref 3.8–5.2)
RBC MORPH BLD: ABNORMAL
RBC MORPH BLD: ABNORMAL
SODIUM SERPL-SCNC: 139 MMOL/L (ref 136–145)
TROPONIN I SERPL-MCNC: <0.05 NG/ML
VENTRICULAR RATE, ECG03: 78 BPM
WBC # BLD AUTO: 8.1 K/UL (ref 3.6–11)

## 2018-07-10 PROCEDURE — 97116 GAIT TRAINING THERAPY: CPT

## 2018-07-10 PROCEDURE — 94760 N-INVAS EAR/PLS OXIMETRY 1: CPT

## 2018-07-10 PROCEDURE — G8988 SELF CARE GOAL STATUS: HCPCS

## 2018-07-10 PROCEDURE — 82550 ASSAY OF CK (CPK): CPT | Performed by: INTERNAL MEDICINE

## 2018-07-10 PROCEDURE — 36415 COLL VENOUS BLD VENIPUNCTURE: CPT | Performed by: INTERNAL MEDICINE

## 2018-07-10 PROCEDURE — 82553 CREATINE MB FRACTION: CPT | Performed by: HOSPITALIST

## 2018-07-10 PROCEDURE — 74011250636 HC RX REV CODE- 250/636: Performed by: INTERNAL MEDICINE

## 2018-07-10 PROCEDURE — 97166 OT EVAL MOD COMPLEX 45 MIN: CPT

## 2018-07-10 PROCEDURE — 74011250637 HC RX REV CODE- 250/637: Performed by: HOSPITALIST

## 2018-07-10 PROCEDURE — 84484 ASSAY OF TROPONIN QUANT: CPT | Performed by: HOSPITALIST

## 2018-07-10 PROCEDURE — 65270000029 HC RM PRIVATE

## 2018-07-10 PROCEDURE — 74011250637 HC RX REV CODE- 250/637: Performed by: INTERNAL MEDICINE

## 2018-07-10 PROCEDURE — 74011000250 HC RX REV CODE- 250: Performed by: INTERNAL MEDICINE

## 2018-07-10 PROCEDURE — 80053 COMPREHEN METABOLIC PANEL: CPT | Performed by: INTERNAL MEDICINE

## 2018-07-10 PROCEDURE — 85025 COMPLETE CBC W/AUTO DIFF WBC: CPT | Performed by: INTERNAL MEDICINE

## 2018-07-10 PROCEDURE — 97535 SELF CARE MNGMENT TRAINING: CPT

## 2018-07-10 PROCEDURE — G8987 SELF CARE CURRENT STATUS: HCPCS

## 2018-07-10 RX ORDER — FAMOTIDINE 20 MG/1
20 TABLET, FILM COATED ORAL 2 TIMES DAILY
Status: DISCONTINUED | OUTPATIENT
Start: 2018-07-10 | End: 2018-07-12 | Stop reason: HOSPADM

## 2018-07-10 RX ORDER — PREDNISONE 20 MG/1
20 TABLET ORAL
Status: DISCONTINUED | OUTPATIENT
Start: 2018-07-11 | End: 2018-07-12 | Stop reason: HOSPADM

## 2018-07-10 RX ADMIN — FAMOTIDINE 20 MG: 20 TABLET ORAL at 17:50

## 2018-07-10 RX ADMIN — GABAPENTIN 100 MG: 100 CAPSULE ORAL at 21:10

## 2018-07-10 RX ADMIN — ASPIRIN 81 MG 81 MG: 81 TABLET ORAL at 08:42

## 2018-07-10 RX ADMIN — LEVOTHYROXINE SODIUM 50 MCG: 50 TABLET ORAL at 05:35

## 2018-07-10 RX ADMIN — PANTOPRAZOLE SODIUM 40 MG: 40 TABLET, DELAYED RELEASE ORAL at 07:00

## 2018-07-10 RX ADMIN — METHYLPREDNISOLONE SODIUM SUCCINATE 60 MG: 125 INJECTION, POWDER, FOR SOLUTION INTRAMUSCULAR; INTRAVENOUS at 17:51

## 2018-07-10 RX ADMIN — METHYLPREDNISOLONE SODIUM SUCCINATE 60 MG: 125 INJECTION, POWDER, FOR SOLUTION INTRAMUSCULAR; INTRAVENOUS at 12:17

## 2018-07-10 RX ADMIN — Medication 10 ML: at 07:07

## 2018-07-10 RX ADMIN — SODIUM CHLORIDE 100 ML/HR: 900 INJECTION, SOLUTION INTRAVENOUS at 16:29

## 2018-07-10 RX ADMIN — DOXYCYCLINE HYCLATE 100 MG: 100 TABLET, COATED ORAL at 08:42

## 2018-07-10 RX ADMIN — CARVEDILOL 3.12 MG: 3.12 TABLET, FILM COATED ORAL at 17:50

## 2018-07-10 RX ADMIN — DIPHENHYDRAMINE HYDROCHLORIDE 25 MG: 25 CAPSULE ORAL at 05:35

## 2018-07-10 RX ADMIN — VITAMIN D, TAB 1000IU (100/BT) 1000 UNITS: 25 TAB at 08:42

## 2018-07-10 RX ADMIN — Medication 10 ML: at 03:29

## 2018-07-10 RX ADMIN — FAMOTIDINE 20 MG: 10 INJECTION, SOLUTION INTRAVENOUS at 08:41

## 2018-07-10 RX ADMIN — DOXYCYCLINE HYCLATE 100 MG: 100 TABLET, COATED ORAL at 21:10

## 2018-07-10 RX ADMIN — THERA TABS 1 TABLET: TAB at 08:41

## 2018-07-10 RX ADMIN — VITAMIN D, TAB 1000IU (100/BT) 1000 UNITS: 25 TAB at 17:50

## 2018-07-10 RX ADMIN — LORATADINE 10 MG: 10 TABLET ORAL at 08:42

## 2018-07-10 RX ADMIN — MONTELUKAST SODIUM 10 MG: 10 TABLET, FILM COATED ORAL at 21:10

## 2018-07-10 RX ADMIN — Medication 1 TABLET: at 08:42

## 2018-07-10 RX ADMIN — Medication 2 MG: at 07:07

## 2018-07-10 RX ADMIN — AZATHIOPRINE 150 MG: 50 TABLET ORAL at 08:41

## 2018-07-10 RX ADMIN — METHYLPREDNISOLONE SODIUM SUCCINATE 60 MG: 125 INJECTION, POWDER, FOR SOLUTION INTRAMUSCULAR; INTRAVENOUS at 03:29

## 2018-07-10 NOTE — PROGRESS NOTES
CM reviewed chart and CM assessment 7/9/18-- noted that patient lives alone in second level apartment. Uses rollator and quad cane for ambulation. Has been to [facility],  [facility] and [facility] (IV abx ) in the past.   She was evaluated by therapy in ER and inpatient rehab recommended. Patient chose [facility] and referral was sent via [employer]. CM met with patient in her room and she confirmed that she lives alone and uses [address] [place] [address] for transportation. She said she usually goes up and down the steps to her apartment once a day. She moved to [place] from [facility] when she retired from the [employer]. . She receives [employer] pension. She has two sons in [place] and has thought about relocating up there with her family. CM talked with Vee [phone], with [facility]-- The referral was received but awaiting OT evaluation to be completed. Vee will secure the OT evaluation from the chart  and call CM when inform of a decision. UPDATE noon    Patient accepted to [facility] when medically ready. Vee is the liaison to call when patient is ready for discharge--[phone]. Transport to rehab TBD but patient said she can call a friend to come transport her if it is determined she is safe to go by car to rehab. Nurse to call report to [phone]   CM to fax discharge instructions to [phone]    SANJU will complete CM portion of Emtala.

## 2018-07-10 NOTE — PROGRESS NOTES
Problem: Self Care Deficits Care Plan (Adult)  Goal: *Acute Goals and Plan of Care (Insert Text)  Occupational Therapy Goals  Initiated 7/10/2018  1. Patient will perform 4 grooming tasks standing at sink with no LOB and good standing balance with modified independence within 7 day(s). 2.  Patient will perform bathing with supervision/set-up within 7 day(s). 3.  Patient will perform upper body dressing and lower body dressing with modified independence within 7 day(s). 4.  Patient will perform toilet transfers with modified independence within 7 day(s). 5.  Patient will perform all aspects of toileting with modified independence within 7 day(s). 6.  Patient will utilize energy conservation techniques during functional activities with verbal cues within 7 day(s). 7. Patient will perform light I-ADL activity with least restrictive device with mod I in room (ie: make bed, arrange/organize personal items,e tc) within 7 days. Occupational Therapy EVALUATION  Patient: Erica Meza (5 y.o. female)  Date: 7/10/2018  Primary Diagnosis: Rhabdomyolysis        Precautions: fall       ASSESSMENT :  Based on the objective data described below, the patient presents with recent fall with time lying on floor until she could get herself to phone. Patient presents with general debility with decreased static and dynamic standing balance/tolerance, safety, ADL, I-ADL, safety and is D to s/u for simple ADL tasks. Patient is usually mod I in her home with quad cane in her home/apt and with rollator in community. She would walk up to 5 miles to [facility] to obtain her Rx when needed and used [employer] for driving to [employer]/grocery store as she doesn't drive. Patient is a good candidate for IP rehab as she is very motivated and eager to return to [facility] and is cognitively intact. Patient will benefit from skilled intervention to address the above impairments.   Patients rehabilitation potential is considered to be Excellent  Factors which may influence rehabilitation potential include:   [x]             None noted  []             Mental ability/status  []             Medical condition  []             Home/family situation and support systems  []             Safety awareness  []             Pain tolerance/management  []             Other:      PLAN :  Recommendations and Planned Interventions:  [x]               Self Care Training                  [x]        Therapeutic Activities  [x]               Functional Mobility Training    []        Cognitive Retraining  [x]               Therapeutic Exercises           [x]        Endurance Activities  [x]               Balance Training                   [x]        Neuromuscular Re-Education  []               Visual/Perceptual Training     [x]   Home Safety Training  [x]               Patient Education                 [x]        Family Training/Education  []               Other (comment):    Frequency/Duration: Patient will be followed by occupational therapy 5 times a week to address goals. Discharge Recommendations: Inpatient Rehab  Further Equipment Recommendations for Discharge: defer to IP     SUBJECTIVE:   Patient stated We are thinking I will stay in my apartment until my lease is up Nov 28 then move to [facility] in [place].     OBJECTIVE DATA SUMMARY:   HISTORY:   Past Medical History:   Diagnosis Date    Abdominal pain 6/18/14    note from Floria Matter Dr Paulino Goldberg directive discussed with patient 07/22/2015    Arthritis     dr Benita buck        2/6/17 f/u note    Bacteremia due to Klebsiella pneumoniae 2/2/2016    OV note from Dr Octavia Cast, Infect Disease    Chronic pain     low back pain/arthritis      [facility]  note9/13/17    Contact dermatitis and other eczema, due to unspecified cause     hyperpigmented macules/seb k    Elevated LFTs     per info from Dr Keisha Ordo[facility]ez [facility] on report    Endocrine disease     hypothyroid    GERD (gastroesophageal reflux disease)     chelsy burns    Heart attack ([address] 05/2017    [facility]    HSV infection     Hypertension     Hypothyroid 10/2012    Insomnia     Melasma 3/3/15    [facility]    Nausea & vomiting 05/04/2017    report [facility]  7/28/17 EGD showed delayed emptying    Pain management counseling, encounter for 05/05/2016    sees Dr Zander Syed 5/2/17 f/u    Rhinitis, allergic nonseasonal     Screening for glaucoma 04/11/2016    Urge incontinence of urine 03/21/2017 initial [facility] consult    [facility] initial eval note Ariana Loss 4/17/17 urodymanics study//5/9/17 f/u note    Well woman exam (no gynecological exam) 07/29/2016    zedler's note rec'd     Past Surgical History:   Procedure Laterality Date    COLONOSCOPY  8/1/11    dr Jsoe Aguilar 10 year repeat    HX CHOLECYSTECTOMY      [address]  [address]    [place]OSCOPY  2017    [place]    HX ENDOSCOPY  07/28/2017    showed delayed emptying of stomach contents--gastroparesis to be r/o    HX OTHER SURGICAL  04/17/2017    complex urodynamics study report from [facility]    HX TUBAL LIGATION         Prior Level of Function/Environment/Context: See \"A\" above  Occupations in which the patient is/was successful, what are the barriers preventing that success:   Performance Patterns (routines, roles, habits, and rituals):   Personal Interests and/or values:   Expanded or extensive additional review of patient history:     Home Situation  Home Environment: Apartment  # Steps to Enter: 14  Rails to Enter: Yes  One/Two Story Residence: One story  Living Alone: Yes  Support Systems: Family member(s) (2 sons in [place], looking for [place] close to son's.  lease up Nov)  Patient Expects to be Discharged to[de-identified] Rehabilitation facility  Current DME Used/Available at Home: Zina Andrews, aleah, [employer], quad, Grab bars, Shower chair, Commode, bedside (toilet safety frame on toilet, uses BSC at night)  Tub or Shower Type: Tub/Shower combination    Hand dominance: Right    EXAMINATION OF PERFORMANCE DEFICITS:  Cognitive/Behavioral Status:  Neurologic State: Alert; Appropriate for age                     Hearing: Auditory  Auditory Impairment: None    Vision/Perceptual:                                Corrective Lenses: Reading glasses    Range of Motion:  B UE  AROM: Generally decreased, functional      B hands with RA deformities with difficulty with opposition and finger flexion                   Strength:  BUE grossly  Strength: Generally decreased, functional                Coordination:     Fine Motor Skills-Upper: Left Intact; Right Intact (with ADL yet noted RA deformity affecting [facility])    Gross Motor Skills-Upper: Left Intact; Right Intact    Tone & Sensation:  BUE  Tone: Normal  Sensation: Intact                      Balance:  Sitting: Intact  Standing: Impaired; Without support  Standing - Static: Fair  Standing - Dynamic : Fair    Functional Mobility and Transfers for ADLs:  Bed Mobility:  Supine to Sit: Contact guard assistance    Transfers:  Sit to Stand: Minimum assistance; Additional time;Assist x1  Bed to Chair: Minimum assistance;Assist x1;Additional time  Toilet Transfer : Minimum assistance;Contact guard assistance  Tub Transfer: Minimum assistance;Contact guard assistance    ADL Assessment:  Feeding: Modified independent (soft diet, recent teeth extraction and lower implants,)    Oral Facial Hygiene/Grooming: Setup    Bathing: Minimum assistance    Upper Body Dressing: Minimum assistance    Lower Body Dressing: Minimum assistance    Toileting: Minimum assistance (stress/urge incontinence, ed on toileting schedule)                ADL Intervention and task modifications:       Grooming  Grooming Assistance: Contact guard assistance (STANDING at sink)  Washing Face: Contact guard assistance  Washing Hands: Contact guard assistance  Brushing Teeth: Contact guard assistance         Lower Body Bathing  Perineal  : Minimum assistance  Position Performed: Standing  Cues: Verbal cues provided         Lower Body Dressing Assistance  Socks: Stand-by assistance  Position Performed: Seated edge of bed    Toileting  Toileting Assistance: Total assistance(dependent) (using wic on arrival, A up/down/bowel hygiene)  Bladder Hygiene: Stand-by assistance  Bowel Hygiene: Minimum assistance  Clothing Management: Minimum assistance  Cues: Verbal cues provided  Adaptive Equipment: Grab bars         Therapeutic Exercise:    Functional Measure:  Barthel Index:    Bathin  Bladder: 5  Bowels: 5  Groomin  Dressin  Feedin  Mobility: 0  Stairs: 0  Toilet Use: 0  Transfer (Bed to Chair and Back): 10  Total: 35       Barthel and G-code impairment scale:  Percentage of impairment CH  0% CI  1-19% CJ  20-39% CK  40-59% CL  60-79% CM  80-99% CN  100%   Barthel Score 0-100 100 99-80 79-60 59-40 20-39 1-19   0   Barthel Score 0-20 20 17-19 13-16 9-12 5-8 1-4 0      The Barthel ADL Index: Guidelines  1. The index should be used as a record of what a patient does, not as a record of what a patient could do. 2. The main aim is to establish degree of independence from any help, physical or verbal, however minor and for whatever reason. 3. The need for supervision renders the patient not independent. 4. A patient's performance should be established using the best available evidence. Asking the patient, friends/relatives and nurses are the usual sources, but direct observation and common sense are also important. However direct testing is not needed. 5. Usually the patient's performance over the preceding 24-48 hours is important, but occasionally longer periods will be relevant. 6. Middle categories imply that the patient supplies over 50 per cent of the effort. 7. Use of aids to be independent is allowed. Dimitri Blood., Barthel, DDORA. (1969). Functional evaluation: the Barthel Index. [facility] (14)2.   JANAE Perry, [place] Art., Nagi Sorenson. (1999). Measuring the change indisability after inpatient rehabilitation; comparison of the responsiveness of the Barthel Index and Functional [place] Measure. Journal of Neurology, Neurosurgery, and Psychiatry, 66(4), 650-880. MARIUSZ Silva, KALIN Proctor, & Fina Nelson M.A. (2004.) Assessment of post-stroke quality of life in cost-effectiveness studies: The usefulness of the Barthel Index and the EuroQoL-5D. Quality of Life Research, 13, 408-95       G codes: In compliance with CMSs Claims Based Outcome Reporting, the following G-code set was chosen for this patient based on their primary functional limitation being treated: The outcome measure chosen to determine the severity of the functional limitation was the Barthel index with a score of 35/100 which was correlated with the impairment scale. ? Self Care:     - CURRENT STATUS: CL - 60%-79% impaired, limited or restricted    - GOAL STATUS: CK - 40%-59% impaired, limited or restricted    - D/C STATUS:  ---------------To be determined---------------     Occupational Therapy Evaluation Charge Determination   History Examination Decision-Making   MEDIUM Complexity : Expanded review of history including physical, cognitive and psychosocial  history  MEDIUM Complexity : 3-5 performance deficits relating to physical, cognitive , or psychosocial skils that result in activity limitations and / or participation restrictions MEDIUM Complexity : Patient may present with comorbidities that affect occupational performnce.  Miniml to moderate modification of tasks or assistance (eg, physical or verbal ) with assesment(s) is necessary to enable patient to complete evaluation       Based on the above components, the patient evaluation is determined to be of the following complexity level: MEDIUM  Pain:  Pain Scale 1: Numeric (0 - 10)  Pain Intensity 1: 0  Pain Location 1: Face  Pain Orientation 1: Right  Pain Description 1: Aching  Pain Intervention(s) 1: Medication (see MAR)  Activity Tolerance:   good  Please refer to the flowsheet for vital signs taken during this treatment. After treatment:   [x] Patient left in no apparent distress sitting up in chair  [] Patient left in no apparent distress in bed  [x] Call bell left within reach  [x] Nursing notified  [x] Caregiver present  [] Bed alarm activated    COMMUNICATION/EDUCATION:   The patients plan of care was discussed with: Physical Therapist and Registered Nurse. [x] Home safety education was provided and the patient/caregiver indicated understanding. [x] Patient/family have participated as able in goal setting and plan of care. [x] Patient/family agree to work toward stated goals and plan of care. [] Patient understands intent and goals of therapy, but is neutral about his/her participation. [] Patient is unable to participate in goal setting and plan of care. This patients plan of care is appropriate for delegation to [facility].     Thank you for this referral.  Jayla Ray, OTR/L  Time Calculation: 45 mins

## 2018-07-10 NOTE — PROGRESS NOTES
Hospitalist Progress Note Alejandra Kramer MD 
     
                             Answering service: [phone] OR [phone] from in house phone Cell: [phone] Date of Service:  7/10/2018 NAME:  Marcela Yanez :  1945 MRN:  [id number] Admission Summary:  
Per HPI Irlanda Pavon is a 75 y.o. female with CAD, h/o HSV infection, chronic pain, arthritis, GERD, HTN, hypothyroidism, and gastroparesis who was BIBEMS to the ED from home s/p GLF. Patient states she was trying to  her shoes from the floor yesterday about 2pm when she lost her balance and fell. She was on the floor till about 9am today as she was unable to move. She had an appointment today, called the office to let them know her situation, office called her son, and son called EMS. Patient had a dental procedure recently and had just completed her clindamycin 7/6pm. She developed hives on 77am, came to [facility], and was discharged with a script for prednisone. She was about to leave to go to [facility] today when she fell in her house. Patient denies any preceding symptoms; she walks about 5 miles roundtrip with her walker almost daily. Patient is also c/o of right facial swelling but denies difficulty swallowing. She has chronic abdominal and back pain. \" Reason for follow up:rhabdomyolysis Up in chair and feeling well. Assessment & Plan:  
 
Rhabdomyolysis due to fall 
- CK coming down with hydration,continue iv fluids. Encouraged  Fluids. R sialadenitis without drain able abscess. No purulence,pain . Afabrile. Need out patient follow up. On doxycycline. She is allergy to pcn and clincamycin Hives likely due to allergic reaction to clindamycin (POA),no angioedema. Rash improving. Switch solumedrol to short course prednisone.  
 
Hypothyroidism - continue levothyroxine Hyperlipidemia - hold statin due to elevated AST Hyperbilirubinemia and elevated AST - pt has h/o abdominal issues and abnormal LFTs in the past 
- sees Dr Bri Garcia with normal liver and bile ducts. Gallbladder is absent. Chronic pain - sees Dr Hazel Camarillo, pain specialist 
  
Diet: cardiac DVT prophylaxis: SCDs Anticipated disposition: rehab Hospital Problems  Date Reviewed: 7/9/2018 Codes Class Noted POA * (Principal)Rhabdomyolysis ICD-10-CM: N01.62 ICD-9-CM: 728.88  7/9/2018 Yes Pruritic erythematous rash ICD-10-CM: L29.8 ICD-9-CM: 698.8  7/9/2018 Yes Facial mass ICD-10-CM: R22.0 ICD-9-CM: 784.2  7/9/2018 Yes Fall ICD-10-CM: W19. Melissa Sappnet ICD-9-CM: E888.9  7/9/2018 Yes Hyperbilirubinemia ICD-10-CM: E80.6 ICD-9-CM: 782.4  7/9/2018 Yes Elevated AST (SGOT) ICD-10-CM: R74.0 ICD-9-CM: 790.4  7/9/2018 Yes Chronic pain (Chronic) ICD-10-CM: B50.17 ICD-9-CM: 338.29  7/9/2018 Yes Overview Signed 7/9/2018  1:51 PM by Ariana Gotti MD  
  low back pain/arthritis      [employer] [facility] note9/13/17 Hyperlipidemia ICD-10-CM: E78.5 ICD-9-CM: 272.4  6/26/2017 Yes Hypothyroidism ICD-10-CM: E03.9 ICD-9-CM: 244.9  11/30/2012 Yes Review of Systems: A comprehensive review of systems was negative except for that written in the HPI. Physical Examination:  
 
 Last 24hrs VS reviewed since prior progress note. Most recent are: 
Visit Vitals  /75 (BP 1 Location: Left arm)  Pulse 65  Temp 97 °F (36.1 °C)  Resp 16  
 Ht 5' 5\" (1.651 m)  Wt 86.2 kg (190 lb)  SpO2 98%  Breastfeeding No  
 BMI 31.62 kg/m2 Constitutional:  No acute distress, cooperative, pleasant   
HEENT: Head is a traumatic, Un icteric sclera. Pink conjunctiva,no erythema or discharge. Oral mucous moist, oropharynx benign. Neck supple, Resp:  CTA bilaterally. No wheezing/rhonchi/rales.  No accessory muscle use CV:  Regular rhythm, normal rate, no murmurs, gallops, rubs GI:  Soft, non distended, non tender. normoactive bowel sounds, no hepatosplenomegaly :  No CVA or suprapubic tenderness Skin  :  purplish macular rash on the thighs,trunks and upper extremities. Musculoskeletal:  No edema, warm, 2+ pulses throughout Neurologic:  AAOx3, CN II-XII reviewed. Moves all extremities. Intake/Output Summary (Last 24 hours) at 07/10/18 1939 Last data filed at 07/10/18 1256 Gross per 24 hour Intake          2553.34 ml Output             1950 ml Net           603.34 ml Data Review:  
 Review and/or order of clinical lab test 
Review and/or order of tests in the radiology section of CPT Review and/or order of tests in the medicine section of CPT Labs:  
 
Recent Labs  
   07/10/18 
 0150  07/09/18 
 1157 WBC  8.1  11.2* HGB  11.1*  11.0*  
HCT  32.4*  33.1*  
PLT  188  161 Recent Labs  
   07/10/18 
 0150  07/09/18 
 0935 NA  139  137  
K  3.5  3.9 CL  106  102 CO2  24  25 BUN  9  9  
CREA  0.76  0.87 GLU  223*  93  
CA  7.8*  8.3* Recent Labs  
   07/10/18 
 0150  07/09/18 
 0935 SGOT  111*  159* ALT  46  50 AP  90  104 TBILI  0.8  1.5* TP  6.2*  7.1 ALB  2.3*  2.6*  
GLOB  3.9  4.5* No results for input(s): INR, PTP, APTT in the last 72 hours. No lab exists for component: INREXT No results for input(s): FE, TIBC, PSAT, FERR in the last 72 hours. No results found for: FOL, RBCF No results for input(s): PH, PCO2, PO2 in the last 72 hours. Recent Labs  
   07/10/18 
 0150  07/09/18 
 0935 CPK  2594*  2610*  4468* CKNDX  0.3   --   
TROIQ  <0.05  0.06* Lab Results Component Value Date/Time  Cholesterol, total 97 (L) 06/26/2017 01:47 PM  
 HDL Cholesterol 37 (L) 06/26/2017 01:47 PM  
 LDL, calculated 43 06/26/2017 01:47 PM  
 Triglyceride 86 06/26/2017 01:47 PM  
 CHOL/HDL Ratio 4.5 12/23/2015 01:45 AM  
 
Lab Results Component Value Date/Time Glucose (POC) 138 (H) 11/28/2017 09:21 PM  
 Glucose (POC) 138 (H) 11/27/2017 02:39 AM  
 Glucose (POC) 98 05/19/2017 11:52 AM  
 Glucose (POC) 125 (H) 12/30/2015 11:28 AM  
 Glucose (POC) 101 (H) 12/23/2015 12:08 AM  
 
Lab Results Component Value Date/Time Color DARK YELLOW 07/09/2018 11:57 AM  
 Appearance CLEAR 07/09/2018 11:57 AM  
 Specific gravity 1.013 07/09/2018 11:57 AM  
 Specific gravity 1.005 12/23/2015 01:45 AM  
 pH (UA) 7.5 07/09/2018 11:57 AM  
 Protein TRACE (A) 07/09/2018 11:57 AM  
 Glucose NEGATIVE  07/09/2018 11:57 AM  
 Ketone NEGATIVE  07/09/2018 11:57 AM  
 Bilirubin NEGATIVE  07/09/2018 11:57 AM  
 Urobilinogen 1.0 07/09/2018 11:57 AM  
 Nitrites NEGATIVE  07/09/2018 11:57 AM  
 Leukocyte Esterase TRACE (A) 07/09/2018 11:57 AM  
 Epithelial cells FEW 07/09/2018 11:57 AM  
 Bacteria NEGATIVE  07/09/2018 11:57 AM  
 WBC 0-4 07/09/2018 11:57 AM  
 RBC 10-20 07/09/2018 11:57 AM  
 
 
 
Medications Reviewed:  
 
Current Facility-Administered Medications Medication Dose Route Frequency  famotidine (PEPCID) tablet 20 mg  20 mg Oral BID  aspirin chewable tablet 81 mg  81 mg Oral DAILY  azaTHIOprine (IMURAN) tablet 150 mg  150 mg Oral DAILY  carvedilol (COREG) tablet 3.125 mg  3.125 mg Oral BID WITH MEALS  cholecalciferol (VITAMIN D3) tablet 1,000 Units  1,000 Units Oral BID  
 gabapentin (NEURONTIN) capsule 100 mg  100 mg Oral QHS  levothyroxine (SYNTHROID) tablet 50 mcg  50 mcg Oral 6am  
 montelukast (SINGULAIR) tablet 10 mg  10 mg Oral QHS  therapeutic multivitamin (THERAGRAN) tablet 1 Tab  1 Tab Oral DAILY  pantoprazole (PROTONIX) tablet 40 mg  40 mg Oral ACB  vitamin B complex tablet  1 Tab Oral DAILY  sodium chloride (NS) flush 5-10 mL  5-10 mL IntraVENous Q8H  
 sodium chloride (NS) flush 5-10 mL  5-10 mL IntraVENous PRN  
 ondansetron (ZOFRAN) injection 4 mg  4 mg IntraVENous Q4H PRN  
 diphenhydrAMINE (BENADRYL) capsule 25 mg  25 mg Oral Q4H PRN  
 0.9% sodium chloride infusion  100 mL/hr IntraVENous CONTINUOUS  
 loratadine (CLARITIN) tablet 10 mg  10 mg Oral DAILY  methylPREDNISolone (PF) (SOLU-MEDROL) injection 60 mg  60 mg IntraVENous Q6H  
 doxycycline (VIBRA-TABS) tablet 100 mg  100 mg Oral Q12H  
 morphine (PF) 1 mg/mL injection 2 mg  2 mg IntraVENous Q4H PRN  
 
______________________________________________________________________ EXPECTED LENGTH OF STAY: 3d 4h 
ACTUAL LENGTH OF STAY:          [address], [place]

## 2018-07-10 NOTE — PROGRESS NOTES
Clinical Pharmacy Note: IV to PO Automatic Conversion  Please note: Ronny Figueroa medication Famotidine has been changed from IV to PO based on the following critiera:    - Patient is taking scheduled oral medications  - Patient is tolerating tube feeds at goal rate or a full liquid, soft or regular diet    This IV to PO conversion is based on the [employer] approved automatic conversion policy for eligible patients. Please call with questions.

## 2018-07-10 NOTE — PROGRESS NOTES
Problem: Mobility Impaired (Adult and Pediatric)  Goal: *Acute Goals and Plan of Care (Insert Text)  Physical Therapy Goals  Initiated 7/9/2018  1. Patient will move from supine to sit and sit to supine  in bed with supervision/set-up within 7 day(s). 2.  Patient will transfer from bed to chair and chair to bed with supervision/set-up using the least restrictive device within 7 day(s). 3.  Patient will perform sit to stand with modified independence within 7 day(s). 4.  Patient will ambulate with modified independence for 150 feet with the least restrictive device within 7 day(s). physical Therapy TREATMENT  Patient: Анна Wilson (79 y.o. female)  Date: 7/10/2018  Diagnosis: Rhabdomyolysis Rhabdomyolysis       Precautions:    Chart, physical therapy assessment, plan of care and goals were reviewed. ASSESSMENT:  Pt cleared by RN and received sitting up in chair. Making steady progress towards acute therapy goals. Pt reports she is feeling much better and stronger today. Initial sit<>stand from chair Min A x 2 d/t posterior lean and slow to come to stand. Unable to transition hands to walker without physical assist. Pt ambulated with increased speed and step clearance today however requires additional time for all tasks and requires use of RW for stability. Pt returned to room and completed multiple sit<>stand attempts to increase independence with transfer. Plan for discharge to IP rehab for short stay remains appropriate as she will not have assistance at home and has many steps to get into apartment. Progression toward goals:  [x]    Improving appropriately and progressing toward goals  []    Improving slowly and progressing toward goals  []    Not making progress toward goals and plan of care will be adjusted     PLAN:  Patient continues to benefit from skilled intervention to address the above impairments. Continue treatment per established plan of care.   Discharge Recommendations: Inpatient Rehab  Further Equipment Recommendations for Discharge:  None     SUBJECTIVE:   Patient stated I have decided Im going to sit up in the chair the rest of the day.     OBJECTIVE DATA SUMMARY:   Critical Behavior:  Neurologic State: Alert, Appropriate for age           Functional Mobility Training:  Bed Mobility:     Supine to Sit: Contact guard assistance              Transfers:  Sit to Stand: Minimum assistance;Assist x2           Bed to Chair: Minimum assistance;Assist x1;Additional time                    Balance:  Sitting: Intact  Standing: Impaired  Standing - Static: Good;Constant support  Standing - Dynamic : Fair  Ambulation/Gait Training:  Distance (ft): 75 Feet (ft) (75ft x 4 rounds with standing rest breaks)  Assistive Device: Gait belt;Walker, rollator  Ambulation - Level of Assistance: Contact guard assistance        Gait Abnormalities: Decreased step clearance;Shuffling gait        Base of Support: Widened     Speed/Eunice: Pace decreased (<100 feet/min); Slow  Step Length: Right shortened;Left shortened                    Stairs:              Neuro Re-Education:    Therapeutic Exercises:     Pain:  Pain Scale 1: Numeric (0 - 10)  Pain Intensity 1: 0  Pain Location 1: Face  Pain Orientation 1: Right  Pain Description 1: Aching  Pain Intervention(s) 1: Medication (see MAR)  Activity Tolerance:   Good  Please refer to the flowsheet for vital signs taken during this treatment.   After treatment:   [x]    Patient left in no apparent distress sitting up in chair  []    Patient left in no apparent distress in bed  [x]    Call bell left within reach  [x]    Nursing notified  []    Caregiver present  []    Bed alarm activated    COMMUNICATION/COLLABORATION:   The patients plan of care was discussed with: Registered Nurse    Sheron Harry PT   Time Calculation: 20 mins

## 2018-07-11 LAB
ALBUMIN SERPL-MCNC: 2.2 G/DL (ref 3.5–5)
ALBUMIN/GLOB SERPL: 0.5 {RATIO} (ref 1.1–2.2)
ALP SERPL-CCNC: 93 U/L (ref 45–117)
ALT SERPL-CCNC: 51 U/L (ref 12–78)
ANION GAP SERPL CALC-SCNC: 10 MMOL/L (ref 5–15)
AST SERPL-CCNC: 98 U/L (ref 15–37)
BILIRUB SERPL-MCNC: 0.6 MG/DL (ref 0.2–1)
BUN SERPL-MCNC: 11 MG/DL (ref 6–20)
BUN/CREAT SERPL: 16 (ref 12–20)
CALCIUM SERPL-MCNC: 8.3 MG/DL (ref 8.5–10.1)
CHLORIDE SERPL-SCNC: 108 MMOL/L (ref 97–108)
CK SERPL-CCNC: 1425 U/L (ref 26–192)
CO2 SERPL-SCNC: 20 MMOL/L (ref 21–32)
CREAT SERPL-MCNC: 0.68 MG/DL (ref 0.55–1.02)
GLOBULIN SER CALC-MCNC: 4.2 G/DL (ref 2–4)
GLUCOSE SERPL-MCNC: 173 MG/DL (ref 65–100)
POTASSIUM SERPL-SCNC: 4.2 MMOL/L (ref 3.5–5.1)
PROT SERPL-MCNC: 6.4 G/DL (ref 6.4–8.2)
SODIUM SERPL-SCNC: 138 MMOL/L (ref 136–145)

## 2018-07-11 PROCEDURE — 80053 COMPREHEN METABOLIC PANEL: CPT | Performed by: HOSPITALIST

## 2018-07-11 PROCEDURE — 36415 COLL VENOUS BLD VENIPUNCTURE: CPT | Performed by: HOSPITALIST

## 2018-07-11 PROCEDURE — 74011250636 HC RX REV CODE- 250/636: Performed by: INTERNAL MEDICINE

## 2018-07-11 PROCEDURE — 74011636637 HC RX REV CODE- 636/637: Performed by: HOSPITALIST

## 2018-07-11 PROCEDURE — 97535 SELF CARE MNGMENT TRAINING: CPT

## 2018-07-11 PROCEDURE — 65270000029 HC RM PRIVATE

## 2018-07-11 PROCEDURE — 82550 ASSAY OF CK (CPK): CPT | Performed by: HOSPITALIST

## 2018-07-11 PROCEDURE — 74011250637 HC RX REV CODE- 250/637: Performed by: HOSPITALIST

## 2018-07-11 PROCEDURE — 74011250637 HC RX REV CODE- 250/637: Performed by: INTERNAL MEDICINE

## 2018-07-11 RX ADMIN — CARVEDILOL 3.12 MG: 3.12 TABLET, FILM COATED ORAL at 17:59

## 2018-07-11 RX ADMIN — Medication 10 ML: at 21:56

## 2018-07-11 RX ADMIN — FAMOTIDINE 20 MG: 20 TABLET ORAL at 10:30

## 2018-07-11 RX ADMIN — PANTOPRAZOLE SODIUM 40 MG: 40 TABLET, DELAYED RELEASE ORAL at 05:44

## 2018-07-11 RX ADMIN — DOXYCYCLINE HYCLATE 100 MG: 100 TABLET, COATED ORAL at 10:30

## 2018-07-11 RX ADMIN — LORATADINE 10 MG: 10 TABLET ORAL at 10:30

## 2018-07-11 RX ADMIN — ASPIRIN 81 MG 81 MG: 81 TABLET ORAL at 10:30

## 2018-07-11 RX ADMIN — THERA TABS 1 TABLET: TAB at 10:30

## 2018-07-11 RX ADMIN — MONTELUKAST SODIUM 10 MG: 10 TABLET, FILM COATED ORAL at 21:56

## 2018-07-11 RX ADMIN — PREDNISONE 20 MG: 20 TABLET ORAL at 07:07

## 2018-07-11 RX ADMIN — FAMOTIDINE 20 MG: 20 TABLET ORAL at 17:59

## 2018-07-11 RX ADMIN — Medication 2 MG: at 21:57

## 2018-07-11 RX ADMIN — DIPHENHYDRAMINE HYDROCHLORIDE 25 MG: 25 CAPSULE ORAL at 05:40

## 2018-07-11 RX ADMIN — GABAPENTIN 100 MG: 100 CAPSULE ORAL at 21:57

## 2018-07-11 RX ADMIN — VITAMIN D, TAB 1000IU (100/BT) 1000 UNITS: 25 TAB at 17:59

## 2018-07-11 RX ADMIN — DIPHENHYDRAMINE HYDROCHLORIDE 25 MG: 25 CAPSULE ORAL at 00:42

## 2018-07-11 RX ADMIN — SODIUM CHLORIDE 100 ML/HR: 900 INJECTION, SOLUTION INTRAVENOUS at 16:26

## 2018-07-11 RX ADMIN — DIPHENHYDRAMINE HYDROCHLORIDE 25 MG: 25 CAPSULE ORAL at 10:34

## 2018-07-11 RX ADMIN — DIPHENHYDRAMINE HYDROCHLORIDE 25 MG: 25 CAPSULE ORAL at 15:25

## 2018-07-11 RX ADMIN — Medication 10 ML: at 05:44

## 2018-07-11 RX ADMIN — Medication 2 MG: at 00:42

## 2018-07-11 RX ADMIN — Medication 10 ML: at 13:44

## 2018-07-11 RX ADMIN — DIPHENHYDRAMINE HYDROCHLORIDE 25 MG: 25 CAPSULE ORAL at 21:56

## 2018-07-11 RX ADMIN — LEVOTHYROXINE SODIUM 50 MCG: 50 TABLET ORAL at 05:44

## 2018-07-11 RX ADMIN — AZATHIOPRINE 150 MG: 50 TABLET ORAL at 10:34

## 2018-07-11 RX ADMIN — VITAMIN D, TAB 1000IU (100/BT) 1000 UNITS: 25 TAB at 10:30

## 2018-07-11 RX ADMIN — Medication 2 MG: at 05:43

## 2018-07-11 RX ADMIN — DOXYCYCLINE HYCLATE 100 MG: 100 TABLET, COATED ORAL at 21:56

## 2018-07-11 RX ADMIN — Medication 2 MG: at 13:44

## 2018-07-11 NOTE — PROGRESS NOTES
Problem: Self Care Deficits Care Plan (Adult)  Goal: *Acute Goals and Plan of Care (Insert Text)  Occupational Therapy Goals  Initiated 7/10/2018  1. Patient will perform 4 grooming tasks standing at sink with no LOB and good standing balance with modified independence within 7 day(s). 2.  Patient will perform bathing with supervision/set-up within 7 day(s). 3.  Patient will perform upper body dressing and lower body dressing with modified independence within 7 day(s). 4.  Patient will perform toilet transfers with modified independence within 7 day(s). 5.  Patient will perform all aspects of toileting with modified independence within 7 day(s). 6.  Patient will utilize energy conservation techniques during functional activities with verbal cues within 7 day(s). 7. Patient will perform light I-ADL activity with least restrictive device with mod I in room (ie: make bed, arrange/organize personal items,e tc) within 7 days. Occupational Therapy TREATMENT  Patient: Katerin Matias (41 y.o. female)  Date: 7/11/2018  Diagnosis: Rhabdomyolysis Rhabdomyolysis       Precautions:    Chart, occupational therapy assessment, plan of care, and goals were reviewed. ASSESSMENT:  Pt pleasant and motivated to participate. Feel patient is close to baseline with self-care and mobility. Pt has all DME needs at home (shower chair, quad cane and rollator). Discussed [employer] and she would like to have it and plans on asking son. Recommend home once medically stable. Progression toward goals:  [x]       Improving appropriately and progressing toward goals  []       Improving slowly and progressing toward goals  []       Not making progress toward goals and plan of care will be adjusted     PLAN:  Patient continues to benefit from skilled intervention to address the above impairments. Continue treatment per established plan of care.   Discharge Recommendations:  home  Further Equipment Recommendations for Discharge: None noted     SUBJECTIVE:   Patient stated See, I can put my socks on.    OBJECTIVE DATA SUMMARY:   Cognitive/Behavioral Status:  Neurologic State: Alert  Orientation Level: Oriented X4                Functional Mobility and Transfers for ADLs:  Bed Mobility:       Transfers:     Functional Transfers  Bathroom Mobility: Modified independent  Toilet Transfer : Modified independent       Balance:  Sitting: Intact  Standing: Intact; With support    ADL Intervention:  Feeding  Feeding Assistance: Independent                        Lower Body Dressing Assistance  Dressing Assistance: Independent  Socks: Independent  Leg Crossed Method Used: Yes  Position Performed: Seated in chair    Toileting  Toileting Assistance: Modified independent  Bladder Hygiene: Independent  Bowel Hygiene: Independent  Clothing Management: Independent  Adaptive Equipment: Walker         Pain:  Pain Scale 1: Numeric (0 - 10)  Pain Intensity 1: 7  Pain Location 1: Jaw  Pain Orientation 1: Right  Pain Description 1: Throbbing;Radiating (riates to throat)  Pain Intervention(s) 1: Medication (see MAR)  Activity Tolerance:   Good  Please refer to the flowsheet for vital signs taken during this treatment.   After treatment:   [x] Patient left in no apparent distress sitting up in chair  [] Patient left in no apparent distress in bed  [x] Call bell left within reach  [] Nursing notified  [] Caregiver present  [] Bed alarm activated    COMMUNICATION/COLLABORATION:   The patients plan of care was discussed with: Registered Nurse    Cindi Moreno OTR/L  Time Calculation: 15 mins

## 2018-07-11 NOTE — PROGRESS NOTES
Hospitalist Progress Note Bhupendra Egan MD 
     
                             Answering service: [phone] OR [phone] from in house phone Cell: [phone] Date of Service:  2018 NAME:  Bobby Fonseca :  1945 MRN:  [id number] Admission Summary:  
Per HPI Sultana Macias is a 75 y.o. female with CAD, h/o HSV infection, chronic pain, arthritis, GERD, HTN, hypothyroidism, and gastroparesis who was BIBEMS to the ED from home s/p GLF. Patient states she was trying to  her shoes from the floor yesterday about 2pm when she lost her balance and fell. She was on the floor till about 9am today as she was unable to move. She had an appointment today, called the office to let them know her situation, office called her son, and son called EMS. Patient had a dental procedure recently and had just completed her clindamycin 7/6pm. She developed hives on 77am, came to [facility] ED, and was discharged with a script for prednisone. She was about to leave to go to [facility] today when she fell in her house. Patient denies any preceding symptoms; she walks about 5 miles roundtrip with her walker almost daily. Patient is also c/o of right facial swelling but denies difficulty swallowing. She has chronic abdominal and back pain. \" Reason for follow up:rhabdomyolysis Ms Octavia Gentile had itchiness from the rash and could not sleep well last night,but got better after she received benadryl. Assessment & Plan:  
 
Rhabdomyolysis due to fall 
- CK 1400 ,continue iv fluids. Encouraged  Fluids. -Monitor one more day and if CK down by about 50%,plan to discharge to rehab tomorrow. R sialadenitis without drain able abscess. No purulence,pain . Afabrile. Need out patient follow up. On doxycycline. She is allergy to pcn and clincamycin Hives likely due to allergic reaction to clindamycin (POA),no angioedema. Rash improving. Switch solumedrol to short course prednisone. Hypothyroidism - continue levothyroxine Hyperlipidemia - hold statin due to elevated AST Hyperbilirubinemia and elevated AST - pt has h/o abdominal issues and abnormal LFTs in the past 
- sees Dr Sang Crowley with normal liver and bile ducts. Gallbladder is absent. Chronic pain - sees Dr Harmony Grier, pain specialist 
  
Diet: cardiac DVT prophylaxis: SCDs Anticipated disposition: rehab Hospital Problems  Date Reviewed: 7/9/2018 Codes Class Noted POA * (Principal)Rhabdomyolysis ICD-10-CM: U92.17 ICD-9-CM: 728.88  7/9/2018 Yes Pruritic erythematous rash ICD-10-CM: L29.8 ICD-9-CM: 698.8  7/9/2018 Yes Facial mass ICD-10-CM: R22.0 ICD-9-CM: 784.2  7/9/2018 Yes Fall ICD-10-CM: W19. Weyman Hamper ICD-9-CM: E888.9  7/9/2018 Yes Hyperbilirubinemia ICD-10-CM: E80.6 ICD-9-CM: 782.4  7/9/2018 Yes Elevated AST (SGOT) ICD-10-CM: R74.0 ICD-9-CM: 790.4  7/9/2018 Yes Chronic pain (Chronic) ICD-10-CM: Y73.16 ICD-9-CM: 338.29  7/9/2018 Yes Overview Signed 7/9/2018  1:51 PM by Lexus Johnson MD  
  low back pain/arthritis      [employer] note9/13/17 Hyperlipidemia ICD-10-CM: E78.5 ICD-9-CM: 272.4  6/26/2017 Yes Hypothyroidism ICD-10-CM: E03.9 ICD-9-CM: 244.9  11/30/2012 Yes Review of Systems: A comprehensive review of systems was negative except for that written in the HPI. Physical Examination:  
 
 Last 24hrs VS reviewed since prior progress note. Most recent are: 
Visit Vitals  /66 (BP 1 Location: Right arm, BP Patient Position: Sitting)  Pulse 60  Temp 97.6 °F (36.4 °C)  Resp 16  
 Ht 5' 5\" (1.651 m)  Wt 86.2 kg (190 lb)  SpO2 99%  Breastfeeding No  
 BMI 31.62 kg/m2 Constitutional:  No acute distress, cooperative, pleasant   
HEENT: Head is a traumatic, Un icteric sclera. Pink conjunctiva,no erythema or discharge. Oral mucous moist, oropharynx benign. Neck supple, Resp:  CTA bilaterally. No wheezing/rhonchi/rales. No accessory muscle use CV:  Regular rhythm, normal rate, no murmurs, gallops, rubs GI:  Soft, non distended, non tender. normoactive bowel sounds, no hepatosplenomegaly :  No CVA or suprapubic tenderness Skin  :  purplish macular rash on the thighs,trunks and upper extremities. Musculoskeletal:  No edema, warm, 2+ pulses throughout Neurologic:  AAOx3, CN II-XII reviewed. Moves all extremities. Intake/Output Summary (Last 24 hours) at 07/11/18 1214 Last data filed at 07/11/18 9549 Gross per 24 hour Intake              480 ml Output                0 ml Net              480 ml Data Review:  
 Review and/or order of clinical lab test 
Review and/or order of tests in the radiology section of CPT Review and/or order of tests in the medicine section of CPT Labs:  
 
Recent Labs  
   07/10/18 
 0150  07/09/18 
 1157 WBC  8.1  11.2* HGB  11.1*  11.0*  
HCT  32.4*  33.1*  
PLT  188  161 Recent Labs  
   07/11/18 
 0034  07/10/18 
 0150  07/09/18 
 0935 NA  138  139  137  
K  4.2  3.5  3.9 CL  108  106  102 CO2  20*  24  25 BUN  11  9  9 CREA  0.68  0.76  0.87 GLU  173*  223*  93  
CA  8.3*  7.8*  8.3* Recent Labs  
   07/11/18 
 0034  07/10/18 
 0150  07/09/18 
 0935 SGOT  98*  111*  159* ALT  51  46  50 AP  93  90  104 TBILI  0.6  0.8  1.5* TP  6.4  6.2*  7.1 ALB  2.2*  2.3*  2.6*  
GLOB  4.2*  3.9  4.5* No results for input(s): INR, PTP, APTT in the last 72 hours. No lab exists for component: INREXT, INREXT No results for input(s): FE, TIBC, PSAT, FERR in the last 72 hours. No results found for: FOL, RBCF No results for input(s): PH, PCO2, PO2 in the last 72 hours. Recent Labs  
   07/11/18 
 0034  07/10/18 
 0150  07/09/18 
 0935 CPK  1425*  2594*  2610* 7333* CKNDX   --   0.3   --   
TROIQ   --   <0.05  0.06* Lab Results Component Value Date/Time Cholesterol, total 97 (L) 06/26/2017 01:47 PM  
 HDL Cholesterol 37 (L) 06/26/2017 01:47 PM  
 LDL, calculated 43 06/26/2017 01:47 PM  
 Triglyceride 86 06/26/2017 01:47 PM  
 CHOL/HDL Ratio 4.5 12/23/2015 01:45 AM  
 
Lab Results Component Value Date/Time Glucose (POC) 138 (H) 11/28/2017 09:21 PM  
 Glucose (POC) 138 (H) 11/27/2017 02:39 AM  
 Glucose (POC) 98 05/19/2017 11:52 AM  
 Glucose (POC) 125 (H) 12/30/2015 11:28 AM  
 Glucose (POC) 101 (H) 12/23/2015 12:08 AM  
 
Lab Results Component Value Date/Time Color DARK YELLOW 07/09/2018 11:57 AM  
 Appearance CLEAR 07/09/2018 11:57 AM  
 Specific gravity 1.013 07/09/2018 11:57 AM  
 Specific gravity 1.005 12/23/2015 01:45 AM  
 pH (UA) 7.5 07/09/2018 11:57 AM  
 Protein TRACE (A) 07/09/2018 11:57 AM  
 Glucose NEGATIVE  07/09/2018 11:57 AM  
 Ketone NEGATIVE  07/09/2018 11:57 AM  
 Bilirubin NEGATIVE  07/09/2018 11:57 AM  
 Urobilinogen 1.0 07/09/2018 11:57 AM  
 Nitrites NEGATIVE  07/09/2018 11:57 AM  
 Leukocyte Esterase TRACE (A) 07/09/2018 11:57 AM  
 Epithelial cells FEW 07/09/2018 11:57 AM  
 Bacteria NEGATIVE  07/09/2018 11:57 AM  
 WBC 0-4 07/09/2018 11:57 AM  
 RBC 10-20 07/09/2018 11:57 AM  
 
 
 
Medications Reviewed:  
 
Current Facility-Administered Medications Medication Dose Route Frequency  famotidine (PEPCID) tablet 20 mg  20 mg Oral BID  predniSONE (DELTASONE) tablet 20 mg  20 mg Oral DAILY WITH BREAKFAST  aspirin chewable tablet 81 mg  81 mg Oral DAILY  azaTHIOprine (IMURAN) tablet 150 mg  150 mg Oral DAILY  carvedilol (COREG) tablet 3.125 mg  3.125 mg Oral BID WITH MEALS  cholecalciferol (VITAMIN D3) tablet 1,000 Units  1,000 Units Oral BID  
 gabapentin (NEURONTIN) capsule 100 mg  100 mg Oral QHS  levothyroxine (SYNTHROID) tablet 50 mcg  50 mcg Oral 6am  
 montelukast (SINGULAIR) tablet 10 mg  10 mg Oral QHS  
 therapeutic multivitamin (THERAGRAN) tablet 1 Tab  1 Tab Oral DAILY  pantoprazole (PROTONIX) tablet 40 mg  40 mg Oral ACB  vitamin B complex tablet  1 Tab Oral DAILY  sodium chloride (NS) flush 5-10 mL  5-10 mL IntraVENous Q8H  
 sodium chloride (NS) flush 5-10 mL  5-10 mL IntraVENous PRN  
 ondansetron (ZOFRAN) injection 4 mg  4 mg IntraVENous Q4H PRN  
 diphenhydrAMINE (BENADRYL) capsule 25 mg  25 mg Oral Q4H PRN  
 0.9% sodium chloride infusion  100 mL/hr IntraVENous CONTINUOUS  
 loratadine (CLARITIN) tablet 10 mg  10 mg Oral DAILY  doxycycline (VIBRA-TABS) tablet 100 mg  100 mg Oral Q12H  
 morphine (PF) 1 mg/mL injection 2 mg  2 mg IntraVENous Q4H PRN  
 
______________________________________________________________________ EXPECTED LENGTH OF STAY: 3d 4h 
ACTUAL LENGTH OF STAY:          2 Elizbeth Dakins, MD

## 2018-07-11 NOTE — PROGRESS NOTES
CM reviewed chart and provided updates to [facility]. Plan is for discharge to [facility] tomorrow. CM will continue to follow.   Rolando Tsai, BSW, ACM

## 2018-07-12 ENCOUNTER — PATIENT OUTREACH (OUTPATIENT)
Dept: FAMILY MEDICINE CLINIC | Age: 73
End: 2018-07-12

## 2018-07-12 VITALS
HEIGHT: 65 IN | TEMPERATURE: 97.6 F | HEART RATE: 62 BPM | BODY MASS INDEX: 31.65 KG/M2 | RESPIRATION RATE: 16 BRPM | SYSTOLIC BLOOD PRESSURE: 154 MMHG | WEIGHT: 190 LBS | OXYGEN SATURATION: 99 % | DIASTOLIC BLOOD PRESSURE: 94 MMHG

## 2018-07-12 LAB — CK SERPL-CCNC: 587 U/L (ref 26–192)

## 2018-07-12 PROCEDURE — 74011250637 HC RX REV CODE- 250/637: Performed by: INTERNAL MEDICINE

## 2018-07-12 PROCEDURE — 74011636637 HC RX REV CODE- 636/637: Performed by: HOSPITALIST

## 2018-07-12 PROCEDURE — 82550 ASSAY OF CK (CPK): CPT | Performed by: HOSPITALIST

## 2018-07-12 PROCEDURE — 36415 COLL VENOUS BLD VENIPUNCTURE: CPT | Performed by: HOSPITALIST

## 2018-07-12 PROCEDURE — 74011250636 HC RX REV CODE- 250/636: Performed by: INTERNAL MEDICINE

## 2018-07-12 PROCEDURE — 94760 N-INVAS EAR/PLS OXIMETRY 1: CPT

## 2018-07-12 PROCEDURE — 74011250637 HC RX REV CODE- 250/637: Performed by: HOSPITALIST

## 2018-07-12 RX ORDER — DOXYCYCLINE HYCLATE 100 MG
100 TABLET ORAL EVERY 12 HOURS
Qty: 8 TAB | Refills: 0 | Status: SHIPPED
Start: 2018-07-12 | End: 2018-07-16

## 2018-07-12 RX ORDER — ATORVASTATIN CALCIUM 20 MG/1
20 TABLET, FILM COATED ORAL
Qty: 90 TAB | Refills: 3 | Status: SHIPPED | OUTPATIENT
Start: 2018-07-19 | End: 2018-08-01 | Stop reason: SDUPTHER

## 2018-07-12 RX ORDER — PREDNISONE 20 MG/1
20 TABLET ORAL
Qty: 3 TAB | Refills: 0 | Status: SHIPPED | OUTPATIENT
Start: 2018-07-12 | End: 2018-07-15

## 2018-07-12 RX ADMIN — FAMOTIDINE 20 MG: 20 TABLET ORAL at 09:04

## 2018-07-12 RX ADMIN — DOXYCYCLINE HYCLATE 100 MG: 100 TABLET, COATED ORAL at 09:04

## 2018-07-12 RX ADMIN — Medication 2 MG: at 06:32

## 2018-07-12 RX ADMIN — PANTOPRAZOLE SODIUM 40 MG: 40 TABLET, DELAYED RELEASE ORAL at 06:32

## 2018-07-12 RX ADMIN — LORATADINE 10 MG: 10 TABLET ORAL at 09:04

## 2018-07-12 RX ADMIN — ASPIRIN 81 MG 81 MG: 81 TABLET ORAL at 09:04

## 2018-07-12 RX ADMIN — DIPHENHYDRAMINE HYDROCHLORIDE 25 MG: 25 CAPSULE ORAL at 06:32

## 2018-07-12 RX ADMIN — Medication 10 ML: at 06:32

## 2018-07-12 RX ADMIN — DIPHENHYDRAMINE HYDROCHLORIDE 25 MG: 25 CAPSULE ORAL at 11:24

## 2018-07-12 RX ADMIN — PREDNISONE 20 MG: 20 TABLET ORAL at 06:33

## 2018-07-12 RX ADMIN — LEVOTHYROXINE SODIUM 50 MCG: 50 TABLET ORAL at 06:32

## 2018-07-12 RX ADMIN — THERA TABS 1 TABLET: TAB at 09:05

## 2018-07-12 RX ADMIN — VITAMIN D, TAB 1000IU (100/BT) 1000 UNITS: 25 TAB at 09:04

## 2018-07-12 RX ADMIN — AZATHIOPRINE 150 MG: 50 TABLET ORAL at 11:21

## 2018-07-12 NOTE — DISCHARGE INSTRUCTIONS
Discharge SNF/Rehab Instructions/LTAC       PATIENT ID: Valerie Amezcua  MRN: [id number]   YOB: 1945    DATE OF ADMISSION: 7/9/2018  9:06 AM    DATE OF DISCHARGE: 7/12/2018    PRIMARY CARE PROVIDER: Puja Dunham MD       ATTENDING PHYSICIAN: Uriel Montoya MD  DISCHARGING PROVIDER: Uriel Montoya MD     To contact this individual call [phone] and ask the  to page. If unavailable ask to be transferred the Adult Hospitalist Department. CONSULTATIONS: ED CONSULT TO SENIOR SERVICES CASE MANAGEMENT  ED CONSULT TO Flori Carrington  ED CONSULT TO SENIOR SERVICES PHYSICAL THERAPY  IP CONSULT TO HOSPITALIST    PROCEDURES/SURGERIES: * No surgery found *    ADMITTING [address] [place] [address] COURSE:   Admission Summary:   Per HPI Marc Reddy is a 75 y.o. female with CAD, h/o HSV infection, chronic pain, arthritis, GERD, HTN, hypothyroidism, and gastroparesis who was BIBEMS to the ED from home s/p GLF. Patient states she was trying to  her shoes from the floor yesterday about 2pm when she lost her balance and fell. She was on the floor till about 9am today as she was unable to move. She had an appointment today, called the office to let them know her situation, office called her son, and son called EMS. Patient had a dental procedure recently and had just completed her clindamycin 7/6pm. She developed hives on 7/7am, came to [facility] ED, and was discharged with a script for prednisone. She was about to leave to go to [facility] today when she fell in her house. Patient denies any preceding symptoms; she walks about 5 miles roundtrip with her walker almost daily. Patient is also c/o of right facial swelling but denies difficulty swallowing. She has chronic abdominal and back pain. \"              DISCHARGE DIAGNOSES / PLAN:    Rhabdomyolysis due to fall  - CK trended down nicely with IV fluids. Encourage oral fluids. Ref.  Range 7/9/2018 09:35 7/10/2018 01:50 7/10/2018 01:50 7/11/2018 00:34 7/12/2018 00:33   CK Latest Ref Range: 26 - 192 U/L 4468 (H) 2610 (H) 2594 (H) 1425 (H) 587 (H)           R sialadenitis without drain able abscess. No purulence,pain . Afabrile. Need out patient follow up.complete doxycycline. She is allergy to pcn and clincamycin     Hives likely due to allergic reaction to clindamycin (POA),no angioedema. Rash improving. Switch solumedrol to short course prednisone. Hypothyroidism - continue levothyroxine     Hyperlipidemia Lipitor on hold due to elevated LFTs which are now improving. Checl LFTs on Monday and if normal,may resume Lipitor.,     Hyperbilirubinemia and elevated AST - pt has h/o abdominal issues and abnormal LFTs in the past  - sees Dr Sujit Rasmussen with normal liver and bile ducts. Gallbladder is absent. Chronic pain - sees Dr Brady Jaime, pain specialist       Please check BMP,LFTs and CK in 3-5 days. Resume Lipitor if LFTs are okay. PENDING TEST RESULTS:   At the time of discharge the following test results are still pending: none    FOLLOW UP APPOINTMENTS:    Follow-up Information     Follow up With Details Comments [address], [place]   [address]  [address]  [address] [facility]  [phone]             ADDITIONAL CARE RECOMMENDATIONS:     DIET: Cardiac Diet      ACTIVITY: Activity as tolerated,rehabilitation    WOUND CARE: NA    EQUIPMENT needed: TBD on discharge from rehab      DISCHARGE MEDICATIONS:   See Medication Reconciliation Form      NOTIFY YOUR PHYSICIAN FOR ANY OF THE FOLLOWING:   Fever over 101 degrees for 24 hours. Chest pain, shortness of breath, fever, chills, nausea, vomiting, diarrhea, change in mentation, falling, weakness, bleeding. Severe pain or pain not relieved by medications. Or, any other signs or symptoms that you may have questions about.     DISPOSITION:    Home With:   OT  PT  HH  RN      x SNF/Inpatient Rehab/LTAC    Independent/assisted living Hospice    Other:       PATIENT CONDITION AT DISCHARGE:     Functional status    Poor    x Deconditioned     Independent      Cognition   x  Lucid     Forgetful     Dementia      Catheters/lines (plus indication)    Wilkerson     PICC     PEG    x None      Code status    x Full code     DNR      PHYSICAL EXAMINATION AT DISCHARGE:         Constitutional:  No acute distress, cooperative, pleasant    HEENT: Head is a traumatic,  Un icteric sclera. Pink conjunctiva,no erythema or discharge. Oral mucous moist, oropharynx benign. Neck supple,    Resp:  CTA bilaterally. No wheezing/rhonchi/rales. No accessory muscle use   CV:  Regular rhythm, normal rate, no murmurs, gallops, rubs    GI:  Soft, non distended, non tender. normoactive bowel sounds, no hepatosplenomegaly    :  No CVA or suprapubic tenderness   Skin  :  purplish macular rash on the thighs,trunks and upper extremities. Musculoskeletal:  No edema, warm, 2+ pulses throughout    Neurologic:  AAOx3, CN II-XII reviewed. Moves all extremities. CHRONIC MEDICAL DIAGNOSES:  Problem List as of 7/12/2018  Date Reviewed: 7/9/2018          Codes Class Noted - Resolved    * (Principal)Rhabdomyolysis ICD-10-CM: F75.24  ICD-9-CM: 728.88  7/9/2018 - Present        Pruritic erythematous rash ICD-10-CM: L29.8  ICD-9-CM: 698.8  7/9/2018 - Present        Facial mass ICD-10-CM: R22.0  ICD-9-CM: 784.2  7/9/2018 - Present        Fall ICD-10-CM: W19. Weyman Hamper  ICD-9-CM: E888.9  7/9/2018 - Present        Hyperbilirubinemia ICD-10-CM: E80.6  ICD-9-CM: 782.4  7/9/2018 - Present        Elevated AST (SGOT) ICD-10-CM: R74.0  ICD-9-CM: 790.4  7/9/2018 - Present        Chronic pain (Chronic) ICD-10-CM: N00.53  ICD-9-CM: 338.29  7/9/2018 - Present    Overview Signed 7/9/2018  1:51 PM by Lexus Johnson MD     low back pain/arthritis      [employer] note9/13/17             Gram-negative bacteremia ICD-10-CM: R78.81  ICD-9-CM: 790.7, 041.85  11/28/2017 - Present        Hypokalemia ICD-10-CM: E87.6  ICD-9-CM: 276.8  10/18/2017 - Present        Gastroparesis ICD-10-CM: K31.84  ICD-9-CM: 536.3  7/31/2017 - Present        Hyperlipidemia ICD-10-CM: E78.5  ICD-9-CM: 272.4  6/26/2017 - Present        Demand ischemia ([facility] ICD-10-CM: I24.8  ICD-9-CM: 411.89  5/15/2017 - Present        ACP (advance care planning) ICD-10-CM: Z71.89  ICD-9-CM: V65.49  11/30/2016 - Present    Overview Signed 11/30/2016 12:23 PM by Praveena Leung     Patient brought her ACP to visit. It was reviewed by me.               Gastroesophageal reflux disease without esophagitis ICD-10-CM: K21.9  ICD-9-CM: 530.81  10/24/2016 - Present        Chronic midline low back pain without sciatica ICD-10-CM: M54.5, G89.29  ICD-9-CM: 724.2, 338.29  10/24/2016 - Present        Insomnia ICD-10-CM: G47.00  ICD-9-CM: 780.52  7/22/2015 - Present        Abnormal gait ICD-10-CM: R26.9  ICD-9-CM: 781.2  5/14/2015 - Present        Vitamin D deficiency ICD-10-CM: E55.9  ICD-9-CM: 268.9  3/16/2015 - Present        Allergic rhinitis ICD-10-CM: J30.9  ICD-9-CM: 477.9  10/7/2014 - Present        Chronic constipation ICD-10-CM: K59.09  ICD-9-CM: 564.00  10/7/2014 - Present        Hypothyroidism ICD-10-CM: E03.9  ICD-9-CM: 244.9  11/30/2012 - Present        Postmenopausal ICD-10-CM: Z78.0  ICD-9-CM: V49.81  10/17/2012 - Present        RA (rheumatoid arthritis) ([facility] ICD-10-CM: M06.9  ICD-9-CM: 714.0  2/9/2010 - Present        Essential hypertension ICD-10-CM: I10  ICD-9-CM: 401.9  2/9/2010 - Present        RESOLVED: E coli bacteremia ICD-10-CM: R78.81  ICD-9-CM: 790.7, 041.49  5/24/2017 - 6/26/2017        RESOLVED: Oral candidiasis ICD-10-CM: B37.0  ICD-9-CM: 112.0  5/24/2017 - 6/26/2017        RESOLVED: Septic shock ([facility] ICD-10-CM: A41.9, R65.21  ICD-9-CM: 038.9, 785.52, 995.92  5/15/2017 - 6/26/2017        RESOLVED: Septic encephalopathy ICD-10-CM: G93.41  ICD-9-CM: 348.31  5/15/2017 - 5/24/2017        RESOLVED: Partial small bowel obstruction ([facility] ICD-10-CM: K56.600  ICD-9-CM: 560.9  5/15/2017 - 6/26/2017        RESOLVED: Hypokalemia ICD-10-CM: E87.6  ICD-9-CM: 276.8  5/15/2017 - 5/24/2017        RESOLVED: High anion gap metabolic acidosis HYA-64-QO: E87.2  ICD-9-CM: 276.2  5/15/2017 - 5/24/2017        RESOLVED: JEANINE (acute kidney injury) ([facility] ICD-10-CM: N17.9  ICD-9-CM: 584.9  5/15/2017 - 5/24/2017        RESOLVED: Abnormal LFTs ICD-10-CM: R94.5  ICD-9-CM: 790.6  5/15/2017 - 5/24/2017        RESOLVED: Acute gastroenteritis ICD-10-CM: K52.9  ICD-9-CM: 558.9  5/15/2017 - 5/24/2017        RESOLVED: Elevated BP ICD-10-CM: MBE0761  ICD-9-CM: Bethelridge Zia  9/21/2016 - 10/24/2016        RESOLVED: Thalamic hemorrhage ([facility] ICD-10-CM: I61.0  ICD-9-CM: 582  12/29/2015 - 10/24/2016        RESOLVED: TIA (transient ischemic attack) ICD-10-CM: G45.9  ICD-9-CM: 435.9  12/23/2015 - 10/24/2016        RESOLVED: Chest pain ICD-10-CM: R07.9  ICD-9-CM: 786.50  12/22/2015 - 10/24/2016        RESOLVED: Discitis of lumbar region ICD-10-CM: M46.46  ICD-9-CM: 722.93  12/9/2015 - 10/24/2016        RESOLVED: Left-sided low back pain with left-sided sciatica ICD-10-CM: M54.42  ICD-9-CM: 724.3  12/9/2015 - 10/24/2016        RESOLVED: Back pain ICD-10-CM: M54.9  ICD-9-CM: 724.5  8/23/2015 - 10/24/2016        RESOLVED: Hypotension ICD-10-CM: I95.9  ICD-9-CM: 458.9  8/23/2015 - 9/21/2016        RESOLVED: Glossitis ICD-10-CM: K14.0  ICD-9-CM: 529.0  7/22/2015 - 10/24/2016        RESOLVED: Prediabetes ICD-10-CM: R73.03  ICD-9-CM: 790.29  7/22/2015 - 10/19/2017        RESOLVED: Sore throat ICD-10-CM: J02.9  ICD-9-CM: 758  3/16/2015 - 7/22/2015        RESOLVED: Obesity, Class II, BMI 35-39.9 ICD-10-CM: E66.9  ICD-9-CM: 278.00  1/21/2015 - 10/24/2016        RESOLVED: Tinea manus ICD-10-CM: B35.2  ICD-9-CM: 110.2  10/7/2014 - 10/18/2017        RESOLVED: LLQ abdominal pain ICD-10-CM: R10.32  ICD-9-CM: 789.04  10/7/2014 - 1/21/2015        RESOLVED: Obesity, unspecified ICD-10-CM: E66.9  ICD-9-CM: 278.00 12/10/2010 - 1/21/2015                      Signed:    Vlad Dee MD  7/12/2018  9:55 AM

## 2018-07-12 NOTE — PROGRESS NOTES
Transition of Care Coordination/Hospital to Post Acute Facility:     Date/Time:  2018 5:00 PM    Patient was admitted to [facility] on 18 and discharged on 18 for Rhabdomyolysis due to fall, R sialadenitis without drain able abscess, Hives likely due to allergic reaction to clindamycin (POA) Patient was transferred to  [address] [place] [address]  for continuation of care (IRF name changed to [facility] [address] [place] [address]). Inpatient RRAT score: 21  Was this a readmission? no   Patient stated reason for the readmission: n/a     Nurse Navigator(NN) contacted [facility] [address] [place] [address] to verify admission, review discharge orders and reconcile discharge medications. Spoke to nurse Montserrat Clarke. Provided introduction to self, and explanation of the Nurse Navigator role. Verified name and  as patient identifiers. Admission verified. Discussed and reviewed  discharge summary, recommendations for future lab/imaging, anticipated length of stay, medication reconciliation. IRF provider Dr Kim Beltran. Top Challenges reviewed    - IRF Team Meeting to be held on 18 re goals, est LOS, anticipated d/c date, plan, disposition     - NN left message for IRF ELMER May Perea re above    - Prednisone & Baclofen dosages to be changed from dosages on d/c med list as of 18           Medication(s):     New Medications at Discharge: doxycycline (VIBRA-TABS) 100 mg tablet  Changed Medications at Discharge: predniSONE (DELTASONE) 20 mg tablet, atorvastatin (LIPITOR) 20 mg tablet  Discontinued Medications at Discharge: calcium-cholecalciferol, d3, 600-125 mg-unit tab, VITAMIN B COMPLEX PO     PCP/Specialist follow up: No future appointments. PCP f/u to be scheduled s/p IRF d/c. Opportunity to ask questions was provided. Contact information was provided for future reference or further questions. Will continue to monitor.

## 2018-07-12 NOTE — DISCHARGE SUMMARY
Discharge Summary     PATIENT ID: Lenny Kwan  MRN: [id number]   YOB: 1945    DATE OF ADMISSION: 7/9/2018  9:06 AM    DATE OF DISCHARGE: 7/12/2018    PRIMARY CARE PROVIDER: Uri Louie MD       ATTENDING PHYSICIAN: Reyes Blunt, MD  DISCHARGING PROVIDER: Reyes Blunt, MD     To contact this individual call [phone] and ask the  to page. If unavailable ask to be transferred the Adult Hospitalist Department. CONSULTATIONS: ED CONSULT TO SENIOR SERVICES CASE MANAGEMENT  ED CONSULT TO Flori Carrington  ED CONSULT TO SENIOR SERVICES PHYSICAL THERAPY  IP CONSULT TO HOSPITALIST    PROCEDURES/SURGERIES: * No surgery found *    ADMITTING [address] [place] [address] COURSE:   Admission Summary:   Per HPI Bridget Cruz is a 75 y.o. female with CAD, h/o HSV infection, chronic pain, arthritis, GERD, HTN, hypothyroidism, and gastroparesis who was BIBEMS to the ED from home s/p GLF. Patient states she was trying to  her shoes from the floor yesterday about 2pm when she lost her balance and fell. She was on the floor till about 9am today as she was unable to move. She had an appointment today, called the office to let them know her situation, office called her son, and son called EMS. Patient had a dental procedure recently and had just completed her clindamycin 7/6pm. She developed hives on 7/7am, came to [facility] ED, and was discharged with a script for prednisone. She was about to leave to go to [facility] today when she fell in her house. Patient denies any preceding symptoms; she walks about 5 miles roundtrip with her walker almost daily. Patient is also c/o of right facial swelling but denies difficulty swallowing. She has chronic abdominal and back pain. \"              DISCHARGE DIAGNOSES / PLAN:    Rhabdomyolysis due to fall  - CK trended down nicely with IV fluids. Encourage oral fluids. Ref.  Range 7/9/2018 09:35 7/10/2018 01:50 7/10/2018 01:50 7/11/2018 00:34 7/12/2018 00:33   CK Latest Ref Range: 26 - 192 U/L 4468 (H) 2610 (H) 2594 (H) 1425 (H) 587 (H)           R sialadenitis without drain able abscess. No purulence,pain . Afabrile. Need out patient follow up.complete doxycycline. She is allergy to pcn and clincamycin     Hives likely due to allergic reaction to clindamycin (POA),no angioedema. Rash improving. Switch solumedrol to short course prednisone. Hypothyroidism - continue levothyroxine     Hyperlipidemia Lipitor on hold due to elevated LFTs which are now improving. Checl LFTs on Monday and if normal,may resume Lipitor.,     Hyperbilirubinemia and elevated AST - pt has h/o abdominal issues and abnormal LFTs in the past  - sees Dr Alec Mitchell with normal liver and bile ducts. Gallbladder is absent. Chronic pain - sees Dr Anil Childs, pain specialist       Please check BMP,LFTs and CK in 3-5 days. Resume Lipitor if LFTs are okay. PENDING TEST RESULTS:   At the time of discharge the following test results are still pending: none    FOLLOW UP APPOINTMENTS:    Follow-up Information     Follow up With Details Comments [address], [place]   [facility] [address] [facility]  [address]  [address] [facility]  [phone]             ADDITIONAL CARE RECOMMENDATIONS:     DIET: Cardiac Diet      ACTIVITY: Activity as tolerated,rehabilitation    WOUND CARE: NA    EQUIPMENT needed: TBD on discharge from rehab      DISCHARGE MEDICATIONS:   See Medication Reconciliation Form      NOTIFY YOUR PHYSICIAN FOR ANY OF THE FOLLOWING:   Fever over 101 degrees for 24 hours. Chest pain, shortness of breath, fever, chills, nausea, vomiting, diarrhea, change in mentation, falling, weakness, bleeding. Severe pain or pain not relieved by medications. Or, any other signs or symptoms that you may have questions about.     DISPOSITION:    Home With:   OT  PT  HH  RN      x SNF/Inpatient Rehab/LTAC    Independent/assisted living    Hospice    Other: PATIENT CONDITION AT DISCHARGE:     Functional status    Poor    x Deconditioned     Independent      Cognition   x  Lucid     Forgetful     Dementia      Catheters/lines (plus indication)    Wilkerson     PICC     PEG    x None      Code status    x Full code     DNR      PHYSICAL EXAMINATION AT DISCHARGE:         Constitutional:  No acute distress, cooperative, pleasant    HEENT: Head is a traumatic,  Un icteric sclera. Pink conjunctiva,no erythema or discharge. Oral mucous moist, oropharynx benign. Neck supple,    Resp:  CTA bilaterally. No wheezing/rhonchi/rales. No accessory muscle use   CV:  Regular rhythm, normal rate, no murmurs, gallops, rubs    GI:  Soft, non distended, non tender. normoactive bowel sounds, no hepatosplenomegaly    :  No CVA or suprapubic tenderness   Skin  :  purplish macular rash on the thighs,trunks and upper extremities. Musculoskeletal:  No edema, warm, 2+ pulses throughout    Neurologic:  AAOx3, CN II-XII reviewed. Moves all extremities. CHRONIC MEDICAL DIAGNOSES:  Problem List as of 7/12/2018  Date Reviewed: 7/9/2018          Codes Class Noted - Resolved    * (Principal)Rhabdomyolysis ICD-10-CM: K84.81  ICD-9-CM: 728.88  7/9/2018 - Present        Pruritic erythematous rash ICD-10-CM: L29.8  ICD-9-CM: 698.8  7/9/2018 - Present        Facial mass ICD-10-CM: R22.0  ICD-9-CM: 784.2  7/9/2018 - Present        Fall ICD-10-CM: W19. Jose Hashimoto  ICD-9-CM: E888.9  7/9/2018 - Present        Hyperbilirubinemia ICD-10-CM: E80.6  ICD-9-CM: 782.4  7/9/2018 - Present        Elevated AST (SGOT) ICD-10-CM: R74.0  ICD-9-CM: 790.4  7/9/2018 - Present        Chronic pain (Chronic) ICD-10-CM: Z84.16  ICD-9-CM: 338.29  7/9/2018 - Present    Overview Signed 7/9/2018  1:51 PM by Chidi Campbell MD     low back pain/arthritis      [employer] note9/13/17             Gram-negative bacteremia ICD-10-CM: R78.81  ICD-9-CM: 790.7, 041.85  11/28/2017 - Present        Hypokalemia ICD-10-CM: E87.6  ICD-9-CM: 276.8  10/18/2017 - Present        Gastroparesis ICD-10-CM: K31.84  ICD-9-CM: 536.3  7/31/2017 - Present        Hyperlipidemia ICD-10-CM: E78.5  ICD-9-CM: 272.4  6/26/2017 - Present        Demand ischemia ([facility] ICD-10-CM: I24.8  ICD-9-CM: 411.89  5/15/2017 - Present        ACP (advance care planning) ICD-10-CM: Z71.89  ICD-9-CM: V65.49  11/30/2016 - Present    Overview Signed 11/30/2016 12:23 PM by Halley Driscoll     Patient brought her ACP to visit. It was reviewed by me.               Gastroesophageal reflux disease without esophagitis ICD-10-CM: K21.9  ICD-9-CM: 530.81  10/24/2016 - Present        Chronic midline low back pain without sciatica ICD-10-CM: M54.5, G89.29  ICD-9-CM: 724.2, 338.29  10/24/2016 - Present        Insomnia ICD-10-CM: G47.00  ICD-9-CM: 780.52  7/22/2015 - Present        Abnormal gait ICD-10-CM: R26.9  ICD-9-CM: 781.2  5/14/2015 - Present        Vitamin D deficiency ICD-10-CM: E55.9  ICD-9-CM: 268.9  3/16/2015 - Present        Allergic rhinitis ICD-10-CM: J30.9  ICD-9-CM: 477.9  10/7/2014 - Present        Chronic constipation ICD-10-CM: K59.09  ICD-9-CM: 564.00  10/7/2014 - Present        Hypothyroidism ICD-10-CM: E03.9  ICD-9-CM: 244.9  11/30/2012 - Present        Postmenopausal ICD-10-CM: Z78.0  ICD-9-CM: V49.81  10/17/2012 - Present        RA (rheumatoid arthritis) ([facility] ICD-10-CM: M06.9  ICD-9-CM: 714.0  2/9/2010 - Present        Essential hypertension ICD-10-CM: I10  ICD-9-CM: 401.9  2/9/2010 - Present        RESOLVED: E coli bacteremia ICD-10-CM: R78.81  ICD-9-CM: 790.7, 041.49  5/24/2017 - 6/26/2017        RESOLVED: Oral candidiasis ICD-10-CM: B37.0  ICD-9-CM: 112.0  5/24/2017 - 6/26/2017        RESOLVED: Septic shock ([facility] ICD-10-CM: A41.9, R65.21  ICD-9-CM: 038.9, 785.52, 995.92  5/15/2017 - 6/26/2017        RESOLVED: Septic encephalopathy ICD-10-CM: G93.41  ICD-9-CM: 348.31  5/15/2017 - 5/24/2017        RESOLVED: Partial small bowel obstruction ([facility] ICD-10-CM: K56.600  ICD-9-CM: 560.9  5/15/2017 - 6/26/2017        RESOLVED: Hypokalemia ICD-10-CM: E87.6  ICD-9-CM: 276.8  5/15/2017 - 5/24/2017        RESOLVED: High anion gap metabolic acidosis INU-77-JQ: E87.2  ICD-9-CM: 276.2  5/15/2017 - 5/24/2017        RESOLVED: JEANINE (acute kidney injury) ([facility] ICD-10-CM: N17.9  ICD-9-CM: 584.9  5/15/2017 - 5/24/2017        RESOLVED: Abnormal LFTs ICD-10-CM: R94.5  ICD-9-CM: 790.6  5/15/2017 - 5/24/2017        RESOLVED: Acute gastroenteritis ICD-10-CM: K52.9  ICD-9-CM: 558.9  5/15/2017 - 5/24/2017        RESOLVED: Elevated BP ICD-10-CM: HGF4404  ICD-9-CM: Marry Bruno  9/21/2016 - 10/24/2016        RESOLVED: Thalamic hemorrhage ([facility] ICD-10-CM: I61.0  ICD-9-CM: 453  12/29/2015 - 10/24/2016        RESOLVED: TIA (transient ischemic attack) ICD-10-CM: G45.9  ICD-9-CM: 435.9  12/23/2015 - 10/24/2016        RESOLVED: Chest pain ICD-10-CM: R07.9  ICD-9-CM: 786.50  12/22/2015 - 10/24/2016        RESOLVED: Discitis of lumbar region ICD-10-CM: M46.46  ICD-9-CM: 722.93  12/9/2015 - 10/24/2016        RESOLVED: Left-sided low back pain with left-sided sciatica ICD-10-CM: M54.42  ICD-9-CM: 724.3  12/9/2015 - 10/24/2016        RESOLVED: Back pain ICD-10-CM: M54.9  ICD-9-CM: 724.5  8/23/2015 - 10/24/2016        RESOLVED: Hypotension ICD-10-CM: I95.9  ICD-9-CM: 458.9  8/23/2015 - 9/21/2016        RESOLVED: Glossitis ICD-10-CM: K14.0  ICD-9-CM: 529.0  7/22/2015 - 10/24/2016        RESOLVED: Prediabetes ICD-10-CM: R73.03  ICD-9-CM: 790.29  7/22/2015 - 10/19/2017        RESOLVED: Sore throat ICD-10-CM: J02.9  ICD-9-CM: 498  3/16/2015 - 7/22/2015        RESOLVED: Obesity, Class II, BMI 35-39.9 ICD-10-CM: E66.9  ICD-9-CM: 278.00  1/21/2015 - 10/24/2016        RESOLVED: Tinea manus ICD-10-CM: B35.2  ICD-9-CM: 110.2  10/7/2014 - 10/18/2017        RESOLVED: LLQ abdominal pain ICD-10-CM: R10.32  ICD-9-CM: 789.04  10/7/2014 - 1/21/2015        RESOLVED: Obesity, unspecified ICD-10-CM: E66.9  ICD-9-CM: 278.00 12/10/2010 - 1/21/2015              DISCHARGE MEDICATIONS:  Current Discharge Medication List      START taking these medications    Details   doxycycline (VIBRA-TABS) 100 mg tablet Take 1 Tab by mouth every twelve (12) hours for 4 days. Qty: 8 Tab, Refills: 0         CONTINUE these medications which have CHANGED    Details   predniSONE (DELTASONE) 20 mg tablet Take 1 Tab by mouth daily (with breakfast) for 3 days. Qty: 3 Tab, Refills: 0      atorvastatin (LIPITOR) 20 mg tablet Take 1 Tab by mouth nightly. Qty: 90 Tab, Refills: 3         CONTINUE these medications which have NOT CHANGED    Details   baclofen (LIORESAL) 10 mg tablet Take 10 mg by mouth nightly.      gabapentin (NEURONTIN) 100 mg capsule Take 100 mg by mouth nightly. vit B Cmplx 3-FA-Vit C-Biotin (NEPHRO ROMAINE RX) 1- mg-mg-mcg tablet Take 1 Tab by mouth daily. carvedilol (COREG) 3.125 mg tablet TAKE 1 TABLET TWICE A DAY  Qty: 180 Tab, Refills: 0      pantoprazole (PROTONIX) 40 mg tablet TAKE 1 TABLET DAILY FOR    GASTROENTERITIS  Qty: 90 Tab, Refills: 0      potassium chloride (K-DUR, KLOR-CON) 20 mEq tablet Take 1 Tab by mouth daily. Qty: 90 Tab, Refills: 0    Associated Diagnoses: Hypokalemia      levothyroxine (SYNTHROID) 50 mcg tablet TAKE 1 TABLET DAILY  Qty: 90 Tab, Refills: 3    Associated Diagnoses: Acquired hypothyroidism      hydroCHLOROthiazide (HYDRODIURIL) 25 mg tablet TAKE 1 TABLET DAILY FOR    EDEMA AND HYPERTENSION  Qty: 90 Tab, Refills: 3    Associated Diagnoses: Essential hypertension with goal blood pressure less than 130/85      montelukast (SINGULAIR) 10 mg tablet TAKE 1 TABLET DAILY  Qty: 90 Tab, Refills: 3      nystatin (MYCOSTATIN) topical cream Apply  to affected area two (2) times a day. Qty: 30 g, Refills: 0      cholecalciferol (VITAMIN D3) 1,000 unit tablet Take 1,000 Units by mouth two (2) times a day. multivitamin (ONE A DAY) tablet Take 1 Tab by mouth daily.       azaTHIOprine (IMURAN) 50 mg tablet Take 150 mg by mouth daily. aspirin 81 mg tablet Take 81 mg by mouth daily. vitamin E (AQUA GEMS) 400 unit capsule Take 400 Units by mouth two (2) times a day. STOP taking these medications       calcium-cholecalciferol, d3, 600-125 mg-unit tab Comments:   Reason for Stopping:         VITAMIN B COMPLEX PO Comments:   Reason for Stopping:               Greater than 40 minutes were spent with the patient on counseling and coordination of care    Signed:    Reyes Blunt, MD  7/12/2018  10:04 AM

## 2018-07-12 NOTE — PROGRESS NOTES
Care Management Interventions  PCP Verified by CM: Yes  Last Visit to PCP: 01/09/18  Palliative Care Criteria Met (RRAT>21 & CHF Dx)?: No  Mode of Transport at Discharge: Other (see comment) (son)  Transition of Care Consult (CM Consult): Discharge Planning  MyChart Signup: No  Discharge Durable Medical Equipment: No  Health Maintenance Reviewed: Yes  Physical Therapy Consult: Yes  Occupational Therapy Consult: No  Speech Therapy Consult: No  Current Support Network: Lives Alone  Confirm Follow Up Transport: Family (TBD)  Plan discussed with Pt/Family/Caregiver: Yes  Freedom of Choice Offered: Yes  [place] Resource Information Provided?: No  Discharge Location  Discharge Placement: Rehab hospital/unit acute ([facility])    CM reviewed chart and received discharge orders. [facility] has a bed available today and is willing to accept pt. Pt is in agreement with discharge plan. Pt's son plans to transport patient. CM faxed AVS and discharge summary to [facility]. Envelope containing MARs, Kardex, AVS, discharge summary, advance directive, and EMTALAs will be sent with pt. Nurse will thomas report.   Caitlin Gerber, BSW, ACM

## 2018-07-12 NOTE — PROGRESS NOTES
[address] July 12, 2018 RE: Michael Miranda To Whom It May Concern, This is to certify that Michael Miranda has been hospitalized at [facility] from 7/9-7/12,2018 for acute medical illnesses. Please feel free to contact my office if you have any questions or concerns. Thank you for your assistance in this matter. Sincerely, Neelima Fajardo MD 
 
[address] [place] [address] ,2nd [facility], [address] [place] [address] Ave EJZDE: Fax:[phone]

## 2018-07-12 NOTE — PROGRESS NOTES
Bedside shift change report given to Aj Alexander RN (oncoming nurse) by Juhi Tobias Rn (offgoing nurse). Report included the following information SBAR, MAR and Recent Results.

## 2018-07-12 NOTE — PROGRESS NOTES
I have reviewed discharge instructions with the patient and her son. They verbalized understanding. Patient going to [facility] for Rehab. Report called to [facility] to [facility],  She  verbalized understanding.

## 2018-07-23 ENCOUNTER — TELEPHONE (OUTPATIENT)
Dept: FAMILY MEDICINE CLINIC | Age: 73
End: 2018-07-23

## 2018-07-23 ENCOUNTER — PATIENT OUTREACH (OUTPATIENT)
Dept: FAMILY MEDICINE CLINIC | Age: 73
End: 2018-07-23

## 2018-07-23 NOTE — Clinical Note
I left a message for [address] requesting requests be faxed to office. Pt has appt 7/31/18 w/ you.

## 2018-07-23 NOTE — PROGRESS NOTES
NN F/U Progress Note    - PCP notified NN [facility] advised of pt's d/c from [facility] on 7/21/18. Pt opened to [facility] 7/22/18.    - NN spoke w/ [facility] admit nurse Haven Castellanos. Reported pt has meds @ home. Pt no longer taking Oxycodone for pain. Now taking Tylenol w/ adequate relief. Pt managing independently. PT/OT to do evals. - Spoke w/ pt. NN introduction & explanation of role. - DUNIA assessment completed. Pt confirmed PCP DUNIA f/u on 7/31/18. Pt reported [facility] PT visited today. - ACP on file. Reviewed & current. - Pt reported has GI f/u w/ Dr Yin Machado on 8/23/18 re removal stents from bile duct. - Pt reported plans to move to [place] in November to be closer to son/family. Goals Addressed      Supportive resources in place to maintain patient in the community (ie. Home Health, DME equipment, refer to, medication assistant plan, etc.)                  7/23/18- pt open w/ [facility]. SN/PT visited already. Pt lives alone. Safety tips reviewed. Pt reported carries cell phone @ all times. Makes sure phone charged @ all times. Has grab bars in bathroom. Reported independent w/ ADLs & IADLs except driving. Uses quad cane in apartment & rollator outside for mobility. \"Just move slow but I manage\". Uses [address] [place] [address] for transportation to medical appts. Plan- pt to follow HEP as per [facility] PT. Pt to practice safety tips. Pt to keep phone charged @ all times. Pt to call PCP office as needed w/ questions/concerns. Next NN  F/u ~ 10 days-ID.  Transitions of Care- collaboration & care coordination to prevent complications post hospitalization. 7/23/18- spoke w/ [facility] admit nurse Haven Castellanos. [facility]22/18. Reviewed pt's copy of IRF d/c papers. Med reconciliation done. All meds in home. Pt no longer taking Oxycodone for pain. Taking Tylenol & effective. BP was 100/60 on 7/22/18 visit. Pt asymptomatic. Mobility w/ quad cane in apartment. Outside uses Rollator. PT/OT do do evals.  Pt reportedly to move to [place] in November when apartment lease up. Wants to be closer to son. NN contact information provided. [facility] nurse Dom Mccord to pass on to team of nurses who will be visiting pt regularly. Spoke w/ pt. Pt reported \"feeling fine. Back a little sore. Off Oxycodone. Can think more clearly. Mind not fogged up. Not jittery. Taking Tylenol & it works. Know what the limit is per day\". Pt reported [facility] PT visited today. Walked outside. Confirmed PCP f/u on 7/31/18. Pt reported appt @ 9 AM. NN advised appt @ 10 AM. Pt reported IRF paper states 9 AM. Pt reported would call PCP office on 7/24/18 to confirm time. Pt reported has GI f/u w/ DR Ellison Mess on 8/23/18 to have \"stents removed from bile duct\". Patient advised providers on call 24 hours a day / 7 days a week (M-F 5 PM to 8 AM, and from Friday 5 PM until Monday 8 AM for the weekend) should the patient have questions or concerns. Plan- pt to attend appts as scheduled. Pt to bring papers from IRF to PCP f/u. Pt to continue w [facility] SN/PT. Follow HEP as per PT. Pt to call office w/ any concerns. NN to collaborate w/ pt, PCP, [facility] staff & other Care Team Members as needed for care coordination. Next NN f/u ~ 10 days-ID.

## 2018-07-31 ENCOUNTER — OFFICE VISIT (OUTPATIENT)
Dept: FAMILY MEDICINE CLINIC | Age: 73
End: 2018-07-31

## 2018-07-31 VITALS
SYSTOLIC BLOOD PRESSURE: 130 MMHG | RESPIRATION RATE: 20 BRPM | WEIGHT: 191.9 LBS | DIASTOLIC BLOOD PRESSURE: 69 MMHG | BODY MASS INDEX: 31.97 KG/M2 | HEIGHT: 65 IN | OXYGEN SATURATION: 98 % | TEMPERATURE: 98 F | HEART RATE: 69 BPM

## 2018-07-31 DIAGNOSIS — M06.9 RHEUMATOID ARTHRITIS, INVOLVING UNSPECIFIED SITE, UNSPECIFIED RHEUMATOID FACTOR PRESENCE: ICD-10-CM

## 2018-07-31 DIAGNOSIS — G47.00 INSOMNIA, UNSPECIFIED TYPE: ICD-10-CM
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
## 2018-07-31 DIAGNOSIS — T79.6XXA TRAUMATIC RHABDOMYOLYSIS, INITIAL ENCOUNTER (HCC): Primary | ICD-10-CM

## 2018-07-31 PROBLEM — I24.8 DEMAND ISCHEMIA (HCC): Status: RESOLVED | Noted: 2017-05-15 | Resolved: 2018-07-31

## 2018-07-31 PROBLEM — R78.81 GRAM-NEGATIVE BACTEREMIA: Status: RESOLVED | Noted: 2017-11-28 | Resolved: 2018-07-31

## 2018-07-31 PROBLEM — L29.8 PRURITIC ERYTHEMATOUS RASH: Status: RESOLVED | Noted: 2018-07-09 | Resolved: 2018-07-31

## 2018-07-31 PROBLEM — R22.0 FACIAL MASS: Status: RESOLVED | Noted: 2018-07-09 | Resolved: 2018-07-31

## 2018-07-31 RX ORDER — POTASSIUM CHLORIDE 40 MEQ/15ML
SOLUTION ORAL DAILY
COMMUNITY

## 2018-07-31 RX ORDER — LORAZEPAM 0.5 MG/1
0.5 TABLET ORAL
Qty: 90 TAB | Refills: 0 | Status: SHIPPED | OUTPATIENT
Start: 2018-07-31

## 2018-07-31 NOTE — PROGRESS NOTES
Here for SNF discharge f/u. Getting home tx daily. Has a walker. Went over 3 miles past Saturday. Went to dentist.  Has 3 lower implants. Diff chewing food. Given lorazepam 0.5 mg at SNF for sleep with benefit. Needs refill. Off oxycodone. Taking tylenol 500 mg q 6-8 hours as needed. Takes 1/2 tab of baclofen in AM and Neurontin in at night. Discussed new Shingles vaccine and pt will check with rheum as pt had Shingles in her 60's. Visit Vitals  /69 (BP 1 Location: Left arm, BP Patient Position: Sitting)  Pulse 69  Temp 98 °F (36.7 °C) (Oral)  Resp 20  
 Ht 5' 5\" (1.651 m)  Wt 191 lb 14.4 oz (87 kg)  SpO2 98%  BMI 31.93 kg/m2 Patient alert and cooperative. Reviewed above. Assessment: 1. Here for follow up post hospitalization for fall with rhabdomyolysis. Plan: 1. Needs refill of med given at the hospital and skilled nursing facility for insomnia. Controlled med, so agreement signed.  reviewed. 2. Patient previously on Oxycodone, but she has stopped this and replaced it with Tylenol 500 mg every six hours. 3. Return in three months for annual visit. 4. Follow otherwise here prn. Diagnoses and all orders for this visit: 1. Traumatic rhabdomyolysis, initial encounter ([facility] 2. Fall, initial encounter 3. Rheumatoid arthritis, involving unspecified site, unspecified rheumatoid factor presence ([facility] Assessment & Plan: This condition is managed by Specialist. 
Lab Results Component Value Date/Time WBC 8.1 07/10/2018 01:50 AM  
 HGB 11.1 07/10/2018 01:50 AM  
 HCT 32.4 07/10/2018 01:50 AM  
 PLATELET 525 81/30/6395 01:50 AM  
 Creatinine 0.68 07/11/2018 12:34 AM  
 BUN 11 07/11/2018 12:34 AM  
 Potassium 4.2 07/11/2018 12:34 AM  
 INR 1.2 11/27/2017 02:43 AM  
 Prothrombin time 12.0 11/27/2017 02:43 AM  
 
 
 
4. Insomnia, unspecified type -     LORazepam (ATIVAN) 0.5 mg tablet; Take 1 Tab by mouth nightly as needed for Anxiety.  Max Daily Amount: 0.5 mg.

## 2018-07-31 NOTE — PROGRESS NOTES
Chief Complaint Patient presents with  Fall Pt had a fall on 7/8/2019 and was admitted to [facility] on 7/9/2018. 1. Have you been to the ER, urgent care clinic since your last visit? Hospitalized since your last visit? Yes When: 7/9/2018 [facility] for a fall 2. Have you seen or consulted any other health care providers outside of the [address] [place] [address] since your last visit? Include any pap smears or colon screening.  No

## 2018-07-31 NOTE — LETTER
Name:.Rachna Laughlin WXW:[birth year] MR #:[id number] Provider Lucila Esposito MD  
*[id number]* BSMG-491 (5/16) Page 1 of 5 Initial [facility] CONTROLLED SUBSTANCE AGREEMENT I may be prescribed medications that are controlled substances as part  of my treatment plan for management of my medical condition(s). The goal of my treatment plan is to maintain and/or improve my health and wellbeing. Because controlled substances have an increased risk of abuse or harm, continual re-evaluation is needed determine if the goals of my treatment plan are being met for my safety and the safety of others. Cloria Crigler  am entering into this Controlled Substance Agreement with my provider, Salvador Mcallister MD at [facility] . I understand that successful treatment requires mutual trust and honesty between me and my provider. I understand that there are state and federal laws and regulations which apply to the medications that my provider may prescribe that must be followed. I understand there are risks and benefits ts of taking the medicines that my provider may prescribe. I understand and agree that following this Agreement is necessary in continuing my provider-patient relationship and success of my treatment plan. As a part of my treatment plan, I agree to the following: COMMUNICATION: 
 
1. I will communicate fully with my provider about my medical condition(s), including the effect on my daily life and how well my medications are helping. I will tell my provider all of the medications that I take for any reason, including medications I receive from another health care provider, and will notify my provider about all issues, problems or concerns, including any side effects, which may be related to my medications. I understand that this information allows my provider to adjust my treatment plan to help manage my medical condition.  I understand that this information will become part of my permanent medical record. 2. I will notify my provider if I have a history of alcohol/drug misuse/addiction or if I have had treatment for alcohol/drug addiction in the past, or if I have a new problem with or concern about alcohol/drug use/addiction, because this increases the likelihood of high risk behaviors and may lead to serious medical conditions. 3. Females Only: I will notify my provider if I am or become pregnant, or if I intend to become pregnant, or if I intend to breastfeed. I understand that communication of these issues with my provider is important, due to possible effects my medication could have on an unborn fetus or breastfeeding child. Name:. Cyn Hayden EAF:[birth year] MR #:[id number] Provider Aura Garrett MD  
*QWQH-071* BSMG-491 (5/16) Page 2 of 5 Initial [employer] MISUSE OF MEDICATIONS / DRUGS: 
 
1. I agree to take all controlled substances as prescribed, and will not misuse or abuse any controlled substances prescribed by my provider. For my safety, I will not increase the amount of medicine I take without first talking with and getting permission from my provider. 2. If I have a medical emergency, another health care provider may prescribe me medication. If I seek emergency treatment, I will notify my provider within seventy-two (72) hours. 3. I understand that my provider may discuss my use and/or possible misuse/abuse of controlled substances and alcohol, as appropriate, with any health care provider involved in my care, pharmacist or legal authority. ILLEGAL DRUGS: 
 
1. I will not use illegal drugs of any kind, including but not limited to marijuana, heroin, cocaine, or any prescription drug which is not prescribed to me. DRUG DIVERSION / PRESCRIPTION FRAUD: 
 
1. I will not share, sell, trade, give away, or otherwise misuse my prescriptions or medications. 2. I will not alter any prescriptions provided to me by my provider. SINGLE PROVIDER: 
 
1. I agree that all controlled substances that I take will be prescribed only by my provider (or his/her covering provider) under this Agreement. This agreement does not prevent me from seeking emergency medical treatment or receiving pain management related to a surgery. PROTECTING MEDICATIONS: 
 
1. I am responsible for keeping my prescriptions and medications in a safe and secure place including safeguarding them from loss or theft. I understand that lost, stolen or damaged/destroyed prescriptions or medications will not be replaced. Name:. Wayne Ugalde VNX:[birth year] MR #:[id number] Provider Alma Bowen MD  
*FQQL-442* BSMG-491 (5/16) Page 3 of 5 Initial [employer] PRESCRIPTION RENEWALS/REFILLS: 
 
1. I will follow my controlled substance medication schedule as prescribed by my provider. 2. I understand and agree that I will make any requests for renewals or refills of my prescriptions only at the time of an office visit or during my providers regular office hours subject to the prescription refill requirements of the individual practice. 3. I understand that my provider may not call in prescriptions for controlled substances to my pharmacy. 4. I understand that my provider may adjust or discontinue these medications as deemed appropriate for my medical treatment plan. This Agreement does not guarantee the prescription of controlled medications. 5. I agree that if my medications are adjusted or discontinued, I will properly dispose of any remaining medications. I understand that I will be required to dispose of any remaining controlled medications prior to being provided with any prescriptions for other controlled medications.  
 
 
1. I authorize my provider and my pharmacy to cooperate fully with any local, state, or federal law enforcement agency in the investigation of any possible misuse, sale, or other diversion of my controlled substance prescriptions or medications. RISKS: 
 
 
1. I understand that if I do not adhere to this Agreement in any way, my provider may change my prescriptions, stop prescribing controlled substances or end our provider-patient relationship. 2. If my provider decides to stop prescribing medication, or decides to end our provider-patient relationship,my provider may require that I taper my medications slowly. If necessary, my provider may also provide a prescription for other medications to treat my withdrawal symptoms. UNDERSTANDING THIS AGREEMENT: 
 
I understand that my provider may adjust or stop my prescriptions for controlled substances based on my medical condition and my treatment plan. I understand that this Agreement does not guarantee that I will be prescribed medications or controlled substances. I understand that controlled substances may be just one part 
of my treatment plan.  
 
My initial on each page and my signature below shows that I have read each page of this Agreement, I have had an opportunity to ask questions, and all of my questions have been answered to my satisfaction by my provider. By signing below, I agree to comply with this Agreement, and I understand that if I do not follow the Agreements listed above, my provider may stop 
 
 
 
_________________________________________  Date/Time 7/31/2018 10:34 AM   
             (Patient Signature)

## 2018-07-31 NOTE — MR AVS SNAPSHOT
[address] 
[address] [place] [address] 
[phone] Patient: Jose Antonio Cross MRN:  [id number] Visit Information Date & Time Provider Department Dept. Phone Encounter #  
 7/31/2018 10:00 AM Cely Voss MD [facility] [phone] [id number] Follow-up Instructions Return in about 3 months (around 10/31/2018). Follow-up and Disposition History Upcoming Health Maintenance Date Due  
 GLAUCOMA SCREENING Q2Y 5/13/2018 BREAST CANCER SCRN MAMMOGRAM 8/31/2018* Influenza Age 5 to Adult 8/1/2018 COLONOSCOPY 8/1/2021 DTaP/Tdap/Td series (2 - Td) 11/13/2023 *Topic was postponed. The date shown is not the original due date. Allergies as of 7/31/2018  Review Complete On: 7/31/2018 By: Cely Voss MD  
  
 Severity Noted Reaction Type Reactions Pcn [Penicillins] High 02/09/2010    Swelling Has tolerated Cefepime Tramadol High 02/09/2010    Nausea and Vomiting SEVERE If taken w/o food Proventil [Albuterol Sulfate] Medium 03/13/2014   Systemic Hives Ace Inhibitors  12/29/2013    Angioedema Albuterol  07/10/2015    Swelling  
 swelling Ambien [Zolpidem]  02/09/2010    Other (comments) Nightmares and memory disturbance Chocolate [Cocoa]  07/11/2018    Diarrhea Ciprofloxacin  08/17/2012    Other (comments) Sore throat and trouble swallowing Clindamycin  07/09/2018    Hives Lisinopril  12/30/2013    Swelling Oxaprozin  05/14/2015    Nausea and Vomiting  
 severe stomach upset Sulfadiazine  05/14/2015    Swelling  
 swelling Trazodone  02/09/2010    Other (comments) Vivid dreams and felt disoriented Current Immunizations  Reviewed on 12/26/2015 Name Date Influenza High Dose Vaccine PF 10/18/2017, 9/21/2016  2:13 PM, 10/12/2015, 10/7/2014 Influenza Vaccine 10/15/2013 Influenza Vaccine Split 10/17/2012, 1/5/2012, 12/6/2010  1:39 PM  
 Influenza Vaccine Whole 12/1/2008 Pneumococcal Conjugate (PCV-13) 7/22/2015 TD Vaccine 11/10/2003 Tdap 11/13/2013 ZZZ-RETIRED (DO NOT USE) Pneumococcal Vaccine (Unspecified Type) 10/17/2012, 11/3/2006 Not reviewed this visit You Were Diagnosed With   
  
 Codes Comments Traumatic rhabdomyolysis, initial encounter ([facility]    -  Primary ICD-10-CM: T79. Atilio Orozco ICD-9-CM: 119. 6 Fall, initial encounter     ICD-10-CM: W19. Elvin Davey ICD-9-CM: E888.9 Rheumatoid arthritis, involving unspecified site, unspecified rheumatoid factor presence ([facility]     ICD-10-CM: M06.9 ICD-9-CM: 714.0 Insomnia, unspecified type     ICD-10-CM: G47.00 ICD-9-CM: 780.52 Vitals BP Pulse Temp Resp Height(growth percentile) Weight(growth percentile) 130/69 (BP 1 Location: Left arm, BP Patient Position: Sitting) 69 98 °F (36.7 °C) (Oral) 20 5' 5\" (1.651 m) 191 lb 14.4 oz (87 kg) SpO2 BMI OB Status Smoking Status 98% 31.93 kg/m2 Postmenopausal Never Smoker BMI and BSA Data Body Mass Index Body Surface Area  
 31.93 kg/m 2 2 m 2 Preferred Pharmacy Pharmacy Name [facility] [address] [place] [address] [phone] Your Updated Medication List  
  
   
This list is accurate as of 7/31/18 10:46 AM.  Always use your most recent med list.  
  
  
  
  
 aspirin 81 mg tablet Take 81 mg by mouth daily. atorvastatin 20 mg tablet Commonly known as:  LIPITOR Take 1 Tab by mouth nightly. azaTHIOprine 50 mg tablet Commonly known as:  [employer] Take 150 mg by mouth daily. baclofen 10 mg tablet Commonly known as:  LIORESAL Take 10 mg by mouth nightly. carvedilol 3.125 mg tablet Commonly known as:  COREG  
TAKE 1 TABLET TWICE A DAY  
  
 gabapentin 100 mg capsule Commonly known as:  NEURONTIN Take 100 mg by mouth nightly. hydroCHLOROthiazide 25 mg tablet Commonly known as:  HYDRODIURIL  
TAKE 1 TABLET DAILY FOR    EDEMA AND HYPERTENSION  
  
 levothyroxine 50 mcg tablet Commonly known as:  SYNTHROID  
TAKE 1 TABLET DAILY LORazepam 0.5 mg tablet Commonly known as:  ATIVAN Take 1 Tab by mouth nightly as needed for Anxiety. Max Daily Amount: 0.5 mg.  
  
 montelukast 10 mg tablet Commonly known as:  SINGULAIR  
TAKE 1 TABLET DAILY  
  
 multivitamin tablet Commonly known as:  ONE A DAY Take 1 Tab by mouth daily. nystatin topical cream  
Commonly known as:  MYCOSTATIN Apply  to affected area two (2) times a day. pantoprazole 40 mg tablet Commonly known as:  PROTONIX  
TAKE 1 TABLET DAILY FOR    GASTROENTERITIS  
  
 potassium chloride 40 mEq/15 mL Liqd Commonly known as:  KAON 20% Take  by mouth daily. VITAMIN D3 1,000 unit tablet Generic drug:  cholecalciferol Take 1,000 Units by mouth two (2) times a day. vitamin E 400 unit capsule Commonly known as:  [address] Take 400 Units by mouth two (2) times a day. Prescriptions Printed Refills LORazepam (ATIVAN) 0.5 mg tablet 0 Sig: Take 1 Tab by mouth nightly as needed for Anxiety. Max Daily Amount: 0.5 mg.  
 Class: Print Route: Oral  
  
Follow-up Instructions Return in about 3 months (around 10/31/2018). Introducing [facility]! [employer] introduces "[employer] patient portal. Now you can access parts of your medical record, email your doctor's office, and request medication refills online. 1. In your internet browser, go to [email or web address] 2. Click on the First Time User? Click Here link in the Sign In box. You will see the New Member Sign Up page. 3. Enter your "[employer] Access Code exactly as it appears below. You will not need to use this code after youve completed the sign-up process.  If you do not sign up before the expiration date, you must request a new code. · [employer] Access Code: [email or web address] Expires: 10/5/2018  1:03 PM 
 
4. Enter the last four digits of your Social Security Number (xxxx) and Date of Birth (mm/dd/yyyy) as indicated and click Submit. You will be taken to the next sign-up page. 5. Create a [employer] ID. This will be your [employer] login ID and cannot be changed, so think of one that is secure and easy to remember. 6. Create a [employer] password. You can change your password at any time. 7. Enter your Password Reset Question and Answer. This can be used at a later time if you forget your password. 8. Enter your e-mail address. You will receive e-mail notification when new information is available in [address] 9. Click Sign Up. You can now view and download portions of your medical record. 10. Click the Download Summary menu link to download a portable copy of your medical information. If you have questions, please visit the Frequently Asked Questions section of the [employer] website. Remember, [employer] is NOT to be used for urgent needs. For medical emergencies, dial 911. Now available from your iPhone and Android! Please provide this summary of care documentation to your next provider. Your primary care clinician is listed as [id number] If you have any questions after today's visit, please call [phone].

## 2018-07-31 NOTE — ASSESSMENT & PLAN NOTE
This condition is managed by Specialist. 
Lab Results Component Value Date/Time  WBC 8.1 07/10/2018 01:50 AM  
 HGB 11.1 07/10/2018 01:50 AM  
 HCT 32.4 07/10/2018 01:50 AM  
 PLATELET 933 19/32/1812 01:50 AM  
 Creatinine 0.68 07/11/2018 12:34 AM  
 BUN 11 07/11/2018 12:34 AM  
 Potassium 4.2 07/11/2018 12:34 AM  
 INR 1.2 11/27/2017 02:43 AM  
 Prothrombin time 12.0 11/27/2017 02:43 AM

## 2018-08-10 NOTE — TELEPHONE ENCOUNTER
----- Message from Keke Beaver sent at 8/10/2018 10:12 AM EDT -----  Regarding: Dr. Jocelyn Garcia   Pt is requesting that Acyclovir 400 mg be called into [facility] Delivery on file. She stated if she doesn't received this med, she will start breaking out in sores on her arms. Pt stated that she doesn't have the $45 Copay to visit another doctor for a refill. Best contact [phone].

## 2018-08-11 NOTE — TELEPHONE ENCOUNTER
PCP: Logan Mederos MD    Last appt: 7/31/2018  Future Appointments  Date Time Provider Tiara Juarez   10/30/2018 10:00 AM Logan Mederos MD [facility]       Requested Prescriptions     Pending Prescriptions Disp Refills    pantoprazole (PROTONIX) 40 mg tablet [Pharmacy Med Name: PANTOPRAZOLE TAB 40MG DR] 90 Tab 0     Sig: TAKE 1 TABLET DAILY FOR    GASTROENTERITIS       Prior labs and Blood pressures:  BP Readings from Last 3 Encounters:   07/31/18 130/69   07/12/18 (!) 154/94   07/07/18 139/60     Lab Results   Component Value Date/Time    Sodium 138 07/11/2018 12:34 AM    Potassium 4.2 07/11/2018 12:34 AM    Chloride 108 07/11/2018 12:34 AM    CO2 20 (L) 07/11/2018 12:34 AM    Anion gap 10 07/11/2018 12:34 AM    Glucose 173 (H) 07/11/2018 12:34 AM    BUN 11 07/11/2018 12:34 AM    Creatinine 0.68 07/11/2018 12:34 AM    BUN/Creatinine ratio 16 07/11/2018 12:34 AM    GFR est AA >60 07/11/2018 12:34 AM    GFR est non-AA >60 07/11/2018 12:34 AM    Calcium 8.3 (L) 07/11/2018 12:34 AM     Lab Results   Component Value Date/Time    Hemoglobin A1c 5.6 10/18/2017 12:00 PM    Hemoglobin A1c (POC) 6.0 03/16/2015 04:00 AM    Hemoglobin A1c, External 6.3 07/07/2015     Lab Results   Component Value Date/Time    Cholesterol, total 97 (L) 06/26/2017 01:47 PM    HDL Cholesterol 37 (L) 06/26/2017 01:47 PM    LDL, calculated 43 06/26/2017 01:47 PM    VLDL, calculated 17 06/26/2017 01:47 PM    Triglyceride 86 06/26/2017 01:47 PM    CHOL/HDL Ratio 4.5 12/23/2015 01:45 AM     Lab Results   Component Value Date/Time    Vitamin D 25-Hydroxy 38.2 07/28/2011 10:39 AM    VITAMIN D, 25-HYDROXY 76.0 06/26/2017 01:47 PM       Lab Results   Component Value Date/Time    TSH 1.060 06/26/2017 01:47 PM

## 2018-08-13 RX ORDER — PANTOPRAZOLE SODIUM 40 MG/1
TABLET, DELAYED RELEASE ORAL
Qty: 90 TAB | Refills: 3 | Status: SHIPPED | OUTPATIENT
Start: 2018-08-13

## 2018-08-14 NOTE — TELEPHONE ENCOUNTER
Writer called pt back regarding acyclovir rx request. Fredy Garcia spoke with pt. Pt verified . Pt stated that she cannot see Dermatologist as of right now; usual MD she sees is no longer at the office and stated that she doesn't have $45 co-pay to get into the office right now; has an appointment in September to see someone. Stated that she is taking 1 tab BID PO, when outbreak occurs, 1 tab TID PO. Wants to know if Dr. Dean Mauro will fill it for her this one time until she gets into the doctor's office. Writer verbalized understanding and stated a message would be put in and when Dr. Dean Mauro reviews and gets back to the nurses, a phone call would be made to the pt to update her. Pt verbalized understanding and appreciation.

## 2018-08-14 NOTE — TELEPHONE ENCOUNTER
----- Message from Jeanine Patrick sent at 8/14/2018  9:31 AM EDT -----  Regarding: Dr. Leroy Martinez  Pt is requesting a call regarding the status for Acyclovir being called into [facility]. Pt stated that she only have a week left. She stated she didn't receive 10 refills. Best contact [phone].

## 2018-08-15 ENCOUNTER — TELEPHONE (OUTPATIENT)
Dept: FAMILY MEDICINE CLINIC | Age: 73
End: 2018-08-15

## 2018-08-15 RX ORDER — ACYCLOVIR 400 MG/1
400 TABLET ORAL 2 TIMES DAILY
Qty: 90 TAB | Refills: 0 | Status: SHIPPED | OUTPATIENT
Start: 2018-08-15 | End: 2018-09-06 | Stop reason: SDUPTHER

## 2018-08-24 LAB — CREATININE, EXTERNAL: 0.63

## 2018-09-26 ENCOUNTER — ANESTHESIA EVENT (OUTPATIENT)
Dept: ENDOSCOPY | Age: 73
End: 2018-09-26
Payer: MEDICARE

## 2018-09-26 NOTE — ANESTHESIA PREPROCEDURE EVALUATION
Anesthetic History No history of anesthetic complications Review of Systems / Medical History Patient summary reviewed, nursing notes reviewed and pertinent labs reviewed Pulmonary Within defined limits Neuro/Psych Within defined limits Cardiovascular Hypertension: well controlled Past MI and CAD 
 
 
  
GI/Hepatic/Renal 
  
GERD: well controlled Endo/Other Hypothyroidism: well controlled Arthritis Other Findings Physical Exam 
 
Airway Mallampati: II 
TM Distance: > 6 cm Neck ROM: normal range of motion Mouth opening: Normal 
 
 Cardiovascular Regular rate and rhythm,  S1 and S2 normal,  no murmur, click, rub, or gallop Dental 
 
Dentition: Full upper dentures and Lower partial plate Pulmonary Breath sounds clear to auscultation Abdominal 
GI exam deferred Other Findings Anesthetic Plan ASA: 3 Anesthesia type: general 
 
 
 
 
Induction: Intravenous Anesthetic plan and risks discussed with: Patient

## 2018-09-27 ENCOUNTER — ANESTHESIA (OUTPATIENT)
Dept: ENDOSCOPY | Age: 73
End: 2018-09-27
Payer: MEDICARE

## 2018-09-27 ENCOUNTER — HOSPITAL ENCOUNTER (OUTPATIENT)
Age: 73
Setting detail: OBSERVATION
Discharge: HOME | End: 2018-09-28
Attending: INTERNAL MEDICINE | Admitting: FAMILY MEDICINE
Payer: MEDICARE

## 2018-09-27 ENCOUNTER — APPOINTMENT (OUTPATIENT)
Dept: GENERAL RADIOLOGY | Age: 73
End: 2018-09-27
Attending: INTERNAL MEDICINE
Payer: MEDICARE

## 2018-09-27 PROBLEM — Z98.890 S/P ERCP: Status: ACTIVE | Noted: 2018-09-27

## 2018-09-27 PROBLEM — K80.50 BILE DUCT STONE: Status: ACTIVE | Noted: 2018-09-27

## 2018-09-27 PROCEDURE — 74011250636 HC RX REV CODE- 250/636

## 2018-09-27 PROCEDURE — 77030009038 HC CATH BILI STN RTVR BSC -C: Performed by: INTERNAL MEDICINE

## 2018-09-27 PROCEDURE — 74011636320 HC RX REV CODE- 636/320

## 2018-09-27 PROCEDURE — 76060000033 HC ANESTHESIA 1 TO 1.5 HR: Performed by: INTERNAL MEDICINE

## 2018-09-27 PROCEDURE — 94760 N-INVAS EAR/PLS OXIMETRY 1: CPT

## 2018-09-27 PROCEDURE — 99218 HC RM OBSERVATION: CPT

## 2018-09-27 PROCEDURE — 77030007288 HC DEV LOK BILI BSC -A: Performed by: INTERNAL MEDICINE

## 2018-09-27 PROCEDURE — 74011250637 HC RX REV CODE- 250/637: Performed by: FAMILY MEDICINE

## 2018-09-27 PROCEDURE — 74011000258 HC RX REV CODE- 258: Performed by: INTERNAL MEDICINE

## 2018-09-27 PROCEDURE — 77030013992 HC SNR POLYP ENDOSC BSC -B: Performed by: INTERNAL MEDICINE

## 2018-09-27 PROCEDURE — 74011250636 HC RX REV CODE- 250/636: Performed by: INTERNAL MEDICINE

## 2018-09-27 PROCEDURE — 76040000008: Performed by: INTERNAL MEDICINE

## 2018-09-27 PROCEDURE — 74011000250 HC RX REV CODE- 250

## 2018-09-27 PROCEDURE — 77030012596 HC SPHNTOM BILI BSC -E: Performed by: INTERNAL MEDICINE

## 2018-09-27 PROCEDURE — 74011000258 HC RX REV CODE- 258

## 2018-09-27 PROCEDURE — 74011250637 HC RX REV CODE- 250/637: Performed by: INTERNAL MEDICINE

## 2018-09-27 PROCEDURE — 77030008684 HC TU ET CUF COVD -B: Performed by: ANESTHESIOLOGY

## 2018-09-27 PROCEDURE — 77030026438 HC STYL ET INTUB CARD -A: Performed by: ANESTHESIOLOGY

## 2018-09-27 PROCEDURE — 74011250637 HC RX REV CODE- 250/637: Performed by: HOSPITALIST

## 2018-09-27 RX ORDER — MELATONIN
1000 2 TIMES DAILY
Status: DISCONTINUED | OUTPATIENT
Start: 2018-09-27 | End: 2018-09-28 | Stop reason: HOSPADM

## 2018-09-27 RX ORDER — SODIUM CHLORIDE 0.9 % (FLUSH) 0.9 %
5-10 SYRINGE (ML) INJECTION EVERY 8 HOURS
Status: DISCONTINUED | OUTPATIENT
Start: 2018-09-27 | End: 2018-09-28 | Stop reason: HOSPADM

## 2018-09-27 RX ORDER — BACLOFEN 10 MG/1
5 TABLET ORAL
Status: DISCONTINUED | OUTPATIENT
Start: 2018-09-27 | End: 2018-09-28 | Stop reason: HOSPADM

## 2018-09-27 RX ORDER — HYDROCHLOROTHIAZIDE 25 MG/1
25 TABLET ORAL DAILY
Status: DISCONTINUED | OUTPATIENT
Start: 2018-09-28 | End: 2018-09-28 | Stop reason: HOSPADM

## 2018-09-27 RX ORDER — LIDOCAINE HYDROCHLORIDE 20 MG/ML
INJECTION, SOLUTION EPIDURAL; INFILTRATION; INTRACAUDAL; PERINEURAL AS NEEDED
Status: DISCONTINUED | OUTPATIENT
Start: 2018-09-27 | End: 2018-09-27 | Stop reason: HOSPADM

## 2018-09-27 RX ORDER — FLUMAZENIL 0.1 MG/ML
0.2 INJECTION INTRAVENOUS
Status: DISCONTINUED | OUTPATIENT
Start: 2018-09-27 | End: 2018-09-27 | Stop reason: HOSPADM

## 2018-09-27 RX ORDER — SODIUM CHLORIDE 0.9 % (FLUSH) 0.9 %
5-10 SYRINGE (ML) INJECTION AS NEEDED
Status: DISCONTINUED | OUTPATIENT
Start: 2018-09-27 | End: 2018-09-28 | Stop reason: HOSPADM

## 2018-09-27 RX ORDER — BACLOFEN 10 MG/1
10 TABLET ORAL
Status: DISCONTINUED | OUTPATIENT
Start: 2018-09-27 | End: 2018-09-27

## 2018-09-27 RX ORDER — AZATHIOPRINE 50 MG/1
150 TABLET ORAL DAILY
Status: DISCONTINUED | OUTPATIENT
Start: 2018-09-28 | End: 2018-09-28 | Stop reason: HOSPADM

## 2018-09-27 RX ORDER — DEXTROMETHORPHAN/PSEUDOEPHED 2.5-7.5/.8
1.2 DROPS ORAL
Status: DISCONTINUED | OUTPATIENT
Start: 2018-09-27 | End: 2018-09-27 | Stop reason: HOSPADM

## 2018-09-27 RX ORDER — MORPHINE SULFATE 2 MG/ML
2 INJECTION, SOLUTION INTRAMUSCULAR; INTRAVENOUS
Status: DISCONTINUED | OUTPATIENT
Start: 2018-09-27 | End: 2018-09-28 | Stop reason: HOSPADM

## 2018-09-27 RX ORDER — GABAPENTIN 100 MG/1
100 CAPSULE ORAL
Status: DISCONTINUED | OUTPATIENT
Start: 2018-09-27 | End: 2018-09-28 | Stop reason: HOSPADM

## 2018-09-27 RX ORDER — ROCURONIUM BROMIDE 10 MG/ML
INJECTION, SOLUTION INTRAVENOUS AS NEEDED
Status: DISCONTINUED | OUTPATIENT
Start: 2018-09-27 | End: 2018-09-27 | Stop reason: HOSPADM

## 2018-09-27 RX ORDER — NEOSTIGMINE METHYLSULFATE 1 MG/ML
INJECTION INTRAVENOUS AS NEEDED
Status: DISCONTINUED | OUTPATIENT
Start: 2018-09-27 | End: 2018-09-27

## 2018-09-27 RX ORDER — ONDANSETRON 2 MG/ML
INJECTION INTRAMUSCULAR; INTRAVENOUS AS NEEDED
Status: DISCONTINUED | OUTPATIENT
Start: 2018-09-27 | End: 2018-09-27 | Stop reason: HOSPADM

## 2018-09-27 RX ORDER — SODIUM CHLORIDE 0.9 % (FLUSH) 0.9 %
5-10 SYRINGE (ML) INJECTION AS NEEDED
Status: ACTIVE | OUTPATIENT
Start: 2018-09-27 | End: 2018-09-27

## 2018-09-27 RX ORDER — SODIUM CHLORIDE, SODIUM LACTATE, POTASSIUM CHLORIDE, CALCIUM CHLORIDE 600; 310; 30; 20 MG/100ML; MG/100ML; MG/100ML; MG/100ML
150 INJECTION, SOLUTION INTRAVENOUS CONTINUOUS
Status: DISCONTINUED | OUTPATIENT
Start: 2018-09-27 | End: 2018-09-28 | Stop reason: HOSPADM

## 2018-09-27 RX ORDER — ACYCLOVIR 800 MG/1
400 TABLET ORAL 2 TIMES DAILY
Status: DISCONTINUED | OUTPATIENT
Start: 2018-09-27 | End: 2018-09-28 | Stop reason: HOSPADM

## 2018-09-27 RX ORDER — PANTOPRAZOLE SODIUM 40 MG/1
40 TABLET, DELAYED RELEASE ORAL
Status: DISCONTINUED | OUTPATIENT
Start: 2018-09-28 | End: 2018-09-28 | Stop reason: HOSPADM

## 2018-09-27 RX ORDER — PREDNISONE 5 MG/1
5 TABLET ORAL
Status: DISCONTINUED | OUTPATIENT
Start: 2018-09-28 | End: 2018-09-28 | Stop reason: HOSPADM

## 2018-09-27 RX ORDER — CARVEDILOL 3.12 MG/1
3.12 TABLET ORAL 2 TIMES DAILY WITH MEALS
Status: DISCONTINUED | OUTPATIENT
Start: 2018-09-27 | End: 2018-09-28 | Stop reason: HOSPADM

## 2018-09-27 RX ORDER — LORAZEPAM 0.5 MG/1
0.5 TABLET ORAL
Status: DISCONTINUED | OUTPATIENT
Start: 2018-09-27 | End: 2018-09-28 | Stop reason: HOSPADM

## 2018-09-27 RX ORDER — ONDANSETRON 2 MG/ML
4 INJECTION INTRAMUSCULAR; INTRAVENOUS
Status: DISCONTINUED | OUTPATIENT
Start: 2018-09-27 | End: 2018-09-28 | Stop reason: HOSPADM

## 2018-09-27 RX ORDER — EPINEPHRINE 0.1 MG/ML
1 INJECTION INTRACARDIAC; INTRAVENOUS
Status: DISCONTINUED | OUTPATIENT
Start: 2018-09-27 | End: 2018-09-27 | Stop reason: HOSPADM

## 2018-09-27 RX ORDER — ATROPINE SULFATE 0.1 MG/ML
0.5 INJECTION INTRAVENOUS
Status: DISCONTINUED | OUTPATIENT
Start: 2018-09-27 | End: 2018-09-27 | Stop reason: HOSPADM

## 2018-09-27 RX ORDER — SUCCINYLCHOLINE CHLORIDE 20 MG/ML
INJECTION INTRAMUSCULAR; INTRAVENOUS AS NEEDED
Status: DISCONTINUED | OUTPATIENT
Start: 2018-09-27 | End: 2018-09-27 | Stop reason: HOSPADM

## 2018-09-27 RX ORDER — PHENYLEPHRINE HCL IN 0.9% NACL 0.4MG/10ML
SYRINGE (ML) INTRAVENOUS AS NEEDED
Status: DISCONTINUED | OUTPATIENT
Start: 2018-09-27 | End: 2018-09-27 | Stop reason: HOSPADM

## 2018-09-27 RX ORDER — NALOXONE HYDROCHLORIDE 0.4 MG/ML
0.4 INJECTION, SOLUTION INTRAMUSCULAR; INTRAVENOUS; SUBCUTANEOUS
Status: ACTIVE | OUTPATIENT
Start: 2018-09-27 | End: 2018-09-27

## 2018-09-27 RX ORDER — SODIUM CHLORIDE, SODIUM LACTATE, POTASSIUM CHLORIDE, CALCIUM CHLORIDE 600; 310; 30; 20 MG/100ML; MG/100ML; MG/100ML; MG/100ML
INJECTION, SOLUTION INTRAVENOUS
Status: DISCONTINUED | OUTPATIENT
Start: 2018-09-27 | End: 2018-09-27 | Stop reason: HOSPADM

## 2018-09-27 RX ORDER — SODIUM CHLORIDE 0.9 % (FLUSH) 0.9 %
5-10 SYRINGE (ML) INJECTION EVERY 8 HOURS
Status: ACTIVE | OUTPATIENT
Start: 2018-09-27 | End: 2018-09-27

## 2018-09-27 RX ORDER — MIDAZOLAM HYDROCHLORIDE 1 MG/ML
.25-1 INJECTION, SOLUTION INTRAMUSCULAR; INTRAVENOUS
Status: DISCONTINUED | OUTPATIENT
Start: 2018-09-27 | End: 2018-09-27 | Stop reason: HOSPADM

## 2018-09-27 RX ORDER — LEVOTHYROXINE SODIUM 50 UG/1
50 TABLET ORAL
Status: DISCONTINUED | OUTPATIENT
Start: 2018-09-28 | End: 2018-09-28 | Stop reason: HOSPADM

## 2018-09-27 RX ORDER — FENTANYL CITRATE 50 UG/ML
200 INJECTION, SOLUTION INTRAMUSCULAR; INTRAVENOUS
Status: DISCONTINUED | OUTPATIENT
Start: 2018-09-27 | End: 2018-09-27 | Stop reason: HOSPADM

## 2018-09-27 RX ORDER — MONTELUKAST SODIUM 10 MG/1
10 TABLET ORAL
Status: DISCONTINUED | OUTPATIENT
Start: 2018-09-27 | End: 2018-09-28 | Stop reason: HOSPADM

## 2018-09-27 RX ORDER — SODIUM CHLORIDE 9 MG/ML
100 INJECTION, SOLUTION INTRAVENOUS CONTINUOUS
Status: DISCONTINUED | OUTPATIENT
Start: 2018-09-27 | End: 2018-09-27

## 2018-09-27 RX ORDER — PROPOFOL 10 MG/ML
INJECTION, EMULSION INTRAVENOUS AS NEEDED
Status: DISCONTINUED | OUTPATIENT
Start: 2018-09-27 | End: 2018-09-27 | Stop reason: HOSPADM

## 2018-09-27 RX ADMIN — Medication 120 MCG: at 15:23

## 2018-09-27 RX ADMIN — PROPOFOL 50 MG: 10 INJECTION, EMULSION INTRAVENOUS at 15:31

## 2018-09-27 RX ADMIN — BACLOFEN 5 MG: 10 TABLET ORAL at 22:46

## 2018-09-27 RX ADMIN — ACYCLOVIR 400 MG: 800 TABLET ORAL at 19:29

## 2018-09-27 RX ADMIN — ROCURONIUM BROMIDE 5 MG: 10 INJECTION, SOLUTION INTRAVENOUS at 15:20

## 2018-09-27 RX ADMIN — Medication 120 MCG: at 15:50

## 2018-09-27 RX ADMIN — LIDOCAINE HYDROCHLORIDE 100 MG: 20 INJECTION, SOLUTION EPIDURAL; INFILTRATION; INTRACAUDAL; PERINEURAL at 15:20

## 2018-09-27 RX ADMIN — ONDANSETRON 4 MG: 2 INJECTION INTRAMUSCULAR; INTRAVENOUS at 16:05

## 2018-09-27 RX ADMIN — IOPAMIDOL 30 ML: 612 INJECTION, SOLUTION INTRAVENOUS at 15:12

## 2018-09-27 RX ADMIN — Medication 10 ML: at 21:49

## 2018-09-27 RX ADMIN — SODIUM CHLORIDE, SODIUM LACTATE, POTASSIUM CHLORIDE, CALCIUM CHLORIDE: 600; 310; 30; 20 INJECTION, SOLUTION INTRAVENOUS at 15:18

## 2018-09-27 RX ADMIN — SUCCINYLCHOLINE CHLORIDE 120 MG: 20 INJECTION INTRAMUSCULAR; INTRAVENOUS at 15:20

## 2018-09-27 RX ADMIN — Medication 120 MCG: at 15:55

## 2018-09-27 RX ADMIN — VITAMIN D, TAB 1000IU (100/BT) 1000 UNITS: 25 TAB at 19:29

## 2018-09-27 RX ADMIN — PROPOFOL 150 MG: 10 INJECTION, EMULSION INTRAVENOUS at 15:20

## 2018-09-27 RX ADMIN — SODIUM CHLORIDE, SODIUM LACTATE, POTASSIUM CHLORIDE, AND CALCIUM CHLORIDE 150 ML/HR: 600; 310; 30; 20 INJECTION, SOLUTION INTRAVENOUS at 16:58

## 2018-09-27 RX ADMIN — INDOMETHACIN 100 MG: 50 SUPPOSITORY RECTAL at 15:07

## 2018-09-27 RX ADMIN — CARVEDILOL 3.12 MG: 3.12 TABLET, FILM COATED ORAL at 19:29

## 2018-09-27 RX ADMIN — GABAPENTIN 100 MG: 100 CAPSULE ORAL at 21:48

## 2018-09-27 RX ADMIN — MONTELUKAST SODIUM 10 MG: 10 TABLET, FILM COATED ORAL at 22:46

## 2018-09-27 RX ADMIN — CEFTRIAXONE 1 G: 1 INJECTION, POWDER, FOR SOLUTION INTRAMUSCULAR; INTRAVENOUS at 15:46

## 2018-09-27 NOTE — ANESTHESIA POSTPROCEDURE EVALUATION
Post-Anesthesia Evaluation and Assessment Patient: Jerome Parekh MRN: [id number]  SSN: [id number] YOB: 1945  Age: 75 y.o. Sex: female Cardiovascular Function/Vital Signs Visit Vitals  /82  Pulse 60  Temp 36.6 °C (97.9 °F)  Resp 20  SpO2 98%  Breastfeeding No  
 
 
Patient is status post general anesthesia for Procedure(s): ENDOSCOPIC RETROGRADE CHOLANGIOPANCREATOGRAPHY (ERCP) ENDOSCOPY WITH PROSTHESIS OR STENT REMOVAL 
ENDOSCOPIC STONE EXTRACTION/BALLOON SWEEP. Nausea/Vomiting: None Postoperative hydration reviewed and adequate. Pain: 
Pain Scale 1: Visual (09/27/18 1655) Pain Intensity 1: 0 (09/27/18 1655) Managed Neurological Status: At baseline Mental Status and Level of Consciousness: Arousable Pulmonary Status:  
O2 Device: Room air (09/27/18 1655) Adequate oxygenation and airway patent Complications related to anesthesia: None Post-anesthesia assessment completed. No concerns Signed By: Gucci Plasencia DO September 27, 2018

## 2018-09-27 NOTE — PROGRESS NOTES

## 2018-09-27 NOTE — H&P
[address]  HISTORY AND PHYSICAL Whitney Macias 
MR#: [id number] : 1945 ACCOUNT #: [de-identified] ADMIT DATE: 2018 PRIMARY CARE PHYSICIAN:  Regla Kohli MD 
 
SOURCE OF INFORMATION:  The patient and from her medical record. CHIEF COMPLAINT:  Status post ERCP on 2018 and observation. HISTORY OF PRESENT ILLNESS:  This is a 70-year-old -American woman with past medical history significant for primary sclerosing cholangitis, status post biliary stent, status post cholecystectomy, rheumatoid arthritis, hypothyroidism, anemia of chronic disease, chronic back pain, hypertension, gastroesophageal reflux disease, history of coronary artery disease, herpes simplex virus infection and incontinence, who had ERCP for common bile duct stones and a stricture and underwent ERCP, status post removal of stones and biliary stent and hospitalist service is involved for post-ERCP observation and management. Patient denied any abdominal pain, nausea, vomiting, chills, fever, left-sided chest pain, palpitation, shortness of breath, urinary complaint or abnormal bowel movement. She received IV ceftriaxone 1 gram IV dose, started lactated Ringer infusion. Vital signs:  Blood pressure 143/85, pulse 62, temperature 97.9, saturation of oxygen 97% on room air. No history of chronic kidney disease or diabetes mellitus. Patient takes prednisone 5 mg for her rheumatoid arthritis. REVIEW OF SYSTEMS:  Pertinent positive findings mentioned in HPI. All systems reviewed. Not any other positive finding. PAST MEDICAL HISTORY: 1.  Primary sclerosing cholangitis. 2.  Status post cholecystectomy. 3.  Rheumatoid arthritis. 4.  Hypothyroidism. 5.  Hypertension. 6.  Chronic back pain. 7.  Anemia of chronic disease. 8.  Gastroesophageal reflux disease. 9.  History of coronary artery disease. 10.  History of herpes simplex virus infection. 11.  Urge incontinence. MEDICATIONS:  Prior to admission medications include: 1. Acyclovir 400 mg 1 tablet twice daily. 2.  Coreg 3.5 mg p.o. b.i.d. 3.  Protonix 40 mg p.o. b.i.d. 4.  Lorazepam 0.5 mg 1 tab as needed. 5.  Baclofen 10 mg p.o. at bedtime. 6.  Neurontin 100 mg p.o. at bedtime. 7.  Synthroid 50 mcg p.o. daily. 8.  Hydrochlorothiazide 25 mg p.o. daily. 9.  Singulair 10 mg p.o. daily. 10.  Multivitamin 1 tab p.o. daily. 11.  Imuran 150 mg p.o. daily. 12.  Aspirin 81 mg p.o. daily. 13.  Vitamin E 400 units b.i.d. ALLERGIES: 
1. PENICILLIN. 2.  TRAMADOL. 3.  PROVENTIL. 4.  ACE INHIBITORS. 5.  ALBUTEROL. 6.  AMBIEN. 7.  CIPROFLOXACIN. 8.  CLINDAMYCIN. 9.  LISINOPRIL. 10.  OXAPROZIN. 11.  SULFADIAZINE. 12.  TRAZODONE. FAMILY HISTORY:  Mother had history of hypertension, heart disease. Father had no known problems. Bother has history of prostate cancer and heart disease. Sister has history of asthma, diabetes and hypertension. SOCIAL HISTORY:  The patient lives by herself. She uses a rollator and cane. No tobacco or alcohol abuse. CODE STATUS:  FULL CODE. PHYSICAL EXAMINATION: 
VITAL SIGNS:  Blood pressure 143/85, pulse 62, temperature 97.9, respiratory rate 21, saturation of oxygen 97%. GENERAL APPEARANCE:  The patient is alert, cooperative, in no acute distress. She appears her stated age. HEENT:  Pink conjunctivae, anicteric sclerae. Moist tongue and buccal mucosa. LUNGS:  Clear to auscultation bilaterally. CHEST WALL:  No tenderness or deformity. HEART:  Regular rate and rhythm. S1 and S2 normal.  No murmur or gallop. ABDOMEN:  Soft, nontender. Bowel sounds normal.  No mass or organomegaly. SKIN:  No rash or lesions. CENTRAL NERVOUS SYSTEM:  Conscious, well oriented to time, place and person. Motor 5/5. Sensation intact. NEUROLOGIC:  Cranial nerves II-XII grossly intact. Lab not available.  
 
ASSESSMENT: 
 1.  Common bile duct stone and stricture with history of primary sclerosing cholangitis, status post ERCP. 2.  Hypertension. 3.  Anemia of chronic disease. 4.  History of chronic back pain. 5.  Hypothyroidism. 6.  History of gastroesophageal reflux. 7.  History of rheumatoid arthritis. PLAN: 
1. Bile duct stone and stricture with history of primary sclerosing cholangitis, status post ERCP with removal of stone and removal of biliary stent. Admit the patient under observation, start on clear liquid diet and advance as tolerated. Lactated Ringer 150 mL per hour. GI on board. If she is stable, likely will go home tomorrow. 2.  Hypertension. Blood pressure normal.  Continue home medications, Coreg and monitor blood pressure. 3.  Anemia of chronic disease. No evidence of bleeding. Check CBC. 4.  History of chronic back pain. Continue baclofen. 5.  Hypothyroidism. Continue home Synthroid. 6.  History of gastroesophageal reflux disease. Resume home Protonix. 7.  History of rheumatoid arthritis. Resume prednisone 5 mg p.o. daily and Imuran. 8.  History of coronary artery disease, stable, no chest pain. Continue Coreg and will resume aspirin once she is stable. Deep venous thrombosis prophylaxis, sequential compressive device. MD KIRT Beck/CRISTIANE 
D: 09/27/2018 17:27    
T: 09/27/2018 18:07 JOB #: [id number]

## 2018-09-27 NOTE — H&P
[address] 
[address] History and Physical    
 
NAME:  Leonidas Sue :   1945 MRN:   [id number] History of Present Illness:  Patient is a 75 y. o. who is seen for cbd stones. PMH: 
Past Medical History:  
Diagnosis Date  Abdominal pain 14  
 note from [facility] Dr Evangelina Bishop  Advance directive discussed with patient 2015  Arthritis   
 dr Sheldon buck        17 f/u note  Bacteremia due to Klebsiella pneumoniae 2016 OV note from Dr Andie Hope Disease  Chronic pain   
 low back pain/arthritis      [facility] note17  Contact dermatitis and other eczema, due to unspecified cause   
 hyperpigmented macules/seb k  Elevated LFTs   
 per info from Dr Lisa Blum at [facility] on report  Endocrine disease   
 hypothyroid  GERD (gastroesophageal reflux disease)   
 chelsy burns  Heart attack ([address] 2017 [facility]  
 HSV infection  Hypertension  Hypothyroid 10/2012  Insomnia  Melasma 3/3/15  
 notes [facility]  Nausea & vomiting 2017  
 report [facility]  17 EGD showed delayed emptying  Pain management counseling, encounter for 2016  
 sees Dr Emory Herrera 17 f/u  Rhinitis, allergic nonseasonal   
 Screening for glaucoma 2016  Urge incontinence of urine 2017 initial [facility] consult [facility] initial eval note Khushi 17 urodymanics study//17 f/u note  Well woman exam (no gynecological exam) 2016  
 [facility]'s note rec'd [facility]SH: 
Past Surgical History:  
Procedure Laterality Date  COLONOSCOPY  11  
 dr Lenore Sosa 10 year repeat  HX CHOLECYSTECTOMY  HX ENDOSCOPY   [address]  
 HX ENDOSCOPY  2017 [facility]  
 HX ENDOSCOPY  2017  
 showed delayed emptying of stomach contents--gastroparesis to be r/o  
 HX OTHER SURGICAL  2017 complex urodynamics study report from [facility]  HX TUBAL LIGATION Allergies: Allergies Allergen Reactions  Pcn [Penicillins] Swelling Has tolerated Cefepime  Tramadol Nausea and Vomiting SEVERE If taken w/o food  Proventil [Albuterol Sulfate] Hives  Ace Inhibitors Angioedema  Albuterol Swelling  
  swelling  Ambien [Zolpidem] Other (comments) Nightmares and memory disturbance  Chocolate [Cocoa] Diarrhea  Ciprofloxacin Other (comments) Sore throat and trouble swallowing  Clindamycin Hives  Lisinopril Swelling  Oxaprozin Nausea and Vomiting  
  severe stomach upset  Sulfadiazine Swelling  
  swelling  Trazodone Other (comments) Vivid dreams and felt disoriented Home Medications: 
Prior to Admission Medications Prescriptions Last Dose Informant Patient Reported? Taking? LORazepam (ATIVAN) 0.5 mg tablet 8/27/2018 at Unknown time  No Yes Sig: Take 1 Tab by mouth nightly as needed for Anxiety. Max Daily Amount: 0.5 mg.  
acyclovir (ZOVIRAX) 400 mg tablet 9/26/2018 at Unknown time  No Yes Sig: TAKE 1 TABLET TWICE A DAY. TAKE 1 TAB 3 TIMES DAILY   WHEN OUTBREAK OCCURS. aspirin 81 mg tablet 9/26/2018 at Unknown time  Yes Yes Sig: Take 81 mg by mouth daily. atorvastatin (LIPITOR) 20 mg tablet 9/26/2018 at Unknown time  No Yes Sig: TAKE 1 TABLET NIGHTLY  
azaTHIOprine (IMURAN) 50 mg tablet 9/26/2018 at Unknown time  Yes Yes Sig: Take 150 mg by mouth daily. baclofen (LIORESAL) 10 mg tablet 9/26/2018 at Unknown time  Yes Yes Sig: Take 10 mg by mouth nightly. carvedilol (COREG) 3.125 mg tablet 9/27/2018 at Unknown time  No Yes Sig: TAKE 1 TABLET TWICE A DAY cholecalciferol (VITAMIN D3) 1,000 unit tablet 9/26/2018 at Unknown time  Yes Yes Sig: Take 1,000 Units by mouth two (2) times a day.  
gabapentin (NEURONTIN) 100 mg capsule 9/26/2018 at Unknown time  Yes Yes Sig: Take 100 mg by mouth nightly. hydroCHLOROthiazide (HYDRODIURIL) 25 mg tablet 9/26/2018 at Unknown time  No Yes Sig: TAKE 1 TABLET DAILY FOR    EDEMA AND HYPERTENSION  
levothyroxine (SYNTHROID) 50 mcg tablet 9/27/2018 at Unknown time  No Yes Sig: TAKE 1 TABLET DAILY  
montelukast (SINGULAIR) 10 mg tablet 9/26/2018 at Unknown time  No Yes Sig: TAKE 1 TABLET DAILY  
multivitamin (ONE A DAY) tablet 9/26/2018 at Unknown time  Yes Yes Sig: Take 1 Tab by mouth daily. nystatin (MYCOSTATIN) topical cream 8/27/2018 at Unknown time  No Yes Sig: Apply  to affected area two (2) times a day. Patient taking differently: Apply  to affected area as needed. pantoprazole (PROTONIX) 40 mg tablet 9/26/2018 at Unknown time  No Yes Sig: TAKE 1 TABLET DAILY FOR    GASTROENTERITIS  
potassium chloride (KAON 20%) 40 mEq/15 mL liqd 9/26/2018 at Unknown time  Yes Yes Sig: Take  by mouth daily. vitamin E (AQUA GEMS) 400 unit capsule 9/26/2018 at Unknown time  Yes Yes Sig: Take 400 Units by mouth two (2) times a day. Facility-Administered Medications: None Hospital Medications: 
Current Facility-Administered Medications Medication Dose Route Frequency  iopamidol (ISOVUE 300) 61 % contrast injection 30 mL  30 mL IntraVENous RAD ONCE Social History: 
Social History Substance Use Topics  Smoking status: Never Smoker  Smokeless tobacco: Never Used  Alcohol use No  
 
 
Family History: 
Family History Problem Relation Age of Onset  Hypertension Mother  Heart Disease Mother  No Known Problems Father  Cancer Brother   
  prostate  Heart Disease Brother   
  blocked valves  Heart Disease Brother  Asthma Sister  Diabetes Sister  Hypertension Sister Review of Systems: 
 
 
Constitutional: negative fever, negative chills, negative weight loss Eyes:   negative visual changes ENT:   negative sore throat, tongue or lip swelling Respiratory:  negative cough, negative dyspnea Cards:  negative for chest pain, palpitations, lower extremity edema GI:   See HPI 
:  negative for frequency, dysuria Integument:  negative for rash and pruritus Heme:  negative for easy bruising and gum/nose bleeding Musculoskel: negative for myalgias,  back pain and muscle weakness Neuro: negative for headaches, dizziness, vertigo Psych:  negative for feelings of anxiety, depression Objective:  
Patient Vitals for the past 8 hrs: 
 BP Temp Pulse Resp SpO2  
09/27/18 1454 150/79 97.9 °F (36.6 °C) (!) 56 17 (!) 0 % EXAM:   
 NEURO-a&o HEENT-wnl LUNGS-clear COR-regular rate and rhythym ABD-soft , no tenderness, no rebound, good bs EXT-no edema Data Review No results for input(s): WBC, HGB, HCT, PLT, HGBEXT, HCTEXT, PLTEXT in the last 72 hours. No results for input(s): NA, K, CL, CO2, BUN, CREA, GLU, PHOS, CA in the last 72 hours. No results for input(s): SGOT, GPT, AP, TBIL, TP, ALB, GLOB, GGT, AML, LPSE in the last 72 hours. No lab exists for component: AMYP, HLPSE No results for input(s): INR, PTP, APTT in the last 72 hours. No lab exists for component: INREXT Assessment: · cbd stones and stricture Patient Active Problem List  
Diagnosis Code  RA (rheumatoid arthritis) ([facility]) M06.9  Essential hypertension I10  
 Postmenopausal Z78.0  Hypothyroidism E03.9  Allergic rhinitis J30.9  Chronic constipation K59.09  
 Vitamin D deficiency E55.9  Abnormal gait R26.9  Insomnia G47.00  Gastroesophageal reflux disease without esophagitis K21.9  Chronic midline low back pain without sciatica M54.5, G89.29  
 ACP (advance care planning) Z71.89  
 Hyperlipidemia E78.5  Gastroparesis K31.84  
 Hypokalemia E87.6  Rhabdomyolysis M62.82  
 Fall W19. [place] Dings  Hyperbilirubinemia E80.6  Elevated AST (SGOT) R74.0  Chronic pain G89.29 Plan:  
· Endoscopic procedure with sedation Signed By: Apollo Hines MD   
 9/27/2018  3:05 PM

## 2018-09-27 NOTE — PROGRESS NOTES
Attempted to call report x 2. Fernando Edge will give report on the floor if not able to get the nurse.  is aware the patient is on her way to floor.

## 2018-09-27 NOTE — ROUTINE PROCESS
Mary Canal 1945 
[id number] Situation: 
Verbal report received from: KARLY Gutierrez Procedure: Procedure(s): ENDOSCOPIC RETROGRADE CHOLANGIOPANCREATOGRAPHY (ERCP) ENDOSCOPY WITH PROSTHESIS OR STENT REMOVAL 
ENDOSCOPIC STONE EXTRACTION/BALLOON SWEEP Background: 
 
Preoperative diagnosis: BILE DUCT STONE Postoperative diagnosis: 1.- Bile Duct :  Dr. Catherine Dangelo Assistant(s): Endoscopy Technician-1: Jodi Art Endoscopy RN-1: Bárbara Hedrick RN Specimens: * No specimens in log * H. Pylori  no Assessment: 
Intra-procedure medications Anesthesia gave intra-procedure sedation and medications, see anesthesia flow sheet yes Intravenous fluids:   500  NS @ [facility] Vital signs stable yes Abdominal assessment: round and soft yes Recommendation: 
Discharge patient per MD order NO. Return to floor YES - new admit Family or Friend : none Permission to share finding with family or friend yes and n/a

## 2018-09-27 NOTE — DISCHARGE INSTRUCTIONS
Endoscopic Retrograde Cholangiopancreatogram (ERCP): What to Expect at [facility]  After you have an endoscopic retrograde cholangiopancreatogram (ERCP), you probably will stay at the hospital or clinic for 1 to 2 hours. This will allow the medicine to wear off. You will be able to go home after your doctor or a nurse checks to make sure you are not having any problems. If you stay in the hospital overnight, you may go home the next day. You may have a sore throat for a day or two after the procedure. This care sheet gives you a general idea about how long it will take for you to recover. But each person recovers at a different pace. Follow the steps below to get better as quickly as possible. How can you care for yourself at home? Activity    · Rest as much as you need to after you go home.     · You should be able to go back to your usual activities the day after the procedure. Diet    · Follow your doctor's directions for eating after the procedure.     · Drink plenty of fluids (unless your doctor tells you not to). Medicines    · Your doctor will tell you if and when you can restart your medicines. He or she will also give you instructions about taking any new medicines.     · If you take blood thinners, such as warfarin (Coumadin), clopidogrel (Plavix), or aspirin, be sure to talk to your doctor. He or she will tell you if and when to start taking those medicines again. Make sure that you understand exactly what your doctor wants you to do.     · If you have a sore throat the next day, use an over-the-counter spray to numb your throat. Be safe with medicines. Read and follow all instructions on the label. Follow-up care is a key part of your treatment and safety. Be sure to make and go to all appointments, and call your doctor if you are having problems. It's also a good idea to know your test results and keep a list of the medicines you take. When should you call for help?   Call [phone] anytime you think you may need emergency care. For example, call if:    · You passed out (lost consciousness).     · Your stools are maroon or very bloody.     · You have trouble breathing.    Call your doctor now or go to the emergency room if:    · You have new or worse belly pain.     · You have pain that does not get better after you take pain medicine.     · You have a fever.     · You cannot pass stools or gas.     · You are sick to your stomach or cannot hold down fluids.     · You have blood in your stools.    Watch closely for changes in your health, and be sure to contact your doctor if:    · Your throat still hurts after a day or two.     · You do not get better as expected. Where can you learn more? Go to http://petey-rona.info/. Enter M662 in the search box to learn more about \"Endoscopic Retrograde Cholangiopancreatogram (ERCP): What to Expect at Home. \"  Current as of: May 12, 2017  Content Version: 11.7  © 4768-7038 [employer]. Care instructions adapted under license by [employer] (which disclaims liability or warranty for this information). If you have questions about a medical condition or this instruction, always ask your healthcare professional. [place] [address] any warranty or liability for your use of this information. Discharge Instructions       PATIENT ID: Conor Richards  MRN: [id number]   YOB: 1945    DATE OF ADMISSION: 9/27/2018 12:10 PM    DATE OF DISCHARGE: 9/28/2018    PRIMARY CARE PROVIDER: Galen Redd MD     ATTENDING PHYSICIAN: Raiza Cordoba MD  DISCHARGING PROVIDER: Raiza Cordoba MD    To contact this individual call [phone] and ask the  to page. If unavailable ask to be transferred the Adult Hospitalist Department. DISCHARGE DIAGNOSES and ADDITIONAL CARE RECOMMENDATIONS:   Post ERCP procedure you were observed in the hospital over night. You did not have abdominal pain,you have tolerated diet and lab work was fine and you are discharged home. Continue your out patient follow up as before. If you experience severe abdominal pain, associated with distension,nausea or vomiting,go to the ER/call 911. Note, none of your home medications are changed,take all your medications as before. DIET: Cardiac Diet    ACTIVITY: Activity as tolerated    WOUND CARE: NA    EQUIPMENT needed: NA      CONSULTATIONS: None    PROCEDURES/SURGERIES: Procedure(s):  ENDOSCOPIC RETROGRADE CHOLANGIOPANCREATOGRAPHY (ERCP)  ENDOSCOPY WITH PROSTHESIS OR STENT REMOVAL  ENDOSCOPIC STONE EXTRACTION/BALLOON SWEEP    PENDING TEST RESULTS:   At the time of discharge the following test results are still pending: none    FOLLOW UP APPOINTMENTS:   Follow-up Information     Follow up With Details Comments Contact [address] MD   [address]  [address] [facility]  [phone]                   DISCHARGE MEDICATIONS:   See Medication Reconciliation Form    · It is important that you take the medication exactly as they are prescribed. · Keep your medication in the bottles provided by the pharmacist and keep a list of the medication names, dosages, and times to be taken in your wallet. · Do not take other medications without consulting your doctor. NOTIFY YOUR PHYSICIAN FOR ANY OF THE FOLLOWING:   Fever over 101 degrees for 24 hours. Chest pain, shortness of breath, fever, chills, nausea, vomiting, diarrhea, change in mentation, falling, weakness, bleeding. Severe pain or pain not relieved by medications. Or, any other signs or symptoms that you may have questions about. DISPOSITION:  x  Home With:   OT  PT  HH  RN       SNF/Inpatient Rehab/LTAC    Independent/assisted living    Hospice    Other:           Signed:    Kee Brennan MD  9/28/2018  4:21 PM

## 2018-09-27 NOTE — ACP (ADVANCE CARE PLANNING)
Advance Care Planning (ACP) Provider Note - Comprehensive     Date of ACP Conversation: 09/27/18     Persons included in Conversation:  Patient and Paulina Valle MD    Patient Active Problem List   Diagnosis Code    RA (rheumatoid arthritis) ([facility] [address] M06.9    Essential hypertension I10    Postmenopausal Z78.0    Hypothyroidism E03.9    Allergic rhinitis J30.9    Chronic constipation K59.09    Vitamin D deficiency E55.9    Abnormal gait R26.9    Insomnia G47.00    Gastroesophageal reflux disease without esophagitis K21.9    Chronic midline low back pain without sciatica M54.5, G89.29    ACP (advance care planning) Z71.89    Hyperlipidemia E78.5    Gastroparesis K31.84    Hypokalemia E87.6    Rhabdomyolysis M62.82    Fall W19. [place]    Hyperbilirubinemia E80.6    Elevated AST (SGOT) R74.0    Chronic pain G89.29    Bile duct stone K80.50    S/P ERCP Z98.890     These active diagnoses are of sufficient risk that focused discussion on advance care planning is indicated in order to allow the patient to thoughtfully consider her personal goals of care and, if situations arise that prevent the ability to personally give input, to ensure appropriate representation of her; personal desires through documentation or informed surrogate decision makers. Authorized Decision Maker (if patient is incapable of making informed decisions): This person is:  Son, Anna Callejas    Discussions : I review her acute medical condition that lead to this admission and her desire for ongoing aggressive care, including potential intubation and mechanical ventilation, also discussed who would speak on her behalf she should be unable to do so and discussed what conversation she has had with her family so they understand her desire if such a situation occurred now or in the future. She said she wants aggressive care during this admission.  She said her son, Anna Callejas is her MPOA.             Length of ACP Conversation in minutes:  16 minutes       Marybeth Merino MD  9/27/2018

## 2018-09-27 NOTE — PROCEDURES
[address]  [address]       NAME:  Christiano Matthews   :   1945   MRN:   [id number]       Procedure Type:   ERCPwith biliary stone removal, biliary stent removal     Indications: h/o cbd stones, PSC, and distal CBD stricture, s/p ERCP last year with fully covered metal stent placement, H/O CHOLECYSTECTOMY  Pre-operative Diagnosis: see indication above  Post-operative Diagnosis:  See findings below  : Michael Velasquez MD    Referring Provider:     Felice Sawant MD      Sedation:  General anesthesia, indocin 100 mg supp, rocephin 1 gm ivx1    Procedure Details:  After informed consent was obtained with all risks and benefits of procedure explained, the patient was taken to the fluoroscopy suite and placed in the prone position. Upon sequential sedation as per above, the Olympus duodenoscope [id number]   was inserted via the mouthpeice and carefully advanced to the second portion of the duodenum. The quality of visualization was excellent. The duodenoscope was withdrawn into a short position. Findings:   Esophagus:normal  Stomach: normal   Duodenum/jejunum: normal    Ampulla:-normal , fully covered metal stent in good position, removed completely by using a snare  Then I cannulated the CBD by using sphincterotome ( dreamtome, [place] [employer]) over guided wire  Cholangiogram: -there multiple small filling defects seen in distal bile duct with resolution of the distal CBD STRICTURE, there were beading in the intrahepatic biliary system consistent with PSC  I removed all small stones and debris by using biliary extraction balloon ( 9 to 12 mm, [place] [employer])  Occlusive cholangiogram at end of procedure showed normal CBD, no stones, no stricture  Pancreatogram:not performed  I performed all immediate radiologic interpretation during this procedure    Specimen Removed:  as above    Complications: None. EBL:  None.     Impression:  See findings  Recommendations:   Clear liquids tonight  Watch for pancreatitis and cholangitis  CBC, CMP, lipase in am  QY=588 ml/h  Discussed with Dr. Hyun Tamayo from hospitalist service, he will admit  May d/c in am if stable  Will need f/u with GI in 6 to 8 weeks regarding    Signed By: Maxi Gamble MD     9/27/2018  4:46 PM

## 2018-09-27 NOTE — IP AVS SNAPSHOT
[address] [id number] Patient: Aneudy Sales MRN: [id number] OIC:3/5/1889 You are allergic to the following Allergen Reactions Pcn (Penicillins) Swelling Has tolerated Cefepime Tramadol Nausea and Vomiting SEVERE If taken w/o food Proventil (Albuterol Sulfate) Hives Ace Inhibitors Angioedema Albuterol Swelling  
 swelling Ambien (Zolpidem) Other (comments) Nightmares and memory disturbance Chocolate (Cocoa) Diarrhea Ciprofloxacin Other (comments) Sore throat and trouble swallowing Clindamycin Hives Lisinopril Swelling Oxaprozin Nausea and Vomiting  
 severe stomach upset Sulfadiazine Swelling  
 swelling Trazodone Other (comments) Vivid dreams and felt disoriented Immunizations Administered for This Admission Name Date Influenza Vaccine (Quad) PF 9/28/2018 Recent Documentation Breastfeeding? OB Status Smoking Status No Postmenopausal Never Smoker Emergency Contacts  (Rel.) Home Phone Work Phone Mobile Phone Sandeep Mtz (Child) [phone] -- -- About your hospitalization You were admitted on:  September 27, 2018 You last received care in the:  [facility] You were discharged on:  September 28, 2018 Why you were hospitalized Your primary diagnosis was:  S/P Ercp Your diagnoses also included:  Bile Duct Stone Providers Seen During Your Hospitalization Provider Specialty Primary office phone Keon Johnson MD Gastroenterology [phone] Lucas Farrar MD Hospitalist [phone] Gomez Diez MD Internal Medicine [phone] Your Primary Care Physician (PCP) Primary Care Physician Office Phone Office Fax Lucian Barron [phone] Follow-up Information Follow up With Details Comments Contact Info Ashok Galindo 
[address] [employer] [address] [facility] [address] 
[phone] My Medications TAKE these medications as instructed Instructions Each Dose to Equal  
 Morning Noon Evening Bedtime  
 acyclovir 400 mg tablet Commonly known as:  ZOVIRAX Your last dose was: Your next dose is: TAKE 1 TABLET TWICE A DAY. TAKE 1 TAB 3 TIMES DAILY   WHEN OUTBREAK OCCURS. aspirin 81 mg tablet Your last dose was: Your next dose is: Take 81 mg by mouth daily. 81 mg  
    
   
   
   
  
 atorvastatin 20 mg tablet Commonly known as:  LIPITOR Your last dose was: Your next dose is: TAKE 1 TABLET NIGHTLY  
     
   
   
   
  
 azaTHIOprine 50 mg tablet Commonly known as:  [employer] Your last dose was: Your next dose is: Take 150 mg by mouth daily. 150 mg  
    
   
   
   
  
 baclofen 10 mg tablet Commonly known as:  LIORESAL Your last dose was: Your next dose is: Take 10 mg by mouth nightly. 10 mg  
    
   
   
   
  
 carvedilol 3.125 mg tablet Commonly known as:  Coty Chengdy Your last dose was: Your next dose is: TAKE 1 TABLET TWICE A DAY  
     
   
   
   
  
 gabapentin 100 mg capsule Commonly known as:  NEURONTIN Your last dose was: Your next dose is: Take 100 mg by mouth nightly. 100 mg  
    
   
   
   
  
 hydroCHLOROthiazide 25 mg tablet Commonly known as:  HYDRODIURIL Your last dose was: Your next dose is: TAKE 1 TABLET DAILY FOR    EDEMA AND HYPERTENSION  
     
   
   
   
  
 levothyroxine 50 mcg tablet Commonly known as:  SYNTHROID Your last dose was: Your next dose is: TAKE 1 TABLET DAILY LORazepam 0.5 mg tablet Commonly known as:  ATIVAN Your last dose was: Your next dose is: Take 1 Tab by mouth nightly as needed for Anxiety. Max Daily Amount: 0.5 mg.  
 0.5 mg  
    
   
   
   
  
 montelukast 10 mg tablet Commonly known as:  SINGULAIR Your last dose was: Your next dose is: TAKE 1 TABLET DAILY  
     
   
   
   
  
 multivitamin tablet Commonly known as:  ONE A DAY Your last dose was: Your next dose is: Take 1 Tab by mouth daily. 1 Tab  
    
   
   
   
  
 nystatin topical cream  
Commonly known as:  MYCOSTATIN Your last dose was: Your next dose is:    
   
   
 Apply  to affected area two (2) times a day. pantoprazole 40 mg tablet Commonly known as:  PROTONIX Your last dose was: Your next dose is: TAKE 1 TABLET DAILY FOR    GASTROENTERITIS  
     
   
   
   
  
 potassium chloride 40 mEq/15 mL Liqd Commonly known as:  KAON 20% Your last dose was: Your next dose is: Take  by mouth daily. VITAMIN D3 1,000 unit tablet Generic drug:  cholecalciferol Your last dose was: Your next dose is: Take 1,000 Units by mouth two (2) times a day. 1000 Units  
    
   
   
   
  
 vitamin E 400 unit capsule Commonly known as:  [address] Your last dose was: Your next dose is: Take 400 Units by mouth two (2) times a day. 400 Units Discharge Instructions Endoscopic Retrograde Cholangiopancreatogram (ERCP): What to Expect at [facility] Your Recovery After you have an endoscopic retrograde cholangiopancreatogram (ERCP), you probably will stay at the hospital or clinic for 1 to 2 hours. This will allow the medicine to wear off.  You will be able to go home after your doctor or a nurse checks to make sure you are not having any problems. If you stay in the hospital overnight, you may go home the next day. You may have a sore throat for a day or two after the procedure. This care sheet gives you a general idea about how long it will take for you to recover. But each person recovers at a different pace. Follow the steps below to get better as quickly as possible. How can you care for yourself at home? Activity 
  · Rest as much as you need to after you go home.  
  · You should be able to go back to your usual activities the day after the procedure. Diet 
  · Follow your doctor's directions for eating after the procedure.  
  · Drink plenty of fluids (unless your doctor tells you not to). Medicines 
  · Your doctor will tell you if and when you can restart your medicines. He or she will also give you instructions about taking any new medicines.  
  · If you take blood thinners, such as warfarin (Coumadin), clopidogrel (Plavix), or aspirin, be sure to talk to your doctor. He or she will tell you if and when to start taking those medicines again. Make sure that you understand exactly what your doctor wants you to do.  
  · If you have a sore throat the next day, use an over-the-counter spray to numb your throat. Be safe with medicines. Read and follow all instructions on the label. Follow-up care is a key part of your treatment and safety. Be sure to make and go to all appointments, and call your doctor if you are having problems. It's also a good idea to know your test results and keep a list of the medicines you take. When should you call for help? Call 911 anytime you think you may need emergency care. For example, call if: 
  · You passed out (lost consciousness).  
  · Your stools are maroon or very bloody.  
  · You have trouble breathing.  
 Call your doctor now or go to the emergency room if: 
  · You have new or worse belly pain.   · You have pain that does not get better after you take pain medicine.  
  · You have a fever.  
  · You cannot pass stools or gas.  
  · You are sick to your stomach or cannot hold down fluids.  
  · You have blood in your stools.  
 Watch closely for changes in your health, and be sure to contact your doctor if: 
  · Your throat still hurts after a day or two.  
  · You do not get better as expected. Where can you learn more? Go to http://petey-rona.info/. Enter C873 in the search box to learn more about \"Endoscopic Retrograde Cholangiopancreatogram (ERCP): What to Expect at Home. \" Current as of: May 12, 2017 Content Version: 11.7 © 8696-9926 [employer]. Care instructions adapted under license by [employer] (which disclaims liability or warranty for this information). If you have questions about a medical condition or this instruction, always ask your healthcare professional. [address] [address] any warranty or liability for your use of this information. Discharge Instructions PATIENT ID: Yani Mcgovern MRN: [id number] YOB: 1945 DATE OF ADMISSION: 9/27/2018 12:10 PM   
DATE OF DISCHARGE: 9/28/2018 PRIMARY CARE PROVIDER: Melina Baltazar MD  
 
ATTENDING PHYSICIAN: Constantine Garduno MD 
DISCHARGING PROVIDER: Constantine Garduno MD   
To contact this individual call [phone] and ask the  to page. If unavailable ask to be transferred the Adult Hospitalist Department. DISCHARGE DIAGNOSES and ADDITIONAL CARE RECOMMENDATIONS:  
Post ERCP procedure you were observed in the hospital over night. You did not have abdominal pain,you have tolerated diet and lab work was fine and you are discharged home. Continue your out patient follow up as before. If you experience severe abdominal pain, associated with distension,nausea or vomiting,go to the ER/call [phone]. Note, none of your home medications are changed,take all your medications as before. DIET: Cardiac Diet ACTIVITY: Activity as tolerated WOUND CARE: NA 
 
EQUIPMENT needed: NA 
 
 
CONSULTATIONS: None PROCEDURES/SURGERIES: Procedure(s): ENDOSCOPIC RETROGRADE CHOLANGIOPANCREATOGRAPHY (ERCP) ENDOSCOPY WITH PROSTHESIS OR STENT REMOVAL 
ENDOSCOPIC STONE EXTRACTION/BALLOON SWEEP 
 
PENDING TEST RESULTS:  
At the time of discharge the following test results are still pending: none FOLLOW UP APPOINTMENTS:  
Follow-up Information Follow up With Details Comments Contact Info Merna RadamesJaysontayler 
[address] [place] [address] 
[phone] DISCHARGE MEDICATIONS: 
 See Medication Reconciliation Form · It is important that you take the medication exactly as they are prescribed. · Keep your medication in the bottles provided by the pharmacist and keep a list of the medication names, dosages, and times to be taken in your wallet. · Do not take other medications without consulting your doctor. NOTIFY YOUR PHYSICIAN FOR ANY OF THE FOLLOWING:  
Fever over 101 degrees for 24 hours. Chest pain, shortness of breath, fever, chills, nausea, vomiting, diarrhea, change in mentation, falling, weakness, bleeding. Severe pain or pain not relieved by medications. Or, any other signs or symptoms that you may have questions about. DISPOSITION: 
x  Home With: 
 OT  PT  [place]  RN  
  
 SNF/Inpatient Rehab/LTAC Independent/assisted living Hospice Other:  
 
 
 
 
Signed: Merlin Berry MD 
9/28/2018 
4:21 PM 
 
Discharge Orders None [facility] Announcement We are excited to announce that we are making your provider's discharge notes available to you in [facility].   You will see these notes when they are completed and signed by the physician that discharged you from your recent hospital stay. If you have any questions or concerns about any information you see in [employer], please call the Health Information Department where you were seen or reach out to your Primary Care Provider for more information about your plan of care. Introducing [facility] HE[facility]! [facility] introduces [employer] patient portal. Now you can access parts of your medical record, email your doctor's office, and request medication refills online. 1. In your internet browser, go to [email or web address] 2. Click on the First Time User? Click Here link in the Sign In box. You will see the New Member Sign Up page. 3. Enter your [employer] Access Code exactly as it appears below. You will not need to use this code after youve completed the sign-up process. If you do not sign up before the expiration date, you must request a new code. · [employer] Access Code: [email or web address] Expires: 10/5/2018  1:03 PM 
 
4. Enter the last four digits of your Social Security Number (xxxx) and Date of Birth (mm/dd/yyyy) as indicated and click Submit. You will be taken to the next sign-up page. 5. Create a [employer] ID. This will be your [employer] login ID and cannot be changed, so think of one that is secure and easy to remember. 6. Create a [employer] password. You can change your password at any time. 7. Enter your Password Reset Question and Answer. This can be used at a later time if you forget your password. 8. Enter your e-mail address. You will receive e-mail notification when new information is available in [address] 9. Click Sign Up. You can now view and download portions of your medical record. 10. Click the Download Summary menu link to download a portable copy of your medical information. If you have questions, please visit the Frequently Asked Questions section of the [employer] website. Remember, [employer] is NOT to be used for urgent needs. For medical emergencies, dial 911. Now available from your iPhone and Android! General Information Please provide this summary of care documentation to your next provider. Patient Signature:  ____________________________________________________________ Date:  ____________________________________________________________  
  
Mickeal Kelp Provider Signature:  ____________________________________________________________ Date:  ____________________________________________________________

## 2018-09-28 VITALS
DIASTOLIC BLOOD PRESSURE: 81 MMHG | OXYGEN SATURATION: 100 % | SYSTOLIC BLOOD PRESSURE: 126 MMHG | RESPIRATION RATE: 17 BRPM | HEART RATE: 61 BPM | TEMPERATURE: 98 F

## 2018-09-28 LAB
ALBUMIN SERPL-MCNC: 2.6 G/DL (ref 3.5–5)
ALBUMIN/GLOB SERPL: 0.7 {RATIO} (ref 1.1–2.2)
ALP SERPL-CCNC: 53 U/L (ref 45–117)
ALT SERPL-CCNC: 19 U/L (ref 12–78)
ANION GAP SERPL CALC-SCNC: 7 MMOL/L (ref 5–15)
ANION GAP SERPL CALC-SCNC: 8 MMOL/L (ref 5–15)
AST SERPL-CCNC: 25 U/L (ref 15–37)
BASOPHILS # BLD: 0.1 K/UL (ref 0–0.1)
BASOPHILS NFR BLD: 1 % (ref 0–1)
BILIRUB SERPL-MCNC: 0.8 MG/DL (ref 0.2–1)
BUN SERPL-MCNC: 7 MG/DL (ref 6–20)
BUN SERPL-MCNC: 8 MG/DL (ref 6–20)
BUN/CREAT SERPL: 10 (ref 12–20)
BUN/CREAT SERPL: 13 (ref 12–20)
CALCIUM SERPL-MCNC: 8.1 MG/DL (ref 8.5–10.1)
CALCIUM SERPL-MCNC: 8.5 MG/DL (ref 8.5–10.1)
CHLORIDE SERPL-SCNC: 112 MMOL/L (ref 97–108)
CHLORIDE SERPL-SCNC: 114 MMOL/L (ref 97–108)
CO2 SERPL-SCNC: 24 MMOL/L (ref 21–32)
CO2 SERPL-SCNC: 25 MMOL/L (ref 21–32)
CREAT SERPL-MCNC: 0.64 MG/DL (ref 0.55–1.02)
CREAT SERPL-MCNC: 0.72 MG/DL (ref 0.55–1.02)
DIFFERENTIAL METHOD BLD: ABNORMAL
EOSINOPHIL # BLD: 0.4 K/UL (ref 0–0.4)
EOSINOPHIL NFR BLD: 8 % (ref 0–7)
ERYTHROCYTE [DISTWIDTH] IN BLOOD BY AUTOMATED COUNT: 14.3 % (ref 11.5–14.5)
GLOBULIN SER CALC-MCNC: 3.5 G/DL (ref 2–4)
GLUCOSE SERPL-MCNC: 91 MG/DL (ref 65–100)
GLUCOSE SERPL-MCNC: 99 MG/DL (ref 65–100)
HCT VFR BLD AUTO: 32.7 % (ref 35–47)
HGB BLD-MCNC: 11.1 G/DL (ref 11.5–16)
IMM GRANULOCYTES # BLD: 0 K/UL (ref 0–0.04)
IMM GRANULOCYTES NFR BLD AUTO: 0 % (ref 0–0.5)
LIPASE SERPL-CCNC: 153 U/L (ref 73–393)
LYMPHOCYTES # BLD: 0.9 K/UL (ref 0.8–3.5)
LYMPHOCYTES NFR BLD: 17 % (ref 12–49)
MCH RBC QN AUTO: 32.5 PG (ref 26–34)
MCHC RBC AUTO-ENTMCNC: 33.9 G/DL (ref 30–36.5)
MCV RBC AUTO: 95.6 FL (ref 80–99)
MONOCYTES # BLD: 0.4 K/UL (ref 0–1)
MONOCYTES NFR BLD: 8 % (ref 5–13)
NEUTS SEG # BLD: 3.3 K/UL (ref 1.8–8)
NEUTS SEG NFR BLD: 66 % (ref 32–75)
NRBC # BLD: 0 K/UL (ref 0–0.01)
NRBC BLD-RTO: 0 PER 100 WBC
PLATELET # BLD AUTO: 181 K/UL (ref 150–400)
PMV BLD AUTO: 9.5 FL (ref 8.9–12.9)
POTASSIUM SERPL-SCNC: 3.4 MMOL/L (ref 3.5–5.1)
POTASSIUM SERPL-SCNC: 3.6 MMOL/L (ref 3.5–5.1)
PROT SERPL-MCNC: 6.1 G/DL (ref 6.4–8.2)
RBC # BLD AUTO: 3.42 M/UL (ref 3.8–5.2)
SODIUM SERPL-SCNC: 143 MMOL/L (ref 136–145)
SODIUM SERPL-SCNC: 147 MMOL/L (ref 136–145)
WBC # BLD AUTO: 5.1 K/UL (ref 3.6–11)

## 2018-09-28 PROCEDURE — 80053 COMPREHEN METABOLIC PANEL: CPT | Performed by: FAMILY MEDICINE

## 2018-09-28 PROCEDURE — 74011250636 HC RX REV CODE- 250/636: Performed by: FAMILY MEDICINE

## 2018-09-28 PROCEDURE — 90686 IIV4 VACC NO PRSV 0.5 ML IM: CPT | Performed by: FAMILY MEDICINE

## 2018-09-28 PROCEDURE — 83690 ASSAY OF LIPASE: CPT | Performed by: HOSPITALIST

## 2018-09-28 PROCEDURE — 74011250637 HC RX REV CODE- 250/637: Performed by: HOSPITALIST

## 2018-09-28 PROCEDURE — 85025 COMPLETE CBC W/AUTO DIFF WBC: CPT | Performed by: FAMILY MEDICINE

## 2018-09-28 PROCEDURE — A9270 NON-COVERED ITEM OR SERVICE: HCPCS | Performed by: HOSPITALIST

## 2018-09-28 PROCEDURE — 36415 COLL VENOUS BLD VENIPUNCTURE: CPT | Performed by: FAMILY MEDICINE

## 2018-09-28 PROCEDURE — 74011250636 HC RX REV CODE- 250/636: Performed by: HOSPITALIST

## 2018-09-28 PROCEDURE — 74011636637 HC RX REV CODE- 636/637: Performed by: HOSPITALIST

## 2018-09-28 PROCEDURE — 90471 IMMUNIZATION ADMIN: CPT

## 2018-09-28 PROCEDURE — 99218 HC RM OBSERVATION: CPT

## 2018-09-28 PROCEDURE — 74011250636 HC RX REV CODE- 250/636: Performed by: INTERNAL MEDICINE

## 2018-09-28 RX ADMIN — Medication 10 ML: at 15:00

## 2018-09-28 RX ADMIN — HYDROCHLOROTHIAZIDE 25 MG: 25 TABLET ORAL at 10:18

## 2018-09-28 RX ADMIN — ACYCLOVIR 400 MG: 800 TABLET ORAL at 10:19

## 2018-09-28 RX ADMIN — VITAMIN D, TAB 1000IU (100/BT) 1000 UNITS: 25 TAB at 10:18

## 2018-09-28 RX ADMIN — SODIUM CHLORIDE, SODIUM LACTATE, POTASSIUM CHLORIDE, AND CALCIUM CHLORIDE 150 ML/HR: 600; 310; 30; 20 INJECTION, SOLUTION INTRAVENOUS at 06:59

## 2018-09-28 RX ADMIN — ACYCLOVIR 400 MG: 800 TABLET ORAL at 18:43

## 2018-09-28 RX ADMIN — VITAMIN D, TAB 1000IU (100/BT) 1000 UNITS: 25 TAB at 18:43

## 2018-09-28 RX ADMIN — LORAZEPAM 0.5 MG: 0.5 TABLET ORAL at 01:22

## 2018-09-28 RX ADMIN — PREDNISONE 5 MG: 5 TABLET ORAL at 10:23

## 2018-09-28 RX ADMIN — AZATHIOPRINE 150 MG: 50 TABLET ORAL at 10:24

## 2018-09-28 RX ADMIN — PANTOPRAZOLE SODIUM 40 MG: 40 TABLET, DELAYED RELEASE ORAL at 06:58

## 2018-09-28 RX ADMIN — LEVOTHYROXINE SODIUM 50 MCG: 50 TABLET ORAL at 06:58

## 2018-09-28 RX ADMIN — INFLUENZA VIRUS VACCINE 0.5 ML: 15; 15; 15; 15 SUSPENSION INTRAMUSCULAR at 16:46

## 2018-09-28 RX ADMIN — CARVEDILOL 3.12 MG: 3.12 TABLET, FILM COATED ORAL at 10:23

## 2018-09-28 RX ADMIN — Medication 10 ML: at 06:58

## 2018-09-28 RX ADMIN — CARVEDILOL 3.12 MG: 3.12 TABLET, FILM COATED ORAL at 18:42

## 2018-09-28 NOTE — PROGRESS NOTES
[address] [id number] [facility], [address] GI PROGRESS NOTE Aaliyahsarah Duncaneladio, [address] office [phone] NP in-hospital cell phone M-F until 4:30 After 5pm or on weekends, please call  for physician on call NAME: Georgina Baker :  1945 MRN:  [id number] Subjective:  
Patient denies complaint - no abdominal pain or nausea. Tolerating clear liquid diet. Objective: VITALS:  
Last 24hrs VS reviewed since prior progress note. Most recent are: 
Visit Vitals  /81 (BP 1 Location: Right arm, BP Patient Position: At rest)  Pulse 62  Temp 98.2 °F (36.8 °C)  Resp 18  SpO2 100%  Breastfeeding No  
 
 
PHYSICAL EXAM: 
General: Cooperative, no acute distress   
Neurologic:  Alert and oriented X 3. HEENT: EOMI, no scleral icterus Lungs:  CTA bilaterally. No wheezing Heart:  S1 S2, regular rhythm, no murmur Abdomen: Soft, non-distended, no tenderness. +Bowel sounds Extremities: No edema Psych:   Good insight. Not anxious or agitated. Lab Data Reviewed:  
 
Recent Results (from the past 24 hour(s)) CBC WITH AUTOMATED DIFF Collection Time: 18  3:27 AM  
Result Value Ref Range WBC 5.1 3.6 - 11.0 K/uL  
 RBC 3.42 (L) 3.80 - 5.20 M/uL  
 HGB 11.1 (L) 11.5 - 16.0 g/dL HCT 32.7 (L) 35.0 - 47.0 % MCV 95.6 80.0 - 99.0 FL  
 MCH 32.5 26.0 - 34.0 PG  
 MCHC 33.9 30.0 - 36.5 g/dL  
 RDW 14.3 11.5 - 14.5 % PLATELET 220 301 - 684 K/uL MPV 9.5 8.9 - 12.9 FL  
 NRBC 0.0 0  WBC ABSOLUTE NRBC 0.00 0.00 - 0.01 K/uL NEUTROPHILS 66 32 - 75 % LYMPHOCYTES 17 12 - 49 % MONOCYTES 8 5 - 13 % EOSINOPHILS 8 (H) 0 - 7 % BASOPHILS 1 0 - 1 % IMMATURE GRANULOCYTES 0 0.0 - 0.5 % ABS. NEUTROPHILS 3.3 1.8 - 8.0 K/UL  
 ABS. LYMPHOCYTES 0.9 0.8 - 3.5 K/UL  
 ABS. MONOCYTES 0.4 0.0 - 1.0 K/UL  
 ABS. EOSINOPHILS 0.4 0.0 - 0.4 K/UL  
 ABS. BASOPHILS 0.1 0.0 - 0.1 K/UL ABS. IMM. GRANS. 0.0 0.00 - 0.04 K/UL  
 DF AUTOMATED METABOLIC PANEL, COMPREHENSIVE Collection Time: 09/28/18  3:27 AM  
Result Value Ref Range Sodium 147 (H) 136 - 145 mmol/L Potassium 3.4 (L) 3.5 - 5.1 mmol/L Chloride 114 (H) 97 - 108 mmol/L  
 CO2 25 21 - 32 mmol/L Anion gap 8 5 - 15 mmol/L Glucose 91 65 - 100 mg/dL BUN 8 6 - 20 MG/DL Creatinine 0.64 0.55 - 1.02 MG/DL  
 BUN/Creatinine ratio 13 12 - 20 GFR est AA >60 >60 ml/min/1.73m2 GFR est non-AA >60 >60 ml/min/1.73m2 Calcium 8.1 (L) 8.5 - 10.1 MG/DL Bilirubin, total 0.8 0.2 - 1.0 MG/DL  
 ALT (SGPT) 19 12 - 78 U/L  
 AST (SGOT) 25 15 - 37 U/L Alk. phosphatase 53 45 - 117 U/L Protein, total 6.1 (L) 6.4 - 8.2 g/dL Albumin 2.6 (L) 3.5 - 5.0 g/dL Globulin 3.5 2.0 - 4.0 g/dL A-G Ratio 0.7 (L) 1.1 - 2.2 Assessment:  
· PSC: status post ERCP with resolution of CBD stricture, stones/debris and stent removed; normal cholangiogram at end of procedure; WBC and LFT's normal  
 
Patient Active Problem List  
Diagnosis Code  RA (rheumatoid arthritis) (Former[facility]) M06.9  Essential hypertension I10  
 Postmenopausal Z78.0  Hypothyroidism E03.9  Allergic rhinitis J30.9  Chronic constipation K59.09  
 Vitamin D deficiency E55.9  Abnormal gait R26.9  Insomnia G47.00  Gastroesophageal reflux disease without esophagitis K21.9  Chronic midline low back pain without sciatica M54.5, G89.29  
 ACP (advance care planning) Z71.89  
 Hyperlipidemia E78.5  Gastroparesis K31.84  
 Hypokalemia E87.6  Rhabdomyolysis M62.82  
 Fall W19. Sallye Burows  Hyperbilirubinemia E80.6  Elevated AST (SGOT) R74.0  Chronic pain G89.29  
 Bile duct stone K80.50  S/P ERCP I63.848 Plan: · Lipase pending · Stable for discharge, patient prefers to find GI in [place] to follow-up with, she will request records from our office to be transfered Signed By: Guera Verduzco NP   
 9/28/2018  9:34 AM 
  
 
 
 
 
 
 
I have examined the patient. I have reviewed the chart and agree with the documentation recorded by the NP, including the assessment, treatment plan, and disposition. May discharge patient Rosie Figueroa MD

## 2018-09-28 NOTE — PROGRESS NOTES
Occupational Therapy Note 9/28/2018 Orders acknowledged, chart reviewed. Spoke with PT who reports pt has been up ad gwendolyn in room (per RN report) with no acute OT needs at this time, completing self-care at baseline IND level. Will sign-off.  
 
Noemy Alfredo, OTR/L

## 2018-09-28 NOTE — PROGRESS NOTES
Problem: Falls - Risk of 
Goal: *Absence of Falls Document Quique Ramirez Fall Risk and appropriate interventions in the flowsheet. Outcome: Progressing Towards Goal 
Fall Risk Interventions: 
Mobility Interventions: Communicate number of staff needed for ambulation/transfer, Patient to call before getting OOB, Strengthening exercises (ROM-active/passive), Utilize walker, cane, or other assistive device Medication Interventions: Evaluate medications/consider consulting pharmacy, Patient to call before getting OOB, Teach patient to arise slowly

## 2018-09-28 NOTE — ROUTINE PROCESS
Bedside and Verbal shift change report given to Shama RN (oncoming nurse) by Liza Canales RN (offgoing nurse). Report included the following information SBAR, Kardex, Intake/Output, MAR, Accordion and Recent Results.

## 2018-09-28 NOTE — PROGRESS NOTES
Primary Nurse Raymundo Black RN and Serina Lance RN performed a dual skin assessment on this patient No impairment noted Jamin score is 23

## 2018-09-28 NOTE — PROGRESS NOTES
Physical Therapy Note Chart reviewed and discussed with RN. Per RN patient has been up ad gwendolyn with rollator multiple times. Per patient report, PTA patient was ambulating up to 5 miles per day with rollator and safely/confidently ascending/descending stairs. In November patient will be moving closer to son in an apartment with an elevator. Patient is at functional baseline with no concerns. PT will complete orders and is clearing patient for discharge home with no services. Please reconsult if patient's status changes. Discussed case with OT. Pau Lee PT, DPT Time spent 10min

## 2018-09-28 NOTE — ROUTINE PROCESS
Bedside and Verbal shift change report given to [address] (oncoming nurse) by Jordan Oshea (offgoing nurse). Report included the following information SBAR, Kardex, Procedure Summary, Intake/Output, MAR, Accordion, Recent Results and Med Rec Status.

## 2018-09-28 NOTE — DISCHARGE SUMMARY
Discharge Summary       PATIENT ID: Shanita Preciado  MRN: [id number]   YOB: 1945    DATE OF ADMISSION: 9/27/2018 12:10 PM    DATE OF DISCHARGE: 9/28/2018  PRIMARY CARE PROVIDER: Halima Contreras MD     ATTENDING PHYSICIAN: Michael Calix MD  DISCHARGING PROVIDER: Michael Calix MD    To contact this individual call [phone] and ask the  to page. If unavailable ask to be transferred the Adult Hospitalist Department. CONSULTATIONS: None    PROCEDURES/SURGERIES: Procedure(s):  ENDOSCOPIC RETROGRADE CHOLANGIOPANCREATOGRAPHY (ERCP)  ENDOSCOPY WITH PROSTHESIS OR STENT REMOVAL  ENDOSCOPIC STONE EXTRACTION/BALLOON SWEEP    ADMITTING [facility] COURSE:   This is a 79-year-old -American woman with past medical history significant for primary sclerosing cholangitis, status post biliary stent, status post cholecystectomy, rheumatoid arthritis, hypothyroidism, anemia of chronic disease, chronic back pain, hypertension, gastroesophageal reflux disease, history of coronary artery disease, herpes simplex virus infection and incontinence, who had ERCP for common bile duct stones and a stricture and underwent ERCP, status post removal of stones and biliary stent and hospitalist service is involved for post-ERCP observation and management. Bile duct stone and stricture with history of primary sclerosing cholangitis, status post ERCP with removal of stone and removal of biliary stent. Admitetd and observed over night. She remained free of abdominal pain,tolerated diet and was discharged in stable condition. GO okay to discharge. Hypertension. Anemia of chronic disease. History of chronic back pain. Hypothyroidism. History of gastroesophageal reflux disease. History of rheumatoid arthritis. History of coronary artery disease, stable, no chest pain. Patient advised to continue to take all her home medications which have NOT been changed.              PENDING TEST RESULTS:   At the time of discharge the following test results are still pending: none    FOLLOW UP APPOINTMENTS:    Follow-up Information     Follow up With Details Comments [address], [place]   [address]  [address]  [address]  [phone]           DISCHARGE DIAGNOSES and ADDITIONAL CARE RECOMMENDATIONS:   Post ERCP procedure you were observed in the hospital over night. You did not have abdominal pain,you have tolerated diet and lab work was fine and you are discharged home. Continue your out patient follow up as before. If you experience severe abdominal pain, associated with distension,nausea or vomiting,go to the ER/call 911. Note, none of your home medications are changed,take all your medications as before. DIET: Cardiac Diet    ACTIVITY: Activity as tolerated    WOUND CARE: NA    EQUIPMENT needed: NA          DISCHARGE MEDICATIONS:  Current Discharge Medication List      CONTINUE these medications which have NOT CHANGED    Details   acyclovir (ZOVIRAX) 400 mg tablet TAKE 1 TABLET TWICE A DAY. TAKE 1 TAB 3 TIMES DAILY   WHEN OUTBREAK OCCURS. Qty: 180 Tab, Refills: 3      carvedilol (COREG) 3.125 mg tablet TAKE 1 TABLET TWICE A DAY  Qty: 180 Tab, Refills: 0      pantoprazole (PROTONIX) 40 mg tablet TAKE 1 TABLET DAILY FOR    GASTROENTERITIS  Qty: 90 Tab, Refills: 3      atorvastatin (LIPITOR) 20 mg tablet TAKE 1 TABLET NIGHTLY  Qty: 90 Tab, Refills: 0      potassium chloride (KAON 20%) 40 mEq/15 mL liqd Take  by mouth daily. LORazepam (ATIVAN) 0.5 mg tablet Take 1 Tab by mouth nightly as needed for Anxiety. Max Daily Amount: 0.5 mg.  Qty: 90 Tab, Refills: 0    Associated Diagnoses: Insomnia, unspecified type      baclofen (LIORESAL) 10 mg tablet Take 10 mg by mouth nightly.      gabapentin (NEURONTIN) 100 mg capsule Take 100 mg by mouth nightly.       levothyroxine (SYNTHROID) 50 mcg tablet TAKE 1 TABLET DAILY  Qty: 90 Tab, Refills: 3 Associated Diagnoses: Acquired hypothyroidism      hydroCHLOROthiazide (HYDRODIURIL) 25 mg tablet TAKE 1 TABLET DAILY FOR    EDEMA AND HYPERTENSION  Qty: 90 Tab, Refills: 3    Associated Diagnoses: Essential hypertension with goal blood pressure less than 130/85      montelukast (SINGULAIR) 10 mg tablet TAKE 1 TABLET DAILY  Qty: 90 Tab, Refills: 3      nystatin (MYCOSTATIN) topical cream Apply  to affected area two (2) times a day. Qty: 30 g, Refills: 0      cholecalciferol (VITAMIN D3) 1,000 unit tablet Take 1,000 Units by mouth two (2) times a day. multivitamin (ONE A DAY) tablet Take 1 Tab by mouth daily. azaTHIOprine (IMURAN) 50 mg tablet Take 150 mg by mouth daily. aspirin 81 mg tablet Take 81 mg by mouth daily. vitamin E (AQUA GEMS) 400 unit capsule Take 400 Units by mouth two (2) times a day. NOTIFY YOUR PHYSICIAN FOR ANY OF THE FOLLOWING:   Fever over 101 degrees for 24 hours. Chest pain, shortness of breath, fever, chills, nausea, vomiting, diarrhea, change in mentation, falling, weakness, bleeding. Severe pain or pain not relieved by medications. Or, any other signs or symptoms that you may have questions about.     DISPOSITION:    Home With:   OT  PT  HH  RN       Long term SNF/Inpatient Rehab    Independent/assisted living    Hospice    Other:       PATIENT CONDITION AT DISCHARGE:     Functional status    Poor     Deconditioned    x Independent      Cognition    x Lucid     Forgetful     Dementia      Catheters/lines (plus indication)    Wilkerson     PICC     PEG    x None      Code status    x Full code     DNR      PHYSICAL EXAMINATION AT DISCHARGE:     Visit Vitals    /81 (BP 1 Location: Left arm, BP Patient Position: Sitting)    Pulse 61    Temp 98 °F (36.7 °C)    Resp 17    SpO2 100%    Breastfeeding No    O2 Flow Rate (L/min): 3 l/min O2 Device: Room air    Temp (24hrs), Av.1 °F (36.7 °C), Min:97.9 °F (36.6 °C), Max:98.2 °F (36.8 °C)         1901 - 09/28 0700  In: 360 [P.O.:360]  Out: -     GENERAL:  Alert, oriented, cooperative, no apparent distress  HEENT:  Normocephalic, atraumatic, non icteric sclerae, non pallor conjuctivae, EOMs intact, PERRLA. NECK: Supple, trachea midline, no adenopathy, no thyromegally or tenderness, no carotid bruit and no JVD. LUNGS:   Vesicular breath sounds bilaterally, no added sounds. HEART:   S1 and S2 well heard,RRR,  no murmur, click, rub or gallop. ABDOMEB:   Soft, non-tender. Normoactive bowel sounds. No masses,  No organomegaly. EXTREMETIES:  Atraumatic, acyanotic, no edema  PULSES: 2+ and symmetric all extremities. SKIN:  No rashes or lesions  NEUROLOGY: Alert and oriented to PPT, CNII-XII intact. Motor and sensory exam grossly intact. CHRONIC MEDICAL DIAGNOSES:  Problem List as of 9/28/2018  Date Reviewed: 9/27/2018          Codes Class Noted - Resolved    Bile duct stone ICD-10-CM: K80.50  ICD-9-CM: 574.50  9/27/2018 - Present        * (Principal)S/P ERCP ICD-10-CM: O20.076  ICD-9-CM: V45.89  9/27/2018 - Present        Rhabdomyolysis ICD-10-CM: M62.82  ICD-9-CM: 728.88  7/9/2018 - Present        Fall ICD-10-CM: W19. Meryle Ducking  ICD-9-CM: E888.9  7/9/2018 - Present        Hyperbilirubinemia ICD-10-CM: E80.6  ICD-9-CM: 782.4  7/9/2018 - Present        Elevated AST (SGOT) ICD-10-CM: R74.0  ICD-9-CM: 790.4  7/9/2018 - Present        Chronic pain (Chronic) ICD-10-CM: W61.50  ICD-9-CM: 338.29  7/9/2018 - Present    Overview Signed 7/9/2018  1:51 PM by Wyatt Pineda MD     low back pain/arthritis      [facility]  note9/13/17             Hypokalemia ICD-10-CM: E87.6  ICD-9-CM: 276.8  10/18/2017 - Present        Gastroparesis ICD-10-CM: K31.84  ICD-9-CM: 536.3  7/31/2017 - Present        Hyperlipidemia ICD-10-CM: E78.5  ICD-9-CM: 272.4  6/26/2017 - Present        ACP (advance care planning) ICD-10-CM: Z71.89  ICD-9-CM: V65.49  11/30/2016 - Present    Overview Signed 11/30/2016 12:23 PM by Harlan Cordero Patient brought her ACP to visit. It was reviewed by me.               Gastroesophageal reflux disease without esophagitis ICD-10-CM: K21.9  ICD-9-CM: 530.81  10/24/2016 - Present        Chronic midline low back pain without sciatica ICD-10-CM: M54.5, G89.29  ICD-9-CM: 724.2, 338.29  10/24/2016 - Present        Insomnia ICD-10-CM: G47.00  ICD-9-CM: 780.52  7/22/2015 - Present        Abnormal gait ICD-10-CM: R26.9  ICD-9-CM: 781.2  5/14/2015 - Present        Vitamin D deficiency ICD-10-CM: E55.9  ICD-9-CM: 268.9  3/16/2015 - Present        Allergic rhinitis ICD-10-CM: J30.9  ICD-9-CM: 477.9  10/7/2014 - Present        Chronic constipation ICD-10-CM: K59.09  ICD-9-CM: 564.00  10/7/2014 - Present        Hypothyroidism ICD-10-CM: E03.9  ICD-9-CM: 244.9  11/30/2012 - Present        Postmenopausal ICD-10-CM: Z78.0  ICD-9-CM: V49.81  10/17/2012 - Present        RA (rheumatoid arthritis) ([facility] ICD-10-CM: M06.9  ICD-9-CM: 714.0  2/9/2010 - Present        Essential hypertension ICD-10-CM: I10  ICD-9-CM: 401.9  2/9/2010 - Present        RESOLVED: Pruritic erythematous rash ICD-10-CM: L29.8  ICD-9-CM: 698.8  7/9/2018 - 7/31/2018        RESOLVED: Facial mass ICD-10-CM: R22.0  ICD-9-CM: 784.2  7/9/2018 - 7/31/2018        RESOLVED: Gram-negative bacteremia ICD-10-CM: R78.81  ICD-9-CM: 790.7, 041.85  11/28/2017 - 7/31/2018        RESOLVED: E coli bacteremia ICD-10-CM: R78.81  ICD-9-CM: 790.7, 041.49  5/24/2017 - 6/26/2017        RESOLVED: Oral candidiasis ICD-10-CM: B37.0  ICD-9-CM: 112.0  5/24/2017 - 6/26/2017        RESOLVED: Septic shock ([facility] ICD-10-CM: A41.9, R65.21  ICD-9-CM: 038.9, 785.52, 995.92  5/15/2017 - 6/26/2017        RESOLVED: Septic encephalopathy ICD-10-CM: G93.41  ICD-9-CM: 348.31  5/15/2017 - 5/24/2017        RESOLVED: Demand ischemia ([facility] ICD-10-CM: I24.8  ICD-9-CM: 411.89  5/15/2017 - 7/31/2018        RESOLVED: Partial small bowel obstruction ([facility] ICD-10-CM: K56.600  ICD-9-CM: 560.9  5/15/2017 - 6/26/2017 RESOLVED: Hypokalemia ICD-10-CM: E87.6  ICD-9-CM: 276.8  5/15/2017 - 5/24/2017        RESOLVED: High anion gap metabolic acidosis LFV-11-IU: E87.2  ICD-9-CM: 276.2  5/15/2017 - 5/24/2017        RESOLVED: JEANINE (acute kidney injury) ([facility].) ICD-10-CM: N17.9  ICD-9-CM: 584.9  5/15/2017 - 5/24/2017        RESOLVED: Abnormal LFTs ICD-10-CM: R94.5  ICD-9-CM: 790.6  5/15/2017 - 5/24/2017        RESOLVED: Acute gastroenteritis ICD-10-CM: K52.9  ICD-9-CM: 558.9  5/15/2017 - 5/24/2017        RESOLVED: Elevated BP ICD-10-CM: HKS4055  ICD-9-CM: Hakan Makrosendo  9/21/2016 - 10/24/2016        RESOLVED: Thalamic hemorrhage ([facility] ICD-10-CM: I61.0  ICD-9-CM: 356  12/29/2015 - 10/24/2016        RESOLVED: TIA (transient ischemic attack) ICD-10-CM: G45.9  ICD-9-CM: 435.9  12/23/2015 - 10/24/2016        RESOLVED: Chest pain ICD-10-CM: R07.9  ICD-9-CM: 786.50  12/22/2015 - 10/24/2016        RESOLVED: Discitis of lumbar region ICD-10-CM: M46.46  ICD-9-CM: 722.93  12/9/2015 - 10/24/2016        RESOLVED: Left-sided low back pain with left-sided sciatica ICD-10-CM: M54.42  ICD-9-CM: 724.3  12/9/2015 - 10/24/2016        RESOLVED: Back pain ICD-10-CM: M54.9  ICD-9-CM: 724.5  8/23/2015 - 10/24/2016        RESOLVED: Hypotension ICD-10-CM: I95.9  ICD-9-CM: 458.9  8/23/2015 - 9/21/2016        RESOLVED: Glossitis ICD-10-CM: K14.0  ICD-9-CM: 529.0  7/22/2015 - 10/24/2016        RESOLVED: Prediabetes ICD-10-CM: R73.03  ICD-9-CM: 790.29  7/22/2015 - 10/19/2017        RESOLVED: Sore throat ICD-10-CM: J02.9  ICD-9-CM: 749  3/16/2015 - 7/22/2015        RESOLVED: Obesity, Class II, BMI 35-39.9 ICD-10-CM: E66.9  ICD-9-CM: 278.00  1/21/2015 - 10/24/2016        RESOLVED: Tinea manus ICD-10-CM: B35.2  ICD-9-CM: 110.2  10/7/2014 - 10/18/2017        RESOLVED: LLQ abdominal pain ICD-10-CM: R10.32  ICD-9-CM: 789.04  10/7/2014 - 1/21/2015        RESOLVED: Obesity, unspecified ICD-10-CM: E66.9  ICD-9-CM: 278.00  12/10/2010 - 1/21/2015                Signed:    Tarah Johnston Jo Ann Draper MD  9/28/2018  4:25 PM

## 2018-09-28 NOTE — PROGRESS NOTES
Problem: Falls - Risk of 
Goal: *Absence of Falls Document Tricia Shadow Fall Risk and appropriate interventions in the flowsheet. Outcome: Progressing Towards Goal 
Fall Risk Interventions: 
  
 
  
 
Medication Interventions: Evaluate medications/consider consulting pharmacy, Patient to call before getting OOB, Teach patient to arise slowly

## 2018-09-29 NOTE — ROUTINE PROCESS
Pt discharged home. Discharge instructions given and explained to patient. Pt verbalized understanding. Peripheral IV discontinued, tip intact, pt tolerated well. Pt son at bedside. Pt off unit via wheelchair. Pt's son to transport pt home.

## 2018-10-02 ENCOUNTER — PATIENT OUTREACH (OUTPATIENT)
Dept: FAMILY MEDICINE CLINIC | Age: 73
End: 2018-10-02

## 2018-10-02 NOTE — ACP (ADVANCE CARE PLANNING)
- ACP document on file. Dated 11/30/2016. Primary agent michelle Caballero. - As per Hospitalist ACP documentation ACP document UTD.

## 2018-10-02 NOTE — PROGRESS NOTES
Hospital Discharge Follow-Up Date/Time:  10/2/2018 9:32 AM 
 
Patient was admitted to [address] on 9/27/18 and discharged on 9/28/18 for Bile duct stone and stricture with history of primary sclerosing cholangitis, S/P ERCP with removal of stone & biliary stent. The physician discharge summary was vailable at the time of outreach. Patient was contacted within 2 business days of discharge. Top Challenges reviewed with the provider - GI f/u in 6-8 weeks. Pt plans to move to [place] in Nov 2018. Prefers to f/u there. - Last PCP f/u 7/31/18. 3 month f/u recommendation. Appt previously scheduled for 10/30/18 @ 10:00 AM. Method of communication with provider :chart routing Inpatient RRAT score: 20 Was this a readmission? no  
Patient stated reason for the readmission: n/a Attempted to contact pt. Message left. Disease Specific:   N/A Summary of patient's top problems: 
1. Bile duct stone and stricture with history of primary sclerosing cholangitis, S/P ERCP with removal of stone & biliary stent- 9/28/18 WBC, LFTs & Lipase wnl. No change in meds. Home Health orders at discharge: none [facility]: n/a Date of initial visit: n/a Durable Medical Equipment ordered/company: none Durable Medical Equipment received: n/a Advance Care Planning:  
Does patient have an Advance Directive:  reviewed and current Medication(s):  
New Medications at Discharge: none Changed Medications at Discharge: none Discontinued Medications at Discharge: none Referral to Pharm D needed: no  
 
Current Outpatient Prescriptions Medication Sig  
 acyclovir (ZOVIRAX) 400 mg tablet TAKE 1 TABLET TWICE A DAY. TAKE 1 TAB 3 TIMES DAILY   WHEN OUTBREAK OCCURS.  carvedilol (COREG) 3.125 mg tablet TAKE 1 TABLET TWICE A DAY  pantoprazole (PROTONIX) 40 mg tablet TAKE 1 TABLET DAILY FOR    GASTROENTERITIS  atorvastatin (LIPITOR) 20 mg tablet TAKE 1 TABLET NIGHTLY  potassium chloride (KAON 20%) 40 mEq/15 mL liqd Take  by mouth daily.  LORazepam (ATIVAN) 0.5 mg tablet Take 1 Tab by mouth nightly as needed for Anxiety. Max Daily Amount: 0.5 mg.  
 baclofen (LIORESAL) 10 mg tablet Take 10 mg by mouth nightly.  gabapentin (NEURONTIN) 100 mg capsule Take 100 mg by mouth nightly.  levothyroxine (SYNTHROID) 50 mcg tablet TAKE 1 TABLET DAILY  hydroCHLOROthiazide (HYDRODIURIL) 25 mg tablet TAKE 1 TABLET DAILY FOR    EDEMA AND HYPERTENSION  montelukast (SINGULAIR) 10 mg tablet TAKE 1 TABLET DAILY  nystatin (MYCOSTATIN) topical cream Apply  to affected area two (2) times a day. (Patient taking differently: Apply  to affected area as needed.)  cholecalciferol (VITAMIN D3) 1,000 unit tablet Take 1,000 Units by mouth two (2) times a day.  multivitamin (ONE A DAY) tablet Take 1 Tab by mouth daily.  azaTHIOprine (IMURAN) 50 mg tablet Take 150 mg by mouth daily.  aspirin 81 mg tablet Take 81 mg by mouth daily.  vitamin E (AQUA GEMS) 400 unit capsule Take 400 Units by mouth two (2) times a day. No current facility-administered medications for this visit. There are no discontinued medications. [facility] follow up appointment(s):  
Future Appointments Date Time Provider Tiara Ann 10/30/2018 10:00 AM Malinda Jennings MD [facility] [address] Non-[facility] follow up appointment(s): none Goals  Transitions of Care- collaboration & care coordination to prevent complications post hospitalization. 10/2/18- attempted to contact pt. Message left. Per EMR review pt reported will be moving closer to son in [place] to an apartment w/ an elevator. GI f/u recommendation 6-8 weeks. Per documentation pt prefers to f/u in [place]. To request DI records from [facility] [address] [place] [address] office when new GI provider selected. No new neds @ d/c. Last PCP f/u 7/31/18 w/ 3 month f/u recommendation.  Plan- NN to f/u w/ pt ~ 2 days if no response to NN message. Schedule PCP f/u appt if pt agreeable-ID.

## 2018-10-02 NOTE — PROGRESS NOTES
Hospital Discharge Follow-Up Date/Time:  10/2/2018 9:32 AM 
 
Patient was admitted to [address] [place] [address]) on 18 and discharged on 18 for Bile duct stone and stricture with history of primary sclerosing cholangitis, S/P ERCP with removal of stone & biliary stent. . The physician discharge summary {[employer] Single Select Template:::\"was\",\"was not\"} available at the time of outreach. Patient was contacted within *** business days of discharge. Top Challenges reviewed with the provider *** Method of communication with provider :{[employer] Multiple Select Template:::\"face to face\",\"chart routing\",\"staff message\",\"phone\",\"none\"} Inpatient RRAT score: *** Was this a readmission? {gen no default/yes/free text:259802::\"yes\"} Patient stated reason for the readmission: *** Nurse Navigator (NN) contacted the {Blank Single Select Template:::\"patient\",\"family\",\"caregiver\",\"parent\"} by telephone to perform post hospital discharge assessment. Verified name and  with {Blank Single Select Template:::\"patient\",\"family\",\"caregiver\",\"parent\"} as identifiers. Provided introduction to self, and explanation of the Nurse Navigator role. Reviewed discharge instructions and red flags with {Blank Single Select Template:::\"patient\",\"family\",\"caregiver\",\"parent\"} who verbalized understanding. {Blank Single Select Template:::\"Patient\",\"Family\",\"Caregiver\",\"Parent\"} given an opportunity to ask questions and does not have any further questions or concerns at this time. The {Blank Single Select Template:::\"patient\",\"family\",\"caregiver\",\"parent\"} agrees to contact the PCP office for questions related to their healthcare. NN provided contact information for future reference. Disease Specific:   {Blank Single Select Template:::\"CHF\",\"COPD\",\"Sepsis\",\"N/A\"} Summary of patient's top problems: 
1. *** 
2. *** 
3. *** 
 
 Home Health orders at discharge: {[employer] Multiple Select Template:20062::\"PT\",\"OT\",\"SN\",\"SLP\",\"SW\",\"Personal Care Aide\",\"tele monitoring\",\"H2H\",\"patient refused\",\"resumption of care\",\"prior history with non [facility]\",\"none\"} Home Health company: *** Date of initial visit: *** Durable Medical Equipment ordered/company: *** Durable Medical Equipment received: *** Barriers to care? {[employer] Multiple Select Template:20062::\"depression\",\"financial\",\"ineffective coping\",\"lack of knowledge about disease\",\"level of motivation\",\"medication management\",\"PCP relationship\",\"stages of grief\",\"support system\",\"transportation\",\"utilization of services\"} Advance Care Planning:  
Does patient have an Advance Directive:  {[employer] Single Select Template:20061::\"reviewed and current\",\"reviewed and needs to be updated\",\"not on file; education provided\",\"not on file\",\"patient declined education\",\"referred to a Certified Facilitator\"} Medication(s):  
New Medications at Discharge: *** Changed Medications at Discharge: *** Discontinued Medications at Discharge: *** Medication reconciliation was performed with {[employer] Single Select Template:20061::\"patient\",\"family\",\"caregiver\",\"parent\"}, who verbalizes understanding of administration of home medications. There {[employer] Single Select Template:20061::\"were\",\"were no\"} barriers to obtaining medications identified at this time. Referral to Pharm D needed: {gen no default/yes/free text:281127::\"yes\"} Current Outpatient Prescriptions Medication Sig  
 acyclovir (ZOVIRAX) 400 mg tablet TAKE 1 TABLET TWICE A DAY. TAKE 1 TAB 3 TIMES DAILY   WHEN OUTBREAK OCCURS.  carvedilol (COREG) 3.125 mg tablet TAKE 1 TABLET TWICE A DAY  pantoprazole (PROTONIX) 40 mg tablet TAKE 1 TABLET DAILY FOR    GASTROENTERITIS  atorvastatin (LIPITOR) 20 mg tablet TAKE 1 TABLET NIGHTLY  potassium chloride (KAON 20%) 40 mEq/15 mL liqd Take  by mouth daily.  LORazepam (ATIVAN) 0.5 mg tablet Take 1 Tab by mouth nightly as needed for Anxiety. Max Daily Amount: 0.5 mg.  
 baclofen (LIORESAL) 10 mg tablet Take 10 mg by mouth nightly.  gabapentin (NEURONTIN) 100 mg capsule Take 100 mg by mouth nightly.  levothyroxine (SYNTHROID) 50 mcg tablet TAKE 1 TABLET DAILY  hydroCHLOROthiazide (HYDRODIURIL) 25 mg tablet TAKE 1 TABLET DAILY FOR    EDEMA AND HYPERTENSION  montelukast (SINGULAIR) 10 mg tablet TAKE 1 TABLET DAILY  nystatin (MYCOSTATIN) topical cream Apply  to affected area two (2) times a day. (Patient taking differently: Apply  to affected area as needed.)  cholecalciferol (VITAMIN D3) 1,000 unit tablet Take 1,000 Units by mouth two (2) times a day.  multivitamin (ONE A DAY) tablet Take 1 Tab by mouth daily.  azaTHIOprine (IMURAN) 50 mg tablet Take 150 mg by mouth daily.  aspirin 81 mg tablet Take 81 mg by mouth daily.  vitamin E (AQUA GEMS) 400 unit capsule Take 400 Units by mouth two (2) times a day. No current facility-administered medications for this visit. There are no discontinued medications. [facility] follow up appointment(s): Future Appointments Date Time Provider Tiara Juarez 10/30/2018 10:00 AM Liss Saucedo MD [facility] [address]. Non-[facility] follow up appointment(s): *** [facility]:  {Blank Single Select Template:20061::\"scheduled\",\"out of service area\",\"offered and patient declined\",\"information provided as a resource\",\"n/a\"} Goals None

## 2018-10-03 ENCOUNTER — PATIENT OUTREACH (OUTPATIENT)
Dept: FAMILY MEDICINE CLINIC | Age: 73
End: 2018-10-03

## 2018-10-03 RX ORDER — ASCORBIC ACID 500 MG
500 TABLET ORAL 2 TIMES DAILY
COMMUNITY

## 2018-10-03 NOTE — PROGRESS NOTES
Hospital Discharge Follow-Up Date/Time:  10/3/2018 11:05 AM 
 
Return call from pt in response to 10/2/18 NN message. Verified name and  with patient as identifiers. Provided introduction to self, and explanation of the Nurse Navigator role. Reviewed discharge instructions and red flags with patient who verbalized understanding. Patient given an opportunity to ask questions and does not have any further questions or concerns at this time. The patient agrees to contact the PCP office for questions related to their healthcare. NN provided contact information for future reference. Current Outpatient Prescriptions Medication Sig  
 ascorbic acid, vitamin C, (VITAMIN C) 500 mg tablet Take 500 mg by mouth two (2) times a day.  OTHER Take 1,200 mg by mouth two (2) times a day. Calcium 1200 mg  
 acyclovir (ZOVIRAX) 400 mg tablet TAKE 1 TABLET TWICE A DAY. TAKE 1 TAB 3 TIMES DAILY   WHEN OUTBREAK OCCURS.  carvedilol (COREG) 3.125 mg tablet TAKE 1 TABLET TWICE A DAY  pantoprazole (PROTONIX) 40 mg tablet TAKE 1 TABLET DAILY FOR    GASTROENTERITIS  atorvastatin (LIPITOR) 20 mg tablet TAKE 1 TABLET NIGHTLY  potassium chloride (KAON 20%) 40 mEq/15 mL liqd Take  by mouth daily.  LORazepam (ATIVAN) 0.5 mg tablet Take 1 Tab by mouth nightly as needed for Anxiety. Max Daily Amount: 0.5 mg.  
 baclofen (LIORESAL) 10 mg tablet Take 10 mg by mouth nightly.  gabapentin (NEURONTIN) 100 mg capsule Take 100 mg by mouth nightly.  levothyroxine (SYNTHROID) 50 mcg tablet TAKE 1 TABLET DAILY  hydroCHLOROthiazide (HYDRODIURIL) 25 mg tablet TAKE 1 TABLET DAILY FOR    EDEMA AND HYPERTENSION  montelukast (SINGULAIR) 10 mg tablet TAKE 1 TABLET DAILY  nystatin (MYCOSTATIN) topical cream Apply  to affected area two (2) times a day. (Patient taking differently: Apply  to affected area as needed.)  cholecalciferol (VITAMIN D3) 1,000 unit tablet Take 1,000 Units by mouth two (2) times a day.  multivitamin (ONE A DAY) tablet Take 1 Tab by mouth daily.  azaTHIOprine (IMURAN) 50 mg tablet Take 150 mg by mouth daily.  aspirin 81 mg tablet Take 81 mg by mouth daily.  vitamin E (AQUA GEMS) 400 unit capsule Take 400 Units by mouth two (2) times a day. No current facility-administered medications for this visit. There are no discontinued medications. [facility] follow up appointment(s):  
Future Appointments Date Time Provider Tiara Juarez 10/30/2018 10:00 AM Alexandr Fuentes MD [facility] [address] [employer]:  information provided as a resource Goals  Transitions of Care- collaboration & care coordination to prevent complications post hospitalization. 10/3/18- spoke w/ pt. Reported \"doing fine\". Denied abd pain, n/v. Appetite \"great\". Voiding w/o difficulty. +BMs. Reported moving to [place] on 11/23/18 or 11/28/18. Plans to enroll in [facility] during open enrollment. After enrollment will select PCP, GI, & Rheumatology providers. Red flag s/s & action plan reviewed. Patient advised providers on call 24 hours a day / 7 days a week (M-F 5 PM to 8 AM, and from Friday 5 PM until Monday 8 AM for the weekend) should the patient have questions or concerns. [employer] information provided. Previously scheduled PCP f/u on 10/30/18 confirmed. Plans to sign Authorization to Release Medical Record form @ that time. Plan- pt to attend PCP f/u as scheduled. Pt to complete medical record release form. NN to collaborate w/ pt, PCP, & other Care Team Members as needed for care coordination-ID. 10/2/18- attempted to contact pt. Message left. Per EMR review pt reported will be moving closer to son in [place] to an apartment w/ an elevator. GI f/u recommendation 6-8 weeks. Per documentation pt prefers to f/u in [place].  [facility] [address] [place] [address] office when new GI provider selected. No new neds @ d/c. Last PCP f/u 7/31/18 w/ 3 month f/u recommendation. Plan- NN to f/u w/ pt ~ 2 days if no response to NN message. Schedule PCP f/u appt if pt agreeable-ID.

## 2018-11-08 ENCOUNTER — OFFICE VISIT (OUTPATIENT)
Dept: FAMILY MEDICINE CLINIC | Age: 73
End: 2018-11-08

## 2018-11-08 VITALS
DIASTOLIC BLOOD PRESSURE: 77 MMHG | OXYGEN SATURATION: 98 % | TEMPERATURE: 98 F | RESPIRATION RATE: 19 BRPM | SYSTOLIC BLOOD PRESSURE: 133 MMHG | WEIGHT: 207.3 LBS | HEIGHT: 65 IN | HEART RATE: 68 BPM | BODY MASS INDEX: 34.54 KG/M2

## 2018-11-08 DIAGNOSIS — B36.9 FUNGAL RASH OF TRUNK: Primary | ICD-10-CM

## 2018-11-08 DIAGNOSIS — Z71.89 ADVANCED DIRECTIVES, COUNSELING/DISCUSSION: ICD-10-CM

## 2018-11-08 PROBLEM — N32.81 OAB (OVERACTIVE BLADDER): Status: ACTIVE | Noted: 2018-11-08

## 2018-11-08 RX ORDER — PREDNISONE 5 MG/1
TABLET ORAL
COMMUNITY
Start: 2018-08-07

## 2018-11-08 RX ORDER — NYSTATIN 100000 U/G
CREAM TOPICAL AS NEEDED
Qty: 30 G | Refills: 0 | Status: SHIPPED | OUTPATIENT
Start: 2018-11-08

## 2018-11-08 RX ORDER — CALCIUM CARBONATE 600 MG
TABLET ORAL
COMMUNITY
Start: 2010-11-08

## 2018-11-08 NOTE — ACP (ADVANCE CARE PLANNING)
Advance Care Planning    Advance Care Planning (ACP) Provider Conversation Snapshot    Date of ACP Conversation: 11/08/18  Persons included in Conversation:  patient  Length of ACP Conversation in minutes:  <16 minutes (Non-Billable)    Authorized Decision Maker (if patient is incapable of making informed decisions): This person is:    Other Legally Authorized Decision Maker (e.g. Next of Kin)  Blas        For Patients with Decision Making Capacity:   Values/Goals: Exploration of values, goals, and preferences if recovery is not expected, even with continued medical treatment in the event of:  Imminent death  Severe, permanent brain injury    Conversation Outcomes / Follow-Up Plan:   Reviewed existing Advance Directive

## 2018-11-08 NOTE — PROGRESS NOTES
Here for post hospital follow up. Moving to [place] end of month. Hosp in Sept overnight for observation. Had stents removed from bile duct. Doing well since then. Occas fatigued. Lasts 1-2 days. Takes Lorazepam as needed for sleep. Packing and cleaning out house. Got flu shot. Needs to get new Shingles vaccine when ins will cover. Has gained some weight. Plans to work on that post relocation. Plans to take care of eyes and OAB once relocates. On waiting list for 3 years for new location. This is the Subsequent Medicare Annual Wellness Exam, performed 12 months or more after the Initial AWV or the last Subsequent AWV I have reviewed the patient's medical history in detail and updated the computerized patient record. Eye doctor > 2-3 yrs. Dentist prn. Has implants. Colonoscopy '11.  10 yr repeat. Gyn and mammo past yr. DEXA done in past.  Rheum q 3-4 mos. GI 2 x annually. Urol past yr. Rehab x 2. No other signif hx past yr. History Past Medical History:  
Diagnosis Date  Abdominal pain 6/18/14  
 note from [facility]  Advance directive discussed with patient 07/22/2015  Arthritis   
 dr Shannon buck        2/6/17 f/u note  Bacteremia due to Klebsiella pneumoniae 2/2/2016 OV note from Dr Kebede Ion Disease  Chronic pain   
 low back pain/arthritis      [facility]  note9/13/17  Contact dermatitis and other eczema, due to unspecified cause   
 hyperpigmented macules/seb k  Elevated LFTs   
 per info from Dr Delia Amin at . [facility] on report  Endocrine disease   
 hypothyroid  GERD (gastroesophageal reflux disease)   
 chelsy burns  Heart attack ([facility] [address] 05/2017 [facility]  
 HSV infection  Hypertension  Hypothyroid 10/2012  Insomnia  Melasma 3/3/15  
 [facility]  Nausea & vomiting 05/04/2017  
 report [facility]  7/28/17 EGD showed delayed emptying  Pain management counseling, encounter for 05/05/2016  
 sees Dr Cortney Rasheed 5/2/17 f/u  Rhinitis, allergic nonseasonal   
 Screening for glaucoma 04/11/2016  Urge incontinence of urine 03/21/2017 [facility] eval note [facility] 4/17/17 urodymanics study//5/9/17 f/u note  Well woman exam (no gynecological exam) 07/29/2016  
 [facility] note rec'd Past Surgical History:  
Procedure Laterality Date  COLONOSCOPY  8/1/11  
 dr Billings Mantle 10 year repeat  HX CHOLECYSTECTOMY  HX ENDOSCOPY  2014 [address]  
 HX ENDOSCOPY  2017 [place]  
 HX ENDOSCOPY  07/28/2017  
 showed delayed emptying of stomach contents--gastroparesis to be r/o  
 HX OTHER SURGICAL  04/17/2017  
 complex [facility]rodynamics study [facility]  HX TUBAL LIGATION Current Outpatient Medications Medication Sig Dispense Refill  calcium carbonate (CALTREX) 600 mg calcium (1,500 mg) tablet Caltrate 600 600 mg calcium (1,500 mg) tablet Prescribed by non 606/706 Franny Zuñiga MD    
 nystatin (MYCOSTATIN) topical cream Apply  to affected area as needed. 30 g 0  
 ascorbic acid, vitamin C, (VITAMIN C) 500 mg tablet Take 500 mg by mouth two (2) times a day.  OTHER Take 1,200 mg by mouth two (2) times a day. Calcium 1200 mg    
 acyclovir (ZOVIRAX) 400 mg tablet TAKE 1 TABLET TWICE A DAY. TAKE 1 TAB 3 TIMES DAILY   WHEN OUTBREAK OCCURS. 180 Tab 3  carvedilol (COREG) 3.125 mg tablet TAKE 1 TABLET TWICE A  Tab 0  
 pantoprazole (PROTONIX) 40 mg tablet TAKE 1 TABLET DAILY FOR    GASTROENTERITIS 90 Tab 3  
 atorvastatin (LIPITOR) 20 mg tablet TAKE 1 TABLET NIGHTLY 90 Tab 0  
 potassium chloride (KAON 20%) 40 mEq/15 mL liqd Take  by mouth daily.  LORazepam (ATIVAN) 0.5 mg tablet Take 1 Tab by mouth nightly as needed for Anxiety. Max Daily Amount: 0.5 mg. 90 Tab 0  
 baclofen (LIORESAL) 10 mg tablet Take 10 mg by mouth nightly.  gabapentin (NEURONTIN) 100 mg capsule Take 100 mg by mouth nightly.  levothyroxine (SYNTHROID) 50 mcg tablet TAKE 1 TABLET DAILY 90 Tab 3  
 hydroCHLOROthiazide (HYDRODIURIL) 25 mg tablet TAKE 1 TABLET DAILY FOR    EDEMA AND HYPERTENSION 90 Tab 3  
 montelukast (SINGULAIR) 10 mg tablet TAKE 1 TABLET DAILY 90 Tab 3  cholecalciferol (VITAMIN D3) 1,000 unit tablet Take 1,000 Units by mouth two (2) times a day.  multivitamin (ONE A DAY) tablet Take 1 Tab by mouth daily.  azaTHIOprine (IMURAN) 50 mg tablet Take 150 mg by mouth daily.  aspirin 81 mg tablet Take 81 mg by mouth daily.  vitamin E (AQUA GEMS) 400 unit capsule Take 400 Units by mouth two (2) times a day.  predniSONE (DELTASONE) 5 mg tablet Allergies Allergen Reactions  Pcn [Penicillins] Swelling Has tolerated Cefepime  Tramadol Nausea and Vomiting SEVERE If taken w/o food  Proventil [Albuterol Sulfate] Hives  Ace Inhibitors Angioedema  Albuterol Swelling  
  swelling  Ambien [Zolpidem] Other (comments) Nightmares and memory disturbance  Chocolate [Cocoa] Diarrhea  Ciprofloxacin Other (comments) Sore throat and trouble swallowing  Clindamycin Hives  Lisinopril Swelling  Oxaprozin Nausea and Vomiting  
  severe stomach upset  Sulfadiazine Swelling  
  swelling  Trazodone Other (comments) Vivid dreams and felt disoriented Family History Problem Relation Age of Onset  Hypertension Mother  Heart Disease Mother  No Known Problems Father  Cancer Brother   
     prostate  Heart Disease Brother   
     blocked valves  Heart Disease Brother  Asthma Sister  Diabetes Sister  Hypertension Sister Social History Tobacco Use  Smoking status: Never Smoker  Smokeless tobacco: Never Used Substance Use Topics  Alcohol use: No  
 
Patient Active Problem List  
Diagnosis Code  RA (rheumatoid arthritis) ([facility]) M06.9  Essential hypertension I10  
 Postmenopausal Z78.0  Hypothyroidism E03.9  Allergic rhinitis J30.9  Chronic constipation K59.09  
 Vitamin D deficiency E55.9  Abnormal gait R26.9  Insomnia G47.00  Gastroesophageal reflux disease without esophagitis K21.9  Chronic midline low back pain without sciatica M54.5, G89.29  
 ACP (advance care planning) Z71.89  
 Hyperlipidemia E78.5  Gastroparesis K31.84  
 Hypokalemia E87.6  Rhabdomyolysis M62.82  
 Fall W19. [place]  Hyperbilirubinemia E80.6  Elevated AST (SGOT) R74.0  Chronic pain G89.29  
 Bile duct stone K80.50  S/P ERCP Z98.890  
 OAB (overactive bladder) N32.81 Depression Risk Factor Screening: PHQ over the last two weeks 11/8/2018 Little interest or pleasure in doing things Not at all Feeling down, depressed, irritable, or hopeless Not at all Total Score PHQ 2 0 Alcohol Risk Factor Screening: You do not drink alcohol or very rarely. Functional Ability and Level of Safety:  
Hearing Loss Hearing is good. Activities of Daily Living The home contains: Likely.co Patient does total self care Fall Risk Fall Risk Assessment, last 12 mths 11/8/2018 Able to walk? Yes Fall in past 12 months? Yes Fall with injury? No  
Number of falls in past 12 months 2 Fall Risk Score 2 Abuse Screen Patient is not abused Cognitive Screening Evaluation of Cognitive Function: 
Has your family/caregiver stated any concerns about your memory: no 
Normal 
 
Patient Care Team  
Patient Care Team: 
Sierra Hansen MD as PCP - Christopher Monaco MD as Consulting Provider (Rheumatology) Maria Del Carmen Rader MD (Gastroenterology) Giulia Fields MD as Physician (Infectious Diseases) Romulo Brock MD (Physical Medicine and Rehab) Raymon Guzman MD as Physician (Gynecology) Carrie Olivas MD (Urology) Ruthie Medina MD (Gastroenterology) Edison Zarco, RN as Ambulatory Care Navigator [facility]) Assessment/Plan Education and counseling provided: 
Are appropriate based on today's review and evaluation End-of-Life planning (with patient's consent) Pneumococcal Vaccine Influenza Vaccine Screening Mammography Screening Pap and pelvic (covered once every 2 years) Colorectal cancer screening tests Cardiovascular screening blood test 
Bone mass measurement (DEXA) Screening for glaucoma Diabetes screening test 
 
Diagnoses and all orders for this visit: 1. Fungal rash of trunk 
-     nystatin (MYCOSTATIN) topical cream; Apply  to affected area as needed. Health Maintenance Due Topic Date Due  Shingrix Vaccine Age 50> (1 of 2) 03/07/1994  GLAUCOMA SCREENING Q2Y  05/13/2018  MEDICARE YEARLY EXAM  10/29/2018

## 2018-11-08 NOTE — PATIENT INSTRUCTIONS
Medicare Wellness Visit, Female The best way to live healthy is to have a lifestyle where you eat a well-balanced diet, exercise regularly, limit alcohol use, and quit all forms of tobacco/nicotine, if applicable. Regular preventive services are another way to keep healthy. Preventive services (vaccines, screening tests, monitoring & exams) can help personalize your care plan, which helps you manage your own care. Screening tests can find health problems at the earliest stages, when they are easiest to treat. [facility] follows the current, evidence-based guidelines published by the [facility] Alexi Mookie ([facility]) when recommending preventive services for our patients. Because we follow these guidelines, sometimes recommendations change over time as research supports it. (For example, mammograms used to be recommended annually. Even though Medicare will still pay for an annual mammogram, the newer guidelines recommend a mammogram every two years for women of average risk.) Of course, you and your doctor may decide to screen more often for some diseases, based on your risk and your health status. Preventive services for you include: - Medicare offers their members a free annual wellness visit, which is time for you and your primary care provider to discuss and plan for your preventive service needs. Take advantage of this benefit every year! 
-All adults over the age of 72 should receive the recommended pneumonia vaccines. Current USPSTF guidelines recommend a series of two vaccines for the best pneumonia protection.  
-All adults should have a flu vaccine yearly and a tetanus vaccine every 10 years. All adults age 61 and older should receive a shingles vaccine once in their lifetime.   
-A bone mass density test is recommended when a woman turns 65 to screen for osteoporosis. This test is only recommended one time, as a screening. Some providers will use this same test as a disease monitoring tool if you already have osteoporosis. -All adults age 38-68 who are overweight should have a diabetes screening test once every three years.  
-Other screening tests and preventive services for persons with diabetes include: an eye exam to screen for diabetic retinopathy, a kidney function test, a foot exam, and stricter control over your cholesterol.  
-Cardiovascular screening for adults with routine risk involves an electrocardiogram (ECG) at intervals determined by your doctor.  
-Colorectal cancer screenings should be done for adults age 54-65 with no increased risk factors for colorectal cancer. There are a number of acceptable methods of screening for this type of cancer. Each test has its own benefits and drawbacks. Discuss with your doctor what is most appropriate for you during your annual wellness visit. The different tests include: colonoscopy (considered the best screening method), a fecal occult blood test, a fecal DNA test, and sigmoidoscopy. -Breast cancer screenings are recommended every other year for women of normal risk, age 54-69. 
-Cervical cancer screenings for women over age 72 are only recommended with certain risk factors.  
-All adults born between [facility] should be screened once for Hepatitis C. Here is a list of your current Health Maintenance items (your personalized list of preventive services) with a due date: 
Health Maintenance Due Topic Date Due  Shingles Vaccine (1 of 2) 03/07/1994  Glaucoma Screening   05/13/2018 [address] [place] [address] Annual Well Visit  10/29/2018

## 2018-11-08 NOTE — PROGRESS NOTES
Joan Graf  Identified pt with two pt identifiers(name and ). Chief Complaint Patient presents with  Follow-up Rm 2  
 
 
1. Have you been to the ER, urgent care clinic since your last visit? Hospitalized since your last visit? n 
 
2. Have you seen or consulted any other health care providers outside of the [address] [place] [address] since your last visit?n  Include any pap smears or colon screening. Advance Care Planning In the event something were to happen to you and you were unable to speak on your behalf, do you have an Advance Directive/ Living Will in place stating your wishes? YES If yes, do we have a copy on file YES If no, would you like information NO Medication reconciliation up to date and corrected with patient at this time. Today's provider has been notified of reason for visit, vitals and flowsheets obtained on patients. Reviewed record in preparation for visit, huddled with provider and have obtained necessary documentation. Health Maintenance Due Topic  Shingrix Vaccine Age 50> (1 of 2)  BREAST CANCER SCRN MAMMOGRAM   
 GLAUCOMA SCREENING Q2Y  MEDICARE YEARLY EXAM   
 
 
Wt Readings from Last 3 Encounters:  
18 207 lb 4.8 oz (94 kg) 18 191 lb 14.4 oz (87 kg) 18 190 lb (86.2 kg) Temp Readings from Last 3 Encounters:  
18 98 °F (36.7 °C) (Oral) 18 98 °F (36.7 °C)  
18 98 °F (36.7 °C) (Oral) BP Readings from Last 3 Encounters:  
18 133/77  
18 126/81  
18 130/69 Pulse Readings from Last 3 Encounters:  
18 68  
18 61  
18 69 Vitals:  
 18 1047 BP: 133/77 Pulse: 68 Resp: 19 Temp: 98 °F (36.7 °C) TempSrc: Oral  
SpO2: 98% Weight: 207 lb 4.8 oz (94 kg) Height: 5' 5\" (1.651 m) PainSc:   0 - No pain Learning Assessment: 
:  
 
Learning Assessment 2017 PRIMARY LEARNER Patient Patient HIGHEST LEVEL OF EDUCATION - PRIMARY LEARNER  GRADUATED HIGH SCHOOL OR GED GRADUATED HIGH SCHOOL OR GED  
BARRIERS PRIMARY LEARNER NONE NONE  
CO-LEARNER CAREGIVER No No  
PRIMARY LANGUAGE ENGLISH ENGLISH  
LEARNER PREFERENCE PRIMARY DEMONSTRATION READING  
  PICTURES -  
ANSWERED BY patient patient RELATIONSHIP SELF SELF Depression Screening: 
:  
 
PHQ over the last two weeks 11/8/2018 Little interest or pleasure in doing things Not at all Feeling down, depressed, irritable, or hopeless Not at all Total Score PHQ 2 0 Fall Risk Assessment: 
:  
 
Fall Risk Assessment, last 12 mths 11/8/2018 Able to walk? Yes Fall in past 12 months? Yes Fall with injury? No  
Number of falls in past 12 months 2 Fall Risk Score 2 Abuse Screening: 
:  
 
Abuse Screening Questionnaire 10/18/2017 Do you ever feel afraid of your partner? Maria Mandril Are you in a relationship with someone who physically or mentally threatens you? Maria Mandril Is it safe for you to go home? Y  
 
 
ADL Screening: 
:  
 
No flowsheet data found. BMI: 
Weight Metrics 11/8/2018 7/31/2018 7/9/2018 11/27/2017 10/18/2017 8/2/2017 6/26/2017 Weight 207 lb 4.8 oz 191 lb 14.4 oz 190 lb 180 lb 181 lb 179 lb 194 lb BMI 34.5 kg/m2 31.93 kg/m2 31.62 kg/m2 30.9 kg/m2 31.07 kg/m2 30.73 kg/m2 33.3 kg/m2 Medication reconciliation up to date and corrected with patient at this time.

## 2018-11-14 ENCOUNTER — PATIENT OUTREACH (OUTPATIENT)
Dept: FAMILY MEDICINE CLINIC | Age: 73
End: 2018-11-14

## 2018-11-14 NOTE — PROGRESS NOTES
Patient has graduated from the Transitions of Care Coordination  program on 10/27/18. Patient's symptoms are stable at this time. Patient/family has the ability to self-manage. Care management goals have been completed at this time. No further nurse navigator follow up scheduled. Goals Addressed This Visit's Progress  COMPLETED: Transitions of Care- collaboration & care coordination to prevent complications post hospitalization. 11/14/18- per EMR review PCP f/u 11/8/19. Pt reported doing well since hosp d/c. Plans to move to [place] @ end of month. Will be closer to son. Post hosp 30 day DUNIA period completed w/o hospital admission. Plan- resolve NN post hosp DUNIA Episode-ID. 10/3/18- spoke w/ pt. Reported \"doing fine\". Denied abd pain, n/v. Appetite \"great\". Voiding w/o difficulty. +BMs. Reported moving to [place] on 11/23/18 or 11/28/18. Plans to enroll in [facility] during open enrollment. After enrollment will select PCP, GI, & Rheumatology providers. Red flag s/s & action plan reviewed. Patient advised providers on call 24 hours a day / 7 days a week (M-F 5 PM to 8 AM, and from Friday 5 PM until Monday 8 AM for the weekend) should the patient have questions or concerns. [facility] information provided. Previously scheduled PCP f/u on 10/30/18 confirmed. Plans to sign Authorization to Release Medical Record form @ that time. Plan- pt to attend PCP f/u as scheduled. Pt to complete medical record release form. NN to collaborate w/ pt, PCP, & other Care Team Members as needed for care coordination-ID. 10/2/18- attempted to contact pt. Message left. Per EMR review pt reported will be moving closer to son in [place] to an apartment w/ an elevator. GI f/u recommendation 6-8 weeks. Per documentation pt prefers to f/u in [place].  To [facility] [address] [place] [address] office when [facility] GI provider selected. No new neds @ d/c. Last PCP f/u 7/31/18 w/ 3 month f/u recommendation. Plan- NN to f/u w/ pt ~ 2 days if no response to NN message. Schedule PCP f/u appt if pt agreeable-ID. Pt has nurse navigator's contact information for any further questions, concerns, or needs. Patients upcoming visits:  No future appointments.

## 2019-02-01 NOTE — PROGRESS NOTES
Bedside and Verbal shift change report given to Shivani (oncoming nurse) by Bisi Damon (offgoing nurse). Report included the following information SBAR, Kardex, MAR and Recent Results. Yes, it is fine to take the vitamin d with other medications.

## 2019-11-18 NOTE — PROGRESS NOTES
Chief Complaint   Patient presents with    Follow-up     [facility] and then to [facility] and discharged 6/17/17.  Medication Refill     Patient is taking her previous RX of Potassium 20 meq daily and had been on 40 meq daily at the SNF. Vomited while sleeping on 6/24/17 and did not remember vomiting. 1. Have you been to the ER, urgent care clinic since your last visit? Hospitalized since your last visit? Yes When: 5/14/17 Where: [facility] Reason for visit: Heart Attack    2. Have you seen or consulted any other health care providers outside of the [address] [place] [address] since your last visit? Include any pap smears or colon screening. Yes When: 6/17 Where: [facility] Andres Rosales Reason for visit: Labs. The patient was counseled on the dangers of tobacco use, and Patient is a non smoker. .  Reviewed strategies to maximize success, including Continue not to smoke. I have reviewed Health Maintenance with the patient and updated. Advance Care Plan is on file. jewelry

## 2020-10-09 NOTE — ED NOTES
Ambulated patient in the department with walker. Steady gait noted. O2 sats remained above 96%. Assessment (Free Text): The patient verified their identity with their photo ID and consented to evaluation and management of their medical condition through telehealth. This visit was conducted in real-time with an audio and video platform, Doxy. me during the [address] during a state of national emergency. This telehealth visit was medically necessary to prevent the community spread of COVID-19. \\n\\nThe patient is aware that we will bill their insurance for this telehealth visit following insurance guidelines. \\n\\nFace to face time with the patient was 15 minutes. \\n\\nConsent:\\n\\nPatient consented to the following prior to the visit: \"I consent and understand that I am initiating a synchronous video health session with my healthcare provider and will be asked to confirm my identity with photo identification. I understand that there are potential risks to this technology, including interruptions, potential data breaches, and technical difficulties. Poor video quality may interfere with my healthcare providerâs ability to accurately diagnose my condition. I understand that my health care provider or I can discontinue the telemedicine consult/visit if it is felt that the videoconferencing connections are not adequate for the situation. I also understand that my insurance carrier will be billed for healthcare services rendered. \" TYPED YES TO CONSENT Recommendation Preamble: Assessment: Detail Level: Simple

## 2021-01-01 NOTE — ROUTINE PROCESS
0745 received verbal bedside report from [facility] rn using sbar- marga  Interdisciplinary team rounds were held 5/15/2017 with the following team members:Care Management, Nursing, Nutrition, Pharmacy, Physical Therapy, Physician and Respiratory Therapy and the patient and child(wilber). Plan of care discussed. See clinical pathway and/or care plan for interventions and desired outcomes. checked and alarms  Bedside and Verbal shift change report given to Swapnil Mas rn (oncoming nurse) by Betty Zuleta (offgoing nurse). Report included the following information SBAR, Kardex, Intake/Output, MAR and Recent Results. Statement Selected

## 2021-07-05 NOTE — PROGRESS NOTES
Hospitalist Progress Note  Office: [phone]      Date of Service:  2017  NAME:  Saurav Owens  :  1945  MRN:  [id number]      Admission Summary:   66 yo woman with obesity, rheumatoid arthritis, chronic pain syndrome, gastroesophageal reflux disease,   hypertension, h/o herpes simplex virus infection was BIBEMS from home on 17 with nausea, vomiting, diarrhea and abdominal pain. Interval history / Subjective:    Awake, hungry, wants her breakfast tray. Otherwise, no acute concerns raised. Assessment & Plan:     1. Septic shock (POA) with E. coli bacteremia, toxic encephalopathy   - leukocytosis, fever, hypotension, lactic acidosis, tachypnea, tachycardia on admission   - leukocytosis not significantly changed, though had been given stress dose steroids, given E. Coli bacteremia, concern raised for intra-abdominal source for infection   - abd US  - heterogenous liver, increase small right pleural effusion, normal biliary tree s/p cholecystectomy, no hydronephrosis   - aggressive IVF resuscitation   - was on norepinephrine gtt, now weaned off   - on empiric cefepime, received vancomycin, de-escalated to ceftriaxone   - blood cultures  - E. Coli 3/3 bottles   - repeat blood cultures  - no growth thus far   - stool Cx 5/15 - negative   - stool caliber not amenable to C diff testing currently    2. Troponin elevation / NSTEMI   - suspect demand ischemia in setting of septic shock   - she will require stress test prior to discharge, plan for 2 day stress test with first day on    - start aspirin   - started on heparin drip x 48 hours   - seen by cardiology     3. Partial small bowel obstruction (POA)   - RUQ and epigastic TTP, still with nausea, but has flatus   - KUB  -  Nasogastric tube in place with tip projected in the expected location of the stomach. Continued relative paucity of abdominal gas.    - I don't think any of us are enrolled in [place] Medicaid.    Let patient know he needs to find a new doctor if he has moved to [place].    Patel Torrez M.D.     pain control, conservative management with bowel rest, diet advanced to clears   - GI / general surgery following    4. Hypokalemia (POA), hypophosphatemia   - replete prn    5. Anion gap metabolic acidosis (POA)   - likely due to lactic acidosis and exacerbated by GI losses and JEANINE, generally downtrending   - lactic acid normalized   - on sodium bicarbonate, now discontinued, started on normal saline    6. Acute kidney injury (POA)   - likely due to prerenal azotemia vs septic / hypotensive ATN   - on sodium bicarbonate, now discontinued   - CT abd/pelvis without contrast 5/15 - unremarkable kidneys   - nephrology following    7. Abnormal LFTs, hyperbilirubinemia (POA)   - suspect either dehydration or intra-abdominal sepsis, improving   - GI following   - avoid hepatotoxins    8. Acute gastroenteritis (POA)    - antiemetics prn   - pain control   - stool Cx 5/15 - negative   - C diff 5/15 - not able to be tested due to stool caliber   - PPI    9. Hypothyroidism   - TSH WNL; continue levothyroxine    10. Rheumatoid arthritis   - started stress dose steroids due to chronic steroid use, taper   - hold home Imuran    11. Hypomagnesemia   - replete prn    12. Anemia   - suspect some element of hemodilution, seems improved   - check occult blood stool    Code status: FULL  DVT prophylaxis: heparin    Care Plan discussed with: Patient/Family and Nurse  Disposition: TBD. Came from home. Unclear disposition currently. Hospital Problems  Date Reviewed: 5/15/2017          Codes Class Noted POA    * (Principal)Septic shock ([facility] [address] ICD-10-CM: A41.9, R65.21  ICD-9-CM: 038.9, 785.52, 995.92  5/15/2017 Yes            Review of Systems:   Pertinent items are noted in HPI. Vital Signs:    Last 24hrs VS reviewed since prior progress note.  Most recent are:  Visit Vitals    /76 (BP 1 Location: Right arm, BP Patient Position: At rest)    Pulse 71    Temp 98 °F (36.7 °C)    Resp 16    Ht 5' 4\" (1.626 m)    Wt 104.6 kg (230 lb 9.6 oz)    SpO2 96%    BMI 39.58 kg/m2         Intake/Output Summary (Last 24 hours) at 05/18/17 1514  Last data filed at 05/18/17 1503   Gross per 24 hour   Intake          1284.37 ml   Output             1350 ml   Net           -65.63 ml      Physical Examination:     Constitutional:  awake, calm, obese   ENT:  oral mucosa slightly dry, oropharynx benign   Resp:  clear to auscultation anteriorly   CV:  regular rhythm, slightly tachycardic, distant heart sounds    GI:  hypoactive BS, soft, non distended, mildly tender to palpation in epigastrum, obese     Musculoskeletal:  moves all extremities    Neurologic:  AAOx3, responds appropriately to questions and commands     Skin:  Good turgor, no rashes or ulcers  Eyes:  PERRL    Data Review:    Review and/or order of clinical lab test  Review and/or order of tests in the radiology section of CPT  Review and/or order of tests in the medicine section of CPT    Labs:     Recent Labs      05/18/17   0430  05/17/17   1050   WBC  23.6*  25.0*   HGB  9.5*  9.5*   HCT  27.3*  27.8*   PLT  95*  91*     Recent Labs      05/18/17   0430  05/17/17   0445  05/16/17   0428   NA  148*  143  142   K  2.7*  3.0*  3.1*   CL  106  100  104   CO2  35*  35*  26   BUN  27*  31*  31*   CREA  1.28*  1.93*  2.43*   GLU  120*  128*  139*   CA  7.7*  7.4*  7.2*   MG  2.3  2.5*  2.6*   PHOS  1.8*  1.7*  1.8*     Recent Labs      05/18/17   0430  05/17/17   0445  05/16/17   0428   SGOT  53*  68*  79*   ALT  42  45  43   AP  97  98  99   TBILI  1.3*  1.6*  2.4*   TP  5.3*  5.4*  5.7*   ALB  2.3*  2.6*  2.8*   GLOB  3.0  2.8  2.9     Recent Labs      05/18/17   1438  05/18/17   0430  05/17/17 2030   APTT  60.4*  55.1*  42.5*      No results for input(s): FE, TIBC, PSAT, FERR in the last 72 hours. No results found for: FOL, RBCF   No results for input(s): PH, PCO2, PO2 in the last 72 hours.   Recent Labs      05/17/17   0445  05/16/17   0428   CPK   --   299*   CKNDX   --   6.8* TROIQ  3.29*  6.88*     Lab Results   Component Value Date/Time    Cholesterol, total 99 12/23/2015 01:45 AM    HDL Cholesterol 22 12/23/2015 01:45 AM    LDL, calculated 54.4 12/23/2015 01:45 AM    Triglyceride 113 12/23/2015 01:45 AM    CHOL/HDL Ratio 4.5 12/23/2015 01:45 AM     Lab Results   Component Value Date/Time    Glucose (POC) 125 12/30/2015 11:28 AM    Glucose (POC) 101 12/23/2015 12:08 AM    Glucose (POC) 205 09/02/2015 11:44 AM    Glucose (POC) 112 09/02/2015 07:04 AM    Glucose (POC) 120 09/01/2015 09:41 PM     Lab Results   Component Value Date/Time    Color DARK YELLOW 05/15/2017 01:44 PM    Appearance TURBID 05/15/2017 01:44 PM    Specific gravity 1.012 05/15/2017 01:44 PM    Specific gravity 1.005 12/23/2015 01:45 AM    pH (UA) 6.0 05/15/2017 01:44 PM    Protein 100 05/15/2017 01:44 PM    Glucose NEGATIVE  05/15/2017 01:44 PM    Ketone NEGATIVE  05/15/2017 01:44 PM    Bilirubin NEGATIVE  02/02/2017 01:27 AM    Urobilinogen 0.2 05/15/2017 01:44 PM    Nitrites NEGATIVE  05/15/2017 01:44 PM    Leukocyte Esterase MODERATE 05/15/2017 01:44 PM    Epithelial cells FEW 05/15/2017 01:44 PM    Bacteria 2+ 05/15/2017 01:44 PM    WBC 0-4 05/15/2017 01:44 PM    RBC 0-5 05/15/2017 01:44 PM     Medications Reviewed:     Current Facility-Administered Medications   Medication Dose Route Frequency    carvedilol (COREG) tablet 3.125 mg  3.125 mg Oral BID    dextrose 5% with KCl 20 mEq/L infusion   IntraVENous CONTINUOUS    sodium chloride (NS) 0.9 % flush        [START ON 5/19/2017] cefTRIAXone (ROCEPHIN) 2 g in 0.9% sodium chloride (MBP/ADV) 50 mL  2 g IntraVENous Q24H    hydrocortisone Sod Succ (PF) (SOLU-CORTEF) injection 25 mg  25 mg IntraVENous Q8H    heparin 25,000 units in D5W 250 ml infusion  9-25 Units/kg/hr IntraVENous TITRATE    ipratropium (ATROVENT) 0.02 % nebulizer solution 0.5 mg  0.5 mg Nebulization Q6H PRN    aspirin chewable tablet 81 mg  81 mg Oral DAILY    HYDROmorphone (PF) (DILAUDID) injection 1 mg  1 mg IntraVENous Q4H PRN    ondansetron (ZOFRAN) injection 4 mg  4 mg IntraVENous Q4H PRN    pantoprazole (PROTONIX) 40 mg in sodium chloride 0.9 % 10 mL injection  40 mg IntraVENous DAILY     ______________________________________________________________________  EXPECTED LENGTH OF STAY: 5d 2h  ACTUAL LENGTH OF STAY:          MD Lloyd

## 2023-08-17 NOTE — DISCHARGE SUMMARY
-- DO NOT REPLY / DO NOT REPLY ALL --  -- Message is from [employer] ([facility]) --    General Patient Message: Patient is calling stated he will need a letter for his employer stating he will be off of work .      Caller Information       Type Contact Phone/Fax    08/17/2023 12:42 PM CDT Phone (Incoming) Mateo Mcneal (Self) [phone] (M)        Alternative phone number: none    Can a detailed message be left? Yes    Message Turnaround: [place]:    Refer to site's KB page for routing instructions    Please give this turnaround time to the caller:   \"You can expect to receive a response 1-3 business days after your provider's clinical team reviews the message\"               Discharge Summary       PATIENT ID: Jonna Herrera  MRN: [id number]   YOB: 1945    DATE OF ADMISSION: 11/27/2017 10:26 PM    DATE OF DISCHARGE: 12/4/2017   PRIMARY CARE PROVIDER: Karina Guzman MD     ATTENDING PHYSICIAN: Dr. Luci De Leon  DISCHARGING PROVIDER: Jyoti Lindo NP    To contact this individual call [phone] and ask the  to page. If unavailable ask to be transferred the Adult Hospitalist Department. CONSULTATIONS: IP CONSULT TO HOSPITALIST  IP CONSULT TO INFECTIOUS DISEASES  IP CONSULT TO GASTROENTEROLOGY    PROCEDURES/SURGERIES: Procedure(s):  ENDOSCOPIC RETROGRADE CHOLANGIOPANCREATOGRAPHY  ENDOSCOPIC SPHINCTEROTOMY  ENDOSCOPY WITH PROSTHESIS OR STENT PLACEMENT    ADMITTING [address] [place] [address] COURSE:   Pt initially came to ED with son after he noted she was confused, not clear and her speech was \"off\"  it was also noted that she was not feeling well on Thursday after eating Thanksgiving dinner:  Nauseous and had loose stools. Pt denies Loose stools - says they were soft due to colace. She was seen and treated in the ED and discharged yesterday. She was subsequently called later on in the day due to positive blood cultures and asked to return to the hospital.  Pt was admitted with E coli Bacteremia likely biliary in origin. Pt had an MRCP on 11/29/2017 which showed CBD stricture with stones. Pt is s/p ERCP with biliary sphincterotomy and biliary stent placement on 11/30. Pt subsequently developed POST ERCP pancreatitis. Lipase has remained elevated, however, pt clinically looks better. She is able to eat w/o difficulty. VSS. Home infusion services have been set up for IV abx until 12/11 with Ceftriaxone. DISCHARGE DIAGNOSES / PLAN:      Post ERCP Pancreatitis:  - Lipase >3000 accompanied by nausea and vomiting 12/1.  - Lipase again >3000 12/2 and 12/3 still has some mild pain 12/2 but diet advanced by GI to clear lquids.  Today, she is doing well and having very little to no pain. Advancing diet to GI lite. Following clinical course and not necessarily lipase levels. - may have dilaudid for pain, zofran for nausea  - will need GI follow up and repeat ERCP 3-6 months  - on d/c she is able to tolerate all meals, denies any n/v     E coli bacteremia:   - has hx of recurrent bacteremia and previously followed by ID  - pt has allergy to penicillin, Cipro and Sulfa  - Blood cultures from 0600 11/27: e coli in 4/4 bottles, sensitive to ceftriaxone  - Repeat blood cultures from 11/27 with no growth (final)   - urine culture back and has e faecalis growing. Discussed this with ID - do not treat as she is asymptomatic  - CT abdomen negative for acute process   - MRCP showing CBD stricture and stones. - ERCP 11/30: showed ~ 2 cm distal CBD tight stricture, all the way down to ampulla, small stones above it in the CBD, rest of biliary tree was not dilated and had beading appearance consistent with PSC. Stent placed but stones were not removed due to the stricture. - CA 19-9 result was 1 (ref range 0-35)  - PICC placed 12/2     Hypokalemia: Resolved       Anemia, likely chronic: Stable      Elevated liver function tests: Resolved      Hx Hypothyroidism: Continue levothyroxine.       Hx Peripheral neuropathy: Continue Neurontin.       Hx GERD: Continue Protonix.       Hx Arthritis: continue Azathioprine.       Hx Hyperlipidemia: Continue Lipitor          PENDING TEST RESULTS:   At the time of discharge the following test results are still pending: none    FOLLOW UP APPOINTMENTS:    Follow-up Information     Follow up With Details Comments Contact Info    Cody Felix MD  follow up in 3 months [address] [facility]  [address] [place] [address]             ADDITIONAL CARE RECOMMENDATIONS:  Ceftriaxone 2 gram, IV until December 11 th to treat your blood infection.   The infusion service will be in touch with Dr. Zaynab Clarke (infectious disease)  You will need a repeat ERCP in 3-6 months for stent removal with Dr. Lopez Book: Low Fat    ACTIVITY: As tolerated    WOUND CARE: routine PICC care    EQUIPMENT needed: none        DISCHARGE MEDICATIONS:  Current Discharge Medication List      START taking these medications    Details   docusate sodium (COLACE) 100 mg capsule Take 1 Cap by mouth two (2) times a day for 90 days. Qty: 60 Cap, Refills: 2      cefTRIAXone 2 gram 2 g, ADDaptor 1 Device IVPB 2 g by IntraVENous route every twenty-four (24) hours for 7 days. Qty: 7 Dose, Refills: 0         CONTINUE these medications which have CHANGED    Details   oxyCODONE IR (ROXICODONE) 5 mg immediate release tablet Take 1 Tab by mouth two (2) times a day for 7 days. Max Daily Amount: 10 mg.  Qty: 14 Tab, Refills: 0         CONTINUE these medications which have NOT CHANGED    Details   aspirin 81 mg tablet Take 81 mg by mouth daily. potassium chloride (KAON 10%) 20 mEq/15 mL solution Take 15 mL by mouth daily. Qty: 1440 mL, Refills: 1    Associated Diagnoses: Hypokalemia      montelukast (SINGULAIR) 10 mg tablet TAKE 1 TABLET DAILY  Qty: 90 Tab, Refills: 3      verapamil ER (CALAN-SR) 120 mg tablet TAKE 1 TABLET NIGHTLY  Qty: 90 Tab, Refills: 2      predniSONE (DELTASONE) 5 mg tablet       nystatin (MYCOSTATIN) topical cream Apply  to affected area two (2) times a day. Qty: 30 g, Refills: 0      Nebulizer & Compressor machine 1 Each by Does Not Apply route every six (6) hours as needed. Indications: COUGH  Qty: 1 Each, Refills: 0      hydroCHLOROthiazide (HYDRODIURIL) 25 mg tablet Take 1 Tab by mouth daily. 1 tablet oe time daily for edema ,HTN  Qty: 90 Tab, Refills: 1    Associated Diagnoses: Essential hypertension with goal blood pressure less than 130/85      atorvastatin (LIPITOR) 20 mg tablet Take 1 Tab by mouth nightly. Qty: 90 Tab, Refills: 3      pantoprazole (PROTONIX) 40 mg tablet Take 1 Tab by mouth daily.  Indications: gastroenteritis  Qty: [address] Tab, Refills: 3 carvedilol (COREG) 3.125 mg tablet Take 1 Tab by mouth two (2) times a day. Qty: 180 Tab, Refills: 2      ipratropium (ATROVENT) 0.02 % nebulizer solution 2.5 mL by Nebulization route every six (6) hours as needed. Qty: 100 mL, Refills: 0      levothyroxine (SYNTHROID) 50 mcg tablet TAKE 1 TABLET DAILY  Qty: 90 Tab, Refills: 2    Associated Diagnoses: Acquired hypothyroidism      cholecalciferol (VITAMIN D3) 1,000 unit tablet Take 1,000 Units by mouth two (2) times a day. calcium-cholecalciferol, d3, (CALCIUM 600 + D) 600-125 mg-unit tab Take  by mouth.      multivitamin (ONE A DAY) tablet Take 1 Tab by mouth daily. baclofen (LIORESAL) 10 mg tablet Take 0.5 Tabs by mouth three (3) times daily. Take one half tablet every 8 hr.prn for muscle spasm  Qty: 30 Tab, Refills: 0    Associated Diagnoses: Left-sided low back pain with left-sided sciatica; Discitis of lumbar region      gabapentin (NEURONTIN) 100 mg capsule Take 1 Cap by mouth two (2) times a day. Qty: 60 Cap, Refills: 0    Associated Diagnoses: Left-sided low back pain with left-sided sciatica; Discitis of lumbar region      azaTHIOprine (IMURAN) 50 mg tablet Take 150 mg by mouth daily. VITAMIN B COMPLEX PO Take 1 Tab by mouth daily. 1 tab, PO, daily, 0 Refills      vitamin E (AQUA GEMS) 400 unit capsule Take 400 Units by mouth two (2) times a day. STOP taking these medications       potassium chloride SR (KLOR-CON 10) 10 mEq tablet Comments:   Reason for Stopping:                 NOTIFY YOUR PHYSICIAN FOR ANY OF THE FOLLOWING:   Fever over 101 degrees for 24 hours. Chest pain, shortness of breath, fever, chills, nausea, vomiting, diarrhea, change in mentation, falling, weakness, bleeding. Severe pain or pain not relieved by medications. Or, any other signs or symptoms that you may have questions about.     DISPOSITION:  XX  Home With:   OT  PT X HH  RN       Long term SNF/Inpatient Rehab    Independent/assisted living    Hospice Other: PATIENT CONDITION AT DISCHARGE:     Functional status    Poor     Deconditioned    XX Independent      Cognition    XX Lucid     Forgetful     Dementia      Catheters/lines (plus indication)    Wilk[place]    XX PICC     PEG     None      Code status    XX Full code     DNR      PHYSICAL EXAMINATION AT DISCHARGE:  Constitutional:  No acute distress, cooperative, pleasant    ENT:  Oral mucous membranes moist, oropharynx benign. Resp:  CTA bilaterally. No wheezing. No accessory muscle use, RA   CV:  Regular rhythm, normal rate, no murmurs    GI:  Soft, non distended, mild tenderess around hernia. normoactive bowel sounds + BM     Musculoskeletal:  No edema, warm, 2+ pulses throughout    Neurologic:  Moves all extremities. AAOx3  Lines: DAYANNA PICC line       CHRONIC MEDICAL DIAGNOSES:  Problem List as of 12/4/2017  Date Reviewed: 12/4/2017          Codes Class Noted - Resolved    * (Principal)Gram-negative bacteremia ICD-10-CM: R78.81  ICD-9-CM: 790.7, 041.85  11/28/2017 - Present        Hypokalemia ICD-10-CM: E87.6  ICD-9-CM: 276.8  10/18/2017 - Present        Gastroparesis ICD-10-CM: K31.84  ICD-9-CM: 536.3  7/31/2017 - Present        Hyperlipidemia ICD-10-CM: E78.5  ICD-9-CM: 272.4  6/26/2017 - Present        Demand ischemia ([facility] ICD-10-CM: I24.8  ICD-9-CM: 411.89  5/15/2017 - Present        ACP (advance care planning) ICD-10-CM: Z71.89  ICD-9-CM: V65.49  11/30/2016 - Present    Overview Signed 11/30/2016 12:23 PM by Gale Jones     Patient brought her ACP to visit. It was reviewed by me.               Gastroesophageal reflux disease without esophagitis ICD-10-CM: K21.9  ICD-9-CM: 530.81  10/24/2016 - Present        Chronic midline low back pain without sciatica ICD-10-CM: M54.5, G89.29  ICD-9-CM: 724.2, 338.29  10/24/2016 - Present        Insomnia ICD-10-CM: G47.00  ICD-9-CM: 780.52  7/22/2015 - Present        Abnormal gait ICD-10-CM: R26.9  ICD-9-CM: 781.2  5/14/2015 - Present        Vitamin D deficiency ICD-10-CM: E55.9  ICD-9-CM: 268.9  3/16/2015 - Present        Allergic rhinitis ICD-10-CM: J30.9  ICD-9-CM: 477.9  10/7/2014 - Present        Chronic constipation ICD-10-CM: K59.09  ICD-9-CM: 564.00  10/7/2014 - Present        Hypothyroidism ICD-10-CM: E03.9  ICD-9-CM: 244.9  11/30/2012 - Present        Postmenopausal ICD-10-CM: Z78.0  ICD-9-CM: V49.81  10/17/2012 - Present        RA (rheumatoid arthritis) ([facility] ICD-10-CM: M06.9  ICD-9-CM: 714.0  2/9/2010 - Present        Essential hypertension ICD-10-CM: I10  ICD-9-CM: 401.9  2/9/2010 - Present        RESOLVED: E coli bacteremia ICD-10-CM: R78.81  ICD-9-CM: 790.7, 041.49  5/24/2017 - 6/26/2017        RESOLVED: Oral candidiasis ICD-10-CM: B37.0  ICD-9-CM: 112.0  5/24/2017 - 6/26/2017        RESOLVED: Septic shock ([facility] ICD-10-CM: A41.9, R65.21  ICD-9-CM: 038.9, 785.52, 995.92  5/15/2017 - 6/26/2017        RESOLVED: Septic encephalopathy ICD-10-CM: G93.41  ICD-9-CM: 348.31  5/15/2017 - 5/24/2017        RESOLVED: Partial small bowel obstruction ICD-10-CM: K56.600  ICD-9-CM: 560.9  5/15/2017 - 6/26/2017        RESOLVED: Hypokalemia ICD-10-CM: E87.6  ICD-9-CM: 276.8  5/15/2017 - 5/24/2017        RESOLVED: High anion gap metabolic acidosis [id number]: E87.2  ICD-9-CM: 276.2  5/15/2017 - 5/24/2017        RESOLVED: JEANINE (acute kidney injury) ([facility] ICD-10-CM: N17.9  ICD-9-CM: 584.9  5/15/2017 - 5/24/2017        RESOLVED: Abnormal LFTs ICD-10-CM: R79.89  ICD-9-CM: 790.6  5/15/2017 - 5/24/2017        RESOLVED: Acute gastroenteritis ICD-10-CM: K52.9  ICD-9-CM: 558.9  5/15/2017 - 5/24/2017        RESOLVED: Elevated BP ICD-10-CM: [id number]  ICD-9-CM: Sweetie Cote  9/21/2016 - 10/24/2016        RESOLVED: Thalamic hemorrhage ([facility] [address] ICD-10-CM: I61.0  ICD-9-CM: 539  12/29/2015 - 10/24/2016        RESOLVED: TIA (transient ischemic attack) ICD-10-CM: G45.9  ICD-9-CM: 435.9  12/23/2015 - 10/24/2016        RESOLVED: Chest pain ICD-10-CM: R07.9  ICD-9-CM: 786.50  12/22/2015 - 10/24/2016 RESOLVED: Discitis of lumbar region ICD-10-CM: M46.46  ICD-9-CM: 722.93  12/9/2015 - 10/24/2016        RESOLVED: Left-sided low back pain with left-sided sciatica ICD-10-CM: M54.42  ICD-9-CM: 724.3  12/9/2015 - 10/24/2016        RESOLVED: Back pain ICD-10-CM: M54.9  ICD-9-CM: 724.5  8/23/2015 - 10/24/2016        RESOLVED: Hypotension ICD-10-CM: I95.9  ICD-9-CM: 458.9  8/23/2015 - 9/21/2016        RESOLVED: Glossitis ICD-10-CM: K14.0  ICD-9-CM: 529.0  7/22/2015 - 10/24/2016        RESOLVED: Prediabetes ICD-10-CM: R73.03  ICD-9-CM: 790.29  7/22/2015 - 10/19/2017        RESOLVED: Sore throat ICD-10-CM: J02.9  ICD-9-CM: 284  3/16/2015 - 7/22/2015        RESOLVED: Obesity, Class II, BMI 35-39.9 ICD-10-CM: E66.9  ICD-9-CM: 278.00  1/21/2015 - 10/24/2016        RESOLVED: Tinea manus ICD-10-CM: B35.2  ICD-9-CM: 110.2  10/7/2014 - 10/18/2017        RESOLVED: LLQ abdominal pain ICD-10-CM: R10.32  ICD-9-CM: 789.04  10/7/2014 - 1/21/2015        RESOLVED: Obesity, unspecified ICD-10-CM: E66.9  ICD-9-CM: 278.00  12/10/2010 - 1/21/2015              Greater than 30 minutes were spent with the patient on counseling and coordination of care    Signed:   Rick Bateman NP  12/4/2017  10:07 AM

## 2023-11-07 NOTE — TELEPHONE ENCOUNTER
[facility] has discharged patient from PT because she has met all her goals.
pt fell at home and complains of left leg pain

## (undated) DEVICE — SPHINCTEROTOME: Brand: DREAMTOME™ RX 44

## (undated) DEVICE — KENDALL RADIOLUCENT FOAM MONITORING ELECTRODE -RECTANGULAR SHAPE: Brand: KENDALL

## (undated) DEVICE — MEDI-VAC NON-CONDUCTIVE SUCTION TUBING: Brand: CARDINAL HEALTH

## (undated) DEVICE — SET EXTN TBNG L BOR 4 W STPCOCK ST 32IN PRIMING VOL 6ML

## (undated) DEVICE — WIREGUIDED CYTOLOGY BRUSH: Brand: RX CYTOLOGY BRUSH

## (undated) DEVICE — SYR LR LCK 1ML GRAD NSAF 30ML --

## (undated) DEVICE — NDL PRT INJ NSAF BLNT 18GX1.5 --

## (undated) DEVICE — DEVICE LCK BILI RAP EXCHG OLPS --

## (undated) DEVICE — SYR 3ML LL TIP 1/10ML GRAD --

## (undated) DEVICE — SET ADMIN 16ML TBNG L100IN 2 Y INJ SITE IV PIGGY BK DISP

## (undated) DEVICE — BITEBLOCK ENDOSCP 60FR MAXI WHT POLYETH STURDY W/ VELC WVN

## (undated) DEVICE — SOLIDIFIER FLUID 3000 CC ABSORB

## (undated) DEVICE — Device: Brand: SINGLE USE SOFT BRUSH

## (undated) DEVICE — CATH IV AUTOGRD BC BLU 22GA 25 -- INSYTE

## (undated) DEVICE — PREP SKN CHLRAPRP SNGL 1.75ML --

## (undated) DEVICE — CANN NASAL O2 CAPNOGRAPHY AD -- FILTERLINE

## (undated) DEVICE — NDL FLTR TIP 5 MIC 18GX1.5IN --

## (undated) DEVICE — BW-412T DISP COMBO CLEANING BRUSH: Brand: SINGLE USE COMBINATION CLEANING BRUSH

## (undated) DEVICE — ENDO CARRY-ON PROCEDURE KIT INCLUDES ENZYMATIC SPONGE, GAUZE, BIOHAZARD LABEL, TRAY, LUBRICANT, DIRTY SCOPE LABEL, WATER LABEL, TRAY, DRAWSTRING PAD, AND DEFENDO 4-PIECE KIT.: Brand: ENDO CARRY-ON PROCEDURE KIT

## (undated) DEVICE — 1200 GUARD II KIT W/5MM TUBE W/O VAC TUBE: Brand: GUARDIAN

## (undated) DEVICE — Device

## (undated) DEVICE — RETRIEVAL BALLOON CATHETER: Brand: EXTRACTOR™ PRO RX

## (undated) DEVICE — Z DISCONTINUED NO SUB IDED SET EXTN W/ 4 W STPCOCK M SPIN LOK 36IN

## (undated) DEVICE — BAG BELONG PT PERS CLEAR HANDL

## (undated) DEVICE — DEVON™ KNEE AND BODY STRAP 60" X 3" (1.5 M X 7.6 CM): Brand: DEVON

## (undated) DEVICE — SYR 5ML 1/5 GRAD LL NSAF LF --

## (undated) DEVICE — SNARE ENDOSCP M L240CM W27MM SHTH DIA2.4MM CHN 2.8MM OVL

## (undated) DEVICE — KIT COLON W/ 1.1OZ LUB AND 2 END

## (undated) DEVICE — TRNQT TEXT 1X18IN BLU LF DISP -- CONVERT TO ITEM 362165